# Patient Record
Sex: FEMALE | Race: BLACK OR AFRICAN AMERICAN | NOT HISPANIC OR LATINO | Employment: OTHER | URBAN - METROPOLITAN AREA
[De-identification: names, ages, dates, MRNs, and addresses within clinical notes are randomized per-mention and may not be internally consistent; named-entity substitution may affect disease eponyms.]

---

## 2017-01-09 ENCOUNTER — COMMUNICATION - HEALTHEAST (OUTPATIENT)
Dept: INTERNAL MEDICINE | Facility: CLINIC | Age: 82
End: 2017-01-09

## 2017-01-11 ENCOUNTER — HOSPITAL ENCOUNTER (OUTPATIENT)
Dept: CARDIOLOGY | Facility: CLINIC | Age: 82
Discharge: HOME | End: 2017-01-11
Attending: INTERNAL MEDICINE

## 2017-01-11 ENCOUNTER — OFFICE VISIT - HEALTHEAST (OUTPATIENT)
Dept: INTERNAL MEDICINE | Facility: CLINIC | Age: 82
End: 2017-01-11

## 2017-01-11 DIAGNOSIS — J98.01 ACUTE BRONCHOSPASM: ICD-10-CM

## 2017-01-11 DIAGNOSIS — I10 ESSENTIAL HYPERTENSION, BENIGN: ICD-10-CM

## 2017-01-11 DIAGNOSIS — J06.9 VIRAL URI: ICD-10-CM

## 2017-01-11 DIAGNOSIS — R05.9 COUGH: ICD-10-CM

## 2017-01-11 DIAGNOSIS — E11.8 TYPE 2 DIABETES MELLITUS WITH COMPLICATION, UNSPECIFIED LONG TERM INSULIN USE STATUS: ICD-10-CM

## 2017-01-11 DIAGNOSIS — J81.0 ACUTE PULMONARY EDEMA (H): ICD-10-CM

## 2017-01-11 DIAGNOSIS — R06.2 WHEEZING: ICD-10-CM

## 2017-01-11 DIAGNOSIS — E78.00 PURE HYPERCHOLESTEROLEMIA: ICD-10-CM

## 2017-01-11 DIAGNOSIS — E03.9 HYPOTHYROIDISM, UNSPECIFIED TYPE: ICD-10-CM

## 2017-01-11 LAB
AORTIC ROOT: 2.6 CM
AORTIC VALVE MEAN VELOCITY: 73.8 CM/S
AV DIMENSIONLESS INDEX VTI: 0.7
AV MEAN GRADIENT: 3 MMHG
AV PEAK GRADIENT: 5.1 MMHG
AV VALVE AREA: 1.9 CM2
AV VELOCITY RATIO: 0.7
BSA FOR ECHO PROCEDURE: 1.89 M2
CV BLOOD PRESSURE: NORMAL MMHG
CV ECHO HEIGHT: 63 IN
CV ECHO WEIGHT: 178 LBS
DOP CALC AO PEAK VEL: 113 CM/S
DOP CALC AO VTI: 28.6 CM
DOP CALC LVOT AREA: 2.83 CM2
DOP CALC LVOT DIAMETER: 1.9 CM
DOP CALC LVOT PEAK VEL: 78.2 CM/S
DOP CALC LVOT STROKE VOLUME: 55.3 CM3
DOP CALCLVOT PEAK VEL VTI: 19.5 CM
EJECTION FRACTION: 55 % (ref 55–75)
FRACTIONAL SHORTENING: 37.6 % (ref 28–44)
HBA1C MFR BLD: 6.5 % (ref 3.5–6)
INTERVENTRICULAR SEPTUM IN END DIASTOLE: 1.02 CM (ref 0.6–0.9)
IVS/PW RATIO: 1
LA AREA 1: 26.1 CM2
LA AREA 2: 21.2 CM2
LEFT ATRIUM LENGTH: 5.58 CM
LEFT ATRIUM SIZE: 4.2 CM
LEFT ATRIUM VOLUME INDEX: 44.6 ML/M2
LEFT ATRIUM VOLUME: 84.3 CM3
LEFT VENTRICLE CARDIAC INDEX: 1.9 L/MIN/M2
LEFT VENTRICLE CARDIAC OUTPUT: 3.5 L/MIN
LEFT VENTRICLE DIASTOLIC VOLUME INDEX: 21.2 CM3/M2 (ref 34–74)
LEFT VENTRICLE DIASTOLIC VOLUME: 40 CM3 (ref 46–106)
LEFT VENTRICLE HEART RATE: 64 BPM
LEFT VENTRICLE MASS INDEX: 99.5 G/M2
LEFT VENTRICLE SYSTOLIC VOLUME INDEX: 9.5 CM3/M2 (ref 11–31)
LEFT VENTRICLE SYSTOLIC VOLUME: 18 CM3 (ref 14–42)
LEFT VENTRICULAR INTERNAL DIMENSION IN DIASTOLE: 4.98 CM (ref 3.8–5.2)
LEFT VENTRICULAR INTERNAL DIMENSION IN SYSTOLE: 3.11 CM (ref 2.2–3.5)
LEFT VENTRICULAR MASS: 188.1 G
LEFT VENTRICULAR OUTFLOW TRACT MEAN GRADIENT: 1 MMHG
LEFT VENTRICULAR OUTFLOW TRACT MEAN VELOCITY: 52.9 CM/S
LEFT VENTRICULAR OUTFLOW TRACT PEAK GRADIENT: 2 MMHG
LEFT VENTRICULAR POSTERIOR WALL IN END DIASTOLE: 1.04 CM (ref 0.6–0.9)
LV STROKE VOLUME INDEX: 29.2 ML/M2
MITRAL REGURGITANT VELOCITY TIME INTEGRAL: 191 CM
MITRAL VALVE E/A RATIO: 2.2
MR FLOW: 55 CM3
MR MEAN GRADIENT: 60 MMHG
MR MEAN VELOCITY: 364 CM/S
MR PEAK GRADIENT: 85.4 MMHG
MR PISA EROA: 0.3 CM2
MR PISA RADIUS: 0.9 CM
MR PISA VN NYQUIST: 26 CM/S
MV AVERAGE E/E' RATIO: 12.9 CM/S
MV DECELERATION TIME: 158 MS
MV E'TISSUE VEL-LAT: 7.99 CM/S
MV E'TISSUE VEL-MED: 8.29 CM/S
MV LATERAL E/E' RATIO: 13.1
MV MEDIAL E/E' RATIO: 12.7
MV PEAK A VELOCITY: 47.9 CM/S
MV PEAK E VELOCITY: 105 CM/S
MV REGURGITANT VOLUME: 54.7 CC
NUC REST DIASTOLIC VOLUME INDEX: 2848 LBS
NUC REST SYSTOLIC VOLUME INDEX: 63 IN
PISA MR PEAK VEL: 462 CM/S
PR MAX PG: 11 MMHG
PR PEAK VELOCITY: 169 CM/S
TRICUSPID REGURGITATION PEAK PRESSURE GRADIENT: 39.9 MMHG
TRICUSPID VALVE PEAK REGURGITANT VELOCITY: 316 CM/S

## 2017-01-11 ASSESSMENT — MIFFLIN-ST. JEOR
SCORE: 1191.07
SCORE: 1186.53

## 2017-01-12 ENCOUNTER — COMMUNICATION - HEALTHEAST (OUTPATIENT)
Dept: INTERNAL MEDICINE | Facility: CLINIC | Age: 82
End: 2017-01-12

## 2017-01-13 ENCOUNTER — COMMUNICATION - HEALTHEAST (OUTPATIENT)
Dept: INTERNAL MEDICINE | Facility: CLINIC | Age: 82
End: 2017-01-13

## 2017-01-17 ENCOUNTER — COMMUNICATION - HEALTHEAST (OUTPATIENT)
Dept: INTERNAL MEDICINE | Facility: CLINIC | Age: 82
End: 2017-01-17

## 2017-01-26 ENCOUNTER — OFFICE VISIT - HEALTHEAST (OUTPATIENT)
Dept: INTERNAL MEDICINE | Facility: CLINIC | Age: 82
End: 2017-01-26

## 2017-01-26 ENCOUNTER — COMMUNICATION - HEALTHEAST (OUTPATIENT)
Dept: INTERNAL MEDICINE | Facility: CLINIC | Age: 82
End: 2017-01-26

## 2017-01-26 DIAGNOSIS — J81.1 CHRONIC PULMONARY EDEMA: ICD-10-CM

## 2017-01-26 DIAGNOSIS — R05.9 COUGH: ICD-10-CM

## 2017-01-26 DIAGNOSIS — E11.8 TYPE 2 DIABETES MELLITUS WITH COMPLICATION, UNSPECIFIED LONG TERM INSULIN USE STATUS: ICD-10-CM

## 2017-01-26 DIAGNOSIS — I10 ESSENTIAL HYPERTENSION, BENIGN: ICD-10-CM

## 2017-01-26 ASSESSMENT — MIFFLIN-ST. JEOR: SCORE: 1195.6

## 2017-02-01 ENCOUNTER — COMMUNICATION - HEALTHEAST (OUTPATIENT)
Dept: INTERNAL MEDICINE | Facility: CLINIC | Age: 82
End: 2017-02-01

## 2017-02-27 ENCOUNTER — OFFICE VISIT - HEALTHEAST (OUTPATIENT)
Dept: INTERNAL MEDICINE | Facility: CLINIC | Age: 82
End: 2017-02-27

## 2017-02-27 DIAGNOSIS — E11.8 TYPE 2 DIABETES MELLITUS WITH COMPLICATION, UNSPECIFIED LONG TERM INSULIN USE STATUS: ICD-10-CM

## 2017-02-27 DIAGNOSIS — M79.10 MYALGIA: ICD-10-CM

## 2017-02-27 DIAGNOSIS — E03.9 HYPOTHYROIDISM, UNSPECIFIED TYPE: ICD-10-CM

## 2017-02-27 DIAGNOSIS — I10 ESSENTIAL HYPERTENSION, BENIGN: ICD-10-CM

## 2017-02-27 ASSESSMENT — MIFFLIN-ST. JEOR: SCORE: 1213.75

## 2017-03-01 ENCOUNTER — COMMUNICATION - HEALTHEAST (OUTPATIENT)
Dept: INTERNAL MEDICINE | Facility: CLINIC | Age: 82
End: 2017-03-01

## 2017-06-28 ENCOUNTER — COMMUNICATION - HEALTHEAST (OUTPATIENT)
Dept: INTERNAL MEDICINE | Facility: CLINIC | Age: 82
End: 2017-06-28

## 2017-06-28 DIAGNOSIS — E03.9 HYPOTHYROID: ICD-10-CM

## 2017-07-01 ENCOUNTER — COMMUNICATION - HEALTHEAST (OUTPATIENT)
Dept: INTERNAL MEDICINE | Facility: CLINIC | Age: 82
End: 2017-07-01

## 2017-07-11 ENCOUNTER — OFFICE VISIT - HEALTHEAST (OUTPATIENT)
Dept: INTERNAL MEDICINE | Facility: CLINIC | Age: 82
End: 2017-07-11

## 2017-07-11 DIAGNOSIS — R40.0 DAYTIME SOMNOLENCE: ICD-10-CM

## 2017-07-11 DIAGNOSIS — E78.00 PURE HYPERCHOLESTEROLEMIA: ICD-10-CM

## 2017-07-11 DIAGNOSIS — E11.8 TYPE 2 DIABETES MELLITUS WITH COMPLICATION (H): ICD-10-CM

## 2017-07-11 DIAGNOSIS — I10 ESSENTIAL HYPERTENSION, BENIGN: ICD-10-CM

## 2017-07-11 DIAGNOSIS — E03.9 HYPOTHYROIDISM: ICD-10-CM

## 2017-07-11 LAB
CHOLEST SERPL-MCNC: 138 MG/DL
FASTING STATUS PATIENT QL REPORTED: YES
HBA1C MFR BLD: 7 % (ref 3.5–6)
HDLC SERPL-MCNC: 42 MG/DL
LDLC SERPL CALC-MCNC: 82 MG/DL
TRIGL SERPL-MCNC: 71 MG/DL

## 2017-07-11 ASSESSMENT — MIFFLIN-ST. JEOR: SCORE: 1204.68

## 2017-07-12 ENCOUNTER — COMMUNICATION - HEALTHEAST (OUTPATIENT)
Dept: INTERNAL MEDICINE | Facility: CLINIC | Age: 82
End: 2017-07-12

## 2017-07-27 ENCOUNTER — COMMUNICATION - HEALTHEAST (OUTPATIENT)
Dept: INTERNAL MEDICINE | Facility: CLINIC | Age: 82
End: 2017-07-27

## 2017-08-01 ENCOUNTER — OFFICE VISIT - HEALTHEAST (OUTPATIENT)
Dept: INTERNAL MEDICINE | Facility: CLINIC | Age: 82
End: 2017-08-01

## 2017-08-01 DIAGNOSIS — I10 ESSENTIAL HYPERTENSION, BENIGN: ICD-10-CM

## 2017-08-01 DIAGNOSIS — I48.92 ATRIAL FLUTTER (H): ICD-10-CM

## 2017-08-01 DIAGNOSIS — R60.9 EDEMA: ICD-10-CM

## 2017-08-01 DIAGNOSIS — I49.9 IRREGULAR HEART RHYTHM: ICD-10-CM

## 2017-08-01 DIAGNOSIS — R40.0 DAYTIME SOMNOLENCE: ICD-10-CM

## 2017-08-01 LAB
ATRIAL RATE - MUSE: 340 BPM
DIASTOLIC BLOOD PRESSURE - MUSE: NORMAL MMHG
INTERPRETATION ECG - MUSE: NORMAL
P AXIS - MUSE: NORMAL DEGREES
PR INTERVAL - MUSE: NORMAL MS
QRS DURATION - MUSE: 88 MS
QT - MUSE: 394 MS
QTC - MUSE: 492 MS
R AXIS - MUSE: -21 DEGREES
SYSTOLIC BLOOD PRESSURE - MUSE: NORMAL MMHG
T AXIS - MUSE: 191 DEGREES
VENTRICULAR RATE- MUSE: 94 BPM

## 2017-08-01 ASSESSMENT — MIFFLIN-ST. JEOR: SCORE: 1200.14

## 2017-08-03 ENCOUNTER — AMBULATORY - HEALTHEAST (OUTPATIENT)
Dept: PODIATRY | Facility: CLINIC | Age: 82
End: 2017-08-03

## 2017-08-03 ENCOUNTER — OFFICE VISIT - HEALTHEAST (OUTPATIENT)
Dept: CARDIOLOGY | Facility: CLINIC | Age: 82
End: 2017-08-03

## 2017-08-03 DIAGNOSIS — I50.32 CHRONIC DIASTOLIC CONGESTIVE HEART FAILURE (H): ICD-10-CM

## 2017-08-03 DIAGNOSIS — I10 ESSENTIAL HYPERTENSION: ICD-10-CM

## 2017-08-03 DIAGNOSIS — B35.1 NAIL FUNGUS: ICD-10-CM

## 2017-08-03 DIAGNOSIS — I48.3 TYPICAL ATRIAL FLUTTER (H): ICD-10-CM

## 2017-08-03 DIAGNOSIS — E11.8 TYPE 2 DIABETES MELLITUS WITH COMPLICATION, WITHOUT LONG-TERM CURRENT USE OF INSULIN (H): ICD-10-CM

## 2017-08-03 DIAGNOSIS — L60.2 ONYCHAUXIS: ICD-10-CM

## 2017-08-03 DIAGNOSIS — L84 TYLOMA: ICD-10-CM

## 2017-08-03 DIAGNOSIS — I11.0 LVH (LEFT VENTRICULAR HYPERTROPHY) DUE TO HYPERTENSIVE DISEASE, WITH HEART FAILURE (H): ICD-10-CM

## 2017-08-03 ASSESSMENT — MIFFLIN-ST. JEOR
SCORE: 1166.68
SCORE: 1200.14

## 2017-08-07 ENCOUNTER — COMMUNICATION - HEALTHEAST (OUTPATIENT)
Dept: INTERNAL MEDICINE | Facility: CLINIC | Age: 82
End: 2017-08-07

## 2017-09-08 ENCOUNTER — AMBULATORY - HEALTHEAST (OUTPATIENT)
Dept: NURSING | Facility: CLINIC | Age: 82
End: 2017-09-08

## 2017-09-08 ENCOUNTER — COMMUNICATION - HEALTHEAST (OUTPATIENT)
Dept: INTERNAL MEDICINE | Facility: CLINIC | Age: 82
End: 2017-09-08

## 2017-09-08 DIAGNOSIS — I48.3 TYPICAL ATRIAL FLUTTER (H): ICD-10-CM

## 2017-09-08 DIAGNOSIS — I48.92 ATRIAL FLUTTER (H): ICD-10-CM

## 2017-09-12 ENCOUNTER — COMMUNICATION - HEALTHEAST (OUTPATIENT)
Dept: NURSING | Facility: CLINIC | Age: 82
End: 2017-09-12

## 2017-09-12 DIAGNOSIS — I48.3 TYPICAL ATRIAL FLUTTER (H): ICD-10-CM

## 2017-09-13 ENCOUNTER — AMBULATORY - HEALTHEAST (OUTPATIENT)
Dept: LAB | Facility: CLINIC | Age: 82
End: 2017-09-13

## 2017-09-13 ENCOUNTER — COMMUNICATION - HEALTHEAST (OUTPATIENT)
Dept: NURSING | Facility: CLINIC | Age: 82
End: 2017-09-13

## 2017-09-13 DIAGNOSIS — I48.3 TYPICAL ATRIAL FLUTTER (H): ICD-10-CM

## 2017-09-18 ENCOUNTER — COMMUNICATION - HEALTHEAST (OUTPATIENT)
Dept: INTERNAL MEDICINE | Facility: CLINIC | Age: 82
End: 2017-09-18

## 2017-09-19 ENCOUNTER — AMBULATORY - HEALTHEAST (OUTPATIENT)
Dept: LAB | Facility: CLINIC | Age: 82
End: 2017-09-19

## 2017-09-19 ENCOUNTER — COMMUNICATION - HEALTHEAST (OUTPATIENT)
Dept: NURSING | Facility: CLINIC | Age: 82
End: 2017-09-19

## 2017-09-19 DIAGNOSIS — I48.3 TYPICAL ATRIAL FLUTTER (H): ICD-10-CM

## 2017-09-25 ENCOUNTER — COMMUNICATION - HEALTHEAST (OUTPATIENT)
Dept: NURSING | Facility: CLINIC | Age: 82
End: 2017-09-25

## 2017-09-25 ENCOUNTER — AMBULATORY - HEALTHEAST (OUTPATIENT)
Dept: LAB | Facility: CLINIC | Age: 82
End: 2017-09-25

## 2017-09-25 DIAGNOSIS — I48.3 TYPICAL ATRIAL FLUTTER (H): ICD-10-CM

## 2017-09-28 ENCOUNTER — COMMUNICATION - HEALTHEAST (OUTPATIENT)
Dept: NURSING | Facility: CLINIC | Age: 82
End: 2017-09-28

## 2017-09-28 ENCOUNTER — AMBULATORY - HEALTHEAST (OUTPATIENT)
Dept: LAB | Facility: CLINIC | Age: 82
End: 2017-09-28

## 2017-09-28 DIAGNOSIS — I48.3 TYPICAL ATRIAL FLUTTER (H): ICD-10-CM

## 2017-10-02 ENCOUNTER — AMBULATORY - HEALTHEAST (OUTPATIENT)
Dept: LAB | Facility: CLINIC | Age: 82
End: 2017-10-02

## 2017-10-02 ENCOUNTER — COMMUNICATION - HEALTHEAST (OUTPATIENT)
Dept: NURSING | Facility: CLINIC | Age: 82
End: 2017-10-02

## 2017-10-02 DIAGNOSIS — I48.3 TYPICAL ATRIAL FLUTTER (H): ICD-10-CM

## 2017-10-10 ENCOUNTER — COMMUNICATION - HEALTHEAST (OUTPATIENT)
Dept: SCHEDULING | Facility: CLINIC | Age: 82
End: 2017-10-10

## 2017-10-11 ENCOUNTER — COMMUNICATION - HEALTHEAST (OUTPATIENT)
Dept: NURSING | Facility: CLINIC | Age: 82
End: 2017-10-11

## 2017-10-16 ENCOUNTER — OFFICE VISIT - HEALTHEAST (OUTPATIENT)
Dept: INTERNAL MEDICINE | Facility: CLINIC | Age: 82
End: 2017-10-16

## 2017-10-16 DIAGNOSIS — I48.92 ATRIAL FLUTTER (H): ICD-10-CM

## 2017-10-16 DIAGNOSIS — R51.9 HEADACHE: ICD-10-CM

## 2017-10-16 DIAGNOSIS — M54.9 BACK PAIN: ICD-10-CM

## 2017-10-16 DIAGNOSIS — I10 ESSENTIAL HYPERTENSION, BENIGN: ICD-10-CM

## 2017-10-16 ASSESSMENT — MIFFLIN-ST. JEOR: SCORE: 1186.53

## 2017-10-19 ENCOUNTER — COMMUNICATION - HEALTHEAST (OUTPATIENT)
Dept: NURSING | Facility: CLINIC | Age: 82
End: 2017-10-19

## 2017-10-19 ENCOUNTER — AMBULATORY - HEALTHEAST (OUTPATIENT)
Dept: PODIATRY | Facility: CLINIC | Age: 82
End: 2017-10-19

## 2017-10-19 ENCOUNTER — AMBULATORY - HEALTHEAST (OUTPATIENT)
Dept: LAB | Facility: CLINIC | Age: 82
End: 2017-10-19

## 2017-10-19 DIAGNOSIS — B35.1 NAIL FUNGUS: ICD-10-CM

## 2017-10-19 DIAGNOSIS — E11.9 TYPE 2 DIABETES MELLITUS WITHOUT COMPLICATION, WITHOUT LONG-TERM CURRENT USE OF INSULIN (H): ICD-10-CM

## 2017-10-19 DIAGNOSIS — I48.92 ATRIAL FLUTTER (H): ICD-10-CM

## 2017-10-19 DIAGNOSIS — L60.2 ONYCHAUXIS: ICD-10-CM

## 2017-10-19 DIAGNOSIS — I48.3 TYPICAL ATRIAL FLUTTER (H): ICD-10-CM

## 2017-10-19 DIAGNOSIS — L84 TYLOMA: ICD-10-CM

## 2017-10-26 ENCOUNTER — OFFICE VISIT - HEALTHEAST (OUTPATIENT)
Dept: INTERNAL MEDICINE | Facility: CLINIC | Age: 82
End: 2017-10-26

## 2017-10-26 ENCOUNTER — COMMUNICATION - HEALTHEAST (OUTPATIENT)
Dept: INTERNAL MEDICINE | Facility: CLINIC | Age: 82
End: 2017-10-26

## 2017-10-26 DIAGNOSIS — E78.00 HYPERCHOLESTEREMIA: ICD-10-CM

## 2017-10-26 DIAGNOSIS — I10 ESSENTIAL HYPERTENSION, BENIGN: ICD-10-CM

## 2017-10-26 DIAGNOSIS — Z23 NEED FOR IMMUNIZATION AGAINST INFLUENZA: ICD-10-CM

## 2017-10-26 DIAGNOSIS — K30 UPSET STOMACH: ICD-10-CM

## 2017-10-26 ASSESSMENT — MIFFLIN-ST. JEOR: SCORE: 1172.92

## 2017-10-30 ENCOUNTER — COMMUNICATION - HEALTHEAST (OUTPATIENT)
Dept: INTERNAL MEDICINE | Facility: CLINIC | Age: 82
End: 2017-10-30

## 2017-10-30 DIAGNOSIS — E78.00 HYPERCHOLESTEREMIA: ICD-10-CM

## 2017-11-02 ENCOUNTER — COMMUNICATION - HEALTHEAST (OUTPATIENT)
Dept: NURSING | Facility: CLINIC | Age: 82
End: 2017-11-02

## 2017-11-16 ENCOUNTER — OFFICE VISIT - HEALTHEAST (OUTPATIENT)
Dept: INTERNAL MEDICINE | Facility: CLINIC | Age: 82
End: 2017-11-16

## 2017-11-16 DIAGNOSIS — I10 ESSENTIAL HYPERTENSION, BENIGN: ICD-10-CM

## 2017-11-16 ASSESSMENT — MIFFLIN-ST. JEOR: SCORE: 1172.92

## 2017-11-20 ENCOUNTER — COMMUNICATION - HEALTHEAST (OUTPATIENT)
Dept: NURSING | Facility: CLINIC | Age: 82
End: 2017-11-20

## 2017-11-20 ENCOUNTER — AMBULATORY - HEALTHEAST (OUTPATIENT)
Dept: LAB | Facility: CLINIC | Age: 82
End: 2017-11-20

## 2017-11-20 DIAGNOSIS — I48.92 ATRIAL FLUTTER (H): ICD-10-CM

## 2017-11-20 DIAGNOSIS — I48.3 TYPICAL ATRIAL FLUTTER (H): ICD-10-CM

## 2017-11-29 ENCOUNTER — COMMUNICATION - HEALTHEAST (OUTPATIENT)
Dept: INTERNAL MEDICINE | Facility: CLINIC | Age: 82
End: 2017-11-29

## 2017-11-29 DIAGNOSIS — I10 ESSENTIAL HYPERTENSION, BENIGN: ICD-10-CM

## 2017-12-04 ENCOUNTER — COMMUNICATION - HEALTHEAST (OUTPATIENT)
Dept: NURSING | Facility: CLINIC | Age: 82
End: 2017-12-04

## 2017-12-04 ENCOUNTER — AMBULATORY - HEALTHEAST (OUTPATIENT)
Dept: LAB | Facility: CLINIC | Age: 82
End: 2017-12-04

## 2017-12-04 DIAGNOSIS — I48.92 ATRIAL FLUTTER (H): ICD-10-CM

## 2017-12-04 DIAGNOSIS — I48.3 TYPICAL ATRIAL FLUTTER (H): ICD-10-CM

## 2017-12-18 ENCOUNTER — COMMUNICATION - HEALTHEAST (OUTPATIENT)
Dept: INTERNAL MEDICINE | Facility: CLINIC | Age: 82
End: 2017-12-18

## 2017-12-18 DIAGNOSIS — E03.9 HYPOTHYROID: ICD-10-CM

## 2018-01-04 ENCOUNTER — COMMUNICATION - HEALTHEAST (OUTPATIENT)
Dept: NURSING | Facility: CLINIC | Age: 83
End: 2018-01-04

## 2018-01-08 ENCOUNTER — COMMUNICATION - HEALTHEAST (OUTPATIENT)
Dept: ADMINISTRATIVE | Facility: CLINIC | Age: 83
End: 2018-01-08

## 2018-01-08 ENCOUNTER — COMMUNICATION - HEALTHEAST (OUTPATIENT)
Dept: NURSING | Facility: CLINIC | Age: 83
End: 2018-01-08

## 2018-01-08 ENCOUNTER — AMBULATORY - HEALTHEAST (OUTPATIENT)
Dept: LAB | Facility: CLINIC | Age: 83
End: 2018-01-08

## 2018-01-08 DIAGNOSIS — I48.92 ATRIAL FLUTTER (H): ICD-10-CM

## 2018-01-08 DIAGNOSIS — I48.3 TYPICAL ATRIAL FLUTTER (H): ICD-10-CM

## 2018-01-08 LAB — INR PPP: 3 (ref 0.9–1.1)

## 2018-01-28 ENCOUNTER — COMMUNICATION - HEALTHEAST (OUTPATIENT)
Dept: INTERNAL MEDICINE | Facility: CLINIC | Age: 83
End: 2018-01-28

## 2018-01-29 ENCOUNTER — COMMUNICATION - HEALTHEAST (OUTPATIENT)
Dept: INTERNAL MEDICINE | Facility: CLINIC | Age: 83
End: 2018-01-29

## 2018-01-29 DIAGNOSIS — E87.6 HYPOPOTASSEMIA: ICD-10-CM

## 2018-01-31 RX ORDER — FUROSEMIDE 20 MG
TABLET ORAL
Qty: 90 TABLET | Refills: 1 | Status: SHIPPED | OUTPATIENT
Start: 2018-01-31 | End: 2022-04-21

## 2018-01-31 RX ORDER — POTASSIUM CHLORIDE 750 MG/1
TABLET, FILM COATED, EXTENDED RELEASE ORAL
Qty: 90 TABLET | Refills: 0 | Status: SHIPPED | OUTPATIENT
Start: 2018-01-31 | End: 2022-04-21

## 2018-02-12 ENCOUNTER — COMMUNICATION - HEALTHEAST (OUTPATIENT)
Dept: NURSING | Facility: CLINIC | Age: 83
End: 2018-02-12

## 2018-02-13 ENCOUNTER — AMBULATORY - HEALTHEAST (OUTPATIENT)
Dept: LAB | Facility: CLINIC | Age: 83
End: 2018-02-13

## 2018-02-13 ENCOUNTER — COMMUNICATION - HEALTHEAST (OUTPATIENT)
Dept: NURSING | Facility: CLINIC | Age: 83
End: 2018-02-13

## 2018-02-13 DIAGNOSIS — I48.92 ATRIAL FLUTTER (H): ICD-10-CM

## 2018-02-13 DIAGNOSIS — I48.3 TYPICAL ATRIAL FLUTTER (H): ICD-10-CM

## 2018-02-13 LAB — INR PPP: 2.7 (ref 0.9–1.1)

## 2018-03-13 ENCOUNTER — COMMUNICATION - HEALTHEAST (OUTPATIENT)
Dept: NURSING | Facility: CLINIC | Age: 83
End: 2018-03-13

## 2018-03-13 ENCOUNTER — OFFICE VISIT - HEALTHEAST (OUTPATIENT)
Dept: INTERNAL MEDICINE | Facility: CLINIC | Age: 83
End: 2018-03-13

## 2018-03-13 DIAGNOSIS — I48.3 TYPICAL ATRIAL FLUTTER (H): ICD-10-CM

## 2018-03-13 DIAGNOSIS — R26.81 UNSTEADY GAIT: ICD-10-CM

## 2018-03-13 DIAGNOSIS — I10 ESSENTIAL HYPERTENSION, BENIGN: ICD-10-CM

## 2018-03-13 DIAGNOSIS — E78.00 PURE HYPERCHOLESTEROLEMIA: ICD-10-CM

## 2018-03-13 DIAGNOSIS — E03.9 HYPOTHYROIDISM, UNSPECIFIED TYPE: ICD-10-CM

## 2018-03-13 DIAGNOSIS — I48.92 ATRIAL FLUTTER (H): ICD-10-CM

## 2018-03-13 LAB
ALBUMIN SERPL-MCNC: 3.1 G/DL (ref 3.5–5)
ALP SERPL-CCNC: 77 U/L (ref 45–120)
ALT SERPL W P-5'-P-CCNC: 12 U/L (ref 0–45)
ANION GAP SERPL CALCULATED.3IONS-SCNC: 9 MMOL/L (ref 5–18)
AST SERPL W P-5'-P-CCNC: 19 U/L (ref 0–40)
BILIRUB SERPL-MCNC: 0.7 MG/DL (ref 0–1)
BUN SERPL-MCNC: 20 MG/DL (ref 8–28)
CALCIUM SERPL-MCNC: 9 MG/DL (ref 8.5–10.5)
CHLORIDE BLD-SCNC: 102 MMOL/L (ref 98–107)
CHOLEST SERPL-MCNC: 148 MG/DL
CO2 SERPL-SCNC: 28 MMOL/L (ref 22–31)
CREAT SERPL-MCNC: 1.08 MG/DL (ref 0.6–1.1)
FASTING STATUS PATIENT QL REPORTED: YES
GFR SERPL CREATININE-BSD FRML MDRD: 48 ML/MIN/1.73M2
GLUCOSE BLD-MCNC: 141 MG/DL (ref 70–125)
HDLC SERPL-MCNC: 51 MG/DL
INR PPP: 3.3 (ref 0.9–1.1)
LDLC SERPL CALC-MCNC: 82 MG/DL
POTASSIUM BLD-SCNC: 4.4 MMOL/L (ref 3.5–5)
PROT SERPL-MCNC: 7.9 G/DL (ref 6–8)
SODIUM SERPL-SCNC: 139 MMOL/L (ref 136–145)
TRIGL SERPL-MCNC: 73 MG/DL
TSH SERPL DL<=0.005 MIU/L-ACNC: 3.43 UIU/ML (ref 0.3–5)

## 2018-03-13 ASSESSMENT — MIFFLIN-ST. JEOR: SCORE: 1191.07

## 2018-03-14 ENCOUNTER — COMMUNICATION - HEALTHEAST (OUTPATIENT)
Dept: INTERNAL MEDICINE | Facility: CLINIC | Age: 83
End: 2018-03-14

## 2018-03-29 ENCOUNTER — COMMUNICATION - HEALTHEAST (OUTPATIENT)
Dept: ADMINISTRATIVE | Facility: CLINIC | Age: 83
End: 2018-03-29

## 2018-04-09 ENCOUNTER — COMMUNICATION - HEALTHEAST (OUTPATIENT)
Dept: ANTICOAGULATION | Facility: CLINIC | Age: 83
End: 2018-04-09

## 2018-04-09 ENCOUNTER — AMBULATORY - HEALTHEAST (OUTPATIENT)
Dept: LAB | Facility: CLINIC | Age: 83
End: 2018-04-09

## 2018-04-09 DIAGNOSIS — I48.3 TYPICAL ATRIAL FLUTTER (H): ICD-10-CM

## 2018-04-09 DIAGNOSIS — I48.92 ATRIAL FLUTTER (H): ICD-10-CM

## 2018-04-09 LAB — INR PPP: 2.9 (ref 0.9–1.1)

## 2018-04-13 ENCOUNTER — OFFICE VISIT - HEALTHEAST (OUTPATIENT)
Dept: INTERNAL MEDICINE | Facility: CLINIC | Age: 83
End: 2018-04-13

## 2018-04-13 ENCOUNTER — COMMUNICATION - HEALTHEAST (OUTPATIENT)
Dept: ANTICOAGULATION | Facility: CLINIC | Age: 83
End: 2018-04-13

## 2018-04-13 DIAGNOSIS — I48.3 TYPICAL ATRIAL FLUTTER (H): ICD-10-CM

## 2018-04-13 DIAGNOSIS — T30.0 BURN: ICD-10-CM

## 2018-04-13 DIAGNOSIS — I48.92 ATRIAL FLUTTER (H): ICD-10-CM

## 2018-04-13 DIAGNOSIS — I10 ESSENTIAL HYPERTENSION, BENIGN: ICD-10-CM

## 2018-04-13 DIAGNOSIS — L72.9 SCALP CYST: ICD-10-CM

## 2018-04-13 LAB — INR PPP: 3.6 (ref 0.9–1.1)

## 2018-04-13 ASSESSMENT — MIFFLIN-ST. JEOR: SCORE: 1191.07

## 2018-04-25 ENCOUNTER — COMMUNICATION - HEALTHEAST (OUTPATIENT)
Dept: ADMINISTRATIVE | Facility: CLINIC | Age: 83
End: 2018-04-25

## 2018-04-27 ENCOUNTER — COMMUNICATION - HEALTHEAST (OUTPATIENT)
Dept: ANTICOAGULATION | Facility: CLINIC | Age: 83
End: 2018-04-27

## 2018-04-27 ENCOUNTER — AMBULATORY - HEALTHEAST (OUTPATIENT)
Dept: LAB | Facility: CLINIC | Age: 83
End: 2018-04-27

## 2018-04-27 DIAGNOSIS — I48.3 TYPICAL ATRIAL FLUTTER (H): ICD-10-CM

## 2018-04-27 DIAGNOSIS — I48.92 ATRIAL FLUTTER (H): ICD-10-CM

## 2018-04-27 LAB — INR PPP: 2.6 (ref 0.9–1.1)

## 2018-05-18 ENCOUNTER — COMMUNICATION - HEALTHEAST (OUTPATIENT)
Dept: ANTICOAGULATION | Facility: CLINIC | Age: 83
End: 2018-05-18

## 2018-06-05 ENCOUNTER — COMMUNICATION - HEALTHEAST (OUTPATIENT)
Dept: ANTICOAGULATION | Facility: CLINIC | Age: 83
End: 2018-06-05

## 2018-06-05 ENCOUNTER — AMBULATORY - HEALTHEAST (OUTPATIENT)
Dept: LAB | Facility: CLINIC | Age: 83
End: 2018-06-05

## 2018-06-05 DIAGNOSIS — I48.92 ATRIAL FLUTTER (H): ICD-10-CM

## 2018-06-05 DIAGNOSIS — I48.3 TYPICAL ATRIAL FLUTTER (H): ICD-10-CM

## 2018-06-05 LAB — INR PPP: 3.6 (ref 0.9–1.1)

## 2018-06-06 ENCOUNTER — COMMUNICATION - HEALTHEAST (OUTPATIENT)
Dept: INTERNAL MEDICINE | Facility: CLINIC | Age: 83
End: 2018-06-06

## 2018-06-06 DIAGNOSIS — I10 ESSENTIAL HYPERTENSION, BENIGN: ICD-10-CM

## 2018-06-11 ENCOUNTER — OFFICE VISIT - HEALTHEAST (OUTPATIENT)
Dept: INTERNAL MEDICINE | Facility: CLINIC | Age: 83
End: 2018-06-11

## 2018-06-11 ENCOUNTER — COMMUNICATION - HEALTHEAST (OUTPATIENT)
Dept: ANTICOAGULATION | Facility: CLINIC | Age: 83
End: 2018-06-11

## 2018-06-11 DIAGNOSIS — I48.3 TYPICAL ATRIAL FLUTTER (H): ICD-10-CM

## 2018-06-11 DIAGNOSIS — I48.92 ATRIAL FLUTTER (H): ICD-10-CM

## 2018-06-11 DIAGNOSIS — E11.8 TYPE 2 DIABETES MELLITUS WITH COMPLICATION, UNSPECIFIED LONG TERM INSULIN USE STATUS: ICD-10-CM

## 2018-06-11 DIAGNOSIS — E03.9 HYPOTHYROIDISM, UNSPECIFIED TYPE: ICD-10-CM

## 2018-06-11 DIAGNOSIS — R53.83 FATIGUE: ICD-10-CM

## 2018-06-11 DIAGNOSIS — I10 ESSENTIAL HYPERTENSION, BENIGN: ICD-10-CM

## 2018-06-11 LAB
ALBUMIN SERPL-MCNC: 3.1 G/DL (ref 3.5–5)
ALP SERPL-CCNC: 94 U/L (ref 45–120)
ALT SERPL W P-5'-P-CCNC: 9 U/L (ref 0–45)
ANION GAP SERPL CALCULATED.3IONS-SCNC: 13 MMOL/L (ref 5–18)
AST SERPL W P-5'-P-CCNC: 14 U/L (ref 0–40)
BILIRUB SERPL-MCNC: 0.5 MG/DL (ref 0–1)
BUN SERPL-MCNC: 19 MG/DL (ref 8–28)
CALCIUM SERPL-MCNC: 9.1 MG/DL (ref 8.5–10.5)
CHLORIDE BLD-SCNC: 104 MMOL/L (ref 98–107)
CHOLEST SERPL-MCNC: 156 MG/DL
CO2 SERPL-SCNC: 24 MMOL/L (ref 22–31)
CREAT SERPL-MCNC: 1.1 MG/DL (ref 0.6–1.1)
FASTING STATUS PATIENT QL REPORTED: YES
GFR SERPL CREATININE-BSD FRML MDRD: 47 ML/MIN/1.73M2
GLUCOSE BLD-MCNC: 123 MG/DL (ref 70–125)
HBA1C MFR BLD: 7.3 % (ref 3.5–6)
HDLC SERPL-MCNC: 49 MG/DL
INR PPP: 3.5 (ref 0.9–1.1)
LDLC SERPL CALC-MCNC: 92 MG/DL
POTASSIUM BLD-SCNC: 3.6 MMOL/L (ref 3.5–5)
PROT SERPL-MCNC: 8.3 G/DL (ref 6–8)
SODIUM SERPL-SCNC: 141 MMOL/L (ref 136–145)
TRIGL SERPL-MCNC: 76 MG/DL
TSH SERPL DL<=0.005 MIU/L-ACNC: 3.79 UIU/ML (ref 0.3–5)

## 2018-06-11 ASSESSMENT — MIFFLIN-ST. JEOR: SCORE: 1179.72

## 2018-06-13 ENCOUNTER — COMMUNICATION - HEALTHEAST (OUTPATIENT)
Dept: INTERNAL MEDICINE | Facility: CLINIC | Age: 83
End: 2018-06-13

## 2018-06-25 ENCOUNTER — AMBULATORY - HEALTHEAST (OUTPATIENT)
Dept: LAB | Facility: CLINIC | Age: 83
End: 2018-06-25

## 2018-06-25 ENCOUNTER — COMMUNICATION - HEALTHEAST (OUTPATIENT)
Dept: ANTICOAGULATION | Facility: CLINIC | Age: 83
End: 2018-06-25

## 2018-06-25 DIAGNOSIS — I48.3 TYPICAL ATRIAL FLUTTER (H): ICD-10-CM

## 2018-06-25 LAB — INR PPP: 1.9 (ref 0.9–1.1)

## 2018-07-08 ENCOUNTER — COMMUNICATION - HEALTHEAST (OUTPATIENT)
Dept: INTERNAL MEDICINE | Facility: CLINIC | Age: 83
End: 2018-07-08

## 2018-07-08 DIAGNOSIS — E03.9 HYPOTHYROID: ICD-10-CM

## 2018-07-08 DIAGNOSIS — I10 ESSENTIAL HYPERTENSION, BENIGN: ICD-10-CM

## 2018-07-09 ENCOUNTER — AMBULATORY - HEALTHEAST (OUTPATIENT)
Dept: LAB | Facility: CLINIC | Age: 83
End: 2018-07-09

## 2018-07-09 ENCOUNTER — COMMUNICATION - HEALTHEAST (OUTPATIENT)
Dept: ANTICOAGULATION | Facility: CLINIC | Age: 83
End: 2018-07-09

## 2018-07-09 DIAGNOSIS — I48.92 ATRIAL FLUTTER (H): ICD-10-CM

## 2018-07-09 DIAGNOSIS — I48.3 TYPICAL ATRIAL FLUTTER (H): ICD-10-CM

## 2018-07-09 LAB — INR PPP: 3.3 (ref 0.9–1.1)

## 2018-07-16 ENCOUNTER — COMMUNICATION - HEALTHEAST (OUTPATIENT)
Dept: ANTICOAGULATION | Facility: CLINIC | Age: 83
End: 2018-07-16

## 2018-07-16 ENCOUNTER — AMBULATORY - HEALTHEAST (OUTPATIENT)
Dept: LAB | Facility: CLINIC | Age: 83
End: 2018-07-16

## 2018-07-16 DIAGNOSIS — I48.3 TYPICAL ATRIAL FLUTTER (H): ICD-10-CM

## 2018-07-16 DIAGNOSIS — I48.92 ATRIAL FLUTTER (H): ICD-10-CM

## 2018-07-16 LAB — INR PPP: 1.8 (ref 0.9–1.1)

## 2018-07-23 ENCOUNTER — COMMUNICATION - HEALTHEAST (OUTPATIENT)
Dept: INTERNAL MEDICINE | Facility: CLINIC | Age: 83
End: 2018-07-23

## 2018-07-30 ENCOUNTER — COMMUNICATION - HEALTHEAST (OUTPATIENT)
Dept: ANTICOAGULATION | Facility: CLINIC | Age: 83
End: 2018-07-30

## 2018-07-30 ENCOUNTER — AMBULATORY - HEALTHEAST (OUTPATIENT)
Dept: LAB | Facility: CLINIC | Age: 83
End: 2018-07-30

## 2018-07-30 DIAGNOSIS — I48.3 TYPICAL ATRIAL FLUTTER (H): ICD-10-CM

## 2018-07-30 LAB — INR PPP: 3 (ref 0.9–1.1)

## 2018-08-14 ENCOUNTER — COMMUNICATION - HEALTHEAST (OUTPATIENT)
Dept: ANTICOAGULATION | Facility: CLINIC | Age: 83
End: 2018-08-14

## 2018-08-14 DIAGNOSIS — I48.92 ATRIAL FLUTTER, UNSPECIFIED TYPE (H): ICD-10-CM

## 2018-08-20 ENCOUNTER — AMBULATORY - HEALTHEAST (OUTPATIENT)
Dept: LAB | Facility: CLINIC | Age: 83
End: 2018-08-20

## 2018-08-20 ENCOUNTER — COMMUNICATION - HEALTHEAST (OUTPATIENT)
Dept: ANTICOAGULATION | Facility: CLINIC | Age: 83
End: 2018-08-20

## 2018-08-20 DIAGNOSIS — I48.92 ATRIAL FLUTTER, UNSPECIFIED TYPE (H): ICD-10-CM

## 2018-08-20 LAB — INR PPP: 2.7 (ref 0.9–1.1)

## 2018-09-11 ENCOUNTER — OFFICE VISIT - HEALTHEAST (OUTPATIENT)
Dept: INTERNAL MEDICINE | Facility: CLINIC | Age: 83
End: 2018-09-11

## 2018-09-11 ENCOUNTER — COMMUNICATION - HEALTHEAST (OUTPATIENT)
Dept: ANTICOAGULATION | Facility: CLINIC | Age: 83
End: 2018-09-11

## 2018-09-11 DIAGNOSIS — E11.8 TYPE 2 DIABETES MELLITUS WITH COMPLICATION, UNSPECIFIED LONG TERM INSULIN USE STATUS: ICD-10-CM

## 2018-09-11 DIAGNOSIS — I48.92 ATRIAL FLUTTER, UNSPECIFIED TYPE (H): ICD-10-CM

## 2018-09-11 DIAGNOSIS — Z23 NEED FOR IMMUNIZATION AGAINST INFLUENZA: ICD-10-CM

## 2018-09-11 DIAGNOSIS — I10 ESSENTIAL HYPERTENSION, BENIGN: ICD-10-CM

## 2018-09-11 DIAGNOSIS — I48.92 ATRIAL FLUTTER (H): ICD-10-CM

## 2018-09-11 LAB
ALBUMIN SERPL-MCNC: 3.4 G/DL (ref 3.5–5)
ALP SERPL-CCNC: 69 U/L (ref 45–120)
ALT SERPL W P-5'-P-CCNC: 13 U/L (ref 0–45)
ANION GAP SERPL CALCULATED.3IONS-SCNC: 12 MMOL/L (ref 5–18)
AST SERPL W P-5'-P-CCNC: 15 U/L (ref 0–40)
BILIRUB SERPL-MCNC: 0.8 MG/DL (ref 0–1)
BUN SERPL-MCNC: 17 MG/DL (ref 8–28)
CALCIUM SERPL-MCNC: 9.3 MG/DL (ref 8.5–10.5)
CHLORIDE BLD-SCNC: 104 MMOL/L (ref 98–107)
CO2 SERPL-SCNC: 26 MMOL/L (ref 22–31)
CREAT SERPL-MCNC: 0.98 MG/DL (ref 0.6–1.1)
GFR SERPL CREATININE-BSD FRML MDRD: 53 ML/MIN/1.73M2
GLUCOSE BLD-MCNC: 120 MG/DL (ref 70–125)
HBA1C MFR BLD: 7.1 % (ref 3.5–6)
INR PPP: 3.9 (ref 0.9–1.1)
POTASSIUM BLD-SCNC: 4.1 MMOL/L (ref 3.5–5)
PROT SERPL-MCNC: 8.1 G/DL (ref 6–8)
SODIUM SERPL-SCNC: 142 MMOL/L (ref 136–145)

## 2018-09-11 ASSESSMENT — MIFFLIN-ST. JEOR: SCORE: 1170.65

## 2018-09-13 ENCOUNTER — COMMUNICATION - HEALTHEAST (OUTPATIENT)
Dept: INTERNAL MEDICINE | Facility: CLINIC | Age: 83
End: 2018-09-13

## 2018-09-25 ENCOUNTER — COMMUNICATION - HEALTHEAST (OUTPATIENT)
Dept: ANTICOAGULATION | Facility: CLINIC | Age: 83
End: 2018-09-25

## 2018-09-25 ENCOUNTER — AMBULATORY - HEALTHEAST (OUTPATIENT)
Dept: LAB | Facility: CLINIC | Age: 83
End: 2018-09-25

## 2018-09-25 DIAGNOSIS — I48.92 ATRIAL FLUTTER, UNSPECIFIED TYPE (H): ICD-10-CM

## 2018-09-25 LAB — INR PPP: 3.5 (ref 0.9–1.1)

## 2018-09-28 ENCOUNTER — COMMUNICATION - HEALTHEAST (OUTPATIENT)
Dept: SCHEDULING | Facility: CLINIC | Age: 83
End: 2018-09-28

## 2018-10-01 ENCOUNTER — COMMUNICATION - HEALTHEAST (OUTPATIENT)
Dept: ANTICOAGULATION | Facility: CLINIC | Age: 83
End: 2018-10-01

## 2018-10-09 ENCOUNTER — COMMUNICATION - HEALTHEAST (OUTPATIENT)
Dept: ANTICOAGULATION | Facility: CLINIC | Age: 83
End: 2018-10-09

## 2018-10-09 ENCOUNTER — OFFICE VISIT - HEALTHEAST (OUTPATIENT)
Dept: INTERNAL MEDICINE | Facility: CLINIC | Age: 83
End: 2018-10-09

## 2018-10-09 DIAGNOSIS — R26.81 UNSTEADY GAIT: ICD-10-CM

## 2018-10-09 DIAGNOSIS — I48.92 ATRIAL FLUTTER, UNSPECIFIED TYPE (H): ICD-10-CM

## 2018-10-09 DIAGNOSIS — R29.6 FALLS FREQUENTLY: ICD-10-CM

## 2018-10-09 DIAGNOSIS — S80.219A KNEE ABRASION: ICD-10-CM

## 2018-10-09 LAB
ALBUMIN UR-MCNC: ABNORMAL MG/DL
APPEARANCE UR: ABNORMAL
BACTERIA #/AREA URNS HPF: ABNORMAL HPF
BILIRUB UR QL STRIP: NEGATIVE
COLOR UR AUTO: YELLOW
GLUCOSE UR STRIP-MCNC: NEGATIVE MG/DL
HGB UR QL STRIP: ABNORMAL
INR PPP: 3.59 (ref 0.9–1.1)
KETONES UR STRIP-MCNC: NEGATIVE MG/DL
LEUKOCYTE ESTERASE UR QL STRIP: NEGATIVE
NITRATE UR QL: NEGATIVE
PH UR STRIP: 7 [PH] (ref 5–8)
RBC #/AREA URNS AUTO: ABNORMAL HPF
SP GR UR STRIP: 1.01 (ref 1–1.03)
SQUAMOUS #/AREA URNS AUTO: ABNORMAL LPF
UROBILINOGEN UR STRIP-ACNC: ABNORMAL
WBC #/AREA URNS AUTO: ABNORMAL HPF

## 2018-10-09 ASSESSMENT — MIFFLIN-ST. JEOR: SCORE: 1161.58

## 2018-10-11 ENCOUNTER — COMMUNICATION - HEALTHEAST (OUTPATIENT)
Dept: INTERNAL MEDICINE | Facility: CLINIC | Age: 83
End: 2018-10-11

## 2018-10-23 ENCOUNTER — COMMUNICATION - HEALTHEAST (OUTPATIENT)
Dept: ANTICOAGULATION | Facility: CLINIC | Age: 83
End: 2018-10-23

## 2018-10-23 ENCOUNTER — AMBULATORY - HEALTHEAST (OUTPATIENT)
Dept: LAB | Facility: CLINIC | Age: 83
End: 2018-10-23

## 2018-10-23 DIAGNOSIS — I48.92 ATRIAL FLUTTER, UNSPECIFIED TYPE (H): ICD-10-CM

## 2018-10-23 LAB — INR PPP: 2.3 (ref 0.9–1.1)

## 2018-11-06 ENCOUNTER — AMBULATORY - HEALTHEAST (OUTPATIENT)
Dept: LAB | Facility: CLINIC | Age: 83
End: 2018-11-06

## 2018-11-06 ENCOUNTER — COMMUNICATION - HEALTHEAST (OUTPATIENT)
Dept: ANTICOAGULATION | Facility: CLINIC | Age: 83
End: 2018-11-06

## 2018-11-06 DIAGNOSIS — I48.92 ATRIAL FLUTTER, UNSPECIFIED TYPE (H): ICD-10-CM

## 2018-11-06 LAB — INR PPP: 2.5 (ref 0.9–1.1)

## 2018-12-03 ENCOUNTER — COMMUNICATION - HEALTHEAST (OUTPATIENT)
Dept: INTERNAL MEDICINE | Facility: CLINIC | Age: 83
End: 2018-12-03

## 2018-12-03 DIAGNOSIS — I10 ESSENTIAL HYPERTENSION, BENIGN: ICD-10-CM

## 2018-12-23 ENCOUNTER — COMMUNICATION - HEALTHEAST (OUTPATIENT)
Dept: INTERNAL MEDICINE | Facility: CLINIC | Age: 83
End: 2018-12-23

## 2018-12-23 DIAGNOSIS — E78.00 HYPERCHOLESTEREMIA: ICD-10-CM

## 2019-01-11 ENCOUNTER — AMBULATORY - HEALTHEAST (OUTPATIENT)
Dept: LAB | Facility: CLINIC | Age: 84
End: 2019-01-11

## 2019-01-11 ENCOUNTER — COMMUNICATION - HEALTHEAST (OUTPATIENT)
Dept: ANTICOAGULATION | Facility: CLINIC | Age: 84
End: 2019-01-11

## 2019-01-11 DIAGNOSIS — I48.92 ATRIAL FLUTTER, UNSPECIFIED TYPE (H): ICD-10-CM

## 2019-01-11 LAB — INR PPP: 2.7 (ref 0.9–1.1)

## 2019-03-04 ENCOUNTER — COMMUNICATION - HEALTHEAST (OUTPATIENT)
Dept: ANTICOAGULATION | Facility: CLINIC | Age: 84
End: 2019-03-04

## 2019-04-01 ENCOUNTER — COMMUNICATION - HEALTHEAST (OUTPATIENT)
Dept: ANTICOAGULATION | Facility: CLINIC | Age: 84
End: 2019-04-01

## 2019-04-01 ENCOUNTER — OFFICE VISIT - HEALTHEAST (OUTPATIENT)
Dept: INTERNAL MEDICINE | Facility: CLINIC | Age: 84
End: 2019-04-01

## 2019-04-01 DIAGNOSIS — I48.92 ATRIAL FLUTTER, UNSPECIFIED TYPE (H): ICD-10-CM

## 2019-04-01 DIAGNOSIS — E03.9 HYPOTHYROIDISM, UNSPECIFIED TYPE: ICD-10-CM

## 2019-04-01 DIAGNOSIS — E11.8 TYPE 2 DIABETES MELLITUS WITH COMPLICATION, UNSPECIFIED WHETHER LONG TERM INSULIN USE: ICD-10-CM

## 2019-04-01 DIAGNOSIS — E78.00 PURE HYPERCHOLESTEROLEMIA: ICD-10-CM

## 2019-04-01 DIAGNOSIS — I10 ESSENTIAL HYPERTENSION, BENIGN: ICD-10-CM

## 2019-04-01 DIAGNOSIS — M54.10 RADICULAR PAIN: ICD-10-CM

## 2019-04-01 LAB
ALBUMIN SERPL-MCNC: 3.3 G/DL (ref 3.5–5)
ALP SERPL-CCNC: 73 U/L (ref 45–120)
ALT SERPL W P-5'-P-CCNC: 11 U/L (ref 0–45)
ANION GAP SERPL CALCULATED.3IONS-SCNC: 9 MMOL/L (ref 5–18)
AST SERPL W P-5'-P-CCNC: 14 U/L (ref 0–40)
BILIRUB SERPL-MCNC: 0.5 MG/DL (ref 0–1)
BUN SERPL-MCNC: 25 MG/DL (ref 8–28)
CALCIUM SERPL-MCNC: 9.3 MG/DL (ref 8.5–10.5)
CHLORIDE BLD-SCNC: 105 MMOL/L (ref 98–107)
CHOLEST SERPL-MCNC: 168 MG/DL
CO2 SERPL-SCNC: 27 MMOL/L (ref 22–31)
CREAT SERPL-MCNC: 1.49 MG/DL (ref 0.6–1.1)
FASTING STATUS PATIENT QL REPORTED: YES
GFR SERPL CREATININE-BSD FRML MDRD: 33 ML/MIN/1.73M2
GLUCOSE BLD-MCNC: 124 MG/DL (ref 70–125)
HBA1C MFR BLD: 6.5 % (ref 3.5–6)
HDLC SERPL-MCNC: 51 MG/DL
INR PPP: 2.1 (ref 0.9–1.1)
LDLC SERPL CALC-MCNC: 100 MG/DL
POTASSIUM BLD-SCNC: 3.9 MMOL/L (ref 3.5–5)
PROT SERPL-MCNC: 8 G/DL (ref 6–8)
SODIUM SERPL-SCNC: 141 MMOL/L (ref 136–145)
TRIGL SERPL-MCNC: 87 MG/DL
TSH SERPL DL<=0.005 MIU/L-ACNC: 2.27 UIU/ML (ref 0.3–5)

## 2019-04-01 ASSESSMENT — MIFFLIN-ST. JEOR: SCORE: 1102.61

## 2019-04-04 ENCOUNTER — COMMUNICATION - HEALTHEAST (OUTPATIENT)
Dept: INTERNAL MEDICINE | Facility: CLINIC | Age: 84
End: 2019-04-04

## 2019-04-04 DIAGNOSIS — N17.9 AKI (ACUTE KIDNEY INJURY) (H): ICD-10-CM

## 2019-04-18 ENCOUNTER — AMBULATORY - HEALTHEAST (OUTPATIENT)
Dept: LAB | Facility: CLINIC | Age: 84
End: 2019-04-18

## 2019-04-18 DIAGNOSIS — N17.9 AKI (ACUTE KIDNEY INJURY) (H): ICD-10-CM

## 2019-04-18 LAB
ANION GAP SERPL CALCULATED.3IONS-SCNC: 14 MMOL/L (ref 5–18)
BUN SERPL-MCNC: 16 MG/DL (ref 8–28)
CALCIUM SERPL-MCNC: 9.1 MG/DL (ref 8.5–10.5)
CHLORIDE BLD-SCNC: 101 MMOL/L (ref 98–107)
CO2 SERPL-SCNC: 25 MMOL/L (ref 22–31)
CREAT SERPL-MCNC: 1.05 MG/DL (ref 0.6–1.1)
GFR SERPL CREATININE-BSD FRML MDRD: 49 ML/MIN/1.73M2
GLUCOSE BLD-MCNC: 124 MG/DL (ref 70–125)
POTASSIUM BLD-SCNC: 3.4 MMOL/L (ref 3.5–5)
SODIUM SERPL-SCNC: 140 MMOL/L (ref 136–145)

## 2019-04-23 ENCOUNTER — COMMUNICATION - HEALTHEAST (OUTPATIENT)
Dept: INTERNAL MEDICINE | Facility: CLINIC | Age: 84
End: 2019-04-23

## 2019-04-29 ENCOUNTER — AMBULATORY - HEALTHEAST (OUTPATIENT)
Dept: LAB | Facility: CLINIC | Age: 84
End: 2019-04-29

## 2019-04-29 ENCOUNTER — COMMUNICATION - HEALTHEAST (OUTPATIENT)
Dept: ANTICOAGULATION | Facility: CLINIC | Age: 84
End: 2019-04-29

## 2019-04-29 DIAGNOSIS — I48.92 ATRIAL FLUTTER, UNSPECIFIED TYPE (H): ICD-10-CM

## 2019-04-29 LAB — INR PPP: 2.2 (ref 0.9–1.1)

## 2019-05-31 ENCOUNTER — COMMUNICATION - HEALTHEAST (OUTPATIENT)
Dept: INTERNAL MEDICINE | Facility: CLINIC | Age: 84
End: 2019-05-31

## 2019-05-31 DIAGNOSIS — E03.9 HYPOTHYROID: ICD-10-CM

## 2019-06-04 ENCOUNTER — COMMUNICATION - HEALTHEAST (OUTPATIENT)
Dept: INTERNAL MEDICINE | Facility: CLINIC | Age: 84
End: 2019-06-04

## 2019-06-04 DIAGNOSIS — M54.10 RADICULAR PAIN: ICD-10-CM

## 2019-06-10 ENCOUNTER — COMMUNICATION - HEALTHEAST (OUTPATIENT)
Dept: ANTICOAGULATION | Facility: CLINIC | Age: 84
End: 2019-06-10

## 2019-06-19 ENCOUNTER — AMBULATORY - HEALTHEAST (OUTPATIENT)
Dept: LAB | Facility: CLINIC | Age: 84
End: 2019-06-19

## 2019-06-19 ENCOUNTER — COMMUNICATION - HEALTHEAST (OUTPATIENT)
Dept: ANTICOAGULATION | Facility: CLINIC | Age: 84
End: 2019-06-19

## 2019-06-19 DIAGNOSIS — I48.92 ATRIAL FLUTTER, UNSPECIFIED TYPE (H): ICD-10-CM

## 2019-06-19 LAB — INR PPP: 2.3 (ref 0.9–1.1)

## 2019-07-02 ENCOUNTER — COMMUNICATION - HEALTHEAST (OUTPATIENT)
Dept: ANTICOAGULATION | Facility: CLINIC | Age: 84
End: 2019-07-02

## 2019-07-02 ENCOUNTER — OFFICE VISIT - HEALTHEAST (OUTPATIENT)
Dept: INTERNAL MEDICINE | Facility: CLINIC | Age: 84
End: 2019-07-02

## 2019-07-02 DIAGNOSIS — N18.30 CKD (CHRONIC KIDNEY DISEASE) STAGE 3, GFR 30-59 ML/MIN (H): ICD-10-CM

## 2019-07-02 DIAGNOSIS — I10 ESSENTIAL HYPERTENSION, BENIGN: ICD-10-CM

## 2019-07-02 DIAGNOSIS — I48.92 ATRIAL FLUTTER, UNSPECIFIED TYPE (H): ICD-10-CM

## 2019-07-02 DIAGNOSIS — E03.9 HYPOTHYROIDISM, UNSPECIFIED TYPE: ICD-10-CM

## 2019-07-02 DIAGNOSIS — E78.00 PURE HYPERCHOLESTEROLEMIA: ICD-10-CM

## 2019-07-02 DIAGNOSIS — E11.8 TYPE 2 DIABETES MELLITUS WITH COMPLICATION, UNSPECIFIED WHETHER LONG TERM INSULIN USE: ICD-10-CM

## 2019-07-02 LAB — INR PPP: 1.7 (ref 0.9–1.1)

## 2019-07-02 ASSESSMENT — MIFFLIN-ST. JEOR: SCORE: 1116.22

## 2019-07-15 ENCOUNTER — OFFICE VISIT - HEALTHEAST (OUTPATIENT)
Dept: CARDIOLOGY | Facility: CLINIC | Age: 84
End: 2019-07-15

## 2019-07-15 ENCOUNTER — AMBULATORY - HEALTHEAST (OUTPATIENT)
Dept: CARDIOLOGY | Facility: CLINIC | Age: 84
End: 2019-07-15

## 2019-07-15 ENCOUNTER — COMMUNICATION - HEALTHEAST (OUTPATIENT)
Dept: ANTICOAGULATION | Facility: CLINIC | Age: 84
End: 2019-07-15

## 2019-07-15 DIAGNOSIS — I48.92 ATRIAL FLUTTER, UNSPECIFIED TYPE (H): ICD-10-CM

## 2019-07-15 DIAGNOSIS — Z79.01 LONG TERM (CURRENT) USE OF ANTICOAGULANTS: ICD-10-CM

## 2019-07-15 DIAGNOSIS — N18.30 CKD (CHRONIC KIDNEY DISEASE) STAGE 3, GFR 30-59 ML/MIN (H): ICD-10-CM

## 2019-07-15 DIAGNOSIS — E78.00 PURE HYPERCHOLESTEROLEMIA: ICD-10-CM

## 2019-07-15 DIAGNOSIS — E11.8 TYPE 2 DIABETES MELLITUS WITH COMPLICATION, UNSPECIFIED WHETHER LONG TERM INSULIN USE: ICD-10-CM

## 2019-07-15 DIAGNOSIS — I10 ESSENTIAL HYPERTENSION, BENIGN: ICD-10-CM

## 2019-07-15 LAB — POC INR - HE - HISTORICAL: 2.1 (ref 0.9–1.1)

## 2019-07-15 ASSESSMENT — MIFFLIN-ST. JEOR: SCORE: 1125.29

## 2019-07-23 ENCOUNTER — COMMUNICATION - HEALTHEAST (OUTPATIENT)
Dept: INTERNAL MEDICINE | Facility: CLINIC | Age: 84
End: 2019-07-23

## 2019-07-23 DIAGNOSIS — E78.00 HYPERCHOLESTEREMIA: ICD-10-CM

## 2019-07-24 ENCOUNTER — COMMUNICATION - HEALTHEAST (OUTPATIENT)
Dept: ANTICOAGULATION | Facility: CLINIC | Age: 84
End: 2019-07-24

## 2019-07-24 DIAGNOSIS — I48.92 ATRIAL FLUTTER, UNSPECIFIED TYPE (H): ICD-10-CM

## 2019-08-04 ENCOUNTER — COMMUNICATION - HEALTHEAST (OUTPATIENT)
Dept: INTERNAL MEDICINE | Facility: CLINIC | Age: 84
End: 2019-08-04

## 2019-08-04 DIAGNOSIS — I10 ESSENTIAL HYPERTENSION, BENIGN: ICD-10-CM

## 2019-08-07 ENCOUNTER — AMBULATORY - HEALTHEAST (OUTPATIENT)
Dept: LAB | Facility: CLINIC | Age: 84
End: 2019-08-07

## 2019-08-07 ENCOUNTER — COMMUNICATION - HEALTHEAST (OUTPATIENT)
Dept: ANTICOAGULATION | Facility: CLINIC | Age: 84
End: 2019-08-07

## 2019-08-07 DIAGNOSIS — I48.92 ATRIAL FLUTTER, UNSPECIFIED TYPE (H): ICD-10-CM

## 2019-08-07 LAB — INR PPP: 1.9 (ref 0.9–1.1)

## 2019-08-26 ENCOUNTER — AMBULATORY - HEALTHEAST (OUTPATIENT)
Dept: LAB | Facility: CLINIC | Age: 84
End: 2019-08-26

## 2019-08-26 ENCOUNTER — COMMUNICATION - HEALTHEAST (OUTPATIENT)
Dept: ANTICOAGULATION | Facility: CLINIC | Age: 84
End: 2019-08-26

## 2019-08-26 DIAGNOSIS — I48.92 ATRIAL FLUTTER, UNSPECIFIED TYPE (H): ICD-10-CM

## 2019-08-26 LAB — INR PPP: 1.5 (ref 0.9–1.1)

## 2019-09-09 ENCOUNTER — COMMUNICATION - HEALTHEAST (OUTPATIENT)
Dept: ANTICOAGULATION | Facility: CLINIC | Age: 84
End: 2019-09-09

## 2019-09-09 ENCOUNTER — AMBULATORY - HEALTHEAST (OUTPATIENT)
Dept: LAB | Facility: CLINIC | Age: 84
End: 2019-09-09

## 2019-09-09 DIAGNOSIS — I48.92 ATRIAL FLUTTER, UNSPECIFIED TYPE (H): ICD-10-CM

## 2019-09-09 LAB — INR PPP: 2.2 (ref 0.9–1.1)

## 2019-09-12 ENCOUNTER — RECORDS - HEALTHEAST (OUTPATIENT)
Dept: ADMINISTRATIVE | Facility: OTHER | Age: 84
End: 2019-09-12

## 2019-09-23 ENCOUNTER — COMMUNICATION - HEALTHEAST (OUTPATIENT)
Dept: ANTICOAGULATION | Facility: CLINIC | Age: 84
End: 2019-09-23

## 2019-09-23 ENCOUNTER — AMBULATORY - HEALTHEAST (OUTPATIENT)
Dept: LAB | Facility: CLINIC | Age: 84
End: 2019-09-23

## 2019-09-23 DIAGNOSIS — I48.92 ATRIAL FLUTTER, UNSPECIFIED TYPE (H): ICD-10-CM

## 2019-09-23 LAB — INR PPP: 1.9 (ref 0.9–1.1)

## 2019-09-25 ENCOUNTER — COMMUNICATION - HEALTHEAST (OUTPATIENT)
Dept: INTERNAL MEDICINE | Facility: CLINIC | Age: 84
End: 2019-09-25

## 2019-09-25 DIAGNOSIS — I10 ESSENTIAL HYPERTENSION, BENIGN: ICD-10-CM

## 2019-09-26 ENCOUNTER — RECORDS - HEALTHEAST (OUTPATIENT)
Dept: ADMINISTRATIVE | Facility: OTHER | Age: 84
End: 2019-09-26

## 2019-09-30 ENCOUNTER — COMMUNICATION - HEALTHEAST (OUTPATIENT)
Dept: INTERNAL MEDICINE | Facility: CLINIC | Age: 84
End: 2019-09-30

## 2019-10-02 ENCOUNTER — COMMUNICATION - HEALTHEAST (OUTPATIENT)
Dept: ANTICOAGULATION | Facility: CLINIC | Age: 84
End: 2019-10-02

## 2019-10-02 ENCOUNTER — OFFICE VISIT - HEALTHEAST (OUTPATIENT)
Dept: INTERNAL MEDICINE | Facility: CLINIC | Age: 84
End: 2019-10-02

## 2019-10-02 DIAGNOSIS — R00.2 PALPITATIONS: ICD-10-CM

## 2019-10-02 DIAGNOSIS — Z01.818 PREOPERATIVE EXAMINATION: ICD-10-CM

## 2019-10-02 DIAGNOSIS — I48.92 ATRIAL FLUTTER, UNSPECIFIED TYPE (H): ICD-10-CM

## 2019-10-02 LAB
ATRIAL RATE - MUSE: 375 BPM
DIASTOLIC BLOOD PRESSURE - MUSE: NORMAL
INR PPP: 2.4 (ref 0.9–1.1)
INTERPRETATION ECG - MUSE: NORMAL
P AXIS - MUSE: NORMAL
PR INTERVAL - MUSE: NORMAL
QRS DURATION - MUSE: 92 MS
QT - MUSE: 290 MS
QTC - MUSE: 388 MS
R AXIS - MUSE: -21 DEGREES
SYSTOLIC BLOOD PRESSURE - MUSE: NORMAL
T AXIS - MUSE: 140 DEGREES
VENTRICULAR RATE- MUSE: 108 BPM

## 2019-10-03 ENCOUNTER — COMMUNICATION - HEALTHEAST (OUTPATIENT)
Dept: SCHEDULING | Facility: CLINIC | Age: 84
End: 2019-10-03

## 2019-10-23 ENCOUNTER — COMMUNICATION - HEALTHEAST (OUTPATIENT)
Dept: INTERNAL MEDICINE | Facility: CLINIC | Age: 84
End: 2019-10-23

## 2019-10-23 DIAGNOSIS — I48.92 ATRIAL FLUTTER (H): ICD-10-CM

## 2019-10-29 ENCOUNTER — COMMUNICATION - HEALTHEAST (OUTPATIENT)
Dept: ANTICOAGULATION | Facility: CLINIC | Age: 84
End: 2019-10-29

## 2019-10-29 ENCOUNTER — OFFICE VISIT - HEALTHEAST (OUTPATIENT)
Dept: INTERNAL MEDICINE | Facility: CLINIC | Age: 84
End: 2019-10-29

## 2019-10-29 DIAGNOSIS — H34.8192 CENTRAL RETINAL VEIN OCCLUSION, UNSPECIFIED COMPLICATION STATUS, UNSPECIFIED LATERALITY (H): ICD-10-CM

## 2019-10-29 DIAGNOSIS — E03.9 HYPOTHYROIDISM, UNSPECIFIED TYPE: ICD-10-CM

## 2019-10-29 DIAGNOSIS — I48.92 ATRIAL FLUTTER, UNSPECIFIED TYPE (H): ICD-10-CM

## 2019-10-29 DIAGNOSIS — N18.30 CKD (CHRONIC KIDNEY DISEASE) STAGE 3, GFR 30-59 ML/MIN (H): ICD-10-CM

## 2019-10-29 DIAGNOSIS — I10 ESSENTIAL HYPERTENSION, BENIGN: ICD-10-CM

## 2019-10-29 DIAGNOSIS — E78.00 PURE HYPERCHOLESTEROLEMIA: ICD-10-CM

## 2019-10-29 DIAGNOSIS — Z23 FLU VACCINE NEED: ICD-10-CM

## 2019-10-29 DIAGNOSIS — E11.8 TYPE 2 DIABETES MELLITUS WITH COMPLICATION (H): ICD-10-CM

## 2019-10-29 LAB
ALBUMIN SERPL-MCNC: 3.3 G/DL (ref 3.5–5)
ALP SERPL-CCNC: 86 U/L (ref 45–120)
ALT SERPL W P-5'-P-CCNC: <9 U/L (ref 0–45)
ANION GAP SERPL CALCULATED.3IONS-SCNC: 10 MMOL/L (ref 5–18)
AST SERPL W P-5'-P-CCNC: 11 U/L (ref 0–40)
BILIRUB SERPL-MCNC: 0.6 MG/DL (ref 0–1)
BUN SERPL-MCNC: 15 MG/DL (ref 8–28)
CALCIUM SERPL-MCNC: 8.9 MG/DL (ref 8.5–10.5)
CHLORIDE BLD-SCNC: 105 MMOL/L (ref 98–107)
CHOLEST SERPL-MCNC: 187 MG/DL
CO2 SERPL-SCNC: 27 MMOL/L (ref 22–31)
CREAT SERPL-MCNC: 0.92 MG/DL (ref 0.6–1.1)
ERYTHROCYTE [DISTWIDTH] IN BLOOD BY AUTOMATED COUNT: 14.3 % (ref 11–14.5)
FASTING STATUS PATIENT QL REPORTED: YES
GFR SERPL CREATININE-BSD FRML MDRD: 57 ML/MIN/1.73M2
GLUCOSE BLD-MCNC: 129 MG/DL (ref 70–125)
HBA1C MFR BLD: 6.7 % (ref 3.5–6)
HCT VFR BLD AUTO: 41.8 % (ref 35–47)
HDLC SERPL-MCNC: 56 MG/DL
HGB BLD-MCNC: 14.1 G/DL (ref 12–16)
INR PPP: 3.7 (ref 0.9–1.1)
LDLC SERPL CALC-MCNC: 109 MG/DL
MCH RBC QN AUTO: 31.9 PG (ref 27–34)
MCHC RBC AUTO-ENTMCNC: 33.7 G/DL (ref 32–36)
MCV RBC AUTO: 94 FL (ref 80–100)
PLATELET # BLD AUTO: 203 THOU/UL (ref 140–440)
PMV BLD AUTO: 7.9 FL (ref 7–10)
POTASSIUM BLD-SCNC: 4.5 MMOL/L (ref 3.5–5)
PROT SERPL-MCNC: 7.9 G/DL (ref 6–8)
RBC # BLD AUTO: 4.43 MILL/UL (ref 3.8–5.4)
SODIUM SERPL-SCNC: 142 MMOL/L (ref 136–145)
TRIGL SERPL-MCNC: 110 MG/DL
TSH SERPL DL<=0.005 MIU/L-ACNC: 3.13 UIU/ML (ref 0.3–5)
WBC: 5.9 THOU/UL (ref 4–11)

## 2019-10-29 ASSESSMENT — MIFFLIN-ST. JEOR: SCORE: 1116.22

## 2019-10-31 ENCOUNTER — COMMUNICATION - HEALTHEAST (OUTPATIENT)
Dept: INTERNAL MEDICINE | Facility: CLINIC | Age: 84
End: 2019-10-31

## 2019-11-13 ENCOUNTER — COMMUNICATION - HEALTHEAST (OUTPATIENT)
Dept: ANTICOAGULATION | Facility: CLINIC | Age: 84
End: 2019-11-13

## 2019-11-18 ENCOUNTER — COMMUNICATION - HEALTHEAST (OUTPATIENT)
Dept: ANTICOAGULATION | Facility: CLINIC | Age: 84
End: 2019-11-18

## 2019-11-18 ENCOUNTER — AMBULATORY - HEALTHEAST (OUTPATIENT)
Dept: LAB | Facility: CLINIC | Age: 84
End: 2019-11-18

## 2019-11-18 DIAGNOSIS — I48.92 ATRIAL FLUTTER, UNSPECIFIED TYPE (H): ICD-10-CM

## 2019-11-18 LAB — INR PPP: 1.9 (ref 0.9–1.1)

## 2019-11-20 ENCOUNTER — COMMUNICATION - HEALTHEAST (OUTPATIENT)
Dept: INTERNAL MEDICINE | Facility: CLINIC | Age: 84
End: 2019-11-20

## 2019-11-20 DIAGNOSIS — I10 ESSENTIAL HYPERTENSION, BENIGN: ICD-10-CM

## 2019-12-03 ENCOUNTER — COMMUNICATION - HEALTHEAST (OUTPATIENT)
Dept: ANTICOAGULATION | Facility: CLINIC | Age: 84
End: 2019-12-03

## 2020-01-28 ENCOUNTER — COMMUNICATION - HEALTHEAST (OUTPATIENT)
Dept: ANTICOAGULATION | Facility: CLINIC | Age: 85
End: 2020-01-28

## 2020-01-28 ENCOUNTER — AMBULATORY - HEALTHEAST (OUTPATIENT)
Dept: LAB | Facility: CLINIC | Age: 85
End: 2020-01-28

## 2020-01-28 DIAGNOSIS — I48.92 ATRIAL FLUTTER, UNSPECIFIED TYPE (H): ICD-10-CM

## 2020-01-28 LAB — INR PPP: 2.7 (ref 0.9–1.1)

## 2020-03-03 ENCOUNTER — COMMUNICATION - HEALTHEAST (OUTPATIENT)
Dept: ANTICOAGULATION | Facility: CLINIC | Age: 85
End: 2020-03-03

## 2020-03-09 ENCOUNTER — COMMUNICATION - HEALTHEAST (OUTPATIENT)
Dept: ANTICOAGULATION | Facility: CLINIC | Age: 85
End: 2020-03-09

## 2020-03-09 ENCOUNTER — AMBULATORY - HEALTHEAST (OUTPATIENT)
Dept: LAB | Facility: CLINIC | Age: 85
End: 2020-03-09

## 2020-03-09 DIAGNOSIS — I48.92 ATRIAL FLUTTER, UNSPECIFIED TYPE (H): ICD-10-CM

## 2020-03-09 LAB — INR PPP: 1.9 (ref 0.9–1.1)

## 2020-03-30 ENCOUNTER — COMMUNICATION - HEALTHEAST (OUTPATIENT)
Dept: INTERNAL MEDICINE | Facility: CLINIC | Age: 85
End: 2020-03-30

## 2020-04-01 ENCOUNTER — COMMUNICATION - HEALTHEAST (OUTPATIENT)
Dept: ANTICOAGULATION | Facility: CLINIC | Age: 85
End: 2020-04-01

## 2020-04-07 ENCOUNTER — AMBULATORY - HEALTHEAST (OUTPATIENT)
Dept: LAB | Facility: CLINIC | Age: 85
End: 2020-04-07

## 2020-04-07 ENCOUNTER — COMMUNICATION - HEALTHEAST (OUTPATIENT)
Dept: ANTICOAGULATION | Facility: CLINIC | Age: 85
End: 2020-04-07

## 2020-04-07 DIAGNOSIS — I48.92 ATRIAL FLUTTER, UNSPECIFIED TYPE (H): ICD-10-CM

## 2020-04-07 DIAGNOSIS — I48.92 ATRIAL FLUTTER (H): ICD-10-CM

## 2020-04-07 LAB — INR PPP: 3.5 (ref 0.9–1.1)

## 2020-04-21 ENCOUNTER — AMBULATORY - HEALTHEAST (OUTPATIENT)
Dept: LAB | Facility: CLINIC | Age: 85
End: 2020-04-21

## 2020-04-21 ENCOUNTER — COMMUNICATION - HEALTHEAST (OUTPATIENT)
Dept: ANTICOAGULATION | Facility: CLINIC | Age: 85
End: 2020-04-21

## 2020-04-21 DIAGNOSIS — I48.92 ATRIAL FLUTTER (H): ICD-10-CM

## 2020-04-21 DIAGNOSIS — I48.92 ATRIAL FLUTTER, UNSPECIFIED TYPE (H): ICD-10-CM

## 2020-04-21 LAB — INR PPP: 1.5 (ref 0.9–1.1)

## 2020-05-07 ENCOUNTER — OFFICE VISIT - HEALTHEAST (OUTPATIENT)
Dept: INTERNAL MEDICINE | Facility: CLINIC | Age: 85
End: 2020-05-07

## 2020-05-07 ENCOUNTER — COMMUNICATION - HEALTHEAST (OUTPATIENT)
Dept: ANTICOAGULATION | Facility: CLINIC | Age: 85
End: 2020-05-07

## 2020-05-07 DIAGNOSIS — I48.92 ATRIAL FLUTTER, UNSPECIFIED TYPE (H): ICD-10-CM
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
## 2020-05-07 DIAGNOSIS — N18.30 CKD (CHRONIC KIDNEY DISEASE) STAGE 3, GFR 30-59 ML/MIN (H): ICD-10-CM

## 2020-05-07 DIAGNOSIS — I10 ESSENTIAL HYPERTENSION, BENIGN: ICD-10-CM

## 2020-05-07 DIAGNOSIS — E11.8 TYPE 2 DIABETES MELLITUS WITH COMPLICATION (H): ICD-10-CM

## 2020-05-12 ENCOUNTER — AMBULATORY - HEALTHEAST (OUTPATIENT)
Dept: LAB | Facility: CLINIC | Age: 85
End: 2020-05-12

## 2020-05-12 ENCOUNTER — COMMUNICATION - HEALTHEAST (OUTPATIENT)
Dept: ANTICOAGULATION | Facility: CLINIC | Age: 85
End: 2020-05-12

## 2020-05-12 DIAGNOSIS — I48.92 ATRIAL FLUTTER, UNSPECIFIED TYPE (H): ICD-10-CM

## 2020-05-12 LAB — INR PPP: 1.7 (ref 0.9–1.1)

## 2020-05-26 ENCOUNTER — COMMUNICATION - HEALTHEAST (OUTPATIENT)
Dept: ANTICOAGULATION | Facility: CLINIC | Age: 85
End: 2020-05-26

## 2020-05-26 ENCOUNTER — AMBULATORY - HEALTHEAST (OUTPATIENT)
Dept: LAB | Facility: CLINIC | Age: 85
End: 2020-05-26

## 2020-05-26 DIAGNOSIS — I48.92 ATRIAL FLUTTER, UNSPECIFIED TYPE (H): ICD-10-CM

## 2020-05-26 LAB — INR PPP: 2.2 (ref 0.9–1.1)

## 2020-06-01 ENCOUNTER — COMMUNICATION - HEALTHEAST (OUTPATIENT)
Dept: INTERNAL MEDICINE | Facility: CLINIC | Age: 85
End: 2020-06-01

## 2020-06-17 ENCOUNTER — COMMUNICATION - HEALTHEAST (OUTPATIENT)
Dept: ANTICOAGULATION | Facility: CLINIC | Age: 85
End: 2020-06-17

## 2020-06-17 DIAGNOSIS — I48.92 ATRIAL FLUTTER, UNSPECIFIED TYPE (H): ICD-10-CM

## 2020-06-22 ENCOUNTER — RECORDS - HEALTHEAST (OUTPATIENT)
Dept: ADMINISTRATIVE | Facility: OTHER | Age: 85
End: 2020-06-22

## 2020-06-30 ENCOUNTER — COMMUNICATION - HEALTHEAST (OUTPATIENT)
Dept: INTERNAL MEDICINE | Facility: CLINIC | Age: 85
End: 2020-06-30

## 2020-06-30 DIAGNOSIS — I48.92 ATRIAL FLUTTER (H): ICD-10-CM

## 2020-06-30 DIAGNOSIS — Z79.01 LONG TERM (CURRENT) USE OF ANTICOAGULANTS: ICD-10-CM

## 2020-07-03 ENCOUNTER — COMMUNICATION - HEALTHEAST (OUTPATIENT)
Dept: ANTICOAGULATION | Facility: CLINIC | Age: 85
End: 2020-07-03

## 2020-07-03 DIAGNOSIS — I48.3 TYPICAL ATRIAL FLUTTER (H): ICD-10-CM

## 2020-07-03 DIAGNOSIS — Z79.01 LONG TERM (CURRENT) USE OF ANTICOAGULANTS: ICD-10-CM

## 2020-07-14 ENCOUNTER — COMMUNICATION - HEALTHEAST (OUTPATIENT)
Dept: ANTICOAGULATION | Facility: CLINIC | Age: 85
End: 2020-07-14

## 2020-07-26 ENCOUNTER — COMMUNICATION - HEALTHEAST (OUTPATIENT)
Dept: INTERNAL MEDICINE | Facility: CLINIC | Age: 85
End: 2020-07-26

## 2020-07-26 DIAGNOSIS — E78.00 HYPERCHOLESTEREMIA: ICD-10-CM

## 2020-08-01 ENCOUNTER — COMMUNICATION - HEALTHEAST (OUTPATIENT)
Dept: INTERNAL MEDICINE | Facility: CLINIC | Age: 85
End: 2020-08-01

## 2020-08-01 DIAGNOSIS — E03.9 HYPOTHYROID: ICD-10-CM

## 2020-08-10 ENCOUNTER — COMMUNICATION - HEALTHEAST (OUTPATIENT)
Dept: INTERNAL MEDICINE | Facility: CLINIC | Age: 85
End: 2020-08-10

## 2020-08-19 ENCOUNTER — OFFICE VISIT - HEALTHEAST (OUTPATIENT)
Dept: INTERNAL MEDICINE | Facility: CLINIC | Age: 85
End: 2020-08-19

## 2020-08-19 ENCOUNTER — COMMUNICATION - HEALTHEAST (OUTPATIENT)
Dept: ANTICOAGULATION | Facility: CLINIC | Age: 85
End: 2020-08-19

## 2020-08-19 ENCOUNTER — COMMUNICATION - HEALTHEAST (OUTPATIENT)
Dept: INTERNAL MEDICINE | Facility: CLINIC | Age: 85
End: 2020-08-19

## 2020-08-19 DIAGNOSIS — I48.92 ATRIAL FLUTTER, UNSPECIFIED TYPE (H): ICD-10-CM

## 2020-08-19 DIAGNOSIS — E03.9 HYPOTHYROIDISM, UNSPECIFIED TYPE: ICD-10-CM

## 2020-08-19 DIAGNOSIS — I10 ESSENTIAL HYPERTENSION, BENIGN: ICD-10-CM

## 2020-08-19 DIAGNOSIS — Z79.01 LONG TERM (CURRENT) USE OF ANTICOAGULANTS: ICD-10-CM

## 2020-08-19 DIAGNOSIS — E11.8 TYPE 2 DIABETES MELLITUS WITH COMPLICATION (H): ICD-10-CM

## 2020-08-19 DIAGNOSIS — I48.3 TYPICAL ATRIAL FLUTTER (H): ICD-10-CM

## 2020-08-19 DIAGNOSIS — N18.30 CKD (CHRONIC KIDNEY DISEASE) STAGE 3, GFR 30-59 ML/MIN (H): ICD-10-CM

## 2020-08-19 DIAGNOSIS — E78.00 PURE HYPERCHOLESTEROLEMIA: ICD-10-CM

## 2020-08-19 LAB
ALBUMIN SERPL-MCNC: 3.1 G/DL (ref 3.5–5)
ALP SERPL-CCNC: 84 U/L (ref 45–120)
ALT SERPL W P-5'-P-CCNC: 10 U/L (ref 0–45)
ANION GAP SERPL CALCULATED.3IONS-SCNC: 12 MMOL/L (ref 5–18)
AST SERPL W P-5'-P-CCNC: 13 U/L (ref 0–40)
BILIRUB SERPL-MCNC: 0.5 MG/DL (ref 0–1)
BUN SERPL-MCNC: 17 MG/DL (ref 8–28)
CALCIUM SERPL-MCNC: 8.7 MG/DL (ref 8.5–10.5)
CHLORIDE BLD-SCNC: 104 MMOL/L (ref 98–107)
CHOLEST SERPL-MCNC: 157 MG/DL
CO2 SERPL-SCNC: 26 MMOL/L (ref 22–31)
CREAT SERPL-MCNC: 1.06 MG/DL (ref 0.6–1.1)
FASTING STATUS PATIENT QL REPORTED: YES
GFR SERPL CREATININE-BSD FRML MDRD: 49 ML/MIN/1.73M2
GLUCOSE BLD-MCNC: 118 MG/DL (ref 70–125)
HBA1C MFR BLD: 7 %
HDLC SERPL-MCNC: 49 MG/DL
INR PPP: 2.7 (ref 0.9–1.1)
LDLC SERPL CALC-MCNC: 91 MG/DL
POTASSIUM BLD-SCNC: 3.6 MMOL/L (ref 3.5–5)
PROT SERPL-MCNC: 7.4 G/DL (ref 6–8)
SODIUM SERPL-SCNC: 142 MMOL/L (ref 136–145)
TRIGL SERPL-MCNC: 87 MG/DL
TSH SERPL DL<=0.005 MIU/L-ACNC: 2.98 UIU/ML (ref 0.3–5)

## 2020-08-19 ASSESSMENT — MIFFLIN-ST. JEOR: SCORE: 1148.52

## 2020-08-20 ENCOUNTER — COMMUNICATION - HEALTHEAST (OUTPATIENT)
Dept: INTERNAL MEDICINE | Facility: CLINIC | Age: 85
End: 2020-08-20

## 2020-08-25 ENCOUNTER — COMMUNICATION - HEALTHEAST (OUTPATIENT)
Dept: INTERNAL MEDICINE | Facility: CLINIC | Age: 85
End: 2020-08-25

## 2020-09-23 ENCOUNTER — COMMUNICATION - HEALTHEAST (OUTPATIENT)
Dept: ANTICOAGULATION | Facility: CLINIC | Age: 85
End: 2020-09-23

## 2020-09-28 ENCOUNTER — COMMUNICATION - HEALTHEAST (OUTPATIENT)
Dept: ANTICOAGULATION | Facility: CLINIC | Age: 85
End: 2020-09-28

## 2020-09-28 ENCOUNTER — AMBULATORY - HEALTHEAST (OUTPATIENT)
Dept: LAB | Facility: CLINIC | Age: 85
End: 2020-09-28

## 2020-09-28 DIAGNOSIS — I48.92 ATRIAL FLUTTER, UNSPECIFIED TYPE (H): ICD-10-CM

## 2020-09-28 DIAGNOSIS — I48.3 TYPICAL ATRIAL FLUTTER (H): ICD-10-CM

## 2020-09-28 DIAGNOSIS — Z79.01 LONG TERM (CURRENT) USE OF ANTICOAGULANTS: ICD-10-CM

## 2020-09-28 LAB — INR PPP: 2.6 (ref 0.9–1.1)

## 2020-10-03 ENCOUNTER — COMMUNICATION - HEALTHEAST (OUTPATIENT)
Dept: INTERNAL MEDICINE | Facility: CLINIC | Age: 85
End: 2020-10-03

## 2020-10-03 DIAGNOSIS — I10 ESSENTIAL HYPERTENSION, BENIGN: ICD-10-CM

## 2020-10-29 ENCOUNTER — COMMUNICATION - HEALTHEAST (OUTPATIENT)
Dept: INTERNAL MEDICINE | Facility: CLINIC | Age: 85
End: 2020-10-29

## 2020-10-29 DIAGNOSIS — I10 ESSENTIAL HYPERTENSION, BENIGN: ICD-10-CM

## 2020-11-01 ENCOUNTER — COMMUNICATION - HEALTHEAST (OUTPATIENT)
Dept: INTERNAL MEDICINE | Facility: CLINIC | Age: 85
End: 2020-11-01

## 2020-11-01 DIAGNOSIS — I10 ESSENTIAL HYPERTENSION, BENIGN: ICD-10-CM

## 2020-11-02 ENCOUNTER — COMMUNICATION - HEALTHEAST (OUTPATIENT)
Dept: ANTICOAGULATION | Facility: CLINIC | Age: 85
End: 2020-11-02

## 2020-11-04 ENCOUNTER — COMMUNICATION - HEALTHEAST (OUTPATIENT)
Dept: INTERNAL MEDICINE | Facility: CLINIC | Age: 85
End: 2020-11-04

## 2020-11-09 ENCOUNTER — COMMUNICATION - HEALTHEAST (OUTPATIENT)
Dept: ANTICOAGULATION | Facility: CLINIC | Age: 85
End: 2020-11-09

## 2020-11-09 ENCOUNTER — AMBULATORY - HEALTHEAST (OUTPATIENT)
Dept: LAB | Facility: CLINIC | Age: 85
End: 2020-11-09

## 2020-11-09 ENCOUNTER — COMMUNICATION - HEALTHEAST (OUTPATIENT)
Dept: INTERNAL MEDICINE | Facility: CLINIC | Age: 85
End: 2020-11-09

## 2020-11-09 DIAGNOSIS — I48.92 ATRIAL FLUTTER, UNSPECIFIED TYPE (H): ICD-10-CM

## 2020-11-09 DIAGNOSIS — Z79.01 LONG TERM (CURRENT) USE OF ANTICOAGULANTS: ICD-10-CM

## 2020-11-09 DIAGNOSIS — I48.3 TYPICAL ATRIAL FLUTTER (H): ICD-10-CM

## 2020-11-09 LAB — INR PPP: 2.8 (ref 0.9–1.1)

## 2020-11-13 ENCOUNTER — COMMUNICATION - HEALTHEAST (OUTPATIENT)
Dept: INTERNAL MEDICINE | Facility: CLINIC | Age: 85
End: 2020-11-13

## 2020-12-09 ENCOUNTER — COMMUNICATION - HEALTHEAST (OUTPATIENT)
Dept: INTERNAL MEDICINE | Facility: CLINIC | Age: 85
End: 2020-12-09

## 2020-12-09 DIAGNOSIS — I10 ESSENTIAL HYPERTENSION, BENIGN: ICD-10-CM

## 2020-12-10 ENCOUNTER — COMMUNICATION - HEALTHEAST (OUTPATIENT)
Dept: INTERNAL MEDICINE | Facility: CLINIC | Age: 85
End: 2020-12-10

## 2020-12-10 DIAGNOSIS — I10 ESSENTIAL HYPERTENSION, BENIGN: ICD-10-CM

## 2020-12-10 DIAGNOSIS — E78.00 HYPERCHOLESTEREMIA: ICD-10-CM

## 2020-12-10 DIAGNOSIS — I48.92 ATRIAL FLUTTER (H): ICD-10-CM

## 2020-12-10 DIAGNOSIS — Z79.01 LONG TERM (CURRENT) USE OF ANTICOAGULANTS: ICD-10-CM

## 2021-01-06 ENCOUNTER — COMMUNICATION - HEALTHEAST (OUTPATIENT)
Dept: ANTICOAGULATION | Facility: CLINIC | Age: 86
End: 2021-01-06

## 2021-01-12 ENCOUNTER — COMMUNICATION - HEALTHEAST (OUTPATIENT)
Dept: ANTICOAGULATION | Facility: CLINIC | Age: 86
End: 2021-01-12

## 2021-01-12 ENCOUNTER — AMBULATORY - HEALTHEAST (OUTPATIENT)
Dept: LAB | Facility: CLINIC | Age: 86
End: 2021-01-12

## 2021-01-12 DIAGNOSIS — I48.92 ATRIAL FLUTTER, UNSPECIFIED TYPE (H): ICD-10-CM

## 2021-01-12 DIAGNOSIS — I48.3 TYPICAL ATRIAL FLUTTER (H): ICD-10-CM

## 2021-01-12 DIAGNOSIS — Z79.01 LONG TERM (CURRENT) USE OF ANTICOAGULANTS: ICD-10-CM

## 2021-01-12 LAB — INR PPP: 2.6 (ref 0.9–1.1)

## 2021-02-11 ENCOUNTER — COMMUNICATION - HEALTHEAST (OUTPATIENT)
Dept: INTERNAL MEDICINE | Facility: CLINIC | Age: 86
End: 2021-02-11

## 2021-02-17 ENCOUNTER — AMBULATORY - HEALTHEAST (OUTPATIENT)
Dept: ANTICOAGULATION | Facility: CLINIC | Age: 86
End: 2021-02-17

## 2021-02-25 ENCOUNTER — OFFICE VISIT - HEALTHEAST (OUTPATIENT)
Dept: INTERNAL MEDICINE | Facility: CLINIC | Age: 86
End: 2021-02-25

## 2021-02-25 ENCOUNTER — COMMUNICATION - HEALTHEAST (OUTPATIENT)
Dept: ANTICOAGULATION | Facility: CLINIC | Age: 86
End: 2021-02-25

## 2021-02-25 DIAGNOSIS — N18.31 STAGE 3A CHRONIC KIDNEY DISEASE (H): ICD-10-CM

## 2021-02-25 DIAGNOSIS — E78.00 PURE HYPERCHOLESTEROLEMIA: ICD-10-CM

## 2021-02-25 DIAGNOSIS — I10 ESSENTIAL HYPERTENSION, BENIGN: ICD-10-CM

## 2021-02-25 DIAGNOSIS — Z00.00 ENCOUNTER FOR MEDICARE ANNUAL WELLNESS EXAM: ICD-10-CM

## 2021-02-25 DIAGNOSIS — Z79.01 LONG TERM (CURRENT) USE OF ANTICOAGULANTS: ICD-10-CM

## 2021-02-25 DIAGNOSIS — E55.9 VITAMIN D DEFICIENCY: ICD-10-CM

## 2021-02-25 DIAGNOSIS — I48.92 ATRIAL FLUTTER, UNSPECIFIED TYPE (H): ICD-10-CM

## 2021-02-25 DIAGNOSIS — I48.3 TYPICAL ATRIAL FLUTTER (H): ICD-10-CM

## 2021-02-25 DIAGNOSIS — E03.9 HYPOTHYROIDISM, UNSPECIFIED TYPE: ICD-10-CM

## 2021-02-25 DIAGNOSIS — E11.8 TYPE 2 DIABETES MELLITUS WITH COMPLICATION (H): ICD-10-CM

## 2021-02-25 LAB
ALBUMIN SERPL-MCNC: 3.6 G/DL (ref 3.5–5)
ALP SERPL-CCNC: 86 U/L (ref 45–120)
ALT SERPL W P-5'-P-CCNC: <9 U/L (ref 0–45)
ANION GAP SERPL CALCULATED.3IONS-SCNC: 13 MMOL/L (ref 5–18)
AST SERPL W P-5'-P-CCNC: 12 U/L (ref 0–40)
BILIRUB SERPL-MCNC: 0.7 MG/DL (ref 0–1)
BUN SERPL-MCNC: 19 MG/DL (ref 8–28)
CALCIUM SERPL-MCNC: 8.7 MG/DL (ref 8.5–10.5)
CHLORIDE BLD-SCNC: 103 MMOL/L (ref 98–107)
CHOLEST SERPL-MCNC: 154 MG/DL
CO2 SERPL-SCNC: 25 MMOL/L (ref 22–31)
CREAT SERPL-MCNC: 1.13 MG/DL (ref 0.6–1.1)
ERYTHROCYTE [DISTWIDTH] IN BLOOD BY AUTOMATED COUNT: 14.5 % (ref 11–14.5)
FASTING STATUS PATIENT QL REPORTED: ABNORMAL
GFR SERPL CREATININE-BSD FRML MDRD: 45 ML/MIN/1.73M2
GLUCOSE BLD-MCNC: 128 MG/DL (ref 70–125)
HBA1C MFR BLD: 6.9 %
HCT VFR BLD AUTO: 43.6 % (ref 35–47)
HDLC SERPL-MCNC: 48 MG/DL
HGB BLD-MCNC: 14.2 G/DL (ref 12–16)
INR PPP: 3.3 (ref 0.9–1.1)
LDLC SERPL CALC-MCNC: 86 MG/DL
MCH RBC QN AUTO: 31.4 PG (ref 27–34)
MCHC RBC AUTO-ENTMCNC: 32.6 G/DL (ref 32–36)
MCV RBC AUTO: 97 FL (ref 80–100)
PLATELET # BLD AUTO: 198 THOU/UL (ref 140–440)
PMV BLD AUTO: 10.1 FL (ref 7–10)
POTASSIUM BLD-SCNC: 4.6 MMOL/L (ref 3.5–5)
PROT SERPL-MCNC: 8 G/DL (ref 6–8)
RBC # BLD AUTO: 4.52 MILL/UL (ref 3.8–5.4)
SODIUM SERPL-SCNC: 141 MMOL/L (ref 136–145)
TRIGL SERPL-MCNC: 98 MG/DL
TSH SERPL DL<=0.005 MIU/L-ACNC: 3.99 UIU/ML (ref 0.3–5)
WBC: 5.8 THOU/UL (ref 4–11)

## 2021-02-26 ENCOUNTER — COMMUNICATION - HEALTHEAST (OUTPATIENT)
Dept: INTERNAL MEDICINE | Facility: CLINIC | Age: 86
End: 2021-02-26

## 2021-02-26 ENCOUNTER — AMBULATORY - HEALTHEAST (OUTPATIENT)
Dept: NURSING | Facility: CLINIC | Age: 86
End: 2021-02-26

## 2021-02-26 LAB — 25(OH)D3 SERPL-MCNC: 33.9 NG/ML (ref 30–80)

## 2021-03-02 ENCOUNTER — COMMUNICATION - HEALTHEAST (OUTPATIENT)
Dept: INTERNAL MEDICINE | Facility: CLINIC | Age: 86
End: 2021-03-02

## 2021-03-03 ENCOUNTER — COMMUNICATION - HEALTHEAST (OUTPATIENT)
Dept: INTERNAL MEDICINE | Facility: CLINIC | Age: 86
End: 2021-03-03

## 2021-03-03 RX ORDER — TRAMADOL HYDROCHLORIDE 50 MG/1
50 TABLET ORAL EVERY 6 HOURS PRN
Qty: 20 TABLET | Refills: 0 | Status: SHIPPED | OUTPATIENT
Start: 2021-03-03 | End: 2021-11-22

## 2021-03-04 ENCOUNTER — COMMUNICATION - HEALTHEAST (OUTPATIENT)
Dept: INTERNAL MEDICINE | Facility: CLINIC | Age: 86
End: 2021-03-04

## 2021-03-04 DIAGNOSIS — I48.92 ATRIAL FLUTTER (H): ICD-10-CM

## 2021-03-04 DIAGNOSIS — Z79.01 LONG TERM (CURRENT) USE OF ANTICOAGULANTS: ICD-10-CM

## 2021-03-04 RX ORDER — WARFARIN SODIUM 5 MG/1
TABLET ORAL
Qty: 80 TABLET | Refills: 2 | Status: SHIPPED | OUTPATIENT
Start: 2021-03-04 | End: 2022-03-23

## 2021-03-12 ENCOUNTER — COMMUNICATION - HEALTHEAST (OUTPATIENT)
Dept: ANTICOAGULATION | Facility: CLINIC | Age: 86
End: 2021-03-12

## 2021-03-19 ENCOUNTER — AMBULATORY - HEALTHEAST (OUTPATIENT)
Dept: NURSING | Facility: CLINIC | Age: 86
End: 2021-03-19

## 2021-03-22 ENCOUNTER — AMBULATORY - HEALTHEAST (OUTPATIENT)
Dept: LAB | Facility: CLINIC | Age: 86
End: 2021-03-22

## 2021-03-22 ENCOUNTER — COMMUNICATION - HEALTHEAST (OUTPATIENT)
Dept: ANTICOAGULATION | Facility: CLINIC | Age: 86
End: 2021-03-22

## 2021-03-22 DIAGNOSIS — Z79.01 LONG TERM (CURRENT) USE OF ANTICOAGULANTS: ICD-10-CM

## 2021-03-22 DIAGNOSIS — I48.92 ATRIAL FLUTTER, UNSPECIFIED TYPE (H): ICD-10-CM

## 2021-03-22 DIAGNOSIS — I48.3 TYPICAL ATRIAL FLUTTER (H): ICD-10-CM

## 2021-03-22 LAB — INR PPP: 3.7 (ref 0.9–1.1)

## 2021-03-30 ENCOUNTER — COMMUNICATION - HEALTHEAST (OUTPATIENT)
Dept: INTERNAL MEDICINE | Facility: CLINIC | Age: 86
End: 2021-03-30

## 2021-03-31 ENCOUNTER — RECORDS - HEALTHEAST (OUTPATIENT)
Dept: ADMINISTRATIVE | Facility: OTHER | Age: 86
End: 2021-03-31

## 2021-03-31 ENCOUNTER — OFFICE VISIT - HEALTHEAST (OUTPATIENT)
Dept: INTERNAL MEDICINE | Facility: CLINIC | Age: 86
End: 2021-03-31
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
## 2021-03-31 DIAGNOSIS — M25.561 ACUTE PAIN OF RIGHT KNEE: ICD-10-CM

## 2021-03-31 DIAGNOSIS — Z79.899 HIGH RISK MEDICATION USE: ICD-10-CM

## 2021-03-31 DIAGNOSIS — M25.461 EFFUSION OF RIGHT KNEE: ICD-10-CM

## 2021-03-31 DIAGNOSIS — Z91.81 AT RISK FOR FALLS: ICD-10-CM

## 2021-03-31 RX ORDER — OXYCODONE HYDROCHLORIDE 5 MG/1
1 TABLET ORAL EVERY 6 HOURS PRN
Status: SHIPPED | COMMUNITY
Start: 2021-03-29 | End: 2021-11-22

## 2021-04-08 ENCOUNTER — RECORDS - HEALTHEAST (OUTPATIENT)
Dept: ADMINISTRATIVE | Facility: OTHER | Age: 86
End: 2021-04-08

## 2021-04-10 ENCOUNTER — COMMUNICATION - HEALTHEAST (OUTPATIENT)
Dept: INTERNAL MEDICINE | Facility: CLINIC | Age: 86
End: 2021-04-10

## 2021-04-10 DIAGNOSIS — I10 ESSENTIAL HYPERTENSION, BENIGN: ICD-10-CM

## 2021-04-12 ENCOUNTER — COMMUNICATION - HEALTHEAST (OUTPATIENT)
Dept: SCHEDULING | Facility: CLINIC | Age: 86
End: 2021-04-12

## 2021-04-12 DIAGNOSIS — I10 ESSENTIAL HYPERTENSION, BENIGN: ICD-10-CM

## 2021-04-13 ENCOUNTER — COMMUNICATION - HEALTHEAST (OUTPATIENT)
Dept: ANTICOAGULATION | Facility: CLINIC | Age: 86
End: 2021-04-13

## 2021-04-14 ENCOUNTER — COMMUNICATION - HEALTHEAST (OUTPATIENT)
Dept: INTERNAL MEDICINE | Facility: CLINIC | Age: 86
End: 2021-04-14

## 2021-04-14 DIAGNOSIS — E03.9 HYPOTHYROID: ICD-10-CM

## 2021-04-14 RX ORDER — METOPROLOL SUCCINATE 100 MG/1
100 TABLET, EXTENDED RELEASE ORAL DAILY
Qty: 90 TABLET | Refills: 1 | Status: SHIPPED | OUTPATIENT
Start: 2021-04-14 | End: 2021-10-26

## 2021-04-19 ENCOUNTER — COMMUNICATION - HEALTHEAST (OUTPATIENT)
Dept: ANTICOAGULATION | Facility: CLINIC | Age: 86
End: 2021-04-19

## 2021-04-19 ENCOUNTER — AMBULATORY - HEALTHEAST (OUTPATIENT)
Dept: LAB | Facility: CLINIC | Age: 86
End: 2021-04-19

## 2021-04-19 DIAGNOSIS — Z79.01 LONG TERM (CURRENT) USE OF ANTICOAGULANTS: ICD-10-CM

## 2021-04-19 DIAGNOSIS — I48.3 TYPICAL ATRIAL FLUTTER (H): ICD-10-CM

## 2021-04-19 DIAGNOSIS — I48.92 ATRIAL FLUTTER, UNSPECIFIED TYPE (H): ICD-10-CM

## 2021-04-19 LAB — INR PPP: 2.3 (ref 0.9–1.1)

## 2021-05-10 ENCOUNTER — COMMUNICATION - HEALTHEAST (OUTPATIENT)
Dept: INTERNAL MEDICINE | Facility: CLINIC | Age: 86
End: 2021-05-10

## 2021-05-10 DIAGNOSIS — E03.9 HYPOTHYROID: ICD-10-CM

## 2021-05-10 DIAGNOSIS — E78.00 HYPERCHOLESTEREMIA: ICD-10-CM

## 2021-05-11 ENCOUNTER — COMMUNICATION - HEALTHEAST (OUTPATIENT)
Dept: ANTICOAGULATION | Facility: CLINIC | Age: 86
End: 2021-05-11

## 2021-05-11 RX ORDER — LEVOTHYROXINE SODIUM 50 UG/1
TABLET ORAL
Qty: 90 TABLET | Refills: 2 | Status: SHIPPED | OUTPATIENT
Start: 2021-05-11 | End: 2022-05-11

## 2021-05-11 RX ORDER — SIMVASTATIN 20 MG
TABLET ORAL
Qty: 45 TABLET | Refills: 1 | Status: SHIPPED | OUTPATIENT
Start: 2021-05-11 | End: 2021-11-18

## 2021-05-18 ENCOUNTER — AMBULATORY - HEALTHEAST (OUTPATIENT)
Dept: LAB | Facility: CLINIC | Age: 86
End: 2021-05-18

## 2021-05-18 ENCOUNTER — COMMUNICATION - HEALTHEAST (OUTPATIENT)
Dept: ANTICOAGULATION | Facility: CLINIC | Age: 86
End: 2021-05-18

## 2021-05-18 DIAGNOSIS — I48.92 ATRIAL FLUTTER, UNSPECIFIED TYPE (H): ICD-10-CM

## 2021-05-18 DIAGNOSIS — I48.3 TYPICAL ATRIAL FLUTTER (H): ICD-10-CM

## 2021-05-18 DIAGNOSIS — Z79.01 LONG TERM (CURRENT) USE OF ANTICOAGULANTS: ICD-10-CM

## 2021-05-18 LAB — INR PPP: 2.5 (ref 0.9–1.1)

## 2021-05-19 ENCOUNTER — COMMUNICATION - HEALTHEAST (OUTPATIENT)
Dept: INTERNAL MEDICINE | Facility: CLINIC | Age: 86
End: 2021-05-19

## 2021-05-19 DIAGNOSIS — I10 ESSENTIAL HYPERTENSION, BENIGN: ICD-10-CM

## 2021-05-20 RX ORDER — AMLODIPINE BESYLATE 2.5 MG/1
TABLET ORAL
Qty: 90 TABLET | Refills: 0 | Status: SHIPPED | OUTPATIENT
Start: 2021-05-20 | End: 2021-07-30

## 2021-05-20 RX ORDER — LISINOPRIL AND HYDROCHLOROTHIAZIDE 12.5; 2 MG/1; MG/1
TABLET ORAL
Qty: 180 TABLET | Refills: 2 | Status: SHIPPED | OUTPATIENT
Start: 2021-05-20 | End: 2022-01-01

## 2021-05-24 ENCOUNTER — RECORDS - HEALTHEAST (OUTPATIENT)
Dept: ADMINISTRATIVE | Facility: CLINIC | Age: 86
End: 2021-05-24

## 2021-05-27 ENCOUNTER — RECORDS - HEALTHEAST (OUTPATIENT)
Dept: ADMINISTRATIVE | Facility: CLINIC | Age: 86
End: 2021-05-27

## 2021-05-27 NOTE — TELEPHONE ENCOUNTER
Unable to leave a message a message for the patient- caller prompted to enter an access code.    Will try again later.    Keira Boyle RN

## 2021-05-27 NOTE — TELEPHONE ENCOUNTER
----- Message from Roc Mares MD sent at 4/3/2019  6:30 PM CDT -----  Please call pt and send letter:    Cholesterol not as good as last visit but not bad.  Kidney function has declined considerably.  I do not know if that is due to something you are taking or dehydration.  Please make sure you are staying well-hydrated and do not take any over-the-counter pain medications such as Advil or Aleve.  I will want to recheck your kidney function in a week or 2.  Please make a lab appointment for that.  Sugar control looks pretty good.  Thyroid test is normal.    Laura, please place order for BMP.  Diagnosis is AK I.  Have her come in in a week or 2.  Thank you

## 2021-05-27 NOTE — TELEPHONE ENCOUNTER
ANTICOAGULATION MANAGEMENT PROGRAM--Non-Compliance Chart Review    Miesha Quarles is overdue for INR follow up.      INR Results:   Lab Results   Component Value Date    INR 2.70 (H) 01/11/2019    INR 2.50 (H) 11/06/2018    INR 2.30 (H) 10/23/2018         Chart reviewed, noted that patient does have an OV scheduled on 4/1. Until INR results become available okay to continue non-compliance protocol       Belinda Baum RN

## 2021-05-27 NOTE — TELEPHONE ENCOUNTER
Pt notified lab results/recommendations and verbalized understanding. Future lab orders enter in Epic to be co-sign.     Lab appt scheduled for 4/18/19 at 11:00 am for recheck.

## 2021-05-27 NOTE — PROGRESS NOTES
Office Visit - Follow Up   Miesha Quarles   89 y.o. female    Date of Visit: 4/1/2019    Chief Complaint   Patient presents with     Diabetes     4 mo f/u - fasting      Medication Refill     requesting for rx for tramadol for right leg sciatica pain for prn use only         Assessment and Plan   1. Benign Essential Hypertension  Good control.  Continue same regimen.    2. Atrial flutter, unspecified type (H)  Due for INR today.  No bleeding.  - INR    3. Hypercholesterolemia  Labs today for monitoring.  - Lipid Cascade  - Comprehensive Metabolic Panel    4. Hypothyroidism, unspecified type  Seems euthyroid but with the weight loss will check TSH.  - Thyroid Cascade    5. Type 2 diabetes mellitus with complication, unspecified whether long term insulin use (H)  She reports good sugar control.  A1c today.  - Comprehensive Metabolic Panel  - Glycosylated Hemoglobin A1c    6. Radicular pain  She was given a few tramadol to have on hand in the future as needed.  She is tolerates this without difficulty.  - traMADol (ULTRAM) 50 mg tablet; Take 1 tablet (50 mg total) by mouth every 6 (six) hours as needed for pain.  Dispense: 30 tablet; Refill: 0        Return in about 3 months (around 7/1/2019) for Recheck.     History of Present Illness   This 89 y.o. old patient comes in today for follow-up.  I have not seen her in many months.  She did not want to come out of winter.  She has diabetes and other issues.  She is 89 years old now.  She had a stomach bug in February she tells me and lost some weight but is eating well now.  She is also been trying to lose the weight because she wanted to fit into address that she was getting too big for.  She is feeling well.  Checking her sugars and these have been good she tells me.  She recently had a flareup of her lumbar radiculopathy but that is better now.  She would like to have some tramadol on hand if has recurrences.    Review of Systems: A comprehensive review of systems  "was negative except as noted.     Medications, Allergies and Problem List   Reviewed, reconciled and updated     Physical Exam   General Appearance:   Delightful woman who appears younger than her age.  Good spirits today.    /62 (Patient Site: Right Arm, Patient Position: Sitting, Cuff Size: Adult Regular)   Pulse 76   Ht 5' 2\" (1.575 m)   Wt 163 lb (73.9 kg)   SpO2 97%   BMI 29.81 kg/m           Additional Information   Current Outpatient Medications   Medication Sig Dispense Refill     acetaminophen (TYLENOL) 650 MG CR tablet Take 650 mg by mouth as needed.        amLODIPine (NORVASC) 2.5 MG tablet Take 1 tablet (2.5 mg total) by mouth daily. 30 tablet 11     ascorbic acid (VITAMIN C) 100 MG tablet Take 100 mg by mouth daily.       calcium carbonate-vitamin D3 (CALCIUM 600 WITH VITAMIN D3) 600 mg(1,500mg) -200 unit per tablet Take 1 tablet by mouth daily.       cholecalciferol, vitamin D3, 1,000 unit capsule Take 3,000 Units by mouth daily.        cranberry 400 mg cap Take 1 capsule by mouth daily.       cyanocobalamin (VITAMIN B-12) 1000 MCG tablet Take 1,000 mcg by mouth daily.       furosemide (LASIX) 20 MG tablet TAKE 1 TABLET BY MOUTH AS NEEDED. 90 tablet 1     KLOR-CON 10 10 mEq CR tablet TAKE 1 TABLET BY MOUTH DAILY. TAKE ONLY IF TAKING FUROSEMIDE 90 tablet 0     levothyroxine (SYNTHROID, LEVOTHROID) 50 MCG tablet TAKE ONE TABLET BY MOUTH ONCE DAILY 90 tablet 3     lisinopril-hydrochlorothiazide (PRINZIDE,ZESTORETIC) 20-12.5 mg per tablet TAKE TWO TABS BY MOUTH DAILY 180 tablet 3     loratadine (CLARITIN) 10 mg tablet Take 10 mg by mouth daily.       metoprolol succinate (TOPROL-XL) 100 MG 24 hr tablet Take 1 tablet (100 mg total) by mouth daily. 90 tablet 2     pyridoxine (VITAMIN B-6) 100 MG tablet Take 100 mg by mouth daily.       simvastatin (ZOCOR) 20 MG tablet TAKE 1/2 TABLET BY MOUTH DAILY IN THE EVENING. 90 tablet 0     vitamin E 400 unit Tab Take by mouth.       warfarin (COUMADIN) 5 MG " tablet Take 1 tablet (5 mg total) by mouth See Admin Instructions. Adjust dose based on INR results as directed. 90 tablet 2     traMADol (ULTRAM) 50 mg tablet Take 1 tablet (50 mg total) by mouth every 6 (six) hours as needed for pain. 30 tablet 0     No current facility-administered medications for this visit.      Allergies   Allergen Reactions     Amoxicillin Hives     Cephalexin Other (See Comments)     Reaction with intervenous administration.     Penicillins Hives     Latex Rash     Other Environmental Allergy Other (See Comments)     Pollen causes seasonal allergies.     Social History     Tobacco Use     Smoking status: Former Smoker     Types: Cigarettes     Last attempt to quit:      Years since quittin.2     Smokeless tobacco: Never Used   Substance Use Topics     Alcohol use: Yes     Drug use: Not on file       Review and/or order of clinical lab tests:  Review and/or order of radiology tests:  Review and/or order of medicine tests:  Discussion of test results with performing physician:  Decision to obtain old records and/or obtain history from someone other than the patient:  Review and summarization of old records and/or obtaining history from someone other than the patient and.or discussion of case with another health care provider:  Independent visualization of image, tracing or specimen itself:    Time: not applicable     Roc Mares MD

## 2021-05-28 NOTE — TELEPHONE ENCOUNTER
ANTICOAGULATION  MANAGEMENT    Assessment     Today's INR result of 2.20 is Therapeutic (goal INR of 2.0-3.0)        Warfarin taken as previously instructed    No new diet changes affecting INR    No new medication/supplements affecting INR    Continues to tolerate warfarin with no reported s/s of bleeding or thromboembolism     Previous INR was Therapeutic at 2.10 on 4/1/19.  (last 5 consecutive INR's therapeutic.)    Plan:     Spoke with Miesha regarding INR result and instructed:     Warfarin Dosing Instructions:   (has 5mg tabs)   - Continue current warfarin dose 2.5 mg daily.    Instructed patient to follow up no later than:  4-6 wks.    Education provided: importance of consistent vitamin K intake, target INR goal and significance of current INR result and importance of notifying clinic for changes in medications    Miesha verbalizes understanding and agrees to warfarin dosing plan.    Instructed to call the [facility] for any changes, questions or concerns. (#[phone])   ?   Reina Cruz RN    Subjective/Objective:      Miesha Quarles, a 89 y.o. female is on warfarin.     Miesha reports:     Home warfarin dose: as updated on anticoagulation calendar per template     Missed doses: No     Medication changes:  No     S/S of bleeding or thromboembolism:  No     New Injury or illness:  No     Changes in diet or alcohol consumption:  No     Upcoming surgery, procedure or cardioversion:  No    Anticoagulation Episode Summary     Current INR goal:   2.0-3.0   TTR:   68.2 % (1.6 y)   Next INR check:   6/3/2019   INR from last check:   2.20 (4/29/2019)   Weekly max warfarin dose:      Target end date:      INR check location:      Preferred lab:      Send INR reminders to:   ANTICOAGULATION POOL C (DTN,VAD,CGR,GAV)    Indications    Atrial flutter (H) [I48.92]           Comments:            Anticoagulation Care Providers     Provider Role Specialty Phone number    Roc Mares MD Referring Internal  Medicine [phone]

## 2021-05-29 NOTE — TELEPHONE ENCOUNTER
Prescription Monitoring Program activity reviewed with no discrepancies noted.      Last fill per : 4/1/19  Quantity/days supply: 30 tablets for 7 days    Controlled Substance Agreement on file: No  Date: NA    Last office visit with provider:  4/1/2019 Roc Mares MD    Please advise.

## 2021-05-29 NOTE — TELEPHONE ENCOUNTER
Controlled Substance Refill Request  Medication:   Requested Prescriptions     Pending Prescriptions Disp Refills     traMADol (ULTRAM) 50 mg tablet [Pharmacy Med Name: TRAMADOL HCL 50 MG TABLET] 30 tablet 0     Sig: TAKE 1 TABLET BY MOUTH EVERY 6 HOURS AS NEEDED FOR PAIN     Date Last Fill: 4/1/19  Pharmacy: [facility]   Submit electronically to pharmacy    Controlled Substance Agreement on File:   Encounter-Level CSA Scan Date:    There are no encounter-level csa scan date.       Last office visit with primary: 4/1/2019

## 2021-05-29 NOTE — TELEPHONE ENCOUNTER
Refill Approved    Rx renewed per Medication Renewal Policy. Medication was last renewed on 7/9/18.    Claribel Heard, [facility] Triage/Med Refill 6/1/2019     Requested Prescriptions   Pending Prescriptions Disp Refills     levothyroxine (SYNTHROID, LEVOTHROID) 50 MCG tablet [Pharmacy Med Name: LEVOTHYROXINE 50 MCG TABLET] 90 tablet 2     Sig: TAKE ONE TABLET BY MOUTH ONCE DAILY       Thyroid Hormones Protocol Passed - 5/31/2019  1:32 PM        Passed - Provider visit in past 12 months or next 3 months     Last office visit with prescriber/PCP: 4/1/2019 Roc Mares MD OR same dept: 4/1/2019 Roc Mares MD OR same specialty: 4/1/2019 Roc Mares MD  Last physical: Visit date not found Last MTM visit: Visit date not found   Next visit within 3 mo: Visit date not found  Next physical within 3 mo: Visit date not found  Prescriber OR PCP: Roc Mares MD  Last diagnosis associated with med order: 1. Hypothyroid  - levothyroxine (SYNTHROID, LEVOTHROID) 50 MCG tablet [Pharmacy Med Name: LEVOTHYROXINE 50 MCG TABLET]; TAKE ONE TABLET BY MOUTH ONCE DAILY  Dispense: 90 tablet; Refill: 2    If protocol passes may refill for 12 months if within 3 months of last provider visit (or a total of 15 months).             Passed - TSH on file in past 12 months for patient age 12 & older     TSH   Date Value Ref Range Status   04/01/2019 2.27 0.30 - 5.00 uIU/mL Final

## 2021-05-29 NOTE — TELEPHONE ENCOUNTER
ANTICOAGULATION  MANAGEMENT    Assessment     Today's INR result of 2.30 is Therapeutic (goal INR of 2.0-3.0)        Warfarin taken as previously instructed    No new diet changes affecting INR    Potential interaction between Tramadol and warfarin which may affect subsequent INRs    Continues to tolerate warfarin with no reported s/s of bleeding or thromboembolism     Previous INR was Therapeutic at 2.20 on 4/29/19.   (last 6 consecutive INR's therapeutic.)    Plan:     Spoke with Miesha regarding INR result and instructed:     Warfarin Dosing Instructions:  (has 5mg tabs)   - Continue current warfarin dose 2.5 mg daily.    Instructed patient to follow up no later than:  4-6 wks.   - will check INR on 7/15/19 during 2 year f/u @ [facility].    Education provided: importance of consistent vitamin K intake, target INR goal and significance of current INR result, potential interaction between warfarin and Tramadol and importance of notifying clinic for changes in medications    Miesha verbalizes understanding and agrees to warfarin dosing plan.    Instructed to call the [facility] for any changes, questions or concerns. (#[phone])   ?   Reina Cruz RN    Subjective/Objective:      Mieshavaishali Quarles, a 89 y.o. female is on warfarin.     Miesha reports:     Home warfarin dose: as updated on anticoagulation calendar per template     Missed doses: No     Medication changes:  Yes:  Reported since 6/4/19 has been taking pain medication Tramadol 50mg one tab q6hrs PRN for arthritic pains.  Reported helping alleviate pain.     S/S of bleeding or thromboembolism:  No     New Injury or illness:  No.  Reports doing well.     Changes in diet or alcohol consumption:  No     Upcoming surgery, procedure or cardioversion:  No    Anticoagulation Episode Summary     Current INR goal:   2.0-3.0   TTR:   70.7 % (1.8 y)   Next INR check:   8/14/2019   INR from last check:   2.30 (6/19/2019)   Weekly max warfarin  dose:      Target end date:      INR check location:      Preferred lab:      Send INR reminders to:   [facility]    Indications    Atrial flutter (H) [I48.92]           Comments:            Anticoagulation Care Providers     Provider Role Specialty Phone number    Roc Mares MD Referring Internal Medicine [phone]

## 2021-05-29 NOTE — TELEPHONE ENCOUNTER
ANTICOAGULATION  MANAGEMENT PROGRAM    Miesha Quarles is overdue for INR check.  Reminder call made.    Spoke with Miesha and scheduled INR appointment on 6/12/19 @ [facility]. .    Reina Cruz RN

## 2021-05-30 VITALS — HEIGHT: 63 IN | BODY MASS INDEX: 31.71 KG/M2 | WEIGHT: 179 LBS

## 2021-05-30 VITALS — HEIGHT: 63 IN | BODY MASS INDEX: 32.6 KG/M2 | WEIGHT: 184 LBS

## 2021-05-30 VITALS — WEIGHT: 178 LBS | HEIGHT: 63 IN | BODY MASS INDEX: 31.54 KG/M2

## 2021-05-30 VITALS — HEIGHT: 63 IN | WEIGHT: 180 LBS | BODY MASS INDEX: 31.89 KG/M2

## 2021-05-30 NOTE — TELEPHONE ENCOUNTER
Anticoagulation Management    Unable to reach Miesha today.    Today's INR result of 1.70 is Subtherapeutic (goal INR of 2.0-3.0)    (Last 6 INR's therapeutic).    Follow up required to provide dosing instructions     No instructions provided. Unable to leave voicemail due to NO VM    - if patient is reached tonight, have her take one time booster with 5mg warfarin dose.  (she is due to take 2.5mg warfarin dose tonight.)         ACN to follow up    Reina Cruz RN

## 2021-05-30 NOTE — TELEPHONE ENCOUNTER
Refill Approved    Rx renewed per Medication Renewal Policy. Medication was last renewed on 12/23/18.    Emelia Ma, [facility] Triage/Med Refill 7/23/2019     Requested Prescriptions   Pending Prescriptions Disp Refills     simvastatin (ZOCOR) 20 MG tablet [Pharmacy Med Name: SIMVASTATIN 20 MG TABLET] 90 tablet 0     Sig: TAKE 1/2 TABLET BY MOUTH DAILY IN THE EVENING.       Statins Refill Protocol (Hmg CoA Reductase Inhibitors) Passed - 7/23/2019  1:22 AM        Passed - PCP or prescribing provider visit in past 12 months      Last office visit with prescriber/PCP: 7/2/2019 Roc Mares MD OR same dept: 7/2/2019 Roc Mares MD OR same specialty: 7/2/2019 Roc Mares MD  Last physical: Visit date not found Last MTM visit: Visit date not found   Next visit within 3 mo: Visit date not found  Next physical within 3 mo: Visit date not found  Prescriber OR PCP: Roc Mares MD  Last diagnosis associated with med order: 1. Hypercholesteremia  - simvastatin (ZOCOR) 20 MG tablet [Pharmacy Med Name: SIMVASTATIN 20 MG TABLET]; TAKE 1/2 TABLET BY MOUTH DAILY IN THE EVENING.  Dispense: 90 tablet; Refill: 0    If protocol passes may refill for 12 months if within 3 months of last provider visit (or a total of 15 months).

## 2021-05-30 NOTE — TELEPHONE ENCOUNTER
ANTICOAGULATION  MANAGEMENT    Assessment     Today's INR result of 1.70 is Subtherapeutic (goal INR of 2.0-3.0)        Warfarin taken as previously instructed    No new diet changes affecting INR    No new medication/supplements affecting INR    Continues to tolerate warfarin with no reported s/s of bleeding or thromboembolism     Previous INR was Therapeutic at 2.30 on 6/19/19.   (last 6 INR's therapeutic)    Plan:     Spoke with Miesha regarding INR result and instructed:     Warfarin Dosing Instructions:    - today, advised one time booster with 5mg warfarin dose,   - then continue current warfarin dose 2.5 mg daily.    Instructed patient to follow up no later than: 1-2 wks.    Education provided: importance of consistent vitamin K intake, target INR goal and significance of current INR result and importance of notifying clinic for changes in medications    Miesha verbalizes understanding and agrees to warfarin dosing plan.    Instructed to call the [facility] for any changes, questions or concerns. (#[phone])   ?   Reina Cruz RN    Subjective/Objective:      Miesha Quarles, a 89 y.o. female is on warfarin.     Miesha reports:     Home warfarin dose: as updated on anticoagulation calendar per template     Missed doses: No     Medication changes:  No     S/S of bleeding or thromboembolism:  No     New Injury or illness:  No     Changes in diet or alcohol consumption:  No     Upcoming surgery, procedure or cardioversion:  No    Anticoagulation Episode Summary     Current INR goal:   2.0-3.0   TTR:   70.3 % (1.8 y)   Next INR check:   7/16/2019   INR from last check:   1.70! (7/2/2019)   Weekly max warfarin dose:      Target end date:      INR check location:      Preferred lab:      Send INR reminders to:   [facility]    Indications    Atrial flutter (H) [I48.92]           Comments:            Anticoagulation Care Providers     Provider Role Specialty Phone number    Vishnu,  Roc VIEIRA MD [facility] Internal Medicine [phone]

## 2021-05-30 NOTE — TELEPHONE ENCOUNTER
Anticoagulation Annual Referral Renewal Review    Miesha OSORIO Quarles's chart reviewed for annual renewal of referral to anticoagulation monitoring.        Criteria for anticoagulation nurse and/or pharmacist renewal met   Warfarin indication: Atrial Flutter Yes, per indication   Current with INR monitoring/compliant Yes Yes   Date of last office visit 07/15/2019 Yes, had office visit within last year   Time in Therapeutic Range (TTR) 100.00 % Yes, TTR > 60%       Miesha Quarles met all criteria for anticoagulation management program initiated renewal.  New INR standing orders and anticoagulation referral renewal placed.      Reina Cruz RN  1:08 PM

## 2021-05-30 NOTE — PROGRESS NOTES
Office Visit - Follow Up   Miesha Quarles   89 y.o. female    Date of Visit: 7/2/2019    Chief Complaint   Patient presents with     Diabetes     3 mo f/u - fasting today      INR Check        Assessment and Plan   1. Type 2 diabetes mellitus with complication, unspecified whether long term insulin use (H)  Unknown control.  Labs today for monitoring.    2. CKD (chronic kidney disease) stage 3, GFR 30-59 ml/min (H)  Renal function will be rechecked today.    3. Atrial flutter, unspecified type (H)    - INR    4. Hypercholesterolemia  She did not fast.  Lipids will be done next visit.  I will see her in the fall.  Assuming all is well I will see her in the fall again and then next spring.    5. Benign Essential Hypertension  Good control.  Continue same regimen.    6. Hypothyroidism, unspecified type  She seems euthyroid.  Recent TSH done a few months ago looked good.        Return in about 4 months (around 11/2/2019) for Recheck.     History of Present Illness   This 89 y.o. old patient with multiple medical problems comes in today for follow-up of her multiple issues.  He has diabetes which is been well controlled.  States sugars have been good.  Remains active in her [employer]ism and with her family.  Going to her niece's for Fourth of July.  She is been doing a once a month exercise class at a [employer].  Its put on by a nonprofit group.  For seniors.  They teach exercise of her to do at home and she has been doing them.  Enjoys that.  They gave her a free lunch to she tells me.  Otherwise offers no new somatic concerns for me.  Due for an INR today.  She had some hypokalemia and is been trying to eat increase her potassium in her diet.  She needs followed up of that today.  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    Review of Systems: A comprehensive review of systems was negative except as noted.     Medications, Allergies and Problem List   Reviewed, reconciled and updated  Post Discharge Medication  "Reconciliation Status:      Physical Exam   General Appearance:   Delightful woman appears younger than her age.  Heart is regular with occasional ectopy.    /68 (Patient Site: Right Arm, Patient Position: Sitting, Cuff Size: Adult Regular)   Pulse 78   Ht 5' 2\" (1.575 m)   Wt 166 lb (75.3 kg)   SpO2 98%   BMI 30.36 kg/m           Additional Information   Current Outpatient Medications   Medication Sig Dispense Refill     acetaminophen (TYLENOL) 650 MG CR tablet Take 650 mg by mouth as needed.        amLODIPine (NORVASC) 2.5 MG tablet Take 1 tablet (2.5 mg total) by mouth daily. 30 tablet 11     ascorbic acid (VITAMIN C) 100 MG tablet Take 100 mg by mouth daily.       calcium carbonate-vitamin D3 (CALCIUM 600 WITH VITAMIN D3) 600 mg(1,500mg) -200 unit per tablet Take 1 tablet by mouth daily.       cholecalciferol, vitamin D3, 1,000 unit capsule Take 3,000 Units by mouth daily.        cranberry 400 mg cap Take 1 capsule by mouth daily.       cyanocobalamin (VITAMIN B-12) 1000 MCG tablet Take 1,000 mcg by mouth daily.       furosemide (LASIX) 20 MG tablet TAKE 1 TABLET BY MOUTH AS NEEDED. 90 tablet 1     KLOR-CON 10 10 mEq CR tablet TAKE 1 TABLET BY MOUTH DAILY. TAKE ONLY IF TAKING FUROSEMIDE 90 tablet 0     levothyroxine (SYNTHROID, LEVOTHROID) 50 MCG tablet Take 1 tablet (50 mcg total) by mouth daily. 90 tablet 3     lisinopril-hydrochlorothiazide (PRINZIDE,ZESTORETIC) 20-12.5 mg per tablet TAKE TWO TABS BY MOUTH DAILY 180 tablet 3     loratadine (CLARITIN) 10 mg tablet Take 10 mg by mouth daily.       metoprolol succinate (TOPROL-XL) 100 MG 24 hr tablet Take 1 tablet (100 mg total) by mouth daily. 90 tablet 2     pyridoxine (VITAMIN B-6) 100 MG tablet Take 100 mg by mouth daily.       simvastatin (ZOCOR) 20 MG tablet TAKE 1/2 TABLET BY MOUTH DAILY IN THE EVENING. 90 tablet 0     traMADol (ULTRAM) 50 mg tablet TAKE 1 TABLET BY MOUTH EVERY 6 HOURS AS NEEDED FOR PAIN 30 tablet 0     vitamin E 400 unit Tab " Take by mouth.       warfarin (COUMADIN) 5 MG tablet Take 1 tablet (5 mg total) by mouth See Admin Instructions. Adjust dose based on INR results as directed. 90 tablet 2     No current facility-administered medications for this visit.      Allergies   Allergen Reactions     Amoxicillin Hives     Cephalexin Other (See Comments)     Reaction with intervenous administration.     Penicillins Hives     Latex Rash     Other Environmental Allergy Other (See Comments)     Pollen causes seasonal allergies.     Social History     Tobacco Use     Smoking status: Former Smoker     Types: Cigarettes     Last attempt to quit:      Years since quittin.5     Smokeless tobacco: Never Used   Substance Use Topics     Alcohol use: Yes     Drug use: Not on file       Review and/or order of clinical lab tests:  Review and/or order of radiology tests:  Review and/or order of medicine tests:  Discussion of test results with performing physician:  Decision to obtain old records and/or obtain history from someone other than the patient:  Review and summarization of old records and/or obtaining history from someone other than the patient and.or discussion of case with another health care provider:  Independent visualization of image, tracing or specimen itself:    Time: total time spent with the patient was 25 minutes of which >50% was spent in counseling and coordination of care     Roc Mares MD

## 2021-05-30 NOTE — TELEPHONE ENCOUNTER
ANTICOAGULATION  MANAGEMENT    Assessment     Today's INR result of 2.10 is Therapeutic (goal INR of 2.0-3.0)        Warfarin taken as previously instructed    No new diet changes affecting INR    No new medication/supplements affecting INR    Continues to tolerate warfarin with no reported s/s of bleeding or thromboembolism     Previous INR was Subtherapeutic at 1.70 on 7/2/19.    Plan:     Spoke with Miesha regarding INR result and instructed:     Warfarin Dosing Instructions:  (has 5mg tabs)    - Continue current warfarin dose 2.5 mg daily.    Instructed patient to follow up no later than:  2-3 wks.   - scheduled on 8/7/19 @ [facility].    Education provided: importance of consistent vitamin K intake, target INR goal and significance of current INR result and importance of notifying clinic for changes in medications    Miesha verbalizes understanding and agrees to warfarin dosing plan.    Instructed to call the [facility] for any changes, questions or concerns. (#[phone])   ?   Reina Cruz RN    Subjective/Objective:      Miesha Quarles, a 89 y.o. female is on warfarin.     Miesha reports:     Home warfarin dose: verbally confirmed home dose with Miesha and updated on anticoagulation calendar     Missed doses: No     Medication changes:  No     S/S of bleeding or thromboembolism:  No     New Injury or illness:  No. Had appt with Cardiology - no new changes with medications.     Changes in diet or alcohol consumption:  No     Upcoming surgery, procedure or cardioversion:  No    Anticoagulation Episode Summary     Current INR goal:   2.0-3.0   TTR:   69.4 % (1.8 y)   Next INR check:   8/5/2019   INR from last check:   2.10 (7/15/2019)   Weekly max warfarin dose:      Target end date:      INR check location:      Preferred lab:      Send INR reminders to:   [facility]    Indications    Atrial flutter (H) [I48.92]           Comments:            Anticoagulation Care Providers     Provider  Role Specialty Phone number    Roc Mares MD Referring Internal Medicine [phone]

## 2021-05-31 VITALS — WEIGHT: 178 LBS | BODY MASS INDEX: 32.76 KG/M2 | HEIGHT: 62 IN

## 2021-05-31 VITALS — HEIGHT: 63 IN | BODY MASS INDEX: 32.07 KG/M2 | WEIGHT: 181 LBS

## 2021-05-31 VITALS — HEIGHT: 63 IN | WEIGHT: 175 LBS | BODY MASS INDEX: 31.01 KG/M2

## 2021-05-31 VITALS — WEIGHT: 175 LBS | HEIGHT: 63 IN | BODY MASS INDEX: 31.01 KG/M2

## 2021-05-31 VITALS — HEIGHT: 63 IN | WEIGHT: 182 LBS | BODY MASS INDEX: 32.25 KG/M2

## 2021-05-31 VITALS — BODY MASS INDEX: 32.07 KG/M2 | WEIGHT: 181 LBS | HEIGHT: 63 IN

## 2021-05-31 VITALS — WEIGHT: 178 LBS | BODY MASS INDEX: 31.54 KG/M2 | HEIGHT: 63 IN

## 2021-05-31 NOTE — TELEPHONE ENCOUNTER
ANTICOAGULATION  MANAGEMENT    Assessment     Today's INR result of 1.50 is Subtherapeutic (goal INR of 2.0-3.0)        Warfarin taken as previously instructed    Decreased greens/vitamin K intake may be affecting INR    No new medication/supplements affecting INR    Continues to tolerate warfarin with no reported s/s of bleeding or thromboembolism     Previous INR was Subtherapeutic at 1.90 on 8/7/19.   (last 3 INR's sub-therapeutic).    Plan:     Spoke with Miesha regarding INR result and instructed:     Warfarin Dosing Instructions:  (has 5mg tabs)   - Change warfarin dose to 5 mg daily on Monday; and 2.5 mg daily rest of week.   - (14.3 % change)    Instructed patient to follow up no later than:  1-2 wks.   - scheduled on 9/9/19 @ [facility].    Education provided: importance of consistent vitamin K intake and target INR goal and significance of current INR result    Miesha verbalizes understanding and agrees to warfarin dosing plan.    Instructed to call the [facility] for any changes, questions or concerns. (#[phone])   ?   Reina Cruz RN    Subjective/Objective:      Miesha Quarles, a 89 y.o. female is on warfarin.     Miesha reports:     Home warfarin dose: as updated on anticoagulation calendar per template     Missed doses: No     Medication changes:  No     S/S of bleeding or thromboembolism:  No     New Injury or illness:  No     Changes in diet or alcohol consumption:  Yes.  Reported eating less than usual salads / vegetables.     Upcoming surgery, procedure or cardioversion:  No    Anticoagulation Episode Summary     Current INR goal:   2.0-3.0   TTR:   66.9 % (1.9 y)   Next INR check:   9/9/2019   INR from last check:   1.50! (8/26/2019)   Weekly max warfarin dose:      Target end date:      INR check location:      Preferred lab:      Send INR reminders to:   [facility]    Indications    Atrial flutter (H) [I48.92]           Comments:            Anticoagulation Care  Providers     Provider Role Specialty Phone number    Roc Mares MD Referring Internal Medicine [phone]

## 2021-05-31 NOTE — TELEPHONE ENCOUNTER
ANTICOAGULATION  MANAGEMENT    Assessment     Today's INR result of 1.90 is Subtherapeutic (goal INR of 2.0-3.0)        Warfarin taken as previously instructed    Increased greens/vitamin K intake may be affecting INR    No new medication/supplements affecting INR    Continues to tolerate warfarin with no reported s/s of bleeding or thromboembolism     Previous INR was Subtherapeutic at 1.70 on 7/15/19.   (last 2 INR's sub-therapeutic).    Plan:     Spoke with Miesha regarding INR result and instructed:    1.  She reported, she will go back to her usual salad intake.    Warfarin Dosing Instructions:  (has 5mg tabs)   - Continue current warfarin dose 2.5 mg daily.    Instructed patient to follow up no later than:  2 wks.   - scheduled on 8/26/19 @ [facility].    Education provided: importance of consistent vitamin K intake, impact of vitamin K foods on INR and target INR goal and significance of current INR result    Miesha verbalizes understanding and agrees to warfarin dosing plan.    Instructed to call the [facility] for any changes, questions or concerns. (#[phone])   ?   Reina Cruz RN    Subjective/Objective:      Miesha Quarles, a 89 y.o. female is on warfarin.     Miesha reports:     Home warfarin dose: as updated on anticoagulation calendar per template     Missed doses: No     Medication changes:  No     S/S of bleeding or thromboembolism:  No     New Injury or illness:  No     Changes in diet or alcohol consumption:  Yes:  Reported has been eating more salads than usual.     Upcoming surgery, procedure or cardioversion:  No    Anticoagulation Episode Summary     Current INR goal:   2.0-3.0   TTR:   68.8 % (1.9 y)   Next INR check:   8/21/2019   INR from last check:   1.90! (8/7/2019)   Weekly max warfarin dose:      Target end date:      INR check location:      Preferred lab:      Send INR reminders to:   [facility]    Indications    Atrial flutter (H) [I48.92]            Comments:            Anticoagulation Care Providers     Provider Role Specialty Phone number    Roc Mares MD Referring Internal Medicine [phone]

## 2021-05-31 NOTE — TELEPHONE ENCOUNTER
Refill Approved    Rx renewed per Medication Renewal Policy. Medication was last renewed on 7/9/2018 with 3 refills.  Last office visit: 7/2/2019 with PCP Dr LIBAN Fregoso, [facility] Triage/Med Refill 8/4/2019     Requested Prescriptions   Pending Prescriptions Disp Refills     lisinopril-hydrochlorothiazide (PRINZIDE,ZESTORETIC) 20-12.5 mg per tablet [Pharmacy Med Name: LISINOPRIL-HCTZ 20-12.5 MG TAB] 180 tablet 3     Sig: TAKE TWO TABS BY MOUTH DAILY       Diuretics/Combination Diuretics Refill Protocol  Passed - 8/4/2019  8:35 AM        Passed - Visit with PCP or prescribing provider visit in past 12 months     Last office visit with prescriber/PCP: 7/2/2019 Roc Mares MD OR same dept: 7/2/2019 Roc Mares MD OR same specialty: 7/2/2019 Roc Mares MD  Last physical: Visit date not found Last MTM visit: Visit date not found   Next visit within 3 mo: Visit date not found  Next physical within 3 mo: Visit date not found  Prescriber OR PCP: Roc Mares MD  Last diagnosis associated with med order: 1. Benign Essential Hypertension  - lisinopril-hydrochlorothiazide (PRINZIDE,ZESTORETIC) 20-12.5 mg per tablet [Pharmacy Med Name: LISINOPRIL-HCTZ 20-12.5 MG TAB]; TAKE TWO TABS BY MOUTH DAILY  Dispense: 180 tablet; Refill: 3    If protocol passes may refill for 12 months if within 3 months of last provider visit (or a total of 15 months).             Passed - Serum Potassium in past 12 months      Lab Results   Component Value Date    Potassium 3.4 (L) 04/18/2019             Passed - Serum Sodium in past 12 months      Lab Results   Component Value Date    Sodium 140 04/18/2019             Passed - Blood pressure on file in past 12 months     BP Readings from Last 1 Encounters:   07/15/19 142/84             Passed - Serum Creatinine in past 12 months      Creatinine   Date Value Ref Range Status   04/18/2019 1.05 0.60 - 1.10 mg/dL Final

## 2021-06-01 VITALS — WEIGHT: 179 LBS | HEIGHT: 63 IN | BODY MASS INDEX: 31.71 KG/M2

## 2021-06-01 VITALS — BODY MASS INDEX: 31.71 KG/M2 | WEIGHT: 179 LBS | HEIGHT: 63 IN

## 2021-06-01 VITALS — WEIGHT: 180 LBS | HEIGHT: 62 IN | BODY MASS INDEX: 33.13 KG/M2

## 2021-06-01 NOTE — TELEPHONE ENCOUNTER
Refill Approved    Rx renewed per Medication Renewal Policy. Medication was last renewed on 9/11/18.    Emelia Ma, [facility] Triage/Med Refill 9/27/2019     Requested Prescriptions   Pending Prescriptions Disp Refills     metoprolol succinate (TOPROL-XL) 100 MG 24 hr tablet [Pharmacy Med Name: METOPROLOL SUCC  MG TAB] 90 tablet 2     Sig: TAKE 1 TABLET BY MOUTH EVERY DAY       Beta-Blockers Refill Protocol Passed - 9/25/2019  7:23 PM        Passed - PCP or prescribing provider visit in past 12 months or next 3 months     Last office visit with prescriber/PCP: 7/2/2019 Roc Mares MD OR same dept: 7/2/2019 Roc Mares MD OR same specialty: 7/2/2019 Roc Mares MD  Last physical: Visit date not found Last MTM visit: Visit date not found   Next visit within 3 mo: Visit date not found  Next physical within 3 mo: Visit date not found  Prescriber OR PCP: Roc Mares MD  Last diagnosis associated with med order: 1. Benign Essential Hypertension  - metoprolol succinate (TOPROL-XL) 100 MG 24 hr tablet [Pharmacy Med Name: METOPROLOL SUCC  MG TAB]; TAKE 1 TABLET BY MOUTH EVERY DAY  Dispense: 90 tablet; Refill: 2    If protocol passes may refill for 12 months if within 3 months of last provider visit (or a total of 15 months).             Passed - Blood pressure filed in past 12 months     BP Readings from Last 1 Encounters:   07/15/19 142/84

## 2021-06-01 NOTE — TELEPHONE ENCOUNTER
New Appointment Needed  What is the reason for the visit:    Pre-Op Appt Request  When is the surgery? :  10/4/19  Where is the surgery?:   unknown  Who is the surgeon? :  Dr. Coleman  What type of surgery is being done?: right eye surgery  Provider Preference: PCP only  How soon do you need to be seen?: anytime before her surgery  Waitlist offered?: No  Okay to leave a detailed message:  Yes    Patient had to cancel her appointment for today 9/30/19 because her car would not start.

## 2021-06-01 NOTE — TELEPHONE ENCOUNTER
ANTICOAGULATION  MANAGEMENT    Assessment     Today's INR result of 2.20 is Therapeutic (goal INR of 2.0-3.0)        Warfarin taken as previously instructed    No new diet changes affecting INR    No new medication/supplements affecting INR    Continues to tolerate warfarin with no reported s/s of bleeding or thromboembolism     Previous INR was Subtherapeutic at 1.50 on 8/26/19.    Plan:     Spoke with Miesha regarding INR result and instructed:     Warfarin Dosing Instructions:   (has 5mg tabs)   - Continue current warfarin dose 5 mg daily on Monday; and 2.5 mg daily rest of week.    Instructed patient to follow up no later than:  2 wks.   - scheduled on 9/23/19 @ [facility].    Education provided: importance of consistent vitamin K intake and target INR goal and significance of current INR result    Miesha verbalizes understanding and agrees to warfarin dosing plan.    Instructed to call the [facility] for any changes, questions or concerns. (#[phone])   ?   Reina Cruz RN    Subjective/Objective:      Miesha Quarles, a 89 y.o. female is on warfarin.     Miesha reports:     Home warfarin dose: as updated on anticoagulation calendar per template     Missed doses: No     Medication changes:  No     S/S of bleeding or thromboembolism:  No     New Injury or illness:  No     Changes in diet or alcohol consumption:  No     Upcoming surgery, procedure or cardioversion:  No    Anticoagulation Episode Summary     Current INR goal:   2.0-3.0   TTR:   57.3 %   Next INR check:   9/23/2019   INR from last check:   2.20 (9/9/2019)   Weekly max warfarin dose:      Target end date:      INR check location:      Preferred lab:      Send INR reminders to:   [facility]    Indications    Atrial flutter (H) [I48.92]           Comments:            Anticoagulation Care Providers     Provider Role Specialty Phone number    Roc Mares MD Referring Internal Medicine [phone]

## 2021-06-01 NOTE — TELEPHONE ENCOUNTER
Who is calling:  Patient  Reason for Call:  Patient was returning Dipti's phone call about her INR results.   Date of last appointment with primary care: n/a  Okay to leave a detailed message: Yes

## 2021-06-01 NOTE — TELEPHONE ENCOUNTER
ANTICOAGULATION  MANAGEMENT    Assessment     Today's INR result of 1.90 is Subtherapeutic (goal INR of 2.0-3.0)        Warfarin taken as previously instructed    No new diet changes affecting INR    No interaction expected between Avastin injection and warfarin    Continues to tolerate warfarin with no reported s/s of bleeding or thromboembolism     Previous INR was Therapeutic at 2.20 on 9/9/19.    Plan:     Spoke with Miesha regarding INR result and instructed:     Warfarin Dosing Instructions:  (has 5mg tabs)   - Change warfarin dose to 5 mg daily on Mon/Thurs; and 2.5 mg daily rest of week   - (12.5 % change)    Instructed patient to follow up no later than:  1-2 wks.   - scheduled on 10/3/19 @ [facility].    Education provided: importance of consistent vitamin K intake, target INR goal and significance of current INR result, no interaction anticipated between warfarin and Avastin and importance of notifying clinic for changes in medications    Miesha verbalizes understanding and agrees to warfarin dosing plan.    Instructed to call the [facility] for any changes, questions or concerns. (#[phone])   ?   Reina Cruz RN    Subjective/Objective:      Miesha Quarles, a 89 y.o. female is on warfarin.     Miesha reports:     Home warfarin dose: as updated on anticoagulation calendar per template     Missed doses: No     Medication changes:  Yes:  On 9/12/19 Avastin injection LEFT eye for FLOATERS (reported she could not see with left eye.     S/S of bleeding or thromboembolism:  No     New Injury or illness:  No     Changes in diet or alcohol consumption:  No     Upcoming surgery, procedure or cardioversion:  No    Anticoagulation Episode Summary     Current INR goal:   2.0-3.0   TTR:   61.6 %   Next INR check:   10/7/2019   INR from last check:   1.90! (9/23/2019)   Weekly max warfarin dose:      Target end date:      INR check location:      Preferred lab:      Send INR reminders to:   [facility]  [place]    Indications    Atrial flutter (H) [I48.92]           Comments:            Anticoagulation Care Providers     Provider Role Specialty Phone number    Roc Mares MD Referring Internal Medicine [phone]

## 2021-06-01 NOTE — TELEPHONE ENCOUNTER
Spoke with the patient and helped her to schedule a pre-op appointment to see Dr. Ware on Wednesday, since Dr. Mares had no available appointments.  Patient verbalized understanding and had no further questions at this time.  Janice MASCORRO CMA/CMT....................2:43 PM

## 2021-06-01 NOTE — TELEPHONE ENCOUNTER
ANTICOAGULATION  MANAGEMENT    Assessment     Today's INR result of 2.40 is Therapeutic (goal INR of 2.0-3.0)        Warfarin taken as previously instructed    No new diet changes affecting INR    No new medication/supplements affecting INR    Continues to tolerate warfarin with no reported s/s of bleeding or thromboembolism     Previous INR was Subtherapeutic at 1.90 on 9/32/19.    Scheduled for surgery on RIGHT eye vitrectomy on 10/4/19 @ [facility] with Dr. Coleman.  (will continue with warfarin therapy, as long as INR is less than 2.5)    Plan:     Spoke with Miesha regarding INR result and instructed:      Warfarin Dosing Instructions:   (has 5mg tabs)   - Continue current warfarin dose 5 mg daily on Mon/Thurs; and 2.5 mg daily rest of week.    Instructed patient to follow up no later than:  2 wks.    Education provided: importance of consistent vitamin K intake, target INR goal and significance of current INR result, importance of notifying clinic for changes in medications and importance of notifying clinic of upcoming surgeries and procedures 2 weeks in advance    Miesha verbalizes understanding and agrees to warfarin dosing plan.    Instructed to call the [facility] for any changes, questions or concerns. (#[phone])   ?   Reina Cruz RN    Subjective/Objective:      Miesha DEVON McintyreQuarles, a 89 y.o. female is on warfarin.     Miesha reports:     Home warfarin dose: as updated on anticoagulation calendar per template     Missed doses: No     Medication changes:  No     S/S of bleeding or thromboembolism:  No     New Injury or illness:  No   - on warfarin therapy for atrial flutter.     Changes in diet or alcohol consumption:  No    Upcoming surgery, procedure or cardioversion:  Yes: Scheduled for surgery of RIGHT eye vitrectomy on 10/4/19 @ [facility] with Dr. Coleman.   - will continue with warfarin therapy, as long as INR is less than 2.5)      Anticoagulation Episode Summary     Current INR goal:    2.0-3.0   TTR:   64.9 %   Next INR check:   10/16/2019   INR from last check:   2.40 (10/2/2019)   Weekly max warfarin dose:      Target end date:      INR check location:      Preferred lab:      Send INR reminders to:   [facility]    Indications    Atrial flutter (H) [I48.92]           Comments:            Anticoagulation Care Providers     Provider Role Specialty Phone number    Roc Mares MD Referring Internal Medicine [phone]

## 2021-06-01 NOTE — PROGRESS NOTES
Preoperative Exam    Scheduled Procedure: RT eye Vitrectomy  Surgery Date:  10/4/2019  Surgery Location: [facility] - Fax: [phone]    Surgeon:  Dr. Drake Coleman MD    Assessment/Plan:     1. Atrial flutter, unspecified type (H)  Symptomatic in the form of periodic palpitations and headaches.  Counseled patient to please fill her metoprolol prescription today and to take half dose 50 mg tonight and then resume the 100 mg Toprol tomorrow.  We will also take on day of her surgery.  Anticoagulation clinic will follow up with her regarding maintaining INR below 2.5 prior to her surgery.  Counseled patient to call the clinic if any of the symptoms recurs and to call us if heart rate remains greater than 80 by tomorrow morning  - INR  - Ambulatory referral for EKG  - Electrocardiogram Perform and Read    2. Palpitations  Likely in the setting of not taking her metoprolol for the last 3 to 4 days.  Patient will take 50 mg tonight and then resume taking regular dose tomorrow 10/3/2019  - Ambulatory referral for EKG  - Electrocardiogram Perform and Read    3. Preoperative examination  Surgical Procedure Risk: Low (reported cardiac risk generally < 1%)  Have you had prior anesthesia?: Yes  Have you or any family members had a previous anesthesia reaction:  No  Do you or any family members have a history of a clotting or bleeding disorder?: No  Cardiac Risk Assessment: no increased risk for major cardiac complications    APPROVAL GIVEN to proceed with proposed procedure, without further diagnostic evaluation    Please Note:  Counseled to not take furosemide on the day of surgery and will follow up with anticoagulation clinic whilst on the Coumadin    Functional Status: Independent  Patient plans to recover Staying with her niece Mariluz     Subjective:      Miesha Quarles is a 89 y.o. female who presents for a preoperative consultation.  She will be having a right vitrectomy on 10/4/2019 due to a vitreal  hemorrhage.  To note patient is on Coumadin.  She reports doing well overall with no particular complaints except for headache and some discomfort in her left arm 2 days ago that lasted all day and improved with placement of an ice pack.  She denies any diaphoresis, angina or jaw claudication.  To note patient did not feel her metoprolol prescription and endorses the last dose she took was on 9/30/2019.  She denies any falls and ambulates with a single pronged cane.  Endorses osteoarthritic discomfort in her hands and her feet.  Lives alone but will be staying with her niece Mariluz after her surgery which will be on an outpatient basis.  Review of systems is negative for fever, sweats, chills, shortness of breath, cough, hematuria, dysuria, constipation.  She does endorse periodic loose stools but denies watery diarrhea.  Only takes Lasix when she has significant swelling in her feet and denies any plan to take it the day of her surgery.  She denies any bleeding otherwise.    All other systems reviewed and are negative, other than those listed in the HPI.    Pertinent History  Do you have difficulty breathing or chest pain after walking up a flight of stairs: No  History of obstructive sleep apnea: No  Steroid use in the last 6 months: No  Frequent Aspirin/NSAID use: No  Prior Blood Transfusion: No  Prior Blood Transfusion Reaction: No  If for some reason prior to, during or after the procedure, if it is medically indicated, would you be willing to have a blood transfusion?:  There is no transfusion refusal.    Current Outpatient Medications   Medication Sig Dispense Refill     acetaminophen (TYLENOL) 650 MG CR tablet Take 650 mg by mouth as needed.        amLODIPine (NORVASC) 2.5 MG tablet Take 1 tablet (2.5 mg total) by mouth daily. 30 tablet 11     ascorbic acid (VITAMIN C) 100 MG tablet Take 100 mg by mouth daily.       calcium carbonate-vitamin D3 (CALCIUM 600 WITH VITAMIN D3) 600 mg(1,500mg) -200 unit per  tablet Take 1 tablet by mouth daily.       cholecalciferol, vitamin D3, 1,000 unit capsule Take 3,000 Units by mouth daily.        cranberry 400 mg cap Take 1 capsule by mouth daily.       cyanocobalamin (VITAMIN B-12) 1000 MCG tablet Take 1,000 mcg by mouth daily.       furosemide (LASIX) 20 MG tablet TAKE 1 TABLET BY MOUTH AS NEEDED. 90 tablet 1     KLOR-CON 10 10 mEq CR tablet TAKE 1 TABLET BY MOUTH DAILY. TAKE ONLY IF TAKING FUROSEMIDE 90 tablet 0     levothyroxine (SYNTHROID, LEVOTHROID) 50 MCG tablet Take 1 tablet (50 mcg total) by mouth daily. 90 tablet 3     lisinopril-hydrochlorothiazide (PRINZIDE,ZESTORETIC) 20-12.5 mg per tablet Take 2 tablets by mouth daily. 180 tablet 3     loratadine (CLARITIN) 10 mg tablet Take 10 mg by mouth daily.       metoprolol succinate (TOPROL-XL) 100 MG 24 hr tablet TAKE 1 TABLET BY MOUTH EVERY DAY 90 tablet 3     pyridoxine (VITAMIN B-6) 100 MG tablet Take 100 mg by mouth daily.       simvastatin (ZOCOR) 20 MG tablet TAKE 1/2 TABLET BY MOUTH DAILY IN THE EVENING. 45 tablet 3     traMADol (ULTRAM) 50 mg tablet TAKE 1 TABLET BY MOUTH EVERY 6 HOURS AS NEEDED FOR PAIN 30 tablet 0     vitamin E 400 unit Tab Take by mouth.       warfarin (COUMADIN) 5 MG tablet Take 1 tablet (5 mg total) by mouth See Admin Instructions. Adjust dose based on INR results as directed. 90 tablet 2     No current facility-administered medications for this visit.         Allergies   Allergen Reactions     Amoxicillin Hives     Cephalexin Other (See Comments)     Reaction with intervenous administration.     Penicillins Hives     Latex Rash     Other Environmental Allergy Other (See Comments)     Pollen causes seasonal allergies.       Patient Active Problem List   Diagnosis     Osteopenia     Plantar Fasciitis     Muscle Aches, Generalized (Myalgias)     Vitamin D Deficiency     Osteoarthritis Of The Knee     Hypothyroidism     Type 2 diabetes mellitus with complication (H)     Hypercholesterolemia      Benign Essential Hypertension     Edema     Hyperproteinemia     Atrial flutter (H)     CKD (chronic kidney disease) stage 3, GFR 30-59 ml/min (H)       Past Medical History:   Diagnosis Date     Benign Essential Hypertension     Created by Conversion      Hypercholesterolemia     Created by Conversion      Hypothyroidism     Created by Conversion      Osteoarthritis Of The Knee     Created by Conversion      Osteopenia     Created by Conversion      Type 2 Diabetes Mellitus     Created by Conversion      Vitamin D Deficiency     Created by Conversion        Past Surgical History:   Procedure Laterality Date     TOTAL ABDOMINAL HYSTERECTOMY W/ BILATERAL SALPINGOOPHORECTOMY         Social History     Socioeconomic History     Marital status:      Spouse name: Not on file     Number of children: Not on file     Years of education: Not on file     Highest education level: Not on file   Occupational History     Not on file   Social Needs     Financial resource strain: Not on file     Food insecurity:     Worry: Not on file     Inability: Not on file     Transportation needs:     Medical: Not on file     Non-medical: Not on file   Tobacco Use     Smoking status: Former Smoker     Types: Cigarettes     Last attempt to quit:      Years since quittin.7     Smokeless tobacco: Never Used   Substance and Sexual Activity     Alcohol use: Yes     Drug use: Not on file     Sexual activity: Not on file   Lifestyle     Physical activity:     Days per week: Not on file     Minutes per session: Not on file     Stress: Not on file   Relationships     Social connections:     Talks on phone: Not on file     Gets together: Not on file     Attends Christianity service: Not on file     Active member of club or organization: Not on file     Attends meetings of clubs or organizations: Not on file     Relationship status: Not on file     Intimate partner violence:     Fear of current or ex partner: Not on file     Emotionally  abused: Not on file     Physically abused: Not on file     Forced sexual activity: Not on file   Other Topics Concern     Not on file   Social History Narrative     Not on file       Patient Care Team:  Roc Mares MD as PCP - General  Roc Mares MD as Assigned PCP          Objective:     Vitals:    10/02/19 1255   Weight: 164 lb (74.4 kg)         Physical Exam:  General: No acute distress, pleasant, alert and oriented x4, follows commands  EYES: Eyelids, conjunctiva, and sclera were normal. Pupils were normal. Cornea, iris, and lens were normal bilaterally.  HEAD, EARS, NOSE, MOUTH, AND THROAT: Head and face were normal. Hearing was normal to voice and the ears were normal to external exam.  Tympanic membranes intact with no visible cerumen bilaterally. Nose appearance was normal and there was no discharge. Oropharynx was normal.  NECK: Neck appearance was normal. There were no neck masses and the thyroid was not enlarged.  RESPIRATORY: Breathing pattern was normal and the chest moved symmetrically.  Percussion/auscultatory percussion was normal.  Lung sounds were normal and there were no abnormal sounds.  CARDIOVASCULAR: Tachycardic, irregularly irregular.  S1 and S2 were normal and there were no extra sounds or murmurs. Peripheral pulses in arms and legs were normal.  JVP was normal. No peripheral edema.  GASTROINTESTINAL: Normoactive bowel sounds were present.  No organ enlargement or tenderness.  No tenderness, mass, or enlarged organs.   MUSCULOSKELETAL: Skeletal configuration was normal and muscle mass was normal for age. Joint appearance was overall normal.  SKIN/HAIR/NAILS: Skin color was normal.  There were no skin lesions.  Hair and nails were normal.  NEUROLOGIC: Alert and oriented to person, place, time, and circumstance. Speech was normal. Cranial nerves were normal. Motor strength was normal for age.   DIABETES: Sensation is intact in feet to monofilament bilaterally.  No ulceration, ulcers  or wounds noted in between toes on the soles of her feet bilaterally.  PSYCHIATRIC:  Mood and affect were normal and the patient had normal recent and remote memory. The patient's judgment and insight were normal.  There are no Patient Instructions on file for this visit.    EKG: A. fib with RVR, heart rate 108, no significant ST or T wave changes/abnormalities    Labs:  Recent Results (from the past 240 hour(s))   INR    Collection Time: 09/23/19  1:02 PM   Result Value Ref Range    INR 1.90 (H) 0.90 - 1.10   INR    Collection Time: 10/02/19 12:54 PM   Result Value Ref Range    INR 2.40 (H) 0.90 - 1.10   Electrocardiogram Perform and Read    Collection Time: 10/02/19  1:55 PM   Result Value Ref Range    SYSTOLIC BLOOD PRESSURE      DIASTOLIC BLOOD PRESSURE      VENTRICULAR RATE 108 BPM    ATRIAL RATE 375 BPM    P-R INTERVAL      QRS DURATION 92 ms    Q-T INTERVAL 290 ms    QTC CALCULATION (BEZET) 388 ms    P Axis      R AXIS -21 degrees    T AXIS 140 degrees    MUSE DIAGNOSIS       Atrial fibrillation with rapid ventricular response  Abnormal ECG  When compared with ECG of 10-OCT-2017 20:00,  has not changed  Confirmed by JAYA WORLEY MD LOC: ([id number]) on 10/2/2019 3:56:26 PM         Immunization History   Administered Date(s) Administered     Influenza high dose,seasonal,PF, 65+ yrs 09/16/2014, 09/17/2015, 09/26/2016, 10/26/2017, 09/11/2018     Influenza, Seasonal, Inj PF IIV3 09/21/2012     Influenza, inj, historic,unspecified 12/28/2009, 11/17/2010, 10/03/2011     Influenza, seasonal,quad inj 6-35 mos 10/29/2013     Pneumo Conj 13-V (2010&after) 12/30/2014     Pneumo Polysac 23-V 12/26/2007     Td,adult,historic,unspecified 04/28/2008     Tdap 06/18/2014     Electronically signed by Ruben Ware MD 10/02/19 12:55 PM

## 2021-06-01 NOTE — PATIENT INSTRUCTIONS - HE
We will be checking in EKG today because of your complaint of arm pain and fast beating heart rate  Please do not take the furosemide (lasix) the morning of your surgery  Please fill the prescription for the metoprolol and resume taking it today.   Take half a tablet (0.5) or 50 mg this evening and then resume taking a whole tablet tomorrow.  Please check your heart rate tomorrow Thursday 10/3/19 and call the clinic if it remains over 80 and/or if you are having palpitations or arm pain.    Goal INR before surgery is meant to be less than 2.5, so the anticoagulation clinic will give you instructions

## 2021-06-02 VITALS — HEIGHT: 62 IN | BODY MASS INDEX: 32.39 KG/M2 | WEIGHT: 176 LBS

## 2021-06-02 VITALS — BODY MASS INDEX: 30 KG/M2 | WEIGHT: 163 LBS | HEIGHT: 62 IN

## 2021-06-02 VITALS — BODY MASS INDEX: 32.76 KG/M2 | WEIGHT: 178 LBS | HEIGHT: 62 IN

## 2021-06-02 NOTE — TELEPHONE ENCOUNTER
Pt is calling in to report back to Dr. Ware who called her to ask her to check her BP and pulse, and call the clinic back. Pt checked at 550 pm.   /70, HR 73.       Yoni Elizabeth, RN [facility] Triage/Medication Refill

## 2021-06-02 NOTE — TELEPHONE ENCOUNTER
RN cannot approve Refill Request    RN can NOT refill this medication Protocol failed and NO refill given. Last office visit: 7/2/2019 Roc Mares MD Last Physical: Visit date not found Last MTM visit: Visit date not found Last visit same specialty: 7/2/2019 Roc Mares MD.  Next visit within 3 mo: Visit date not found  Next physical within 3 mo: Visit date not found      Charito Hauser, [facility] Triage/Med Refill 10/23/2019    Requested Prescriptions   Pending Prescriptions Disp Refills     warfarin (COUMADIN/JANTOVEN) 5 MG tablet [Pharmacy Med Name: WARFARIN SODIUM 5 MG TABLET] 90 tablet 2     Sig: TAKE 1 TAB DAILY AS DIRECTED BASED ON INR RESULTS.       Warfarin Refill Protocol  Failed - 10/23/2019  4:50 PM        Failed -  Route to appropriate pool/provider     Last Anticoagulation Summary:   Anticoagulation Episode Summary     Current INR goal:   2.0-3.0   TTR:   68.2 %   Next INR check:   10/16/2019   INR from last check:   2.40 (10/2/2019)   Weekly max warfarin dose:      Target end date:      INR check location:      Preferred lab:      Send INR reminders to:   [facility]    Indications    Atrial flutter (H) [I48.92]           Comments:            Anticoagulation Care Providers     Provider Role Specialty Phone number    Roc Mares MD Referring Internal Medicine [phone]                Passed - Provider visit in last year     Last office visit with prescriber/PCP: 7/2/2019 Roc Mares MD OR same dept: 7/2/2019 Roc Mares MD OR same specialty: 7/2/2019 Roc Mares MD  Last physical: Visit date not found Last MTM visit: Visit date not found    Next appt within 3 mo: Visit date not found Next physical within 3 mo: Visit date not found  Prescriber OR PCP: Roc Mares MD  Last diagnosis associated with med order: 1. Atrial flutter (H)  - warfarin (COUMADIN/JANTOVEN) 5 MG tablet [Pharmacy Med Name: WARFARIN SODIUM 5 MG TABLET]; TAKE 1 TAB DAILY AS DIRECTED  BASED ON INR RESULTS.  Dispense: 90 tablet; Refill: 2    If protocol passes may refill for 6 months if within 3 months of last provider visit (or a total of 9 months).

## 2021-06-02 NOTE — TELEPHONE ENCOUNTER
ANTICOAGULATION  MANAGEMENT    Assessment     Today's INR result of 3.70 is Supratherapeutic (goal INR of 2.0-3.0)        Warfarin taken as previously instructed    No new diet changes affecting INR    Potential interaction between Moxifloxacin / Prednisolone eye drops and warfarin which may affect subsequent INRs    Continues to tolerate warfarin with no reported s/s of bleeding or thromboembolism     Previous INR was Therapeutic at 2.40 on 10/2/19.    Plan:     Spoke with Miesha regarding INR result and instructed:     Warfarin Dosing Instructions:  (has 5mg tabs)   -  today, advised to HOLD warfarin dose,   - then change warfarin dose to 5 mg daily on Mondays; and 2.5 mg daily rest of week.   -  (11.1 % change)    Instructed patient to follow up no later than:  2 wks.   - scheduled on 11/12/19 @ [facility].    Education provided: importance of consistent vitamin K intake, target INR goal and significance of current INR result, potential interaction between warfarin and was instilling Moxifloxacin / Prednisolone QID and importance of notifying clinic for changes in medications    Miesha, verbalizes understanding and agrees to warfarin dosing plan.    Instructed to call the [facility] for any changes, questions or concerns. (#[phone])   ?   Reina Cruz RN    Subjective/Objective:      Miesha Quarles, a 89 y.o. female is on warfarin.     Miesha reports:     Home warfarin dose: verbally confirmed home dose with Miesha and updated on anticoagulation calendar     Missed doses: No     Medication changes:  Yes: Now only on Timolol and Atropine eye drops.   - was on on Moxifloxacin, Prednisolone, and Atropine eye drops for RIGHT vitrectomy on 10/4/19 and finished last week.    S/S of bleeding or thromboembolism:  No.  Reported had bleeding behind right eye.  No she can see much better after eye surgery.     New Injury or illness:  No.   Vitreous Hge.     Changes in diet or alcohol consumption:   No     Upcoming surgery, procedure or cardioversion:  No    Anticoagulation Episode Summary     Current INR goal:   2.0-3.0   TTR:   64.6 %   Next INR check:   11/12/2019   INR from last check:   3.70! (10/29/2019)   Weekly max warfarin dose:      Target end date:      INR check location:      Preferred lab:      Send INR reminders to:   [facility]    Indications    Atrial flutter (H) [I48.92]           Comments:            Anticoagulation Care Providers     Provider Role Specialty Phone number    Roc Mares MD Referring Internal Medicine [phone]

## 2021-06-02 NOTE — PROGRESS NOTES
Office Visit - Follow Up   Miesha Quarles   89 y.o. female    Date of Visit: 10/29/2019    Chief Complaint   Patient presents with     Diabetes     4 mo f/u - fasting today         Assessment and Plan   1. Type 2 diabetes mellitus with complication (H)  Unknown control.  Labs today for monitoring.  - Glycosylated Hemoglobin A1c    2. Atrial flutter, unspecified type (H)  Rate is well controlled.  Continue regimen per cardiology including anticoagulation.  - INR    3. Benign Essential Hypertension  Excellent control.  Continue same.    4. CKD (chronic kidney disease) stage 3, GFR 30-59 ml/min (H)  BMP for monitoring.  - Comprehensive Metabolic Panel  - HM2(CBC w/o Differential)    5. Hypothyroidism, unspecified type  Seems euthyroid.  Check TSH given the palpitations.  - Thyroid Cascade    6. Hypercholesterolemia    - Lipid Cascade    7. Central retinal vein occlusion, unspecified complication status, unspecified laterality  Doing well after vitrectomy.  Plan per vitreoretinal surgeon.    8. Flu vaccine need    - Influenza High Dose,Seasonal,PF 65+ Yrs        Return in about 6 months (around 2020) for Recheck.     History of Present Illness   This 89 y.o. old Miesha comes today for follow-up of her months of multiple medical problems.  Since I saw her last she underwent a vitrectomy.  She had a central retinal vein occlusion with bleed.  Initially the tried some nonsurgical treatments it sounds like but then ended to go to vitrectomy.  She is seeing better now she tells me.  She lost her baby brother recently.  Still waiting for the .  This has her down a little bit.  He was 86.  She is the last remaining sibling now.  Sugars have been good.  She denies somatic concerns for me.  She does have a few palpitations here recently but attributes that to the loss of her brother.  No chest pains.  She saw Dr. Tilley from cardiology this summer.  They plan a one-year follow-up.  He said she is had no  "bleeding on her anticoagulation.    Review of Systems: A comprehensive review of systems was negative except as noted.     Medications, Allergies and Problem List   Reviewed, reconciled and updated  Post Discharge Medication Reconciliation Status:      Physical Exam   General Appearance:   Delightful elderly woman who appears younger than her age.  Vital signs are noted.  Weight is stable.  Good spirits despite her recent loss.    /78 (Patient Site: Right Arm, Patient Position: Sitting, Cuff Size: Adult Regular)   Pulse 76   Ht 5' 2\" (1.575 m)   Wt 166 lb (75.3 kg)   SpO2 95%   BMI 30.36 kg/m           Additional Information   Current Outpatient Medications   Medication Sig Dispense Refill     acetaminophen (TYLENOL) 650 MG CR tablet Take 650 mg by mouth as needed.        amLODIPine (NORVASC) 2.5 MG tablet Take 1 tablet (2.5 mg total) by mouth daily. 30 tablet 11     ascorbic acid (VITAMIN C) 100 MG tablet Take 100 mg by mouth daily.       atropine 1 % ophthalmic solution INSTILL 1 DROP INTO THE RIGHT EYE 2 TIMES DAILY. FOLLOW PHYSICIANS INSTRUCTIONS.  0     calcium carbonate-vitamin D3 (CALCIUM 600 WITH VITAMIN D3) 600 mg(1,500mg) -200 unit per tablet Take 1 tablet by mouth daily.       cholecalciferol, vitamin D3, 1,000 unit capsule Take 3,000 Units by mouth daily.        cranberry 400 mg cap Take 1 capsule by mouth daily.       cyanocobalamin (VITAMIN B-12) 1000 MCG tablet Take 1,000 mcg by mouth daily.       dorzolamide-timolol (COSOPT) 22.3-6.8 mg/mL ophthalmic solution INSTILL 1 DROP INTO RIGHT EYE TWICE A DAY  6     furosemide (LASIX) 20 MG tablet TAKE 1 TABLET BY MOUTH AS NEEDED. 90 tablet 1     KLOR-CON 10 10 mEq CR tablet TAKE 1 TABLET BY MOUTH DAILY. TAKE ONLY IF TAKING FUROSEMIDE 90 tablet 0     levothyroxine (SYNTHROID, LEVOTHROID) 50 MCG tablet Take 1 tablet (50 mcg total) by mouth daily. 90 tablet 3     lisinopril-hydrochlorothiazide (PRINZIDE,ZESTORETIC) 20-12.5 mg per tablet Take 2 " tablets by mouth daily. 180 tablet 3     loratadine (CLARITIN) 10 mg tablet Take 10 mg by mouth daily.       metoprolol succinate (TOPROL-XL) 100 MG 24 hr tablet TAKE 1 TABLET BY MOUTH EVERY DAY 90 tablet 3     pyridoxine (VITAMIN B-6) 100 MG tablet Take 100 mg by mouth daily.       simvastatin (ZOCOR) 20 MG tablet TAKE 1/2 TABLET BY MOUTH DAILY IN THE EVENING. 45 tablet 3     traMADol (ULTRAM) 50 mg tablet TAKE 1 TABLET BY MOUTH EVERY 6 HOURS AS NEEDED FOR PAIN 30 tablet 0     vitamin E 400 unit Tab Take by mouth.       warfarin (COUMADIN/JANTOVEN) 5 MG tablet Take 1/2-1 tablet by mouth daily as directed 90 tablet 1     No current facility-administered medications for this visit.      Allergies   Allergen Reactions     Amoxicillin Hives     Cephalexin Other (See Comments)     Reaction with intervenous administration.     Penicillins Hives     Latex Rash     Other Environmental Allergy Other (See Comments)     Pollen causes seasonal allergies.     Social History     Tobacco Use     Smoking status: Former Smoker     Types: Cigarettes     Last attempt to quit:      Years since quittin.8     Smokeless tobacco: Never Used   Substance Use Topics     Alcohol use: Yes     Drug use: Not on file       Review and/or order of clinical lab tests:  Review and/or order of radiology tests:  Review and/or order of medicine tests:  Discussion of test results with performing physician:  Decision to obtain old records and/or obtain history from someone other than the patient:  Review and summarization of old records and/or obtaining history from someone other than the patient and.or discussion of case with another health care provider:  Independent visualization of image, tracing or specimen itself:    Time: not applicable     Roc Mares MD

## 2021-06-03 VITALS
HEART RATE: 76 BPM | BODY MASS INDEX: 30.55 KG/M2 | HEIGHT: 62 IN | DIASTOLIC BLOOD PRESSURE: 78 MMHG | OXYGEN SATURATION: 95 % | SYSTOLIC BLOOD PRESSURE: 126 MMHG | WEIGHT: 166 LBS

## 2021-06-03 VITALS
WEIGHT: 164 LBS | OXYGEN SATURATION: 98 % | DIASTOLIC BLOOD PRESSURE: 74 MMHG | SYSTOLIC BLOOD PRESSURE: 138 MMHG | BODY MASS INDEX: 30 KG/M2 | HEART RATE: 76 BPM

## 2021-06-03 VITALS — BODY MASS INDEX: 30.55 KG/M2 | WEIGHT: 166 LBS | HEIGHT: 62 IN

## 2021-06-03 VITALS — WEIGHT: 168 LBS | BODY MASS INDEX: 30.91 KG/M2 | HEIGHT: 62 IN

## 2021-06-03 NOTE — TELEPHONE ENCOUNTER
Refill Approved    Rx renewed per Medication Renewal Policy. Medication was last renewed on 12/4/18.    Carolyn Eller, [facility] Triage/Med Refill 11/21/2019     Requested Prescriptions   Pending Prescriptions Disp Refills     amLODIPine (NORVASC) 2.5 MG tablet [Pharmacy Med Name: AMLODIPINE BESYLATE 2.5 MG TAB] 90 tablet 3     Sig: TAKE 1 TABLET (2.5 MG TOTAL) BY MOUTH DAILY.       Calcium-Channel Blockers Protocol Passed - 11/20/2019  1:31 PM        Passed - PCP or prescribing provider visit in past 12 months or next 3 months     Last office visit with prescriber/PCP: 10/29/2019 Roc Mares MD OR same dept: 10/29/2019 Roc Mares MD OR same specialty: 10/29/2019 Roc Mares MD  Last physical: Visit date not found Last MTM visit: Visit date not found   Next visit within 3 mo: Visit date not found  Next physical within 3 mo: Visit date not found  Prescriber OR PCP: Roc Mares MD  Last diagnosis associated with med order: 1. Benign Essential Hypertension  - amLODIPine (NORVASC) 2.5 MG tablet [Pharmacy Med Name: AMLODIPINE BESYLATE 2.5 MG TAB]; Take 1 tablet (2.5 mg total) by mouth daily.  Dispense: 90 tablet; Refill: 3    If protocol passes may refill for 12 months if within 3 months of last provider visit (or a total of 15 months).             Passed - Blood pressure filed in past 12 months     BP Readings from Last 1 Encounters:   10/29/19 126/78

## 2021-06-03 NOTE — TELEPHONE ENCOUNTER
ANTICOAGULATION  MANAGEMENT    Assessment     Today's INR result of 1.90 is Subtherapeutic (goal INR of 2.0-3.0)        Missed dose(s) may be affecting INR    No new diet changes affecting INR    No new medication/supplements affecting INR    Continues to tolerate warfarin with no reported s/s of bleeding or thromboembolism     Previous INR was Supratherapeutic at 3.70 on 10/2919.    Plan:     Spoke with Miesha regarding INR result and instructed:    1.  Advised to ensure, she takes a warfarin dose everyday.  If any questions on warfarin doses, to call [facility].    Warfarin Dosing Instructions:   (has 5mg tabs)   - Continue current warfarin dose 5 mg daily on Mondays; and 2.5 mg daily rest of week.    Instructed patient to follow up no later than:  1-2 wks.   - she will call to schedule next INR.    Education provided: target INR goal and significance of current INR result and importance of taking warfarin as instructed    Miesha verbalizes understanding and agrees to warfarin dosing plan.    Instructed to call the [facility] for any changes, questions or concerns. (#[phone])   ?   Reina Cruz RN    Subjective/Objective:      Miesha M Quarles, a 89 y.o. female is on warfarin.     Miesha reports:     Home warfarin dose: template incorrect; verbally confirmed home dose with Miesha and updated on anticoagulation calendar     Missed doses: Yes:  Reported skipping warfarin doses every Tuesday.  She misunderstood her warfarin dose and did not call [facility] to verify.     Medication changes:  Yes:  completed instilling eye drops Prednisolone and Moxifloxacin.  Now just instilling Atropine and Timolol.    S/S of bleeding or thromboembolism:  No     New Injury or illness:  No     Changes in diet or alcohol consumption:  No     Upcoming surgery, procedure or cardioversion:  No    Anticoagulation Episode Summary     Current INR goal:   2.0-3.0   TTR:   62.5 %   Next INR check:   12/2/2019   INR from last check:   1.90!  (11/18/2019)   Weekly max warfarin dose:      Target end date:      INR check location:      Preferred lab:      Send INR reminders to:   [facility]    Indications    Atrial flutter (H) [I48.92]           Comments:            Anticoagulation Care Providers     Provider Role Specialty Phone number    Roc Mares MD Referring Internal Medicine [phone]

## 2021-06-03 NOTE — TELEPHONE ENCOUNTER
ANTICOAGULATION  MANAGEMENT PROGRAM    Miesha Quarles is overdue for INR check.     Spoke with Miesha and scheduled INR appointment on 11/19/19 @ [facility].      Reina Cruz RN

## 2021-06-03 NOTE — TELEPHONE ENCOUNTER
ANTICOAGULATION  MANAGEMENT PROGRAM    Miesha Quarles is overdue for INR check.     Was unable to reach patient and was unable to leave a voicemail. If patient calls, please schedule INR appointment as soon as possible.       Reina Cruz RN

## 2021-06-04 VITALS
SYSTOLIC BLOOD PRESSURE: 132 MMHG | DIASTOLIC BLOOD PRESSURE: 78 MMHG | HEART RATE: 74 BPM | WEIGHT: 173.12 LBS | BODY MASS INDEX: 31.86 KG/M2 | HEIGHT: 62 IN | OXYGEN SATURATION: 98 %

## 2021-06-04 NOTE — TELEPHONE ENCOUNTER
Who is calling:  Patient  Reason for Call:  Patient had INR scheduled for today 12/19, however, she has flat tire. Patient is rescheduled for 12/20/19.  Date of last appointment with primary care: na  Okay to leave a detailed message: Yes

## 2021-06-04 NOTE — TELEPHONE ENCOUNTER
ANTICOAGULATION  MANAGEMENT PROGRAM    Miesha Quarles is overdue for INR check.     Spoke with Miesha and scheduled INR appointment on 12/19/19 @ [facility]..      Reina Cruz RN

## 2021-06-05 VITALS
HEIGHT: 62 IN | BODY MASS INDEX: 31.66 KG/M2 | SYSTOLIC BLOOD PRESSURE: 124 MMHG | TEMPERATURE: 98.5 F | HEART RATE: 68 BPM | DIASTOLIC BLOOD PRESSURE: 78 MMHG | OXYGEN SATURATION: 95 %

## 2021-06-05 VITALS
DIASTOLIC BLOOD PRESSURE: 78 MMHG | SYSTOLIC BLOOD PRESSURE: 124 MMHG | HEIGHT: 62 IN | HEART RATE: 72 BPM | BODY MASS INDEX: 31.66 KG/M2 | OXYGEN SATURATION: 96 %

## 2021-06-05 NOTE — TELEPHONE ENCOUNTER
ANTICOAGULATION  MANAGEMENT PROGRAM    Miesha Quarles is overdue for INR check.     Spoke with Miesha and scheduled INR appointment on 1/24/2020 @ [facility].    - has had 4 - no shows INR appts.      Reina Cruz RN

## 2021-06-06 NOTE — TELEPHONE ENCOUNTER
ANTICOAGULATION  MANAGEMENT PROGRAM    Miesha Quarles is overdue for INR check.     Spoke with Miesha and scheduled INR appointment on 3/9/20 @ [facility].      Reina Cruz RN

## 2021-06-07 NOTE — TELEPHONE ENCOUNTER
Who is calling:  patient  Reason for Call:  Patient is scheduled for an INR today at 1:15pm, she would like to speak to a nurse to discuss whether or not she should keep this appointment.  Date of last appointment with primary care: 10/29/19  Okay to leave a detailed message: Yes

## 2021-06-07 NOTE — TELEPHONE ENCOUNTER
ANTICOAGULATION  MANAGEMENT    Assessment     Today's INR result of 1.50 is Subtherapeutic (goal INR of 2.0-3.0)        Warfarin taken as previously instructed    No new diet changes affecting INR    No new medication/supplements affecting INR    Continues to tolerate warfarin with no reported s/s of bleeding or thromboembolism     Previous INR was Supratherapeutic at 3.50 on 4/7/20.    Plan:     Spoke with Miesha regarding INR result and instructed:     Warfarin Dosing Instructions:    - tonight, advised to take one time booster with 5mg warfarin dose,    - then continue current warfarin dose 5 mg daily on Monday; and 2.5 mg daily rest of week.    Instructed patient to follow up no later than:  1-2 wks.    Education provided: importance of consistent vitamin K intake, target INR goal and significance of current INR result and importance of notifying clinic for changes in medications    Miesha verbalizes understanding and agrees to warfarin dosing plan.    Instructed to call the [facility] for any changes, questions or concerns. (#[phone])   ?   Reina Cruz RN    Subjective/Objective:      Miesha Quarles, a 89 y.o. female is on warfarin.     Miesha reports:     Home warfarin dose: as updated on anticoagulation calendar per template     Missed doses: No     Medication changes:  No     S/S of bleeding or thromboembolism:  No     New Injury or illness:  No     Changes in diet or alcohol consumption:  No     Upcoming surgery, procedure or cardioversion:  No    Anticoagulation Episode Summary     Current INR goal:   2.0-3.0   TTR:   67.6 % (1 y)   Next INR check:   5/5/2020   INR from last check:   1.50! (4/21/2020)   Weekly max warfarin dose:      Target end date:      INR check location:      Preferred lab:      Send INR reminders to:   [facility]    Indications    Atrial flutter (H) [I48.92]           Comments:            Anticoagulation Care Providers     Provider Role Specialty  Phone number    Roc Mares MD Referring Internal Medicine [phone]

## 2021-06-07 NOTE — TELEPHONE ENCOUNTER
[facility] called and spoke with patient and recommended to keep INR appointment today. She stated that she will come in as scheduled.    Keira Boyle RN

## 2021-06-07 NOTE — TELEPHONE ENCOUNTER
ANTICOAGULATION  MANAGEMENT PROGRAM    Miesha Quarles is overdue for INR check.     Spoke with Miesha and scheduled INR appointment on 4/7.      Sowmya Reyna RN

## 2021-06-08 NOTE — TELEPHONE ENCOUNTER
ANTICOAGULATION  MANAGEMENT    Assessment     Today's INR result of 2.20 is Therapeutic (goal INR of 2.0-3.0)        Warfarin taken as previously instructed    No new diet changes affecting INR    No new medication/supplements affecting INR    Continues to tolerate warfarin with no reported s/s of bleeding or thromboembolism     Previous INR was Subtherapeutic at 1.70 on 5/12/20.    Plan:     Spoke with Miesha regarding INR result and instructed:     Warfarin Dosing Instructions:  (has 5mg tabs)   - Continue current warfarin dose 5 mg daily on Mon/Fri; and 2.5 mg daily rest of week.    Instructed patient to follow up no later than:  2 wks.   - INR scheduled on 6/9/20 @ [facility].    Education provided: target INR goal and significance of current INR result and importance of taking warfarin as instructed    Miesha verbalizes understanding and agrees to warfarin dosing plan.    Instructed to call the [facility] for any changes, questions or concerns. (#[phone])   ?   Reina Cruz RN    Subjective/Objective:      Miesha Quarles, a 89 y.o. female is on warfarin.     Miesha reports:     Home warfarin dose: template incorrect; verbally confirmed home dose with Miesha and updated on anticoagulation calendar - Enzo, she verbalized back correct warfarin dose.     Missed doses: No     Medication changes:  No     S/S of bleeding or thromboembolism:  No     New Injury or illness:  No     Changes in diet or alcohol consumption:  No     Upcoming surgery, procedure or cardioversion:  No    Anticoagulation Episode Summary     Current INR goal:   2.0-3.0   TTR:   56.1 % (11.1 mo)   Next INR check:   6/9/2020   INR from last check:   2.20 (5/26/2020)   Weekly max warfarin dose:      Target end date:      INR check location:      Preferred lab:      Send INR reminders to:   [facility]    Indications    Atrial flutter (H) [I48.92]           Comments:            Anticoagulation Care Providers     Provider  Role Specialty Phone number    Roc Mares MD Referring Internal Medicine [phone]

## 2021-06-08 NOTE — PROGRESS NOTES
"  Office Visit - Follow Up   Miesha Quarles   88 y.o. female    Date of Visit: 1/26/2017    Chief Complaint   Patient presents with     Cough     1 week follow up        Assessment and Plan   1. Cough  I think a lot of her residual cough is due to postnasal drip and nasal drainage.  She states she is better if she takes her \"allergy pill\".  I offered fluticasone nasal spray but she declines.  I'll see her back in another few weeks.    2. Chronic pulmonary edema  There was no evidence of systolic or diastolic dysfunction on echo.  I will cut back the water pill to just 3 days a week and see how she does recall if she has worsening shortness of breath.    3. Benign Essential Hypertension  Blood pressure is good at this time.  She does question whether the lisinopril could be causing her cough but I don't believe so at this time.  She's been on this for years.    4. Type 2 diabetes mellitus with complication, unspecified long term insulin use status  Good control continue current regimen.        No Follow-up on file.     History of Present Illness   This 88 y.o. old patient comes in for follow-up of her multiple medical problems.  She was in with a cough.  She had been in the emergency room and they told her that she had pulmonary edema.  The put her on Lasix.  Her BNP was high at that time as well.  She came into for follow-up and at that time she had bronchitis and wheezing.  I had her on Lasix.  With a combination of treatment for bronchitis as well as the furosemide she is feeling better.  He still has a cough but it sounds like this is probably due to postnasal drip.  Her breathing is quite good.  She dislikes the Lasix as it causes her to have urinary incontinence.  She did have her echo showed normal ejection fraction.  No significant valve abnormalities no other worrisome findings.     Review of Systems: A comprehensive review of systems was negative except as noted.     Medications, Allergies and Problem " "List   Reviewed and updated     Physical Exam   General Appearance:   Pleasant woman.  She looks well today.  Lungs are clear.  Heart is regular.    Visit Vitals     /50 (Patient Site: Right Arm, Patient Position: Sitting, Cuff Size: Adult Large)     Pulse 81     Ht 5' 3\" (1.6 m)     Wt 180 lb (81.6 kg)     SpO2 97%     BMI 31.89 kg/m2            Additional Information   Current Outpatient Prescriptions   Medication Sig Dispense Refill     acetaminophen (TYLENOL) 650 MG CR tablet Take 650 mg by mouth daily.        amLODIPine (NORVASC) 5 MG tablet TAKE ONE TABLET BY MOUTH ONE TIME DAILY 90 tablet 1     ascorbic acid (VITAMIN C) 100 MG tablet Take 100 mg by mouth daily.       aspirin 81 mg chewable tablet Chew 81 mg daily.       atenolol (TENORMIN) 100 MG tablet TAKE ONE TABLET BY MOUTH ONE TIME DAILY 90 tablet 2     calcium carbonate-vitamin D3 (CALCIUM 600 WITH VITAMIN D3) 600 mg(1,500mg) -200 unit per tablet Take 1 tablet by mouth daily.       cholecalciferol, vitamin D3, 1,000 unit capsule Take 3,000 Units by mouth daily.        cranberry 400 mg cap Take 1 capsule by mouth daily.       cyanocobalamin (VITAMIN B-12) 1000 MCG tablet Take 1,000 mcg by mouth daily.       furosemide (LASIX) 20 MG tablet Take 1 tablet (20 mg total) by mouth as needed. 90 tablet 1     levothyroxine (SYNTHROID, LEVOTHROID) 50 MCG tablet TAKE ONE TABLET BY MOUTH ONE TIME DAILY 90 tablet 1     lisinopril-hydrochlorothiazide (PRINZIDE,ZESTORETIC) 20-12.5 mg per tablet TAKE TWO TABLETS BY MOUTH DAILY (Patient taking differently: TAKE ONE TABLETS BY MOUTH DAILY) 180 tablet 1     loratadine (CLARITIN) 10 mg tablet Take 10 mg by mouth daily.       potassium chloride (KLOR-CON) 10 MEQ CR tablet Take 1 tablet (10 mEq total) by mouth daily. TAKE ONLY IF TAKING FUROSEMIDE 90 tablet 1     pyridoxine (VITAMIN B-6) 100 MG tablet Take 100 mg by mouth daily.       simvastatin (ZOCOR) 20 MG tablet TAKE ONE TABLET BY MOUTH ONE TIME DAILY IN THE " EVENING 90 tablet 2     vitamin E 400 unit Tab Take by mouth.       No current facility-administered medications for this visit.      Allergies   Allergen Reactions     Amoxicillin Hives     Cephalexin Other (See Comments)     Reaction with intervenous administration.     Penicillins Hives     Latex Rash     Other Environmental Allergy Other (See Comments)     Pollen causes seasonal allergies.     Social History   Substance Use Topics     Smoking status: Former Smoker     Types: Cigarettes     Quit date: 1991     Smokeless tobacco: None     Alcohol use None       Review and/or order of clinical lab tests:  Review and/or order of radiology tests:  Review and/or order of medicine tests:  Discussion of test results with performing physician:  Decision to obtain old records and/or obtain history from someone other than the patient:  Review and summarization of old records and/or obtaining history from someone other than the patient and.or discussion of case with another health care provider:  Independent visualization of image, tracing or specimen itself:    Time: total time spent with the patient was 25 minutes of which >50% was spent in counseling and coordination of care     Roc Mares MD

## 2021-06-08 NOTE — TELEPHONE ENCOUNTER
Controlled Substance Refill Request  Medication Name:   Requested Prescriptions     Pending Prescriptions Disp Refills     traMADoL (ULTRAM) 50 mg tablet 20 tablet 0     Sig: Take 1 tablet (50 mg total) by mouth every 6 (six) hours as needed for pain.     Date Last Fill: 5/7/20  Is patient out of medication?: No, 1 days left. The patient states she has been taking the tramadol more often , every six hours for right shoulder and arm pain because her sink was plugged and had to use the plunger.   Patient notified refills processed within 3 business days:  Yes  Requested Pharmacy: [facility]  Submit electronically to pharmacy  Controlled Substance Agreement on file:   Encounter-Level CSA Scan Date:    There are no encounter-level csa scan date.        Last office visit:  5/7/20

## 2021-06-08 NOTE — TELEPHONE ENCOUNTER
ANTICOAGULATION  MANAGEMENT PROGRAM    Miesha Quarles is overdue for INR check.     Spoke with Miesha and scheduled INR appointment on 5/12/20 @ [facility].      Reina Cruz RN

## 2021-06-08 NOTE — TELEPHONE ENCOUNTER
ANTICOAGULATION  MANAGEMENT    Assessment     Today's INR result of 1.70 is Subtherapeutic (goal INR of 2.0-3.0)        Warfarin taken as previously instructed    - reported, has not missed any doses.    No new diet changes affecting INR    No new medication/supplements affecting INR    Continues to tolerate warfarin with no reported s/s of bleeding or thromboembolism     Previous INR was Subtherapeutic at 1.50 on 4/21/20.    Plan:     Spoke with Miesha regarding INR result and instructed:     Warfarin Dosing Instructions:  (has 5mg tabs)   - tonight, advised one time booster with 5mg warfarin dose,   - then change warfarin dose to 5 mg daily on Mon/Fri; and 2.5 mg daily rest of week.   - (12.5 % change)    Instructed patient to follow up no later than:  1-2 wks.   - INR scheduled on 5/26/20 @ [facility].    Education provided: importance of consistent vitamin K intake, target INR goal and significance of current INR result and importance of notifying clinic for changes in medications    Miesha verbalizes understanding and agrees to warfarin dosing plan.    Instructed to call the [facility] for any changes, questions or concerns. (#[phone])   ?   Reina Crzu RN    Subjective/Objective:      Miesha Quarles, a 89 y.o. female is on warfarin.     Miesha reports:     Home warfarin dose: as updated on anticoagulation calendar per template     Missed doses: No     Medication changes:  No.     S/S of bleeding or thromboembolism:  No     New Injury or illness:  No     Changes in diet or alcohol consumption:  No     Upcoming surgery, procedure or cardioversion:  No    Anticoagulation Episode Summary     Current INR goal:   2.0-3.0   TTR:   61.8 % (1 y)   Next INR check:   5/26/2020   INR from last check:   1.70! (5/12/2020)   Weekly max warfarin dose:      Target end date:      INR check location:      Preferred lab:      Send INR reminders to:   [facility]    Indications    Atrial flutter (H)  [I48.92]           Comments:            Anticoagulation Care Providers     Provider Role Specialty Phone number    Roc Mares MD Referring Internal Medicine [phone]

## 2021-06-08 NOTE — PROGRESS NOTES
Office Visit - Follow Up   Miesha Quarles   88 y.o. female    Date of Visit: 1/11/2017    Chief Complaint   Patient presents with     Diabetes     3 mo. f/u - fasting     Hospital Visit Follow Up     ER f/u 1/9/17 - still coughing and wheezing        Assessment and Plan   1. Cough  I think she does have a viral URI as well as bronchospasm and potentially superimposed congestive heart failure but this seems less likely.  We'll treat with a prednisone 20 mg daily as well as a Z-Anuj as well as Lasix and potassium.  I offered a albuterol inhaler but she declines.  I'll see her back next week.    2. Wheezing  As above.    3. Acute pulmonary edema  We'll do echocardiogram to evaluate her LV function.  Begin Lasix and potassium.  - Echo Complete; Future  - BNP(B-type Natriuretic Peptide)    4. Type 2 diabetes mellitus with complication, unspecified long term insulin use status  Labs if monitoring I did discuss with her that her sugars will go up on the prednisone.  - Glycosylated Hemoglobin A1c  - Basic Metabolic Panel    5. Benign Essential Hypertension  Blood pressure is good back on the amlodipine.    6. Hypercholesterolemia  Recent cholesterol looked good.    7. Hypothyroidism, unspecified type  Continue same regimen.    8. Viral URI  As above    9. Acute bronchospasm   As above  - BNP(B-type Natriuretic Peptide)        Return in about 1 week (around 1/18/2017) for Recheck.     History of Present Illness   This 88 y.o. old patient comes in today for follow-up of her cough and upper restaurant infection symptoms.  She has cough productive of yellow sputum as well as rhinorrhea and nasal congestion.  She's wheezing.  She's went first to urgent care with a center to the ER.  There she was told she might have heart failure.  She denies any PND or orthopnea.  It feels like previous colds that she's had.  Never had a history of heart failure.  No chest pains or shortness of breath.  She is back on her amlodipine.  She's  "not been taking her furosemide.     Review of Systems: A comprehensive review of systems was negative except as noted.     Medications, Allergies and Problem List   Reviewed and updated     Physical Exam   General Appearance:   Pleasant woman.  She sounds nasally congested.  She has rhinorrhea coming out of her nose.  She has an exotropia wheezes bilaterally.  No crackles.  Heart is regular.    Visit Vitals     /70 (Patient Site: Right Arm, Patient Position: Sitting, Cuff Size: Adult Large)     Pulse 62     Temp 98.4  F (36.9  C)     Ht 5' 3\" (1.6 m)     Wt 179 lb (81.2 kg)     SpO2 94%     BMI 31.71 kg/m2            Additional Information   Current Outpatient Prescriptions   Medication Sig Dispense Refill     acetaminophen (TYLENOL) 650 MG CR tablet Take 650 mg by mouth daily.        amLODIPine (NORVASC) 5 MG tablet TAKE ONE TABLET BY MOUTH ONE TIME DAILY 5 tablet 0     ascorbic acid (VITAMIN C) 100 MG tablet Take 100 mg by mouth daily.       aspirin 81 mg chewable tablet Chew 81 mg daily.       atenolol (TENORMIN) 100 MG tablet TAKE ONE TABLET BY MOUTH ONE TIME DAILY 90 tablet 2     calcium carbonate-vitamin D3 (CALCIUM 600 WITH VITAMIN D3) 600 mg(1,500mg) -200 unit per tablet Take 1 tablet by mouth daily.       cholecalciferol, vitamin D3, 1,000 unit capsule Take 3,000 Units by mouth daily.        cranberry 400 mg cap Take 1 capsule by mouth daily.       cyanocobalamin (VITAMIN B-12) 1000 MCG tablet Take 1,000 mcg by mouth daily.       levothyroxine (SYNTHROID, LEVOTHROID) 50 MCG tablet TAKE ONE TABLET BY MOUTH ONE TIME DAILY 90 tablet 1     lisinopril-hydrochlorothiazide (PRINZIDE,ZESTORETIC) 20-12.5 mg per tablet TAKE TWO TABLETS BY MOUTH DAILY (Patient taking differently: TAKE ONE TABLETS BY MOUTH DAILY) 180 tablet 1     loratadine (CLARITIN) 10 mg tablet Take 10 mg by mouth daily.       pyridoxine (VITAMIN B-6) 100 MG tablet Take 100 mg by mouth daily.       simvastatin (ZOCOR) 20 MG tablet TAKE ONE " TABLET BY MOUTH ONE TIME DAILY IN THE EVENING 90 tablet 2     vitamin E 400 unit Tab Take by mouth.       azithromycin (ZITHROMAX Z-CESAR) 250 MG tablet Take 2 tablets (500 mg) on  Day 1,  followed by 1 tablet (250 mg) once daily on Days 2 through 5. 6 tablet 0     furosemide (LASIX) 20 MG tablet Take 1 tablet (20 mg total) by mouth as needed. 90 tablet 1     potassium chloride (KLOR-CON) 10 MEQ CR tablet Take 1 tablet (10 mEq total) by mouth daily. TAKE ONLY IF TAKING FUROSEMIDE 90 tablet 1     predniSONE (DELTASONE) 20 MG tablet Take 1 tablet (20 mg total) by mouth daily for 5 days. 5 tablet 0     No current facility-administered medications for this visit.      Allergies   Allergen Reactions     Amoxicillin      Cephalexin      Penicillins      Latex Rash     Social History   Substance Use Topics     Smoking status: Former Smoker     Types: Cigarettes     Quit date: 1991     Smokeless tobacco: None     Alcohol use None       Review and/or order of clinical lab tests:  Review and/or order of radiology tests:  Review and/or order of medicine tests:  Discussion of test results with performing physician:  Decision to obtain old records and/or obtain history from someone other than the patient:  Review and summarization of old records and/or obtaining history from someone other than the patient and.or discussion of case with another health care provider:  Independent visualization of image, tracing or specimen itself:    Time: total time spent with the patient was 25 minutes of which >50% was spent in counseling and coordination of care     Roc Mares MD

## 2021-06-08 NOTE — TELEPHONE ENCOUNTER
ANTICOAGULATION  MANAGEMENT PROGRAM    Miesha Quarles is overdue for INR check.     Was unable to reach patient and was unable to leave a voicemail. If patient calls, please schedule INR appointment as soon as possible.    - cancelled on 6/9/20 and no showed on rescheduled appt on 6/12.    Reina Cruz RN

## 2021-06-08 NOTE — PROGRESS NOTES
"Miesha Quarles is a 89 y.o. female who is being evaluated via a billable telephone visit.      The patient has been notified of following:     \"This telephone visit will be conducted via a call between you and your physician/provider. We have found that certain health care needs can be provided without the need for a physical exam.  This service lets us provide the care you need with a short phone conversation.  If a prescription is necessary we can send it directly to your pharmacy.  If lab work is needed we can place an order for that and you can then stop by our lab to have the test done at a later time.    Telephone visits are billed at different rates depending on your insurance coverage. During this emergency period, for some insurers they may be billed the same as an in-person visit.  Please reach out to your insurance provider with any questions.    If during the course of the call the physician/provider feels a telephone visit is not appropriate, you will not be charged for this service.\"    Patient has given verbal consent to a Telephone visit? Yes    What phone number would you like to be contacted at? [phone]    Patient would like to receive their AVS by AVS Preference: Mail a copy.    Additional provider notes: Miesha joined me for a phone visit today for follow-up.  She reports she has been doing well.  She is trying to socially isolate although she is getting out to the store into the pharmacy.  She has not seen her boyfriend since January.  She took a fall out of her chair recently.  No injuries.  She fell asleep and just slipped out of it she tells me.  Sugars have been excellent.  No chest pains or shortness of breath.  Her DJD of her knee continues to be a problem.  She is using primarily Tylenol but would like to have a few tramadol on hand.    Assessment/Plan:  1. Type 2 diabetes mellitus with complication (H)  Controlled per patient report.  Continue same regimen.    2. CKD (chronic " kidney disease) stage 3, GFR 30-59 ml/min (H)  Labs will be done next visit in 3 months.    3. Atrial flutter, unspecified type (H)  Continue warfarin.  She is due for an INR.  We will try to get that set up for today or tomorrow I hope.    4. Benign Essential Hypertension  Controlled as far as we are aware.  Recheck in 3 months.    5. Fall: We discussed fall prevention methods today.  She is using her cane and walker.    6. Osteoarthritis Of The Knee  Continue Tylenol and rare tramadol.  I did send a few to her pharmacy.    - traMADoL (ULTRAM) 50 mg tablet; Take 1 tablet (50 mg total) by mouth every 6 (six) hours as needed for pain.  Dispense: 20 tablet; Refill: 0      Phone call duration:  11 minutes

## 2021-06-08 NOTE — TELEPHONE ENCOUNTER
Spoke with the patient and relayed message below from Dr. Mares.  She verbalized understanding and had no further questions at this time.      Refill has been set up for Dr. Mares to review per message below.  Janice MASCORRO, DELVIS/CMT....................2:12 PM

## 2021-06-09 NOTE — PROGRESS NOTES
Office Visit - Follow Up   Miesha Quarles   88 y.o. female    Date of Visit: 2/27/2017    Chief Complaint   Patient presents with     Cough     1 mo f/u - cough & breathing is better     Medication     started taking 1/2 tablet (10 mg) of simvastatin x 1 week due to muscle aches        Assessment and Plan   1. Myalgia  She reports improved symptoms after cutting back her statin.  I will check labs as below.  Continue same regimen for now.  - Basic Metabolic Panel  - CK Total    2. Type 2 diabetes mellitus with complication, unspecified long term insulin use status  Good control.    3. Hypothyroidism, unspecified type  Continue same regimen.    4. Benign Essential Hypertension  Blood pressure is excellent.  Continue same regimen.  If she has recurrent episodes of palpitations that would last more than a few minutes she is to be seen and I discussed that with her.  - Basic Metabolic Panel        Return in about 3 months (around 5/27/2017) for Recheck.     History of Present Illness   This 88 y.o. old patient multiple medical problems comes in for follow-up.  She is having cough and shortness of breath.  There is concern about possible so heart failure but echo looked good.  I did put her on a low-dose of diuretic for some possible pulmonary edema.  She had improvement of her shortness of breath but had persistent cough when I saw her last but I treated that to likely upper respiratory infection she had at that same time.  She comes in today and reports she is doing well.  No further cough or shortness of breath.  She is taking the diuretic 3 days a week.  She does get some myalgias or muscle cramps at times and she cut back her statin therapy and that has resolved these issues.  She denies other somatic concerns for me.  She'll be going to [place] to her family's reunion.  That happens this summer.  She does note that she occasionally will feel some palpitations but that he last just a few minutes.    Review of  "Systems: A comprehensive review of systems was negative except as noted.     Medications, Allergies and Problem List   Reviewed and updated     Physical Exam   General Appearance:   Delightful woman who looks well and in no distress.  O2 sats 99% on room air.  Heart is regular today.    Visit Vitals     /62 (Patient Site: Right Arm, Patient Position: Sitting, Cuff Size: Adult Regular)     Pulse 66     Ht 5' 3\" (1.6 m)     Wt 184 lb (83.5 kg)     SpO2 99%     BMI 32.59 kg/m2            Additional Information   Current Outpatient Prescriptions   Medication Sig Dispense Refill     acetaminophen (TYLENOL) 650 MG CR tablet Take 650 mg by mouth daily.        amLODIPine (NORVASC) 5 MG tablet TAKE ONE TABLET BY MOUTH ONE TIME DAILY 90 tablet 1     ascorbic acid (VITAMIN C) 100 MG tablet Take 100 mg by mouth daily.       aspirin 81 mg chewable tablet Chew 81 mg daily.       atenolol (TENORMIN) 100 MG tablet TAKE ONE TABLET BY MOUTH ONE TIME DAILY 90 tablet 2     calcium carbonate-vitamin D3 (CALCIUM 600 WITH VITAMIN D3) 600 mg(1,500mg) -200 unit per tablet Take 1 tablet by mouth daily.       cholecalciferol, vitamin D3, 1,000 unit capsule Take 3,000 Units by mouth daily.        cranberry 400 mg cap Take 1 capsule by mouth daily.       cyanocobalamin (VITAMIN B-12) 1000 MCG tablet Take 1,000 mcg by mouth daily.       furosemide (LASIX) 20 MG tablet Take 1 tablet (20 mg total) by mouth as needed. (Patient taking differently: Take 20 mg by mouth as needed (Taken on Mon/Wed/Fri). ) 90 tablet 1     levothyroxine (SYNTHROID, LEVOTHROID) 50 MCG tablet TAKE ONE TABLET BY MOUTH ONE TIME DAILY 90 tablet 1     lisinopril-hydrochlorothiazide (PRINZIDE,ZESTORETIC) 20-12.5 mg per tablet TAKE TWO TABLETS BY MOUTH DAILY (Patient taking differently: TAKE ONE TABLETS BY MOUTH DAILY) 180 tablet 1     loratadine (CLARITIN) 10 mg tablet Take 10 mg by mouth daily.       potassium chloride (KLOR-CON) 10 MEQ CR tablet Take 1 tablet (10 mEq " total) by mouth daily. TAKE ONLY IF TAKING FUROSEMIDE 90 tablet 1     pyridoxine (VITAMIN B-6) 100 MG tablet Take 100 mg by mouth daily.       simvastatin (ZOCOR) 20 MG tablet TAKE ONE TABLET BY MOUTH ONE TIME DAILY IN THE EVENING (Patient taking differently: TAKE ONE TABLET-HALF BY MOUTH ONE TIME DAILY IN THE EVENING) 90 tablet 2     vitamin E 400 unit Tab Take by mouth.       No current facility-administered medications for this visit.      Allergies   Allergen Reactions     Amoxicillin Hives     Cephalexin Other (See Comments)     Reaction with intervenous administration.     Penicillins Hives     Latex Rash     Other Environmental Allergy Other (See Comments)     Pollen causes seasonal allergies.     Social History   Substance Use Topics     Smoking status: Former Smoker     Types: Cigarettes     Quit date: 1991     Smokeless tobacco: None     Alcohol use None       Review and/or order of clinical lab tests:  Review and/or order of radiology tests:  Review and/or order of medicine tests:  Discussion of test results with performing physician:  Decision to obtain old records and/or obtain history from someone other than the patient:  Review and summarization of old records and/or obtaining history from someone other than the patient and.or discussion of case with another health care provider:  Independent visualization of image, tracing or specimen itself:    Time: total time spent with the patient was 25 minutes of which >50% was spent in counseling and coordination of care     Roc Mares MD

## 2021-06-09 NOTE — TELEPHONE ENCOUNTER
"Anticoagulation Annual Referral Renewal Review    Miesha Quarles's chart reviewed for annual renewal of referral to anticoagulation monitoring.        Criteria for anticoagulation nurse and/or pharmacist renewal met   Warfarin indication: Atrial Flutter Yes, per indication   Current with INR monitoring/compliant Yes Yes   Date of last office visit 10/29/2019 Yes, had office visit within last year   Time in Therapeutic Range (TTR) 56.1 % No, TTR < 60 %       Miesha Quarles did NOT meet all criteria for anticoagulation management program initiated renewal and requires provider review. Using dot phrase, \".acmrenewalprovider\", please advise if Rebekahs anticoagulation management referral should be renewed or if patient should be seen in office to review anticoagulation therapy      Reina Cruz RN  1:45 PM      "

## 2021-06-09 NOTE — TELEPHONE ENCOUNTER
ANTICOAGULATION  MANAGEMENT PROGRAM    Miesha Quarles is overdue for INR check.     Spoke with Miesha and scheduled INR appointment on 7/21/20 @ [facility].   - NO SHOWS for appts on 6/9 and 6/12 - (she reported forgeting her appts).      Reina Cruz RN

## 2021-06-09 NOTE — TELEPHONE ENCOUNTER
RN cannot approve Refill Request    RN can NOT refill this medication Protocol failed and NO refill given. Last office visit: 10/29/2019 Roc Mares MD Last Physical: Visit date not found Last MTM visit: Visit date not found Last visit same specialty: 10/29/2019 Roc Mares MD.  Next visit within 3 mo: Visit date not found  Next physical within 3 mo: Visit date not found      Charito Hauser, [facility] Triage/Med Refill 6/30/2020    Requested Prescriptions   Pending Prescriptions Disp Refills     warfarin ANTICOAGULANT (COUMADIN/JANTOVEN) 5 MG tablet [Pharmacy Med Name: WARFARIN SODIUM 5 MG TABLET] 90 tablet 1     Sig: TAKE 1/2-1 TABLET BY MOUTH DAILY AS DIRECTED       Warfarin Refill Protocol  Failed - 6/30/2020 12:15 AM        Failed -  Route to appropriate pool/provider     Last Anticoagulation Summary:   Anticoagulation Episode Summary     Current INR goal:   2.0-3.0   TTR:   56.7 % (11 mo)   Next INR check:   6/9/2020   INR from last check:   2.20 (5/26/2020)   Weekly max warfarin dose:      Target end date:      INR check location:      Preferred lab:      Send INR reminders to:   [facility]    Indications    Atrial flutter (H) [I48.92]           Comments:            Anticoagulation Care Providers     Provider Role Specialty Phone number    Roc Mares MD Referring Internal Medicine [phone]                Passed - Provider visit in last year     Last office visit with prescriber/PCP: 10/29/2019 Roc Mares MD OR same dept: 10/29/2019 Roc Mares MD OR same specialty: 10/29/2019 Roc Mares MD  Last physical: Visit date not found Last MTM visit: Visit date not found    Next appt within 3 mo: Visit date not found Next physical within 3 mo: Visit date not found  Prescriber OR PCP: Roc Mares MD  Last diagnosis associated with med order: 1. Atrial flutter (H)  - warfarin ANTICOAGULANT (COUMADIN/JANTOVEN) 5 MG tablet [Pharmacy Med Name: WARFARIN SODIUM 5 MG  TABLET]; Take 1/2-1 tablet by mouth daily as directed  Dispense: 90 tablet; Refill: 1    If protocol passes may refill for 6 months if within 3 months of last provider visit (or a total of 9 months).

## 2021-06-10 NOTE — TELEPHONE ENCOUNTER
ANTICOAGULATION  MANAGEMENT    Assessment     Today's INR result of 2.70 is Therapeutic (goal INR of 2.0-3.0)        Warfarin taken as previously instructed    No new diet changes affecting INR    No new medication/supplements affecting INR    Continues to tolerate warfarin with no reported s/s of bleeding or thromboembolism     Previous INR was Therapeutic at 2.20 on 5/26/20.    Plan:     Spoke on phone with  regarding INR result and instructed:     Warfarin Dosing Instructions:  (has 5mg tabs)   - Continue current warfarin dose 5 mg daily on Mon/Fri; and 2.5 mg daily rest of week.    Instructed patient to follow up no later than:  4 wks.    Education provided: importance of consistent vitamin K intake, target INR goal and significance of current INR result and importance of notifying clinic for changes in medications  Miesha verbalizes understanding and agrees to warfarin dosing plan.    Instructed to call the [facility] for any changes, questions or concerns. (#[phone])   ?   Reina Cruz RN    Subjective/Objective:      Miesha Quarles, a 89 y.o. female is on warfarin.     Miesha reports:     Home warfarin dose: as updated on anticoagulation calendar per template     Missed doses: No     Medication changes:  No     S/S of bleeding or thromboembolism:  No     New Injury or illness:  No     Changes in diet or alcohol consumption:  No     Upcoming surgery, procedure or cardioversion:  No    Anticoagulation Episode Summary     Current INR goal:   2.0-3.0   TTR:   70.5 % (1 y)   Next INR check:   9/16/2020   INR from last check:   2.70 (8/19/2020)   Weekly max warfarin dose:      Target end date:      INR check location:      Preferred lab:      Send INR reminders to:   [facility]    Indications    Atrial flutter (H) [I48.92]           Comments:            Anticoagulation Care Providers     Provider Role Specialty Phone number    Roc Mares MD Referring Internal Medicine  [phone]

## 2021-06-10 NOTE — PROGRESS NOTES
Office Visit - Follow Up   Miesha Quarles   89 y.o. female    Date of Visit: 8/19/2020    Chief Complaint   Patient presents with     Follow-up     DM, medication review. fasting. states BS's have been good        Assessment and Plan   1. Type 2 diabetes mellitus with complication (H)  Unknown control.  Labs today for monitoring.  - Glycosylated Hemoglobin A1c  - Lipid Cascade  - Comprehensive Metabolic Panel    2. Hypothyroidism, unspecified type  Seems euthyroid.  Check TSH given weight gain.  - Thyroid Cascade    3. Hypercholesterolemia  Lipids today for monitoring.  - Lipid Cascade    4. CKD (chronic kidney disease) stage 3, GFR 30-59 ml/min (H)  Labs today for monitoring.  - Comprehensive Metabolic Panel    5. Benign Essential Hypertension  Pretty good control.  Continue same.    6. Typical atrial flutter (H)  Overdue for INR.  I have urged her to make sure she is getting her INR monthly.  - INR    7. Long term (current) use of anticoagulants  As above.  - INR    I have urged her to get her flu shot this fall as soon as available.    Return in about 6 months (around 2/19/2021) for Recheck, AWV.     History of Present Illness   This 89 y.o. old choline comes in today for follow-up of her multi-medical problems.  Have not seen her since last year.  She reports things been going fairly well.  Her boyfriend has been having some issues with unintentional weight loss he needs down to 100 pounds.  She is concerned about that.  He is nearly 89 years old I believe 89 or 89.  She is trying to steer clear of COVID.  She does go out to the store to get her groceries and medications but otherwise is limiting who she sees.  Has not been going to [employer] except they are doing it in the parking lot now and she is staying in her car.  No falls.  She uses a rare tramadol for pain of her arthritis.  She refuses to undergo any sort of knee arthroplasty for her severe knee DJD.  Memory has been stable.  She is a family history  "of longevity.  She is gained weight but she notes that she has been eating very well.    Review of Systems: A comprehensive review of systems was negative except as noted.     Medications, Allergies and Problem List   Reviewed, reconciled and updated  Post Discharge Medication Reconciliation Status:      Physical Exam   General Appearance:   Delightful woman who looks well and is in good spirits.  Appears much younger than her age.    /78 (Patient Site: Right Arm, Patient Position: Sitting, Cuff Size: Adult Regular)   Pulse 74   Ht 5' 2\" (1.575 m)   Wt 173 lb 1.9 oz (78.5 kg)   SpO2 98%   BMI 31.66 kg/m           Additional Information   Current Outpatient Medications   Medication Sig Dispense Refill     acetaminophen (TYLENOL) 650 MG CR tablet Take 650 mg by mouth as needed.        amLODIPine (NORVASC) 2.5 MG tablet TAKE 1 TABLET (2.5 MG TOTAL) BY MOUTH DAILY. 90 tablet 3     ascorbic acid (VITAMIN C) 100 MG tablet Take 100 mg by mouth daily.       calcium carbonate-vitamin D3 (CALCIUM 600 WITH VITAMIN D3) 600 mg(1,500mg) -200 unit per tablet Take 1 tablet by mouth daily.       cholecalciferol, vitamin D3, 1,000 unit capsule Take 3,000 Units by mouth daily.        cyanocobalamin (VITAMIN B-12) 1000 MCG tablet Take 1,000 mcg by mouth daily.       furosemide (LASIX) 20 MG tablet TAKE 1 TABLET BY MOUTH AS NEEDED. 90 tablet 1     KLOR-CON 10 10 mEq CR tablet TAKE 1 TABLET BY MOUTH DAILY. TAKE ONLY IF TAKING FUROSEMIDE 90 tablet 0     levothyroxine (SYNTHROID, LEVOTHROID) 50 MCG tablet Take 1 tablet (50 mcg total) by mouth daily. 90 tablet 2     lisinopril-hydrochlorothiazide (PRINZIDE,ZESTORETIC) 20-12.5 mg per tablet Take 2 tablets by mouth daily. 180 tablet 3     loratadine (CLARITIN) 10 mg tablet Take 10 mg by mouth as needed.        metoprolol succinate (TOPROL-XL) 100 MG 24 hr tablet TAKE 1 TABLET BY MOUTH EVERY DAY 90 tablet 3     pyridoxine (VITAMIN B-6) 100 MG tablet Take 100 mg by mouth daily.       " simvastatin (ZOCOR) 20 MG tablet TAKE 1/2 TABLET BY MOUTH DAILY IN THE EVENING. 45 tablet 3     traMADoL (ULTRAM) 50 mg tablet Take 1 tablet (50 mg total) by mouth every 6 (six) hours as needed for pain. 20 tablet 0     vitamin E 400 unit Tab Take by mouth.       warfarin ANTICOAGULANT (COUMADIN/JANTOVEN) 5 MG tablet Takes 1/2 tablet (2.5mg) daily and Takes 1 tablet (5mg) on  /  by mouth as directed.  Adjust dose based on INR results. 80 tablet 1     No current facility-administered medications for this visit.      Allergies   Allergen Reactions     Amoxicillin Hives     Cephalexin Other (See Comments)     Reaction with intervenous administration.     Penicillins Hives     Latex Rash     Other Environmental Allergy Other (See Comments)     Pollen causes seasonal allergies.     Social History     Tobacco Use     Smoking status: Former Smoker     Types: Cigarettes     Last attempt to quit:      Years since quittin.6     Smokeless tobacco: Never Used   Substance Use Topics     Alcohol use: Yes     Drug use: Not on file       Review and/or order of clinical lab tests:  Review and/or order of radiology tests:  Review and/or order of medicine tests:  Discussion of test results with performing physician:  Decision to obtain old records and/or obtain history from someone other than the patient:  Review and summarization of old records and/or obtaining history from someone other than the patient and.or discussion of case with another health care provider:  Independent visualization of image, tracing or specimen itself:    Time: not applicable     Roc Mares MD

## 2021-06-10 NOTE — TELEPHONE ENCOUNTER
Prescription Monitoring Program activity reviewed with no discrepancies noted.      Last fill per : 6/1/20  Quantity/days supply: 20 tablets for 5 days    Controlled Substance Agreement on file: Yes  Date: 7/2/19 - will need new CSA at next follow up appt    Last office visit with provider:  10/29/2019 Roc Mares MD    Please advise.

## 2021-06-10 NOTE — TELEPHONE ENCOUNTER
Refill Approved    Rx renewed per Medication Renewal Policy. Medication was last renewed on 7/23/19.    Emelia Ma, [facility] Triage/Med Refill 7/28/2020     Requested Prescriptions   Pending Prescriptions Disp Refills     simvastatin (ZOCOR) 20 MG tablet [Pharmacy Med Name: SIMVASTATIN 20 MG TABLET] 45 tablet 3     Sig: TAKE 1/2 TABLET BY MOUTH DAILY IN THE EVENING.       Statins Refill Protocol (Hmg CoA Reductase Inhibitors) Passed - 7/26/2020  4:27 PM        Passed - PCP or prescribing provider visit in past 12 months      Last office visit with prescriber/PCP: 10/29/2019 Roc Mares MD OR same dept: 10/29/2019 Roc Mares MD OR same specialty: 10/29/2019 Roc Mares MD  Last physical: Visit date not found Last MTM visit: Visit date not found   Next visit within 3 mo: Visit date not found  Next physical within 3 mo: Visit date not found  Prescriber OR PCP: Roc Mares MD  Last diagnosis associated with med order: 1. Hypercholesteremia  - simvastatin (ZOCOR) 20 MG tablet [Pharmacy Med Name: SIMVASTATIN 20 MG TABLET]; TAKE 1/2 TABLET BY MOUTH DAILY IN THE EVENING.  Dispense: 45 tablet; Refill: 3    If protocol passes may refill for 12 months if within 3 months of last provider visit (or a total of 15 months).

## 2021-06-10 NOTE — TELEPHONE ENCOUNTER
Dr Mares     Ok to set up refill for Tramadol?    Barbie Cameron, CMA    
Medication Question or Clarification  Who is calling: patient  What medication are you calling about (include dose and sig)?:     traMADoL (ULTRAM) 50 mg tablet  20 tablet  0  8/10/2020      Sig - Route: Take 1 tablet (50 mg total) by mouth every 6 (six) hours as needed for pain. - Oral       Who prescribed the medication?: Roc Mares MD  What is your question/concern?: Patient requesting this medication to be re-sent to [facility].  Requested Pharmacy: [facility]  Okay to leave a detailed message?: Yes        
Ok to send to correct pharmacy.    
Prescription set up and sent to Dr Mares to sign. Barbie Cameron, CMA    
Spoke with patient regarding Tramadol. The prescription on the 10th was sent to the [facility] and patient never did pick it up. I looked on the  and the last fill date was on 6/1/20.    Okay to set up?    Barbie Cameron, CMA    
We just sent to this to the pharmacy on the 10th.  Did she go through all of it?  
no

## 2021-06-10 NOTE — TELEPHONE ENCOUNTER
Refill Approved    Rx renewed per Medication Renewal Policy. Medication was last renewed on 6/1/19, last OV 5/7/20.    Shira Hurley, [facility] Triage/Med Refill 8/2/2020     Requested Prescriptions   Pending Prescriptions Disp Refills     levothyroxine (SYNTHROID, LEVOTHROID) 50 MCG tablet [Pharmacy Med Name: LEVOTHYROXINE 50 MCG TABLET] 90 tablet 3     Sig: TAKE 1 TABLET BY MOUTH EVERY DAY       Thyroid Hormones Protocol Passed - 8/1/2020  9:15 AM        Passed - Provider visit in past 12 months or next 3 months     Last office visit with prescriber/PCP: 10/29/2019 Roc Mares MD OR same dept: 10/29/2019 Roc Mares MD OR same specialty: 10/29/2019 Roc Mares MD  Last physical: Visit date not found Last MTM visit: Visit date not found   Next visit within 3 mo: Visit date not found  Next physical within 3 mo: Visit date not found  Prescriber OR PCP: Roc Mares MD  Last diagnosis associated with med order: 1. Hypothyroid  - levothyroxine (SYNTHROID, LEVOTHROID) 50 MCG tablet [Pharmacy Med Name: LEVOTHYROXINE 50 MCG TABLET]; TAKE 1 TABLET BY MOUTH EVERY DAY  Dispense: 90 tablet; Refill: 3    If protocol passes may refill for 12 months if within 3 months of last provider visit (or a total of 15 months).             Passed - TSH on file in past 12 months for patient age 12 & older     TSH   Date Value Ref Range Status   10/29/2019 3.13 0.30 - 5.00 uIU/mL Final

## 2021-06-11 ENCOUNTER — COMMUNICATION - HEALTHEAST (OUTPATIENT)
Dept: ANTICOAGULATION | Facility: CLINIC | Age: 86
End: 2021-06-11

## 2021-06-11 DIAGNOSIS — H34.8192 CENTRAL RETINAL VEIN OCCLUSION, UNSPECIFIED COMPLICATION STATUS, UNSPECIFIED LATERALITY (H): ICD-10-CM

## 2021-06-11 DIAGNOSIS — I48.92 ATRIAL FLUTTER (H): ICD-10-CM

## 2021-06-11 DIAGNOSIS — Z79.01 LONG TERM (CURRENT) USE OF ANTICOAGULANTS: ICD-10-CM

## 2021-06-11 NOTE — TELEPHONE ENCOUNTER
ANTICOAGULATION  MANAGEMENT PROGRAM    Miesha Quarles is overdue for INR check.     Spoke with Miesha and scheduled INR appointment on 9/28 @ [facility].      Reina Cruz RN

## 2021-06-11 NOTE — TELEPHONE ENCOUNTER
ANTICOAGULATION  MANAGEMENT    Assessment     Today's INR result of 2.60 is Therapeutic (goal INR of 2.0-3.0)        Warfarin taken as previously instructed    No new diet changes affecting INR    No new medication/supplements affecting INR    Continues to tolerate warfarin with no reported s/s of bleeding or thromboembolism     Previous INR was Therapeutic at 2.70 on 8/19/20.    Miesha reported doing well with no new concerns or questions.    Plan:     Spoke on phone with Miesha regarding INR result and instructed:     Warfarin Dosing Instructions:   (has 5mg tabs)   - Continue current warfarin dose 5 mg daily on Mon/Fri; and 2.5 mg daily rest of week.    Instructed patient to follow up no later than:  4 wks.    Education provided: importance of consistent vitamin K intake and target INR goal and significance of current INR result    Miesha verbalizes understanding and agrees to warfarin dosing plan.    Instructed to call the [facility] for any changes, questions or concerns. (#[phone])   ?   Reina Cruz RN    Subjective/Objective:      Miesha Quarles, a 89 y.o. female is on warfarin.     Miesha reports:     Home warfarin dose: as updated on anticoagulation calendar per template     Missed doses: No     Medication changes:  No     S/S of bleeding or thromboembolism:  No     New Injury or illness:  No     Changes in diet or alcohol consumption:  No     Upcoming surgery, procedure or cardioversion:  No    Anticoagulation Episode Summary     Current INR goal:   2.0-3.0   TTR:   76.8 % (1 y)   Next INR check:   10/26/2020   INR from last check:   2.60 (9/28/2020)   Weekly max warfarin dose:      Target end date:      INR check location:      Preferred lab:      Send INR reminders to:   [facility] [place]    Indications    Atrial flutter (H) [I48.92]           Comments:            Anticoagulation Care Providers     Provider Role Specialty Phone number    Roc Mares MD Referring Internal  Medicine [phone]

## 2021-06-11 NOTE — PROGRESS NOTES
Office Visit - Follow Up   Miesha Quarles   88 y.o. female    Date of Visit: 7/11/2017    Chief Complaint   Patient presents with     Diabetes     f/u - fasting     Edema     Lt lower leg x 1 week        Assessment and Plan   1. Type 2 diabetes mellitus with complication  Unknown control.  Labs today for monitoring.  - Lipid Cascade  - Comprehensive Metabolic Panel  - Glycosylated Hemoglobin A1c    2. Osteoarthritis Of The Knee  Knees have been good.    3. Hypothyroidism  She seems euthyroid.  Check TSH today  - Thyroid Stimulating Hormone (TSH)    4. Hypercholesterolemia  Lipid profile today for monitoring.    5. Benign Essential Hypertension  Perhaps too much blood pressure medication at this time.  Discontinue the amlodipine at this time to see if that helps with the edema.  Also see if that helps with her sleepiness during the day.  I will see her back in 3 weeks.    6. Daytime somnolence  I have urged her to sleep more at night and get more rest to prevent the daytime somnolence.  We will see if cutting back on her blood pressure helps as well.        Return in about 3 weeks (around 8/1/2017) for Recheck.     History of Present Illness   This 88 y.o. old delightful active 88-year-old woman comes in today for follow-up.  She reports she has been doing fairly well.  She gets intermittent swelling of her legs left greater than right.  She states of the left leg is more swollen recently.  She also notes she is very tired during the day.  She stays up very late until 2 or 3 in the morning and then only sleeps about 6 hours at night.  She can fall asleep in the middle of the day easily.  Otherwise she reports things are going well.  She did not go to her family reunion this summer.  She is going to her boyfriend's family reunion next month in [facility].  She has not been checking her sugars.  She denies other somatic concerns for me.    Review of Systems: A comprehensive review of systems was negative  "except as noted.     Medications, Allergies and Problem List   Reviewed and updated     Physical Exam   General Appearance:   Delightful woman.  Appears younger than her age.    /60 (Patient Site: Right Arm, Patient Position: Sitting, Cuff Size: Adult Regular)  Pulse (!) 58  Ht 5' 3\" (1.6 m)  Wt 182 lb (82.6 kg)  SpO2 98%  BMI 32.24 kg/m2    1+ edema left lower extremity none on the right.       Additional Information   Current Outpatient Prescriptions   Medication Sig Dispense Refill     acetaminophen (TYLENOL) 650 MG CR tablet Take 650 mg by mouth daily.        ascorbic acid (VITAMIN C) 100 MG tablet Take 100 mg by mouth daily.       aspirin 81 mg chewable tablet Chew 81 mg daily.       atenolol (TENORMIN) 100 MG tablet TAKE ONE TABLET BY MOUTH ONE TIME DAILY 90 tablet 2     calcium carbonate-vitamin D3 (CALCIUM 600 WITH VITAMIN D3) 600 mg(1,500mg) -200 unit per tablet Take 1 tablet by mouth daily.       cholecalciferol, vitamin D3, 1,000 unit capsule Take 3,000 Units by mouth daily.        cranberry 400 mg cap Take 1 capsule by mouth daily.       cyanocobalamin (VITAMIN B-12) 1000 MCG tablet Take 1,000 mcg by mouth daily.       furosemide (LASIX) 20 MG tablet Take 1 tablet (20 mg total) by mouth as needed. (Patient taking differently: Take 20 mg by mouth as needed (Taken on Mon/Wed/Fri). ) 90 tablet 1     levothyroxine (SYNTHROID, LEVOTHROID) 50 MCG tablet TAKE ONE TABLET BY MOUTH ONE TIME DAILY 90 tablet 1     lisinopril-hydrochlorothiazide (PRINZIDE,ZESTORETIC) 20-12.5 mg per tablet TAKE TWO TABLETS BY MOUTH DAILY (Patient taking differently: TAKE ONE TABLETS BY MOUTH DAILY) 180 tablet 1     loratadine (CLARITIN) 10 mg tablet Take 10 mg by mouth daily.       potassium chloride (KLOR-CON) 10 MEQ CR tablet Take 1 tablet (10 mEq total) by mouth daily. TAKE ONLY IF TAKING FUROSEMIDE 90 tablet 1     pyridoxine (VITAMIN B-6) 100 MG tablet Take 100 mg by mouth daily.       simvastatin (ZOCOR) 20 MG tablet " TAKE ONE TABLET BY MOUTH ONE TIME DAILY IN THE EVENING (Patient taking differently: TAKE ONE TABLET-HALF BY MOUTH ONE TIME DAILY IN THE EVENING) 90 tablet 2     vitamin E 400 unit Tab Take by mouth.       No current facility-administered medications for this visit.      Allergies   Allergen Reactions     Amoxicillin Hives     Cephalexin Other (See Comments)     Reaction with intervenous administration.     Penicillins Hives     Latex Rash     Other Environmental Allergy Other (See Comments)     Pollen causes seasonal allergies.     Social History   Substance Use Topics     Smoking status: Former Smoker     Types: Cigarettes     Quit date: 1991     Smokeless tobacco: None     Alcohol use None       Review and/or order of clinical lab tests:  Review and/or order of radiology tests:  Review and/or order of medicine tests:  Discussion of test results with performing physician:  Decision to obtain old records and/or obtain history from someone other than the patient:  Review and summarization of old records and/or obtaining history from someone other than the patient and.or discussion of case with another health care provider:  Independent visualization of image, tracing or specimen itself:    Time: not applicable     Roc Mares MD

## 2021-06-12 NOTE — TELEPHONE ENCOUNTER
Refill Approved    Rx renewed per Medication Renewal Policy. Medication was last renewed on 11/21/19.    Emelia Ma, [facility] Triage/Med Refill 11/2/2020     Requested Prescriptions   Pending Prescriptions Disp Refills     amLODIPine (NORVASC) 2.5 MG tablet [Pharmacy Med Name: AMLODIPINE BESYLATE 2.5 MG TAB] 90 tablet 3     Sig: TAKE 1 TABLET (2.5 MG TOTAL) BY MOUTH DAILY.       Calcium-Channel Blockers Protocol Passed - 10/29/2020 12:47 AM        Passed - PCP or prescribing provider visit in past 12 months or next 3 months     Last office visit with prescriber/PCP: 8/19/2020 Roc Mares MD OR same dept: 8/19/2020 Roc Mares MD OR same specialty: 8/19/2020 Roc Mares MD  Last physical: Visit date not found Last MTM visit: Visit date not found   Next visit within 3 mo: Visit date not found  Next physical within 3 mo: Visit date not found  Prescriber OR PCP: Roc Mares MD  Last diagnosis associated with med order: 1. Benign Essential Hypertension  - amLODIPine (NORVASC) 2.5 MG tablet [Pharmacy Med Name: AMLODIPINE BESYLATE 2.5 MG TAB]; TAKE 1 TABLET (2.5 MG TOTAL) BY MOUTH DAILY.  Dispense: 90 tablet; Refill: 3    If protocol passes may refill for 12 months if within 3 months of last provider visit (or a total of 15 months).             Passed - Blood pressure filed in past 12 months     BP Readings from Last 1 Encounters:   08/19/20 132/78

## 2021-06-12 NOTE — PROGRESS NOTES
Subjective findings: Ms. Quarles returned to the clinic today for continued diabetic foot care  complaining of long thick painful nails on both feet and painful calluses on  the bottom of both feet.      OBJECTIVE FINDINGS: Long thick discolored dystrophic nails 1 through 5 both  feet. All nails are 2 to 3 times normal thickness with subungual debris  present. There is thick hyperkeratotic nucleated lesions, plantar aspect  both feet. DP and PT pulses are absent bilaterally. Capillary refill 3  seconds bilaterally. Hair growth absent bilaterally. Negative clonus.    Negative Babinski bilaterally. Range of motion within normal limits  bilaterally. Muscle power plus 4/5 bilaterally within all compartments.      ASSESSMENT:  1. Onychomycosis.  2. Onychauxis.  3. Tylomas.  4. Diabetes mellitus.      PLAN: Debrided nails 1 through 5 both feet today and trimmed calluses both  feet today. I have recommended that Mrs. Quarles continue to return to  clinic every 2 months for continued diabetic foot care

## 2021-06-12 NOTE — TELEPHONE ENCOUNTER
Refill Approved    Rx renewed per Medication Renewal Policy. Medication was last renewed on 09/27/2019.  Last office visit was 08/19/2020 with PCP.    Dayanna Frances, [facility] Triage/Med Refill 10/6/2020     Requested Prescriptions   Pending Prescriptions Disp Refills     metoprolol succinate (TOPROL-XL) 100 MG 24 hr tablet [Pharmacy Med Name: METOPROLOL SUCC  MG TAB] 90 tablet 3     Sig: TAKE 1 TABLET BY MOUTH EVERY DAY       Beta-Blockers Refill Protocol Passed - 10/3/2020  9:18 AM        Passed - PCP or prescribing provider visit in past 12 months or next 3 months     Last office visit with prescriber/PCP: 8/19/2020 Roc Mares MD OR same dept: 8/19/2020 Roc Mares MD OR same specialty: 8/19/2020 Roc Mares MD  Last physical: Visit date not found Last MTM visit: Visit date not found   Next visit within 3 mo: Visit date not found  Next physical within 3 mo: Visit date not found  Prescriber OR PCP: Roc Mares MD  Last diagnosis associated with med order: 1. Benign Essential Hypertension  - metoprolol succinate (TOPROL-XL) 100 MG 24 hr tablet [Pharmacy Med Name: METOPROLOL SUCC  MG TAB]; TAKE 1 TABLET BY MOUTH EVERY DAY  Dispense: 90 tablet; Refill: 3    If protocol passes may refill for 12 months if within 3 months of last provider visit (or a total of 15 months).             Passed - Blood pressure filed in past 12 months     BP Readings from Last 1 Encounters:   08/19/20 132/78

## 2021-06-12 NOTE — TELEPHONE ENCOUNTER
Prescription Monitoring Program activity reviewed with no discrepancies noted.      Last fill per : 08/25/20  Quantity/days supply: #20 for a 5 day supply    Controlled Substance Agreement on file: Yes  Date: 08/21/20    Last office visit with provider:  8/19/2020 Roc Mares MD    Please advise.

## 2021-06-12 NOTE — TELEPHONE ENCOUNTER
The required info is not on this refill request.  Please complete as previously instructed by management.  Thanks.

## 2021-06-12 NOTE — TELEPHONE ENCOUNTER
ANTICOAGULATION  MANAGEMENT PROGRAM    Miesha Quarles is overdue for INR check.     Spoke with Miesha and scheduled INR appointment on 11/10/20 @ [facility]..      Reina Cruz RN

## 2021-06-12 NOTE — PROGRESS NOTES
Office Visit - Follow Up   Miesha Quarles   88 y.o. female    Date of Visit: 8/1/2017    Chief Complaint   Patient presents with     Hypertension     med & bp recheck - not fasitng     Edema     recheck swelling Lt leg swelling; slightly better        Assessment and Plan   1. Irregular heart rhythm  EKG was done and reveals a flutter.  Rate is in the 90s.  See below.  - Electrocardiogram Perform - Clinic    2. Atrial flutter  I counseled patient at length on what a flutter and A. fib R.  We discussed stroke protection and rate control.  Rate is fairly well-controlled it seems.  Will discontinue her aspirin.  Begin Eliquis.  If that is not covered consider other novel anticoagulant versus Coumadin.  We discussed all of these today and I counseled her on these at length.  She would like to avoid Coumadin because of dietary restrictions if she could.  We will have her see rapid access.  Recent TSH looked okay.  Echo was done just a few months ago and looked okay.  She did have some elevated pulmonary pressure likely due to sleep apnea I would assume.  See below.  - Ambulatory referral to [facility]  - Ambulatory referral to Sleep Medicine    3. Benign Essential Hypertension  Blood pressures a little on the high side at this time.  We will continue to monitor shows be seen cardiology in the near future.  I am not going to adjust anything today.    4. Edema  None today on exam.    5. Daytime somnolence  I suspect she has sleep apnea.  This is likely because of her elevated pulmonary artery pressures as well as her daytime somnolence.  She is resistant to going to do sleep study and using CPAP but she is willing now at this time to do it.  Will make that referral today.  - Ambulatory referral to Sleep Medicine        Return in about 3 weeks (around 8/22/2017) for Recheck.     History of Present Illness   This 88 y.o. old patient comes in today for blood pressure follow-up.  We discontinued her amlodipine  "last visit because of mild swelling in the ankles as well as daytime somnolence.  I thought may be letting her blood pressure go up a little bit would help.  She still feels sleepy during the day but notes that she has for many years if in fact her whole life.  She does snore.  We also changed her atenolol to metoprolol so she is here for follow-up of that.  That was just a couple days ago.  That was based upon manufacture no longer making atenolol and the shortage that is present.  She does not feel any better or worse.  No palpitations.  No lightheadedness or dizziness.  Denies other somatic concerns for me.  Planning to leave for [place] lab next week.    Review of Systems: A comprehensive review of systems was negative except as noted.     Medications, Allergies and Problem List   Reviewed and updated     Physical Exam   General Appearance:   Delightful woman.  Heart today is irregularly irregular however.  No edema today.    /80 (Patient Site: Right Arm, Patient Position: Sitting, Cuff Size: Adult Regular)  Pulse 72  Ht 5' 3\" (1.6 m)  Wt 181 lb (82.1 kg)  SpO2 98%  BMI 32.06 kg/m2         Additional Information   Current Outpatient Prescriptions   Medication Sig Dispense Refill     acetaminophen (TYLENOL) 650 MG CR tablet Take 650 mg by mouth daily.        ascorbic acid (VITAMIN C) 100 MG tablet Take 100 mg by mouth daily.       calcium carbonate-vitamin D3 (CALCIUM 600 WITH VITAMIN D3) 600 mg(1,500mg) -200 unit per tablet Take 1 tablet by mouth daily.       cholecalciferol, vitamin D3, 1,000 unit capsule Take 3,000 Units by mouth daily.        cranberry 400 mg cap Take 1 capsule by mouth daily.       cyanocobalamin (VITAMIN B-12) 1000 MCG tablet Take 1,000 mcg by mouth daily.       levothyroxine (SYNTHROID, LEVOTHROID) 50 MCG tablet TAKE ONE TABLET BY MOUTH ONE TIME DAILY 90 tablet 1     lisinopril-hydrochlorothiazide (PRINZIDE,ZESTORETIC) 20-12.5 mg per tablet TAKE TWO TABLETS BY MOUTH DAILY " (Patient taking differently: No sig reported) 180 tablet 1     loratadine (CLARITIN) 10 mg tablet Take 10 mg by mouth daily.       metoprolol succinate (TOPROL-XL) 100 MG 24 hr tablet Take 100 mg by mouth daily.       pyridoxine (VITAMIN B-6) 100 MG tablet Take 100 mg by mouth daily.       simvastatin (ZOCOR) 20 MG tablet TAKE ONE TABLET BY MOUTH ONE TIME DAILY IN THE EVENING (Patient taking differently: TAKE ONE TABLET-HALF BY MOUTH ONE TIME DAILY IN THE EVENING) 90 tablet 2     vitamin E 400 unit Tab Take by mouth.       apixaban (ELIQUIS) 5 mg Tab tablet Take 1 tablet (5 mg total) by mouth 2 (two) times a day. 60 tablet 1     furosemide (LASIX) 20 MG tablet Take 1 tablet (20 mg total) by mouth as needed. (Patient taking differently: Take 20 mg by mouth as needed (Taken on Mon/Wed/Fri). ) 90 tablet 1     potassium chloride (KLOR-CON) 10 MEQ CR tablet Take 1 tablet (10 mEq total) by mouth daily. TAKE ONLY IF TAKING FUROSEMIDE 90 tablet 1     No current facility-administered medications for this visit.      Allergies   Allergen Reactions     Amoxicillin Hives     Cephalexin Other (See Comments)     Reaction with intervenous administration.     Penicillins Hives     Latex Rash     Other Environmental Allergy Other (See Comments)     Pollen causes seasonal allergies.     Social History   Substance Use Topics     Smoking status: Former Smoker     Types: Cigarettes     Quit date: 1991     Smokeless tobacco: None     Alcohol use None       Review and/or order of clinical lab tests:  Review and/or order of radiology tests:  Review and/or order of medicine tests:  Discussion of test results with performing physician:  Decision to obtain old records and/or obtain history from someone other than the patient:  Review and summarization of old records and/or obtaining history from someone other than the patient and.or discussion of case with another health care provider:  Independent visualization of image, tracing or specimen  itself:    Time: total time spent with the patient was 40 minutes of which >50% was spent in counseling and coordination of care     Roc Mares MD

## 2021-06-12 NOTE — TELEPHONE ENCOUNTER
Controlled Substance Refill Request  Medication Name:   Requested Prescriptions     Pending Prescriptions Disp Refills     traMADoL (ULTRAM) 50 mg tablet [Pharmacy Med Name: TRAMADOL HCL 50 MG TABLET] 20 tablet 0     Sig: TAKE 1 TABLET BY MOUTH EVERY 6 HOURS AS NEEDED FOR PAIN     Date Last Fill: 8/25/20  Requested Pharmacy: [facility]  Submit electronically to pharmacy  Controlled Substance Agreement on file:   Encounter-Level CSA Scan Date:    There are no encounter-level csa scan date.        Last office visit:  8/19/20

## 2021-06-12 NOTE — PROGRESS NOTES
1.  Referral to ACM program.    2.  DX:  Atrial Flutter, typical    3.  INR target goal 2.0 - 3.0    4.  Discontinued on Eliquis 5mg BID   - she was give a 30 day samples from [facility].   - has NO INSURANCE coverage for meds.   - it was too costly to fill.  Over $300.00/month.   - requesting a cost effective anticoagulant.    5.  Reviewed past LABS from 7/11/2017.   - ALBUMIN 3.2 (L)   - CREATININE 1.08   - GFR (-American) 58 (L)    6.  Started on warfarin 5mg QD on Fri. 9/8/17 by Cindy Eller, PharmD    7.  Called Miesha Quarles - NO VM  (not home yet).

## 2021-06-12 NOTE — TELEPHONE ENCOUNTER
Refill Approved    Rx renewed per Medication Renewal Policy. Medication was last renewed on 8/4/19.    Emelia Ma, [facility] Triage/Med Refill 11/4/2020     Requested Prescriptions   Pending Prescriptions Disp Refills     lisinopriL-hydrochlorothiazide (PRINZIDE,ZESTORETIC) 20-12.5 mg per tablet [Pharmacy Med Name: LISINOPRIL-HCTZ 20-12.5 MG TAB] 180 tablet 3     Sig: TAKE 2 TABLETS BY MOUTH EVERY DAY       Diuretics/Combination Diuretics Refill Protocol  Passed - 11/1/2020  9:20 AM        Passed - Visit with PCP or prescribing provider visit in past 12 months     Last office visit with prescriber/PCP: 8/19/2020 Roc Mares MD OR same dept: 8/19/2020 Roc Mares MD OR same specialty: 8/19/2020 Roc Mares MD  Last physical: Visit date not found Last MTM visit: Visit date not found   Next visit within 3 mo: Visit date not found  Next physical within 3 mo: Visit date not found  Prescriber OR PCP: Roc Mares MD  Last diagnosis associated with med order: 1. Benign Essential Hypertension  - lisinopriL-hydrochlorothiazide (PRINZIDE,ZESTORETIC) 20-12.5 mg per tablet [Pharmacy Med Name: LISINOPRIL-HCTZ 20-12.5 MG TAB]; TAKE 2 TABLETS BY MOUTH EVERY DAY  Dispense: 180 tablet; Refill: 3    If protocol passes may refill for 12 months if within 3 months of last provider visit (or a total of 15 months).             Passed - Serum Potassium in past 12 months      Lab Results   Component Value Date    Potassium 3.6 08/19/2020             Passed - Serum Sodium in past 12 months      Lab Results   Component Value Date    Sodium 142 08/19/2020             Passed - Blood pressure on file in past 12 months     BP Readings from Last 1 Encounters:   08/19/20 132/78             Passed - Serum Creatinine in past 12 months      Creatinine   Date Value Ref Range Status   08/19/2020 1.06 0.60 - 1.10 mg/dL Final

## 2021-06-12 NOTE — TELEPHONE ENCOUNTER
Prescription set up and sent to PCP with  information. Please refuse the prescription set up in this encounter. Thank you  Dunia Foley, CMA

## 2021-06-12 NOTE — TELEPHONE ENCOUNTER
ANTICOAGULATION  MANAGEMENT    Assessment     Today's INR result of 2.8 is Therapeutic (goal INR of 2.0-3.0)        Warfarin taken as previously instructed    No new diet changes affecting INR    No new medication/supplements affecting INR    Continues to tolerate warfarin with no reported s/s of bleeding or thromboembolism     Previous INR was Therapeutic    Plan:     Spoke on phone with Miesha regarding INR result and instructed:     Warfarin Dosing Instructions:  Continue current warfarin dose    5 mg every Mon, Fri; 2.5 mg all other days        (0 % change)    Instructed patient to follow up no later than: 4-6 - appointment made    Education provided: importance of therapeutic range    Miesha verbalizes understanding and agrees to warfarin dosing plan.    Instructed to call the [facility] for any changes, questions or concerns. (#[phone])   ?   Keira Boyle RN    Subjective/Objective:      Miesha Quarles, a 89 y.o. female is on warfarin.     Miesha reports:     Home warfarin dose: as updated on anticoagulation calendar per template     Missed doses: No     Medication changes:  No     S/S of bleeding or thromboembolism:  No     New Injury or illness:  No     Changes in diet or alcohol consumption:  No     Upcoming surgery, procedure or cardioversion:  No    Anticoagulation Episode Summary     Current INR goal:  2.0-3.0   TTR:  82.9 % (1 y)   Next INR check:  12/21/2020   INR from last check:  2.80 (11/9/2020)   Weekly max warfarin dose:     Target end date:     INR check location:     Preferred lab:     Send INR reminders to:  [facility]    Indications    Atrial flutter (H) [I48.92]           Comments:           Anticoagulation Care Providers     Provider Role Specialty Phone number    Roc Mares MD Referring Internal Medicine [phone]

## 2021-06-12 NOTE — PROGRESS NOTES
1. Trying to reach Miesha Quarles, to NO AVAIL.   - CALLED 9/7, 9/8, 9/11.   - PHONE ONLY RINGS AND NO VM ([phone]).    2.  Will niece - Estefani Villar  ([phone])   - Mariluz will call her Aunt Miesha and have her call Dipti,  ([phone])   -  WAITING FOR CALL BACK from Miesha.

## 2021-06-13 NOTE — PROGRESS NOTES
"  Office Visit - Follow Up   Miesha Quarles   88 y.o. female    Date of Visit: 10/16/2017    Chief Complaint   Patient presents with     Follow-up     ER f/u - Rt shoulder pain, back pain f/u - pain is better        Assessment and Plan   1. Benign Essential Hypertension  Uncontrolled.  Increase Toprol to Toprol  daily.  1-1/2 tablet.    Description given.  Written instructions given.    2. Back pain  Resolved.    3. Headache  She believes this is due to her allergies.  I concur.  I do not think this is due to Coumadin.    4. Atrial flutter  Rate fair control.  The increase the metoprolol should help with rate control as well.        Return in about 1 week (around 10/23/2017) for Recheck.     History of Present Illness   This 88 y.o. old patient comes in today for ER follow-up.  She seen in the emergency room last week with some severe back pain.  He was in upper shoulder.  Extensive evaluation was done.  They did find gallstones but liver enzymes are normal.  Denies somatic concerns otherwise.  She reports she has been feeling well otherwise.  Pain is much better now.  She is a flutter and is now on Coumadin.  She does get some headaches and wonders if it could be due to that.    Review of Systems: A comprehensive review of systems was negative except as noted.     Medications, Allergies and Problem List   Reviewed and updated     Physical Exam   General Appearance:   Pleasant woman.  No rashes on her back.  She does not appear to be in any distress.  Heart rate is in the 70-90 range.  Blood pressure is high 160s over 90s per    /90  Pulse 72  Ht 5' 3\" (1.6 m)  Wt 178 lb (80.7 kg)  SpO2 98%  BMI 31.53 kg/m2         Additional Information   Current Outpatient Prescriptions   Medication Sig Dispense Refill     acetaminophen (TYLENOL) 650 MG CR tablet Take 650 mg by mouth daily.        ascorbic acid (VITAMIN C) 100 MG tablet Take 100 mg by mouth daily.       calcium carbonate-vitamin D3 (CALCIUM 600 " WITH VITAMIN D3) 600 mg(1,500mg) -200 unit per tablet Take 1 tablet by mouth daily.       cholecalciferol, vitamin D3, 1,000 unit capsule Take 3,000 Units by mouth daily.        cranberry 400 mg cap Take 1 capsule by mouth daily.       cyanocobalamin (VITAMIN B-12) 1000 MCG tablet Take 1,000 mcg by mouth daily.       furosemide (LASIX) 20 MG tablet Take 1 tablet (20 mg total) by mouth as needed. (Patient taking differently: Take 20 mg by mouth as needed (Taken on Mon/Wed/Fri). ) 90 tablet 1     levothyroxine (SYNTHROID, LEVOTHROID) 50 MCG tablet TAKE ONE TABLET BY MOUTH ONE TIME DAILY 90 tablet 1     lisinopril-hydrochlorothiazide (PRINZIDE,ZESTORETIC) 20-12.5 mg per tablet TAKE TWO TABLETS BY MOUTH DAILY (Patient taking differently: No sig reported) 180 tablet 1     loratadine (CLARITIN) 10 mg tablet Take 10 mg by mouth daily.       metoprolol succinate (TOPROL-XL) 100 MG 24 hr tablet Take 1.5 tablets (150 mg total) by mouth daily. 30 tablet 2     potassium chloride (KLOR-CON) 10 MEQ CR tablet Take 1 tablet (10 mEq total) by mouth daily. TAKE ONLY IF TAKING FUROSEMIDE 90 tablet 1     pyridoxine (VITAMIN B-6) 100 MG tablet Take 100 mg by mouth daily.       simvastatin (ZOCOR) 20 MG tablet TAKE ONE TABLET BY MOUTH ONE TIME DAILY IN THE EVENING (Patient taking differently: TAKE ONE TABLET-HALF BY MOUTH ONE TIME DAILY IN THE EVENING) 90 tablet 2     vitamin E 400 unit Tab Take by mouth.       warfarin (COUMADIN) 5 MG tablet Take 1 tablet (5 mg total) by mouth See Admin Instructions. Adjust dose based on INR results as directed. 30 tablet 11     No current facility-administered medications for this visit.      Allergies   Allergen Reactions     Amoxicillin Hives     Cephalexin Other (See Comments)     Reaction with intervenous administration.     Penicillins Hives     Latex Rash     Other Environmental Allergy Other (See Comments)     Pollen causes seasonal allergies.     Social History   Substance Use Topics      Smoking status: Former Smoker     Types: Cigarettes     Quit date: 1991     Smokeless tobacco: Never Used     Alcohol use Yes       Review and/or order of clinical lab tests:  Review and/or order of radiology tests:  Review and/or order of medicine tests:  Discussion of test results with performing physician:  Decision to obtain old records and/or obtain history from someone other than the patient:  Review and summarization of old records and/or obtaining history from someone other than the patient and.or discussion of case with another health care provider:  Independent visualization of image, tracing or specimen itself:    Time: total time spent with the patient was 25 minutes of which >50% was spent in counseling and coordination of care     Roc Mares MD

## 2021-06-13 NOTE — TELEPHONE ENCOUNTER
Prescription Monitoring Program activity reviewed with no discrepancies noted.      Last fill per : 08/25/20  Quantity/days supply: #20 for a 5 day supply    Controlled Substance Agreement on file: Yes  Date: 08/19/20    Last office visit with provider:  8/19/2020 Roc Mares MD    Please advise. Prescription set up in the encounter. Thank you.    Dunia Foley, CMA

## 2021-06-13 NOTE — TELEPHONE ENCOUNTER
Medication Question or Clarification  Who is calling: Patient   What medication are you calling about (include dose and sig)?: traMADoL (ULTRAM) 50 mg tablet. Sig - Route: Take 1 tablet (50 mg total) by mouth every 6 (six) hours as needed for pain.  Who prescribed the medication?: Roc Mares MD   What is your question/concern?: This medication was teed up incorrectly and sent to the wrong pharmacy. Patient is asking for this to be resent as soon as possible. Writer has removed the incorrect pharmacy from patient's list.   Requested Pharmacy: [facility] #[id number]  Okay to leave a detailed message?: No

## 2021-06-13 NOTE — PROGRESS NOTES
"  Office Visit - Follow Up   Miesha Quarles   88 y.o. female    Date of Visit: 10/26/2017    Chief Complaint   Patient presents with     Hypertension     bp & med recheck - reduced toprol back to 100 mg daily x 5 days now due to side effect (burning sensation in stomact region)        Assessment and Plan   1. Benign Essential Hypertension  Uncontrolled.  Add back the amlodipine but at 2.5 mg daily.  I will see her back in about 2-3 weeks and we will see how she is doing.    2. Hypercholesteremia  Renewed her simvastatin.  - simvastatin (ZOCOR) 20 MG tablet; TAKE ONE TABLET-HALF BY MOUTH ONE TIME DAILY IN THE EVENING  Dispense: 90 tablet; Refill: 2    3. Upset stomach  Resolved for now.  Let me know if recurs however.        Return in about 3 weeks (around 11/16/2017) for Recheck.     History of Present Illness   This 88 y.o. old patient comes in today for follow-up of her hypertension.  She had an increase of her metoprolol last visit to 150 mg.  She states that that made her stomach upset.  She noted burning in her stomach.  She actually called our office but never heard back.  She cut back the metoprolol back to the 100 mg in her symptoms went away.  She is feeling fine at this time.  No abdominal concerns.  Blood pressures been running high however.  She is to take amlodipine but we discontinued this due to some daytime somnolence and swelling in her ankles.  She did tolerate that however for years.  She remains on her other medications.    Review of Systems: A comprehensive review of systems was negative except as noted.     Medications, Allergies and Problem List   Reviewed and updated     Physical Exam   General Appearance:   Pleasant woman in no distress.  No ankle edema.  Heart is regular.  Blood pressures noted.    BP (!) 158/100 (Patient Site: Right Arm, Patient Position: Sitting, Cuff Size: Adult Regular)  Pulse 82  Ht 5' 3\" (1.6 m)  Wt 175 lb (79.4 kg)  SpO2 99%  BMI 31 kg/m2         Additional " Information   Current Outpatient Prescriptions   Medication Sig Dispense Refill     acetaminophen (TYLENOL) 650 MG CR tablet Take 650 mg by mouth daily.        ascorbic acid (VITAMIN C) 100 MG tablet Take 100 mg by mouth daily.       calcium carbonate-vitamin D3 (CALCIUM 600 WITH VITAMIN D3) 600 mg(1,500mg) -200 unit per tablet Take 1 tablet by mouth daily.       cholecalciferol, vitamin D3, 1,000 unit capsule Take 3,000 Units by mouth daily.        cranberry 400 mg cap Take 1 capsule by mouth daily.       cyanocobalamin (VITAMIN B-12) 1000 MCG tablet Take 1,000 mcg by mouth daily.       furosemide (LASIX) 20 MG tablet Take 1 tablet (20 mg total) by mouth as needed. (Patient taking differently: Take 20 mg by mouth as needed (Taken on Mon/Wed/Fri). ) 90 tablet 1     levothyroxine (SYNTHROID, LEVOTHROID) 50 MCG tablet TAKE ONE TABLET BY MOUTH ONE TIME DAILY 90 tablet 1     lisinopril-hydrochlorothiazide (PRINZIDE,ZESTORETIC) 20-12.5 mg per tablet TAKE TWO TABLETS BY MOUTH DAILY (Patient taking differently: No sig reported) 180 tablet 1     loratadine (CLARITIN) 10 mg tablet Take 10 mg by mouth daily.       metoprolol succinate (TOPROL-XL) 100 MG 24 hr tablet Take 1.5 tablets (150 mg total) by mouth daily. 30 tablet 2     potassium chloride (KLOR-CON) 10 MEQ CR tablet Take 1 tablet (10 mEq total) by mouth daily. TAKE ONLY IF TAKING FUROSEMIDE 90 tablet 1     pyridoxine (VITAMIN B-6) 100 MG tablet Take 100 mg by mouth daily.       vitamin E 400 unit Tab Take by mouth.       warfarin (COUMADIN) 5 MG tablet Take 1 tablet (5 mg total) by mouth See Admin Instructions. Adjust dose based on INR results as directed. 30 tablet 11     simvastatin (ZOCOR) 20 MG tablet TAKE ONE TABLET-HALF BY MOUTH ONE TIME DAILY IN THE EVENING 90 tablet 2     No current facility-administered medications for this visit.      Allergies   Allergen Reactions     Amoxicillin Hives     Cephalexin Other (See Comments)     Reaction with intervenous  administration.     Penicillins Hives     Latex Rash     Other Environmental Allergy Other (See Comments)     Pollen causes seasonal allergies.     Social History   Substance Use Topics     Smoking status: Former Smoker     Types: Cigarettes     Quit date: 1991     Smokeless tobacco: Never Used     Alcohol use Yes       Review and/or order of clinical lab tests:  Review and/or order of radiology tests:  Review and/or order of medicine tests:  Discussion of test results with performing physician:  Decision to obtain old records and/or obtain history from someone other than the patient:  Review and summarization of old records and/or obtaining history from someone other than the patient and.or discussion of case with another health care provider:  Independent visualization of image, tracing or specimen itself:    Time: total time spent with the patient was 15 minutes of which >50% was spent in counseling and coordination of care     Roc Mares MD

## 2021-06-13 NOTE — TELEPHONE ENCOUNTER
Spoke with the patient and relayed message below from Dr. Gibson.  She verbalized understanding and had no further questions at this time.  Janice MASCORRO, DELVIS/CMT....................12:53 PM

## 2021-06-14 NOTE — TELEPHONE ENCOUNTER
ANTICOAGULATION  MANAGEMENT PROGRAM    Miesha Quarles is overdue for INR check.     Spoke with Miesha and scheduled INR appointment on 1/12 @ [facility].      Reina Cruz RN

## 2021-06-14 NOTE — TELEPHONE ENCOUNTER
ANTICOAGULATION  MANAGEMENT PROGRAM    Miesha Quarles is overdue for INR check.     Was unable to reach patient and was unable to leave a voicemail. If patient calls, please schedule INR appointment as soon as possible.       Reina Cruz RN     Discussed dosing with ACC RN.  Has been increasing dose as goes further out from surgery, which is expected with new valve placement and healing.      Since INR has continued to drop with dose increase, and amiodarone stopped 06/26/2020, recommend planning dose increase 7.5mg 2x weekly,  ~14%, which is outside of protocol, and rechecking INR before second weekly dose of 7.5mg due to assess.      Sandra Hodge, PharmD BCACP  Anticoagulation Clinical Pharmacist

## 2021-06-14 NOTE — TELEPHONE ENCOUNTER
ANTICOAGULATION  MANAGEMENT    Assessment     Today's INR result of 2.60 is Therapeutic (goal INR of 2.0-3.0)        Warfarin taken as previously instructed    No new diet changes affecting INR    No new medication/supplements affecting INR    Continues to tolerate warfarin with no reported s/s of bleeding or thromboembolism     Previous INR was Therapeutic at 2.80 on 11/9/20.    Plan:     Spoke on phone with Miesha regarding INR result and instructed:     Warfarin Dosing Instructions:    - Continue current warfarin dose 5 mg daily on Mon/Fri; and 2.5 mg daily rest of week.    Instructed patient to follow up no later than:  6 wks.   - INR scheduled on 2/25/21 during OV with Dr. Mares.    Education provided: importance of consistent vitamin K intake and target INR goal and significance of current INR result    Miesha verbalizes understanding and agrees to warfarin dosing plan.    Instructed to call the [facility] for any changes, questions or concerns. (#[phone])   ?   Reina Cruz RN    Subjective/Objective:      Miesha Quarles, a 89 y.o. female is on warfarin.     Miesha reports:     Home warfarin dose: verbally confirmed home dose with Miesha and updated on anticoagulation calendar     Missed doses: No     Medication changes:  No     S/S of bleeding or thromboembolism:  No     New Injury or illness:  No     Changes in diet or alcohol consumption:  No     Upcoming surgery, procedure or cardioversion:  No    Anticoagulation Episode Summary     Current INR goal:  2.0-3.0   TTR:  85.7 % (1 y)   Next INR check:  2/23/2021   INR from last check:  2.60 (1/12/2021)   Weekly max warfarin dose:     Target end date:     INR check location:     Preferred lab:     Send INR reminders to:  [facility]    Indications    Atrial flutter (H) [I48.92]           Comments:           Anticoagulation Care Providers     Provider Role Specialty Phone number    Roc Mares MD Referring Internal Medicine  [phone]

## 2021-06-14 NOTE — PROGRESS NOTES
"  Office Visit - Follow Up   Miesha Quarles   88 y.o. female    Date of Visit: 11/16/2017    Chief Complaint   Patient presents with     Hypertension     med and bp recheck - not fasting         Assessment and Plan   1. Benign Essential Hypertension  Better control with current regimen.  Will continue same.  I will see her back for her regular follow-up couple months        Return in about 2 months (around 1/16/2018) for Recheck.     History of Present Illness   This 88 y.o. old Miesha comes today for follow-up of her hypertension.  I added back low-dose amlodipine at last visit.  She had had swelling with higher dose amlodipine such as added back 2.5 mg and she is tolerating without difficulty.  She feels well.  Offers no somatic concerns for me.  Spending Thanksgiving with her niece.    Review of Systems: A comprehensive review of systems was negative except as noted.     Medications, Allergies and Problem List   Reviewed and updated     Physical Exam   General Appearance:   Pleasant woman.  Blood pressure is 140/74.    /78  Pulse 88  Ht 5' 3\" (1.6 m)  Wt 175 lb (79.4 kg)  SpO2 98%  BMI 31 kg/m2         Additional Information   Current Outpatient Prescriptions   Medication Sig Dispense Refill     acetaminophen (TYLENOL) 650 MG CR tablet Take 650 mg by mouth daily.        ascorbic acid (VITAMIN C) 100 MG tablet Take 100 mg by mouth daily.       calcium carbonate-vitamin D3 (CALCIUM 600 WITH VITAMIN D3) 600 mg(1,500mg) -200 unit per tablet Take 1 tablet by mouth daily.       cholecalciferol, vitamin D3, 1,000 unit capsule Take 3,000 Units by mouth daily.        cranberry 400 mg cap Take 1 capsule by mouth daily.       cyanocobalamin (VITAMIN B-12) 1000 MCG tablet Take 1,000 mcg by mouth daily.       furosemide (LASIX) 20 MG tablet Take 1 tablet (20 mg total) by mouth as needed. (Patient taking differently: Take 20 mg by mouth as needed (Taken on Mon/Wed/Fri). ) 90 tablet 1     levothyroxine " (SYNTHROID, LEVOTHROID) 50 MCG tablet TAKE ONE TABLET BY MOUTH ONE TIME DAILY 90 tablet 1     lisinopril-hydrochlorothiazide (PRINZIDE,ZESTORETIC) 20-12.5 mg per tablet TAKE TWO TABLETS BY MOUTH DAILY (Patient taking differently: No sig reported) 180 tablet 1     loratadine (CLARITIN) 10 mg tablet Take 10 mg by mouth daily.       metoprolol succinate (TOPROL-XL) 100 MG 24 hr tablet Take 1.5 tablets (150 mg total) by mouth daily. (Patient taking differently: Take 100 mg by mouth daily. ) 30 tablet 2     potassium chloride (KLOR-CON) 10 MEQ CR tablet Take 1 tablet (10 mEq total) by mouth daily. TAKE ONLY IF TAKING FUROSEMIDE 90 tablet 1     pyridoxine (VITAMIN B-6) 100 MG tablet Take 100 mg by mouth daily.       simvastatin (ZOCOR) 20 MG tablet Take one-half tablet daily in the evening. 90 tablet 2     vitamin E 400 unit Tab Take by mouth.       warfarin (COUMADIN) 5 MG tablet Take 1 tablet (5 mg total) by mouth See Admin Instructions. Adjust dose based on INR results as directed. 30 tablet 11     amLODIPine (NORVASC) 2.5 MG tablet Take 1 tablet (2.5 mg total) by mouth daily. 30 tablet 11     No current facility-administered medications for this visit.      Allergies   Allergen Reactions     Amoxicillin Hives     Cephalexin Other (See Comments)     Reaction with intervenous administration.     Penicillins Hives     Latex Rash     Other Environmental Allergy Other (See Comments)     Pollen causes seasonal allergies.     Social History   Substance Use Topics     Smoking status: Former Smoker     Types: Cigarettes     Quit date: 1991     Smokeless tobacco: Never Used     Alcohol use Yes       Review and/or order of clinical lab tests:  Review and/or order of radiology tests:  Review and/or order of medicine tests:  Discussion of test results with performing physician:  Decision to obtain old records and/or obtain history from someone other than the patient:  Review and summarization of old records and/or obtaining  history from someone other than the patient and.or discussion of case with another health care provider:  Independent visualization of image, tracing or specimen itself:    Time: total time spent with the patient was 15 minutes of which >50% was spent in counseling and coordination of care     Roc Mares MD

## 2021-06-15 NOTE — PROGRESS NOTES
Assessment and Plan:   1. Encounter for Medicare annual wellness exam  She will be getting her Covid vaccine tomorrow.  Pneumococcal vaccine is due but we will wait until she is done with her Covid series for that.  Stay as active as she can.  I will see her back in 3 months.  She does not need a colonoscopy at this point.  She is relieved to hear.    2. Type 2 diabetes mellitus with complication (H)  Labs for monitoring.  Referred to Dr. Festus Mcdonald for routine foot care and to take care of her nails and corns and calluses.  - Comprehensive Metabolic Panel  - Lipid Cascade FASTING  - Glycosylated Hemoglobin A1c  - Urinalysis-UC if Indicated    3. Vitamin D deficiency    - Vitamin D, Total (25-Hydroxy)    4. Hypothyroidism, unspecified type  Seems euthyroid.  Check TSH.  - Thyroid Cascade    5. Hypercholesterolemia  Lipids today for monitoring.    6. Stage 3a chronic kidney disease  Labs for monitoring.  - HM2(CBC w/o Differential)    7. Atrial flutter, unspecified type (H)  Due for an INR.  - HM2(CBC w/o Differential)    8. Benign Essential Hypertension  Good control.    9. Typical atrial flutter (H)    - INR    10. Long term (current) use of anticoagulants    - INR    11.. DJD knees: Referred back to [facility] for steroid injection.    Patient has been advised of split billing requirements and indicates understanding: Yes      The patient's current medical problems were reviewed.      The following health maintenance schedule was reviewed with the patient and provided in printed form in the after visit summary:   Health Maintenance   Topic Date Due     COVID-19 Vaccine (1 of 2) 12/26/1944     ZOSTER VACCINES (2 of 2) 05/26/2015     DIABETIC FOOT EXAM  10/29/2020     DIABETIC EYE EXAM  06/22/2021     BMP  08/19/2021     LIPID  08/19/2021     A1C  08/25/2021     MEDICARE ANNUAL WELLNESS VISIT  02/25/2022     FALL RISK ASSESSMENT  02/25/2022     ADVANCE CARE PLANNING  02/27/2022     TD 18+ HE  06/18/2024      Pneumococcal Vaccine: Pediatrics (0 to 5 Years) and At-Risk Patients (6 to 64 Years)  Completed     Pneumococcal Vaccine: 65+ Years  Completed     INFLUENZA VACCINE RULE BASED  Completed     MICROALBUMIN  Discontinued        Subjective:   Chief Complaint: Miesha Quarles is an 89 y.o. female here for an Annual Wellness visit.   HPI: Miesha comes in today for follow-up.  She is here for annual wellness as well.  She has diabetes.  Sugars have been well controlled.  She has been doing okay.  She had called for preop before colonoscopy however she is 89 and this would be a screening colonoscopy and I do not recommend she proceed with that.  In fact her boyfriend who is in his late 90s recently had a colonoscopy in the punctured his colon and now he has a colostomy as well as is now on dialysis and had a stroke it sounds like.  Difficult situation.  She seems to be tolerating and dealing with this okay.  She is getting her Covid vaccine tomorrow.  She needs her routine foot care.  Wonders where she can go for that.    Her knees are bothering her.  She has end-stage DJD.  She gets intermittent steroid injections.    Review of Systems:    Please see above.  The rest of the review of systems are negative for all systems.    Patient Care Team:  Roc Mares MD as PCP - General  Roc Mares MD as Assigned PCP     Patient Active Problem List   Diagnosis     Osteopenia     Plantar Fasciitis     Muscle Aches, Generalized (Myalgias)     Vitamin D Deficiency     Osteoarthritis Of The Knee     Hypothyroidism     Type 2 diabetes mellitus with complication (H)     Hypercholesterolemia     Benign Essential Hypertension     Edema     Hyperproteinemia     Atrial flutter (H)     CKD (chronic kidney disease) stage 3, GFR 30-59 ml/min     Central retinal vein occlusion     Past Medical History:   Diagnosis Date     Benign Essential Hypertension     Created by Conversion      Hypercholesterolemia     Created by Conversion       Hypothyroidism     Created by Conversion      Osteoarthritis Of The Knee     Created by Conversion      Osteopenia     Created by Conversion      Type 2 Diabetes Mellitus     Created by Conversion      Vitamin D Deficiency     Created by Conversion       Past Surgical History:   Procedure Laterality Date     TOTAL ABDOMINAL HYSTERECTOMY W/ BILATERAL SALPINGOOPHORECTOMY        Family History   Problem Relation Age of Onset     Coronary artery disease Brother       Social History     Socioeconomic History     Marital status:      Spouse name: Not on file     Number of children: Not on file     Years of education: Not on file     Highest education level: Not on file   Occupational History     Not on file   Social Needs     Financial resource strain: Not on file     Food insecurity     Worry: Not on file     Inability: Not on file     Transportation needs     Medical: Not on file     Non-medical: Not on file   Tobacco Use     Smoking status: Former Smoker     Types: Cigarettes     Quit date:      Years since quittin.1     Smokeless tobacco: Never Used   Substance and Sexual Activity     Alcohol use: Yes     Drug use: Not on file     Sexual activity: Not on file   Lifestyle     Physical activity     Days per week: Not on file     Minutes per session: Not on file     Stress: Not on file   Relationships     Social connections     Talks on phone: Not on file     Gets together: Not on file     Attends Jew service: Not on file     Active member of club or organization: Not on file     Attends meetings of clubs or organizations: Not on file     Relationship status: Not on file     Intimate partner violence     Fear of current or ex partner: Not on file     Emotionally abused: Not on file     Physically abused: Not on file     Forced sexual activity: Not on file   Other Topics Concern     Not on file   Social History Narrative     Not on file      Current Outpatient Medications   Medication Sig Dispense  Refill     acetaminophen (TYLENOL) 650 MG CR tablet Take 650 mg by mouth as needed.        amLODIPine (NORVASC) 2.5 MG tablet Take 1 tablet (2.5 mg total) by mouth daily. 90 tablet 3     ascorbic acid (VITAMIN C) 100 MG tablet Take 100 mg by mouth daily.       calcium carbonate-vitamin D3 (CALCIUM 600 WITH VITAMIN D3) 600 mg(1,500mg) -200 unit per tablet Take 1 tablet by mouth daily.       cholecalciferol, vitamin D3, 1,000 unit capsule Take 3,000 Units by mouth daily.        cyanocobalamin (VITAMIN B-12) 1000 MCG tablet Take 1,000 mcg by mouth daily.       furosemide (LASIX) 20 MG tablet TAKE 1 TABLET BY MOUTH AS NEEDED. 90 tablet 1     KLOR-CON 10 10 mEq CR tablet TAKE 1 TABLET BY MOUTH DAILY. TAKE ONLY IF TAKING FUROSEMIDE 90 tablet 0     levothyroxine (SYNTHROID, LEVOTHROID) 50 MCG tablet Take 1 tablet (50 mcg total) by mouth daily. 90 tablet 2     lisinopriL-hydrochlorothiazide (PRINZIDE,ZESTORETIC) 20-12.5 mg per tablet TAKE 2 TABLETS BY MOUTH EVERY  tablet 2     loratadine (CLARITIN) 10 mg tablet Take 10 mg by mouth as needed.        metoprolol succinate (TOPROL-XL) 100 MG 24 hr tablet Take 1 tablet (100 mg total) by mouth daily. 90 tablet 3     pyridoxine (VITAMIN B-6) 100 MG tablet Take 100 mg by mouth daily.       simvastatin (ZOCOR) 20 MG tablet TAKE 1/2 TABLET BY MOUTH DAILY IN THE EVENING. 45 tablet 3     traMADoL (ULTRAM) 50 mg tablet Take 1 tablet (50 mg total) by mouth every 6 (six) hours as needed for pain. 20 tablet 0     vitamin E 400 unit Tab Take by mouth.       warfarin ANTICOAGULANT (COUMADIN/JANTOVEN) 5 MG tablet Takes 1/2 tablet (2.5mg) daily and Takes 1 tablet (5mg) on Mondays / Fridays by mouth as directed.  Adjust dose based on INR results. 80 tablet 1     No current facility-administered medications for this visit.       Objective:   Vital Signs:   Visit Vitals  /78 (Patient Site: Left Arm, Patient Position: Sitting, Cuff Size: Adult Regular)   Pulse 68   Temp 98.5  F (36.9  " C)   Ht 5' 2\" (1.575 m)   SpO2 95%   BMI 31.66 kg/m           VisionScreening:  No exam data present     PHYSICAL EXAM  Delightful woman.  No distress.  She is wearing her mask.  Lungs are clear.  Heart is regular.  Abdomen is obese soft nontender.  Extremities reveal multiple corns and long thick nails on her feet bilaterally.  She has 1+ peripheral pulses and normal monofilament testing.    Assessment Results 2/25/2021   Activities of Daily Living No help needed   Instrumental Activities of Daily Living No help needed   Get Up and Go Score Less than 12 seconds   Mini Cog Total Score 5   Some recent data might be hidden     A Mini-Cog score of 0-2 suggests the possibility of dementia, score of 3-5 suggests no dementia    Identified Health Risks:     She is at risk for lack of exercise and has been provided with information to increase physical activity for the benefit of her well-being.  Information on urinary incontinence and treatment options given to patient.  Patient's advanced directive was discussed and I am comfortable with the patient's wishes.        "

## 2021-06-15 NOTE — TELEPHONE ENCOUNTER
Patient had a colonoscopy in 11/19/10 with [facility].  Original ordered by Dr. Mares.  This is a routine 10 year follow up.    Do you recommend patient have procedure?

## 2021-06-15 NOTE — TELEPHONE ENCOUNTER
Reason for Call:  Medication or medication refill:    Do you use a [facility]?  Name of the pharmacy and phone number for the current request:   [facility] - PHONE:  [phone]    Name of the medication requested:   TRAMADOL    Other request: N/A    Can we leave a detailed message on this number? No    Phone number patient can be reached at: Home number on file [phone] (home)    Best Time: ANYTIME    Call taken on 3/3/2021 at 11:03 AM by Mar Rebollar

## 2021-06-15 NOTE — TELEPHONE ENCOUNTER
ANTICOAGULATION  MANAGEMENT PROGRAM    Miesha Quarles is overdue for INR check.     Spoke with Miesha and scheduled INR appointment on 3/16.      Reina Cruz RN

## 2021-06-15 NOTE — TELEPHONE ENCOUNTER
Attempted to reach patient but there was no answer and no voice mail.   At her age, Dr. Mares recommends she not have a colonoscopy.      Spoke to Miesha's niece, Casie and advised her of Dr. Mares's message.  She will relay the message to Miesha.    SYLVESTER was left a message that patient is not to have colonoscopy.  At her age, she does not need this.

## 2021-06-15 NOTE — TELEPHONE ENCOUNTER
Reason for Call: Request for an order or referral:    Order or referral being requested: verbal orders to hold warfarin 4 days prior to colonoscopy    Date needed: as soon as possible    Has the patient been seen by the PCP for this problem? YES    Additional comments: Colonoscopy March 10th    Phone number Patient can be reached at:  Other phone number:  [phone]    Best Time:  anytime    Can we leave a detailed message on this number?  Yes    Call taken on 2/11/2021 at 1:10 PM by Ronnie Jarquin

## 2021-06-16 PROBLEM — I48.92 ATRIAL FLUTTER (H): Status: ACTIVE | Noted: 2017-09-08

## 2021-06-16 PROBLEM — H34.8192 CENTRAL RETINAL VEIN OCCLUSION (H): Status: ACTIVE | Noted: 2019-10-29

## 2021-06-16 NOTE — TELEPHONE ENCOUNTER
Telephone Encounter by Reina Cruz RN at 3/9/2020  1:43 PM     Author: Reina Cruz RN Service: -- Author Type: Registered Nurse    Filed: 3/9/2020  3:41 PM Encounter Date: 3/9/2020 Status: Attested    : Reina Cruz RN (Registered Nurse) Cosigner: Roc Mares MD at 3/9/2020  4:12 PM    Attestation signed by Roc Mares MD at 3/9/2020  4:12 PM    agree                ANTICOAGULATION  MANAGEMENT    Assessment     Today's INR result of 1.90 is Subtherapeutic (goal INR of 2.0-3.0)        Warfarin taken as previously instructed    Increased greens/vitamin K intake may be affecting INR    No new medication/supplements affecting INR    Continues to tolerate warfarin with no reported s/s of bleeding or thromboembolism     Previous INR was Therapeutic at 2.70 on 1/28/20.    Plan:     Spoke with Miesha regarding INR result and instructed:    1.  Advised to stay consistent with her increased intake of salads / vegetables.  (she has both each meals)    Warfarin Dosing Instructions:   (has 5mg tabs)   - Change warfarin dose to 5 mg daily on Mon/Thurs; and 2.5 mg daily rest of week.   - (12.5 % change)    Instructed patient to follow up no later than:  1-2 wks.    Education provided: importance of consistent vitamin K intake, impact of vitamin K foods on INR, target INR goal and significance of current INR result and importance of notifying clinic for changes in medications    Miesha verbalizes understanding and agrees to warfarin dosing plan.    Instructed to call the [facility] for any changes, questions or concerns. (#[phone])   ?   Reina Cruz RN    Subjective/Objective:      Miesha Quarles, a 89 y.o. female is on warfarin.     Miesha reports:     Home warfarin dose: verbally confirmed home dose with Miesha and updated on anticoagulation calendar     Missed doses: No     Medication changes:  No     S/S of bleeding or thromboembolism:  No     New Injury or illness:   No     Changes in diet or alcohol consumption:  Yes: reported an increase intake with her salads and vegetables with her daily meals.     Upcoming surgery, procedure or cardioversion:  No    Anticoagulation Episode Summary     Current INR goal:   2.0-3.0   TTR:   72.5 % (1 y)   Next INR check:   3/23/2020   INR from last check:   1.90! (3/9/2020)   Weekly max warfarin dose:      Target end date:      INR check location:      Preferred lab:      Send INR reminders to:   [facility]    Indications    Atrial flutter (H) [I48.92]           Comments:            Anticoagulation Care Providers     Provider Role Specialty Phone number    Roc Mares MD Referring Internal Medicine [phone]

## 2021-06-16 NOTE — TELEPHONE ENCOUNTER
ANTICOAGULATION  MANAGEMENT PROGRAM    Miesha Quarles is overdue for INR check.     Spoke with Miesha and scheduled INR appointment on 415 @ [facility]..    - taking Osteo Bi-Flex can interact with warfarin.   - depending on weather condition, she could cancel.  (had a fall on 3/31).      Reina Cruz RN

## 2021-06-16 NOTE — TELEPHONE ENCOUNTER
ANTICOAGULATION  MANAGEMENT    Assessment     Today's INR result of 2.30 is Therapeutic (goal INR of 2.0-3.0)        Warfarin taken differently than instructed, but no impact to total weekly dose    No new diet changes affecting INR    No new medication/supplements affecting INR     not taking Tramadol anymore r/t from a fall   - continues with Osteo Bi flex.    Continues to tolerate warfarin with no reported s/s of bleeding or thromboembolism     Previous INR was Supratherapeutic at 3.70 on 3/22/21.    Plan:     Spoke on phone with Miesha regarding INR result and instructed:      Warfarin Dosing Instructions:    - Continue current warfarin dose 5 mg daily on Fridays; and 2.5 mg daily rest of week.    Instructed patient to follow up no later than:  2 wks.    Education provided: importance of consistent vitamin K intake and target INR goal and significance of current INR result    Miesha verbalizes understanding and agrees to warfarin dosing plan.    Instructed to call the [facility] for any changes, questions or concerns. (#[phone])   ?   Reina Cruz RN    Subjective/Objective:      Miesha Quarles, a 89 y.o. female is on warfarin. Miesha Whiteside reports:     Home warfarin dose: template incorrect; verbally confirmed home dose with Miesha and updated on anticoagulation calendar     Missed doses: No     Medication changes:  No     S/S of bleeding or thromboembolism:  No     New Injury or illness:  No     Changes in diet or alcohol consumption:  No     Upcoming surgery, procedure or cardioversion:  No    Anticoagulation Episode Summary     Current INR goal:  2.0-3.0   TTR:  75.4 % (1 y)   Next INR check:  5/3/2021   INR from last check:  2.30 (4/19/2021)   Weekly max warfarin dose:     Target end date:     INR check location:     Preferred lab:     Send INR reminders to:  [facility]    Indications    Atrial flutter (H) [I48.92]           Comments:           Anticoagulation Care Providers      Provider Role Specialty Phone number    Roc Mares MD Referring Internal Medicine [phone]

## 2021-06-16 NOTE — TELEPHONE ENCOUNTER
Refill Approved    Rx renewed per Medication Renewal Policy. Medication was last renewed on 8/2/20.    Luis Ragsdale, [facility] Triage/Med Refill 4/16/2021     Requested Prescriptions   Pending Prescriptions Disp Refills     levothyroxine (SYNTHROID, LEVOTHROID) 50 MCG tablet [Pharmacy Med Name: LEVOTHYROXINE SODIUM 50 MCG Tablet] 90 tablet 2     Sig: TAKE 1 TABLET EVERY DAY       Thyroid Hormones Protocol Passed - 4/14/2021  1:45 PM        Passed - Provider visit in past 12 months or next 3 months     Last office visit with prescriber/PCP: 3/31/2021 Roc Mares MD OR same dept: 3/31/2021 Roc Mares MD OR same specialty: 3/31/2021 Roc Mares MD  Last physical: 2/25/2021 Last MTM visit: Visit date not found   Next visit within 3 mo: Visit date not found  Next physical within 3 mo: Visit date not found  Prescriber OR PCP: Roc Mares MD  Last diagnosis associated with med order: 1. Hypothyroid  - levothyroxine (SYNTHROID, LEVOTHROID) 50 MCG tablet [Pharmacy Med Name: LEVOTHYROXINE SODIUM 50 MCG Tablet]; TAKE 1 TABLET EVERY DAY  Dispense: 90 tablet; Refill: 2    If protocol passes may refill for 12 months if within 3 months of last provider visit (or a total of 15 months).             Passed - TSH on file in past 12 months for patient age 12 & older     TSH   Date Value Ref Range Status   02/25/2021 3.99 0.30 - 5.00 uIU/mL Final

## 2021-06-16 NOTE — TELEPHONE ENCOUNTER
Refill Approved    Rx renewed per Medication Renewal Policy. Medication was last renewed on 12/09/2020.  Changing pharmacy.     aDyanna Frances, [facility] Triage/Med Refill 4/14/2021     Requested Prescriptions   Pending Prescriptions Disp Refills     metoprolol succinate (TOPROL-XL) 100 MG 24 hr tablet 90 tablet 3     Sig: Take 1 tablet (100 mg total) by mouth daily.       Beta-Blockers Refill Protocol Passed - 4/12/2021 12:59 PM        Passed - PCP or prescribing provider visit in past 12 months or next 3 months     Last office visit with prescriber/PCP: 3/31/2021 Roc Mares MD OR same dept: Visit date not found OR same specialty: Visit date not found  Last physical: 2/25/2021 Last MTM visit: Visit date not found   Next visit within 3 mo: Visit date not found  Next physical within 3 mo: Visit date not found  Prescriber OR PCP: Roc Mares MD  Last diagnosis associated with med order: 1. Benign Essential Hypertension  - metoprolol succinate (TOPROL-XL) 100 MG 24 hr tablet; Take 1 tablet (100 mg total) by mouth daily.  Dispense: 90 tablet; Refill: 3    If protocol passes may refill for 12 months if within 3 months of last provider visit (or a total of 15 months).             Passed - Blood pressure filed in past 12 months     BP Readings from Last 1 Encounters:   03/31/21 124/78

## 2021-06-16 NOTE — PROGRESS NOTES
Office Visit - Follow Up   Miesha Quarles   89 y.o. female    Date of Visit: 3/31/2021    Chief Complaint   Patient presents with     Fall     fell on 3/27/21 (was seen at [facility] 3/29/21)        Assessment and Plan   1. Fall, subsequent encounter  I urged patient to continue to stay at her niece's until she is off pain medications.    2. At risk for falls  As above.    3. Effusion of right knee  Her pain is made worse by her large knee effusion.  Cannot rule out hemarthrosis given her anticoagulation.  I have recommended urgent orthopedic evaluation and I have urged her to go to [facility] directly from here.    4. Acute pain of right knee  As above.    5. High risk medication use  I discussed with Miesha that she cannot go home while she is still on the oxycodone.        No follow-ups on file.     History of Present Illness   This 89 y.o. old Miesha comes in today accompanied by her niece.  Miesha was visiting [employer] grocery store in [place] on Saturday.  It was raining and wet.  She tripped over some sort of obstacle and fell onto her right knee.  Since that pain she has had ongoing pain and swelling.  She is getting around with a walker.  She went to the emergency room on Monday because pain was so severe.  They ruled out fracture.  They gave her some oxycodone.  They told her to see orthopedics which is scheduled for next week.  She is currently staying with her niece.  She gets slightly confused and off balance with the oxycodone.  Cornel does not think she should be going home alone as a result.  She was reaching for and remote to the other day and took another fall but did not injure her knee.  Did not hit her head.  No injury without fall.  She denies other new concerns.    Review of Systems: A comprehensive review of systems was negative except as noted.     Medications, Allergies and Problem List   Reviewed, reconciled and updated  Post Discharge Medication Reconciliation Status:   "    Physical Exam   General Appearance:   Pleasant woman.  She has a large knee effusion on the right.  No erythema or warmth.  She also has a 2 cm superficial abrasion below the knee.    /78 (Patient Site: Left Arm, Patient Position: Sitting, Cuff Size: Adult Regular)   Pulse 72   Ht 5' 2\" (1.575 m)   SpO2 96%   BMI 31.66 kg/m           Additional Information   Current Outpatient Medications   Medication Sig Dispense Refill     acetaminophen (TYLENOL) 650 MG CR tablet Take 650 mg by mouth as needed.        amLODIPine (NORVASC) 2.5 MG tablet Take 1 tablet (2.5 mg total) by mouth daily. 90 tablet 3     ascorbic acid (VITAMIN C) 100 MG tablet Take 100 mg by mouth daily.       calcium carbonate-vitamin D3 (CALCIUM 600 WITH VITAMIN D3) 600 mg(1,500mg) -200 unit per tablet Take 1 tablet by mouth daily.       cholecalciferol, vitamin D3, 1,000 unit capsule Take 3,000 Units by mouth daily.        cyanocobalamin (VITAMIN B-12) 1000 MCG tablet Take 1,000 mcg by mouth daily.       furosemide (LASIX) 20 MG tablet TAKE 1 TABLET BY MOUTH AS NEEDED. 90 tablet 1     KLOR-CON 10 10 mEq CR tablet TAKE 1 TABLET BY MOUTH DAILY. TAKE ONLY IF TAKING FUROSEMIDE 90 tablet 0     levothyroxine (SYNTHROID, LEVOTHROID) 50 MCG tablet Take 1 tablet (50 mcg total) by mouth daily. 90 tablet 2     lisinopriL-hydrochlorothiazide (PRINZIDE,ZESTORETIC) 20-12.5 mg per tablet TAKE 2 TABLETS BY MOUTH EVERY  tablet 2     loratadine (CLARITIN) 10 mg tablet Take 10 mg by mouth as needed.        metoprolol succinate (TOPROL-XL) 100 MG 24 hr tablet Take 1 tablet (100 mg total) by mouth daily. 90 tablet 3     pyridoxine (VITAMIN B-6) 100 MG tablet Take 100 mg by mouth daily.       simvastatin (ZOCOR) 20 MG tablet TAKE 1/2 TABLET BY MOUTH DAILY IN THE EVENING. 45 tablet 3     traMADoL (ULTRAM) 50 mg tablet Take 1 tablet (50 mg total) by mouth every 6 (six) hours as needed for pain. 20 tablet 0     vitamin E 400 unit Tab Take by mouth.       " warfarin ANTICOAGULANT (COUMADIN/JANTOVEN) 5 MG tablet Takes 1/2 tablet (2.5mg) daily and Takes 1 tablet (5mg) on  /  by mouth as directed.  Adjust dose based on INR results. 80 tablet 2     oxyCODONE (ROXICODONE) 5 MG immediate release tablet Take 1 tablet by mouth every 6 (six) hours as needed.       No current facility-administered medications for this visit.      Allergies   Allergen Reactions     Amoxicillin Hives     Cephalexin Other (See Comments)     Reaction with intervenous administration.     Penicillins Hives     Latex Rash     Other Environmental Allergy Other (See Comments)     Pollen causes seasonal allergies.     Social History     Tobacco Use     Smoking status: Former Smoker     Types: Cigarettes     Quit date:      Years since quittin.2     Smokeless tobacco: Never Used   Substance Use Topics     Alcohol use: Yes     Drug use: Not on file       Review and/or order of clinical lab tests:  Review and/or order of radiology tests:  Review and/or order of medicine tests:  Discussion of test results with performing physician:  Decision to obtain old records and/or obtain history from someone other than the patient:  Review and summarization of old records and/or obtaining history from someone other than the patient and.or discussion of case with another health care provider:  Independent visualization of image, tracing or specimen itself:    Time: not applicable     Roc Mares MD

## 2021-06-16 NOTE — TELEPHONE ENCOUNTER
Refill Request  Did you contact pharmacy: Yes  Medication name:   Metoprolol  Who prescribed the medication: PCP  Requested Pharmacy: [facility]  Is patient out of medication: Yes  Patient notified refills processed in 3 business days:  yes  Okay to leave a detailed message: yes         Per patient she called pharm and they told her to call clinic for a refill.  She is completely out, will MD ok refill?  States normally uses mail order but she is out and cant wait.  Please let pt know status

## 2021-06-16 NOTE — PROGRESS NOTES
Office Visit - Follow Up   Miesha Quarles   89 y.o. female    Date of Visit: 3/13/2018    Chief Complaint   Patient presents with     Diabetes     3 mo f/u - fasting today      Gait Problem     increased problem w/ balance - no recent falls        Assessment and Plan   1. Benign Essential Hypertension  Sub-adequate control today however she has not been taking her medications prescribed.  Resume the 2.5 mg of the amlodipine.  She does have quite a bit of dizziness when she first stands up in the morning especially so I want to avoid overcorrection and falls.  - Comprehensive Metabolic Panel    2. Hypercholesterolemia  Labs today for monitoring.  - Lipid Cascade    3. Hypothyroidism, unspecified type  She seems euthyroid.  Continue same regimen.  - Thyroid Stimulating Hormone (TSH)    4. Unsteady gait  Refer to physical therapy for gait analysis and strengthening.  She does pretty good for her age however.  - Ambulatory referral to PT/OT    5. Typical atrial flutter  INR today for monitoring.  Rate controlled  - INR        Return in about 3 months (around 6/13/2018) for Recheck.     History of Present Illness   This 89 y.o. old patient comes in today for follow-up.  She reports her gait is not what it used to be.  She has to turn around slowly because she is afraid that she could fall.  No falls.  She uses a cane.  Some friends do tricia chi or therapy and she wonders if she should try that.  She has not been taking her blood pressure Krason medications as prescribed.  She has been taking half of a 2.5 amlodipine thinking that she had the 5 mg tablets.  She has been feeling well otherwise.  Her granddaughter and 3 of her grand sons are living with her at the time.  She is hoping that is going to and soon.  She has been eating well feeling well and denies other somatic concerns for me except for the gait issue.    Review of Systems: A comprehensive review of systems was negative except as noted.     Medications,  "Allergies and Problem List   Reviewed and updated     Physical Exam   General Appearance:   Delightful woman.  She actually walks fairly steadily with a cane.  Even heel toe walk is pretty steady for a nearly 89-year-old.    /88  Pulse 84  Ht 5' 3\" (1.6 m)  Wt 179 lb (81.2 kg)  SpO2 97%  BMI 31.71 kg/m2         Additional Information   Current Outpatient Prescriptions   Medication Sig Dispense Refill     acetaminophen (TYLENOL) 650 MG CR tablet Take 650 mg by mouth as needed.        amLODIPine (NORVASC) 2.5 MG tablet Take 1 tablet (2.5 mg total) by mouth daily. (Patient taking differently: Take 1.25 mg by mouth daily. ) 30 tablet 11     ascorbic acid (VITAMIN C) 100 MG tablet Take 100 mg by mouth daily.       calcium carbonate-vitamin D3 (CALCIUM 600 WITH VITAMIN D3) 600 mg(1,500mg) -200 unit per tablet Take 1 tablet by mouth daily.       cholecalciferol, vitamin D3, 1,000 unit capsule Take 3,000 Units by mouth daily.        cranberry 400 mg cap Take 1 capsule by mouth daily.       cyanocobalamin (VITAMIN B-12) 1000 MCG tablet Take 1,000 mcg by mouth daily.       furosemide (LASIX) 20 MG tablet TAKE 1 TABLET BY MOUTH AS NEEDED. 90 tablet 1     KLOR-CON 10 10 mEq CR tablet TAKE 1 TABLET BY MOUTH DAILY. TAKE ONLY IF TAKING FUROSEMIDE 90 tablet 0     levothyroxine (SYNTHROID, LEVOTHROID) 50 MCG tablet TAKE ONE TABLET BY MOUTH ONCE DAILY 90 tablet 1     lisinopril-hydrochlorothiazide (PRINZIDE,ZESTORETIC) 20-12.5 mg per tablet TAKE TWO TABLETS BY MOUTH DAILY 180 tablet 1     loratadine (CLARITIN) 10 mg tablet Take 10 mg by mouth daily.       metoprolol succinate (TOPROL-XL) 100 MG 24 hr tablet TAKE 1 TABLET (100 MG TOTAL) BY MOUTH DAILY. 90 tablet 1     pyridoxine (VITAMIN B-6) 100 MG tablet Take 100 mg by mouth daily.       simvastatin (ZOCOR) 20 MG tablet Take one-half tablet daily in the evening. 90 tablet 2     vitamin E 400 unit Tab Take by mouth.       warfarin (COUMADIN) 5 MG tablet Take 1 tablet (5 mg " total) by mouth See Admin Instructions. Adjust dose based on INR results as directed. 30 tablet 11     No current facility-administered medications for this visit.      Allergies   Allergen Reactions     Amoxicillin Hives     Cephalexin Other (See Comments)     Reaction with intervenous administration.     Penicillins Hives     Latex Rash     Other Environmental Allergy Other (See Comments)     Pollen causes seasonal allergies.     Social History   Substance Use Topics     Smoking status: Former Smoker     Types: Cigarettes     Quit date: 1991     Smokeless tobacco: Never Used     Alcohol use Yes       Review and/or order of clinical lab tests:  Review and/or order of radiology tests:  Review and/or order of medicine tests:  Discussion of test results with performing physician:  Decision to obtain old records and/or obtain history from someone other than the patient:  Review and summarization of old records and/or obtaining history from someone other than the patient and.or discussion of case with another health care provider:  Independent visualization of image, tracing or specimen itself:    Time: total time spent with the patient was 25 minutes of which >50% was spent in counseling and coordination of care     Roc Mares MD

## 2021-06-17 NOTE — TELEPHONE ENCOUNTER
Telephone Encounter by Cheyenne Yun LPN at 3/3/2021 11:37 AM     Author: Cheyenne Yun LPN Service: -- Author Type: Licensed Nurse    Filed: 3/3/2021 11:44 AM Encounter Date: 3/3/2021 Status: Signed    : Cheyenne Yun LPN (Licensed Nurse)       Medication: Tramadol  Last Date Filled 11/116/20   pulled: YES         Only PCP Prescribing? : YES  Taken as prescribed from physician notes? YES OV 8/1920    CSA in last year: YES  Random Utox in last year: NO  (if any of the above answer NO - schedule with PCP)     Opioids + benzodiazepines? NO  (if the above answer YES - schedule with PCP every 6 months)     Is patient on the Executive Review Team? no      All responses suggest: Refilling prescription

## 2021-06-17 NOTE — PATIENT INSTRUCTIONS - HE
"Patient Instructions by Saul Tilley DO at 7/15/2019  1:10 PM     Author: Saul Tilley DO Service: -- Author Type: Physician    Filed: 7/15/2019  1:38 PM Encounter Date: 7/15/2019 Status: Addendum    : Saul Tilley DO (Physician)    Related Notes: Original Note by Saul Tilley DO (Physician) filed at 7/15/2019  1:37 PM         It was a pleasure to meet with you today in clinic.  Please do not hesitate to call the [facility] clinic with any questions or concerns at [phone].    Additional Provider Instructions:   Below is a list of any additional instructions from your provider (this section may have been left blank intentionally):   1. Continue current medications. Use lasix 20 mg pill if swelling noted.    2. Follow-up as needed. Call anytime.     Patient Survey:   You may be asked to participate in a survey regarding your experience during today's office visit.  The information from the survey is reviewed by both the provider and the clinic management in an effort to provider the best care for all patients.  Please consider taking the time to complete the survey.      To give this group a score, Medicare looks at the percentage of patient who gave their clinicians a rating of 9 or 10 on a scale of 0 (lowest) to 10 (highest) and/or marked \"always\" as a response.      Publically reported group results of these surveys can be founds on https://www.medicare.gov/physiciancompare/       Sincerely,              "

## 2021-06-17 NOTE — TELEPHONE ENCOUNTER
ANTICOAGULATION  MANAGEMENT PROGRAM    Miesha Quarles is overdue for INR check.     Spoke with Miesha and scheduled INR appointment on 5/18 @ MID..      Reina Cruz RN

## 2021-06-17 NOTE — TELEPHONE ENCOUNTER
RN cannot approve Refill Request    RN can NOT refill this medication early refill request with pharmacy change.  Outside protocol window. Last office visit: 3/31/2021 Roc Mares MD Last Physical: 2/25/2021 Last MTM visit: Visit date not found Last visit same specialty: 3/31/2021 Roc Mares MD.  Next visit within 3 mo: Visit date not found  Next physical within 3 mo: Visit date not found      Luis Ragsdale, [facility] Triage/Med Refill 5/20/2021    Requested Prescriptions   Pending Prescriptions Disp Refills     lisinopriL-hydrochlorothiazide (PRINZIDE,ZESTORETIC) 20-12.5 mg per tablet [Pharmacy Med Name: LISINOPRIL/HYDROCHLOROTHIAZIDE 20-12.5 MG Tablet] 180 tablet 2     Sig: TAKE 2 TABLETS EVERY DAY       Diuretics/Combination Diuretics Refill Protocol  Passed - 5/19/2021  8:39 PM        Passed - Visit with PCP or prescribing provider visit in past 12 months     Last office visit with prescriber/PCP: 3/31/2021 Roc Mares MD OR same dept: 3/31/2021 Roc Mares MD OR same specialty: 3/31/2021 Roc Mares MD  Last physical: 2/25/2021 Last MTM visit: Visit date not found   Next visit within 3 mo: Visit date not found  Next physical within 3 mo: Visit date not found  Prescriber OR PCP: Roc Mares MD  Last diagnosis associated with med order: 1. Benign Essential Hypertension  - lisinopriL-hydrochlorothiazide (PRINZIDE,ZESTORETIC) 20-12.5 mg per tablet [Pharmacy Med Name: LISINOPRIL/HYDROCHLOROTHIAZIDE 20-12.5 MG Tablet]; TAKE 2 TABLETS EVERY DAY  Dispense: 180 tablet; Refill: 2  - amLODIPine (NORVASC) 2.5 MG tablet; TAKE 1 TABLET EVERY DAY  Dispense: 90 tablet; Refill: 0    If protocol passes may refill for 12 months if within 3 months of last provider visit (or a total of 15 months).             Passed - Serum Potassium in past 12 months      Lab Results   Component Value Date    Potassium 4.6 02/25/2021             Passed - Serum Sodium in past 12 months      Lab Results   Component Value  Date    Sodium 141 02/25/2021             Passed - Blood pressure on file in past 12 months     BP Readings from Last 1 Encounters:   03/31/21 124/78             Passed - Serum Creatinine in past 12 months      Creatinine   Date Value Ref Range Status   02/25/2021 1.13 (H) 0.60 - 1.10 mg/dL Final              Signed Prescriptions Disp Refills    amLODIPine (NORVASC) 2.5 MG tablet 90 tablet 0     Sig: TAKE 1 TABLET EVERY DAY       Calcium-Channel Blockers Protocol Passed - 5/19/2021  8:39 PM        Passed - PCP or prescribing provider visit in past 12 months or next 3 months     Last office visit with prescriber/PCP: 3/31/2021 Roc Mares MD OR same dept: 3/31/2021 Roc Mares MD OR same specialty: 3/31/2021 Roc Mares MD  Last physical: 2/25/2021 Last MTM visit: Visit date not found   Next visit within 3 mo: Visit date not found  Next physical within 3 mo: Visit date not found  Prescriber OR PCP: Roc Mares MD  Last diagnosis associated with med order: 1. Benign Essential Hypertension  - lisinopriL-hydrochlorothiazide (PRINZIDE,ZESTORETIC) 20-12.5 mg per tablet [Pharmacy Med Name: LISINOPRIL/HYDROCHLOROTHIAZIDE 20-12.5 MG Tablet]; TAKE 2 TABLETS EVERY DAY  Dispense: 180 tablet; Refill: 2  - amLODIPine (NORVASC) 2.5 MG tablet; TAKE 1 TABLET EVERY DAY  Dispense: 90 tablet; Refill: 0    If protocol passes may refill for 12 months if within 3 months of last provider visit (or a total of 15 months).             Passed - Blood pressure filed in past 12 months     BP Readings from Last 1 Encounters:   03/31/21 124/78

## 2021-06-17 NOTE — PROGRESS NOTES
"  Office Visit - Follow Up   Miesha Quarles   89 y.o. female    Date of Visit: 4/13/2018    Chief Complaint   Patient presents with     Follow-up     4/3/18 UC (back burn) f/u - doing better; wants this recheck      INR Check        Assessment and Plan   1. Burn  I suggested wound care with Silvadene cream which I sent prescription to pharmacy.  She will keep it covered.  Follow until healed.  Looks like it is healing fairly well at this time.    2. Benign Essential Hypertension  Good control.  Continue same I see her back's fairly soon.    3. Scalp cyst  She will put warm compresses on the cyst.  It may eventually drain.        Return for Next scheduled follow up.     History of Present Illness   This 89 y.o. old patient comes in today for a wound check.  She has a burn over her back that she sustained last week due to falling asleep with a heating pad on.  She was having some back and shoulder pain which is resolved.  She also notes a cyst on her scalp that she wants me to look at.  Otherwise she is doing fine.    Review of Systems: A comprehensive review of systems was negative except as noted.     Medications, Allergies and Problem List   Reviewed and updated     Physical Exam   General Appearance:   Pleasant woman.  She has a second-degree burn of her back on the right mid back.  It is healing fairly well it appears.  She has a 1 cm subcutaneous cyst almost looks like a dermoid cyst in her posterior scalp.    /82 (Patient Site: Right Arm, Patient Position: Sitting, Cuff Size: Adult Regular)  Pulse 77  Ht 5' 3\" (1.6 m)  Wt 179 lb (81.2 kg)  SpO2 98%  BMI 31.71 kg/m2         Additional Information   Current Outpatient Prescriptions   Medication Sig Dispense Refill     acetaminophen (TYLENOL) 650 MG CR tablet Take 650 mg by mouth as needed.        amLODIPine (NORVASC) 2.5 MG tablet Take 1 tablet (2.5 mg total) by mouth daily. 30 tablet 11     ascorbic acid (VITAMIN C) 100 MG tablet Take 100 mg by " mouth daily.       calcium carbonate-vitamin D3 (CALCIUM 600 WITH VITAMIN D3) 600 mg(1,500mg) -200 unit per tablet Take 1 tablet by mouth daily.       cholecalciferol, vitamin D3, 1,000 unit capsule Take 3,000 Units by mouth daily.        cranberry 400 mg cap Take 1 capsule by mouth daily.       cyanocobalamin (VITAMIN B-12) 1000 MCG tablet Take 1,000 mcg by mouth daily.       furosemide (LASIX) 20 MG tablet TAKE 1 TABLET BY MOUTH AS NEEDED. 90 tablet 1     KLOR-CON 10 10 mEq CR tablet TAKE 1 TABLET BY MOUTH DAILY. TAKE ONLY IF TAKING FUROSEMIDE 90 tablet 0     levothyroxine (SYNTHROID, LEVOTHROID) 50 MCG tablet TAKE ONE TABLET BY MOUTH ONCE DAILY 90 tablet 1     lisinopril-hydrochlorothiazide (PRINZIDE,ZESTORETIC) 20-12.5 mg per tablet TAKE TWO TABLETS BY MOUTH DAILY 180 tablet 1     loratadine (CLARITIN) 10 mg tablet Take 10 mg by mouth daily.       metoprolol succinate (TOPROL-XL) 100 MG 24 hr tablet TAKE 1 TABLET (100 MG TOTAL) BY MOUTH DAILY. 90 tablet 1     pyridoxine (VITAMIN B-6) 100 MG tablet Take 100 mg by mouth daily.       simvastatin (ZOCOR) 20 MG tablet Take one-half tablet daily in the evening. 90 tablet 2     vitamin E 400 unit Tab Take by mouth.       warfarin (COUMADIN) 5 MG tablet Take 1 tablet (5 mg total) by mouth See Admin Instructions. Adjust dose based on INR results as directed. 30 tablet 11     silver sulfADIAZINE (SILVADENE, SSD) 1 % cream Apply to affected area daily 50 g 1     No current facility-administered medications for this visit.      Allergies   Allergen Reactions     Amoxicillin Hives     Cephalexin Other (See Comments)     Reaction with intervenous administration.     Penicillins Hives     Latex Rash     Other Environmental Allergy Other (See Comments)     Pollen causes seasonal allergies.     Social History   Substance Use Topics     Smoking status: Former Smoker     Types: Cigarettes     Quit date: 1991     Smokeless tobacco: Never Used     Alcohol use Yes       Review and/or  order of clinical lab tests:  Review and/or order of radiology tests:  Review and/or order of medicine tests:  Discussion of test results with performing physician:  Decision to obtain old records and/or obtain history from someone other than the patient:  Review and summarization of old records and/or obtaining history from someone other than the patient and.or discussion of case with another health care provider:  Independent visualization of image, tracing or specimen itself:    Time: total time spent with the patient was 15 minutes of which >50% was spent in counseling and coordination of care     Roc Mares MD

## 2021-06-17 NOTE — TELEPHONE ENCOUNTER
Telephone Encounter by Reina Cruz RN at 2/25/2021 12:28 PM     Author: Reina Cruz RN Service: -- Author Type: Registered Nurse    Filed: 2/25/2021  3:39 PM Encounter Date: 2/25/2021 Status: Signed    : Reina Cruz RN (Registered Nurse)       ANTICOAGULATION  MANAGEMENT    Assessment     Today's INR result of 3.30 is Supratherapeutic (goal INR of 2.0-3.0)        Warfarin taken as previously instructed    Decreased greens/vitamin K intake may be affecting INR    - reported has not been eating much Vitamin-K intake, but will resume eating her salads.    No new medication/supplements affecting INR    - scheduled on 2/26/21 for 1st Covid-19 vaccination.    Continues to tolerate warfarin with no reported s/s of bleeding or thromboembolism     Previous INR was Therapeutic at 2.60 on 1/12/21.   (last 5 INRs therapeutic).    Plan:     Spoke on phone with Miehsa regarding INR result and instructed:      Warfarin Dosing Instructions:   (5mg tabs)   - tomorrow, advised one time lower dose with 2.5mg warfarin, then continue current warfarin dose 5 mg daily on Mon/Fri; and 2.5 mg daily rest of weel/    Instructed patient to follow up no later than: 1-2 wks.    Education provided: importance of consistent vitamin K intake, impact of vitamin K foods on INR and target INR goal and significance of current INR result    Miesha verbalizes understanding and agrees to warfarin dosing plan.    Instructed to call the [facility] for any changes, questions or concerns. (#[phone])   ?   Reina Cruz RN    Subjective/Objective:      Miesha Quarles, a 89 y.o. female is on warfarin. Miesha Whiteside reports:     Home warfarin dose: as updated on anticoagulation calendar per template     Missed doses: No     Medication changes:  No     S/S of bleeding or thromboembolism:  No     New Injury or illness:  No     Changes in diet or alcohol consumption:  Yes:  Reported, has not been eating any  salads.     Upcoming surgery, procedure or cardioversion:  No    Anticoagulation Episode Summary     Current INR goal:  2.0-3.0   TTR:  80.5 % (1 y)   Next INR check:  3/11/2021   INR from last check:  3.30 (2/25/2021)   Weekly max warfarin dose:     Target end date:     INR check location:     Preferred lab:     Send INR reminders to:  [facility]    Indications    Atrial flutter (H) [I48.92]           Comments:           Anticoagulation Care Providers     Provider Role Specialty Phone number    Roc Mares MD Referring Internal Medicine [phone]

## 2021-06-17 NOTE — TELEPHONE ENCOUNTER
Telephone Encounter by Sowmya Reyna RN at 4/7/2020  2:40 PM     Author: Sowmya Reyna RN Service: -- Author Type: Registered Nurse    Filed: 4/7/2020  2:52 PM Encounter Date: 4/7/2020 Status: Attested    : Sowmya Reyna RN (Registered Nurse) Cosigner: Roc Mares MD at 4/7/2020  3:04 PM    Attestation signed by Roc Mares MD at 4/7/2020  3:04 PM    agree                ANTICOAGULATION  MANAGEMENT    Assessment     Today's INR result of 3.5 is Supratherapeutic (goal INR of 2.0-3.0)        Warfarin taken as previously instructed    Decreased greens/vitamin K intake may be affecting INR    No new medication/supplements affecting INR    Continues to tolerate warfarin with no reported s/s of bleeding or thromboembolism     Previous INR was subtherapeutic     Plan:     Spoke with Miesha regarding INR result and instructed:     Warfarin Dosing Instructions:  Change warfarin dose to 5 mg daily on Mon; and 2.5 mg daily rest of week  (11 % change)    Instructed patient to follow up no later than: two weeks    Education provided: importance of consistent vitamin K intake, impact of vitamin K foods on INR and importance of therapeutic range  Miesha states she does not want to go back to eating more greens  Miesha verbalizes understanding and agrees to warfarin dosing plan.    Instructed to call the [facility] for any changes, questions or concerns. (#[phone])   ?   Sowmya Reyna RN    Subjective/Objective:      Miesha Quarles, a 89 y.o. female is on warfarin.     Miesha reports:     Home warfarin dose: as updated on anticoagulation calendar per template     Missed doses: No     Medication changes:  No     S/S of bleeding or thromboembolism:  No     New Injury or illness:  No     Changes in diet or alcohol consumption:  Yes decreased greens     Upcoming surgery, procedure or cardioversion:  No    Anticoagulation Episode Summary     Current INR goal:   2.0-3.0   TTR:    69.5 % (1 y)   Next INR check:   4/21/2020   INR from last check:   3.50! (4/7/2020)   Weekly max warfarin dose:      Target end date:      INR check location:      Preferred lab:      Send INR reminders to:   [facility] [place]    Indications    Atrial flutter (H) [I48.92]           Comments:            Anticoagulation Care Providers     Provider Role Specialty Phone number    Roc Mares MD Referring Internal Medicine [phone]

## 2021-06-17 NOTE — TELEPHONE ENCOUNTER
Telephone Encounter by Reina Cruz RN at 3/22/2021  1:22 PM     Author: Reina Cruz RN Service: -- Author Type: Registered Nurse    Filed: 3/22/2021  3:37 PM Encounter Date: 3/22/2021 Status: Signed    : Reina Cruz RN (Registered Nurse)       ANTICOAGULATION  MANAGEMENT    Assessment     Today's INR result of 3.70 is Supratherapeutic (goal INR of 2.0-3.0)        Warfarin taken as previously instructed    No new diet changes affecting INR    Potential interaction between Tramadol and warfarin which may affect subsequent INRs    - has been taking Tramadol 50mg for her knee pains.  (usually takes 25mg Tramadol)    - is awaiting to get knee injections with Ortho.  She was advised to wait 15 days after covid vaccine.   - on 3/19/21 completed Pfizer Covid-19 vaccinations.    Continues to tolerate warfarin with no reported s/s of bleeding or thromboembolism     Previous INR was Supratherapeutic at 3.30 on 2/25/21.    Plan:     Spoke on phone with Miesha  regarding INR result and instructed:      Warfarin Dosing Instructions:   (evenings. 5mg tabs)   - today, advised to HOLD warfarin dose,   - then change warfarin dose to 5 mg daily on Mondays; and 2.5 mg daily rest of week.   - (11.1 % change)    Instructed patient to follow up no later than:  1-2 wks.    Education provided: importance of consistent vitamin K intake and target INR goal and significance of current INR result    Miesha verbalizes understanding and agrees to warfarin dosing plan.    Instructed to call the [facility] for any changes, questions or concerns. (#[phone])   ?   Reina Cruz RN    Subjective/Objective:      Miesha DEVON McintyreQuarles, a 89 y.o. female is on warfarin. Miesha Whiteside reports:     Home warfarin dose: as updated on anticoagulation calendar per template     Missed doses: No     Medication changes:  Yes:  Had been taking higherTramadol dose for bilateral knee pains.     S/S of bleeding or  thromboembolism:  No     New Injury or illness:  No     Changes in diet or alcohol consumption:  No     Upcoming surgery, procedure or cardioversion:  No    Anticoagulation Episode Summary     Current INR goal:  2.0-3.0   TTR:  75.6 % (1 y)   Next INR check:  4/5/2021   INR from last check:  3.70 (3/22/2021)   Weekly max warfarin dose:     Target end date:     INR check location:     Preferred lab:     Send INR reminders to:  [facility]    Indications    Atrial flutter (H) [I48.92]           Comments:           Anticoagulation Care Providers     Provider Role Specialty Phone number    Roc Mares MD Referring Internal Medicine [phone]

## 2021-06-17 NOTE — TELEPHONE ENCOUNTER
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  She states it seems to help alleviate the pains when walking    Continues to tolerate warfarin with no reported s/s of bleeding or thromboembolism     Previous INR was Therapeutic at 2.40 on 4/19/21.    Plan:     Spoke on phone with Miesha regarding INR result and instructed:      Warfarin Dosing Instructions:  (5mg tabs)   - Continue current warfarin dose 5 mg daily on Fridays; and 2.5 mg daily rest of week.    Instructed patient to follow up no later than:  4 wks.   - INR scheduled on 6/3/21 during OV with Dr. Mares.    Education provided: importance of consistent vitamin K intake and target INR goal and significance of current INR result    Miesha verbalizes understanding and agrees to warfarin dosing plan.    Instructed to call the [facility] for any changes, questions or concerns. (#[phone])   ?   Reina Cruz RN    Subjective/Objective:      Miesha Quarles, a 89 y.o. female is on warfarin. Miesha Whiteside reports:     Home warfarin dose: as updated on anticoagulation calendar per template     Missed doses: No     Medication changes:  No     S/S of bleeding or thromboembolism:  No     New Injury or illness:  No     Changes in diet or alcohol consumption:  No     Upcoming surgery, procedure or cardioversion:  No    Anticoagulation Episode Summary     Current INR goal:  2.0-3.0   TTR:  83.4 % (1 y)   Next INR check:  6/15/2021   INR from last check:  2.50 (5/18/2021)   Weekly max warfarin dose:     Target end date:     INR check location:     Preferred lab:     Send INR reminders to:  [facility]    Indications    Atrial flutter (H) [I48.92]           Comments:           Anticoagulation  Care Providers     Provider Role Specialty Phone number    Roc Mares MD Referring Internal Medicine [phone]

## 2021-06-18 NOTE — LETTER
Letter by Reina Cruz RN at      Author: Reina Cruz RN Service: -- Author Type: --    Filed:  Encounter Date: 3/4/2019 Status: (Other)       Miesha Quarles  [address] [place] [address]      March 12, 2019      Dear MsArnav Quarles,  You are currently under the care of [facility] for your warfarin (Coumadin ) therapy. Our records show that your last INR was on 1/11/2019 and you were due to come back on 2/22/2019. We have attempted to reach you by phone to schedule an INR appointment, but have not heard back from you.    The correct dose of warfarin for you may change from time to time based on your INR result. We would like to ensure that your warfarin dose is correct and that you are not having any side effects. It is not safe for you to be on Warfarin if your INR is not checked regularly. If your INR is too high, serious bleeding can occur. If your INR is too low, blood clots can form and lead to heart attack, stroke or a blood clot in the lung.      Getting your INR checked as instructed is required in order for the [facility] to continue managing and refilling your warfarin. We realize that it might be difficult for you to have frequent blood testing, but it is for your own safety.    Please contact us at [phone] as soon as possible to schedule an INR appointment. Our clinic staff is available Monday through Friday 8:00 am to 5:00 pm.  If you have been told to stop taking warfarin or your warfarin is being managed by another healthcare provider, please call and inform our staff.       Sincerely,     [facility]

## 2021-06-18 NOTE — PATIENT INSTRUCTIONS - HE
Patient Instructions by Roc Mares MD at 2/25/2021 11:00 AM     Author: Roc Mares MD Service: -- Author Type: Physician    Filed: 2/25/2021 12:14 PM Encounter Date: 2/25/2021 Status: Signed    : Roc Mares MD (Physician)         Patient Education     Exercise for a Healthier Heart  You may wonder how you can improve the health of your heart. If youre thinking about exercise, youre on the right track. You dont need to become an athlete, but you do need a certain amount of brisk exercise to help strengthen your heart. If you have been diagnosed with a heart condition, your doctor may recommend exercise to help stabilize your condition. To help make exercise a habit, choose safe, fun activities.       Be sure to check with your health care provider before starting an exercise program.    Why exercise?  Exercising regularly offers many healthy rewards. It can help you do all of the following:    Improve your blood cholesterol levels to help prevent further heart trouble    Lower your blood pressure to help prevent a stroke or heart attack    Control diabetes, or reduce your risk of getting this disease    Improve your heart and lung function    Reach and maintain a healthy weight    Make your muscles stronger and more limber so you can stay active    Prevent falls and fractures by slowing the loss of bone mass (osteoporosis)    Manage stress better  Exercise tips  Ease into your routine. Set small goals. Then build on them.  Exercise on most days. Aim for a total of 150 or more minutes of moderate to  vigorous intensity activity each week. Consider 40 minutes, 3 to 4 times a week. For best results, activity should last for 40 minutes on average. It is OK to work up to the 40 minute period over time. Examples of moderate-intensity activity is walking one mile in 15 minutes or 30 to 45 minutes of yard work.  Step up your daily activity level. Along with your exercise program, try being more active  throughout the day. Walk instead of drive. Do more household tasks or yard work.  Choose one or more activities you enjoy. Walking is one of the easiest things you can do. You can also try swimming, riding a bike, or taking an exercise class.  Stop exercising and call your doctor if you:    Have chest pain or feel dizzy or lightheaded    Feel burning, tightness, pressure, or heaviness in your chest, neck, shoulders, back, or arms    Have unusual shortness of breath    Have increased joint or muscle pain    Have palpitations or an irregular heartbeat      0508-6583 [employer]. [address] [place] [address]. All rights reserved. This information is not intended as a substitute for professional medical care. Always follow your healthcare professional's instructions.         Patient Education   Urinary Incontinence, Female (Adult)  Urinary incontinence means loss of control of the bladder. This problem affects many women, especially as they get older. If you have incontinence, you may be embarrassed to ask for help. But know that this problem can be treated.  Types of Incontinence  There are different types of incontinence. Two of the main types are described here. You can have more than one type.    Stress incontinence. With this type, urine leaks when pressure (stress) is put on the bladder. This may happen when you cough, sneeze, or laugh. Stress incontinence most often occurs because the pelvic floor muscles that support the bladder and urethra are weak. This can happen after pregnancy and vaginal childbirth or a hysterectomy. It can also be due to excess body weight or hormone changes.    Urge incontinence (also called overactive bladder). With this type, a sudden urge to urinate is felt often. This may happen even though there may not be much urine in the bladder. The need to urinate often during the night is common. Urge incontinence most often occurs because of bladder spasms. This may  be due to bladder irritation or infection. Damage to bladder nerves or pelvic muscles, constipation, and certain medicines can also lead to urge incontinence.  Treatment of urinary incontinence depends on the cause. Further evaluation is needed to find the type you have. This will likely include an exam and certain tests. Based on the results, you and your healthcare provider can then plan treatment. Until a diagnosis is made, the home care tips below can help relieve symptoms.  Home care    Do pelvic floor muscle exercises, if they are prescribed. The pelvic floor muscles help support the bladder and urethra. Many women find that their symptoms improve when doing special exercises that strengthen these muscles. To do the exercises contract the muscles you would use to stop your stream of urine, but do this when youre not urinating. Hold for 10 seconds, then relax. Repeat 10 to 20 times in a row, at least 3 times a day. Your provider may give you other instructions for how to do the exercises and how often.    Keep a bladder diary. This helps track how often and how much you urinate over a set period of time. Bring this diary with you to your next visit with the provider. The information can help your provider learn more about your bladder problem.    Lose weight, if advised to by your provider. Excess weight puts pressure on the bladder. Your provider can help you create a weight-loss plan thats right for you. This may include exercising more and making certain diet changes.    Don't consume foods and drinks that may irritate the bladder. These can include alcohol and caffeinated drinks.    Quit smoking. Smoking and other tobacco use can lead to chronic cough that strains the pelvic floor muscles. Smoking may also damage the bladder and urethra. Talk with your provider about treatments or methods you can use to quit smoking.    If drinking large amounts of fluid causes you to have symptoms, you may be advised to  limit your fluid intake. You may also be advised to drink most of your fluids during the day and to limit fluids at night.    If youre worried about urine leakage or accidents, you may wear absorbent pads to catch urine. Change the pads often. This helps reduce discomfort. It may also reduce the risk of skin or bladder infections.  Follow-up care  Follow up with your healthcare provider, or as directed. It may take some to find the right treatment for your problem. Your treatment plan may include special therapies or medicines. Certain procedures or surgery may also be options. Be sure to discuss any questions you have with your provider.  When to seek medical advice  Call the healthcare provider right away if any of these occur:    Fever of 100.4 F (38 C) or higher, or as directed by your provider    Bladder pain or fullness    Abdominal swelling    Nausea or vomiting    Back pain    Weakness, dizziness or fainting  Date Last Reviewed: 10/1/2017    [id number] [employer]. [address] [place] [address]. All rights reserved. This information is not intended as a substitute for professional medical care. Always follow your healthcare professional's instructions.       Advance Directive  Patients advance directive was discussed and I am comfortable with the patients wishes.  Patient Education   Personalized Prevention Plan  You are due for the preventive services outlined below.  Your care team is available to assist you in scheduling these services.  If you have already completed any of these items, please share that information with your care team to update in your medical record.  Health Maintenance   Topic Date Due   ? COVID-19 Vaccine (1 of 2) 12/26/1944   ? ZOSTER VACCINES (2 of 2) 05/26/2015   ? DIABETIC FOOT EXAM  10/29/2020   ? DIABETIC EYE EXAM  06/22/2021   ? BMP  08/19/2021   ? LIPID  08/19/2021   ? A1C  08/25/2021   ? MEDICARE ANNUAL WELLNESS VISIT  02/25/2022   ? FALL RISK  ASSESSMENT  02/25/2022   ? ADVANCE CARE PLANNING  02/27/2022   ? TD 18+ HE  06/18/2024   ? Pneumococcal Vaccine: Pediatrics (0 to 5 Years) and At-Risk Patients (6 to 64 Years)  Completed   ? Pneumococcal Vaccine: 65+ Years  Completed   ? INFLUENZA VACCINE RULE BASED  Completed   ? MICROALBUMIN  Discontinued

## 2021-06-18 NOTE — PROGRESS NOTES
Office Visit - Follow Up   Miesha Quarles   89 y.o. female    Date of Visit: 6/11/2018    Chief Complaint   Patient presents with     Follow-up     Irvinn on back is all healed     Diabetes     Diabetic check up - fasting for lab work //  Needs INR also        Assessment and Plan   1. Benign Essential Hypertension  Pretty good control.  I do not want to cut back her medications.  I think if she would sleep more should do better.  See below peer    2. Hypothyroidism, unspecified type  Check TSH given her fatigue.  - Thyroid Stimulating Hormone (TSH)    3. Type 2 diabetes mellitus with complication, unspecified long term insulin use status  Labs today for monitoring.  Unclear control  - Glycosylated Hemoglobin A1c  - Comprehensive Metabolic Panel  - Lipid Cascade    4. Fatigue  I discussed with patient today that I suspect a lot of her sleepiness could be a combination of not sleeping enough at night as well as some untreated sleep apnea.  We discussed potentially doing a sleep study but she does not wish to pursue that.  I have urged sleeping more at night and napping during the day as needed.        Return in about 3 months (around 9/11/2018) for Recheck.     History of Present Illness   This 89 y.o. old patient comes in for follow-up of her blood pressure and other medical problems.  She reports her sugars have been okay.  She has diabetes as well.  She is 89 years old.  Lives independently although her granddaughter is living with her as well as 2 of her great-grandchildren.  Great-grandchildren off for school from school this summer and patient told her granddaughter that she is not going to be a .  She notes that she is tired during the day.  She stays up very late.  She believes it has been since we added back the amlodipine at 2.5 mg.  She has had these symptoms in the past however likely due to not sleeping as much.  She also probably has some sleep apnea.    Review of Systems: A comprehensive  "review of systems was negative except as noted.     Medications, Allergies and Problem List   Reviewed and updated     Physical Exam   General Appearance:   Pleasant woman in no distress.  Vitals are noted.  Recheck blood pressure was 128/80.    /84 (Patient Site: Right Arm, Patient Position: Sitting, Cuff Size: Adult Regular)  Pulse 90  Ht 5' 2\" (1.575 m)  Wt 180 lb (81.6 kg)  SpO2 98%  BMI 32.92 kg/m2         Additional Information   Current Outpatient Prescriptions   Medication Sig Dispense Refill     acetaminophen (TYLENOL) 650 MG CR tablet Take 650 mg by mouth as needed.        amLODIPine (NORVASC) 2.5 MG tablet Take 1 tablet (2.5 mg total) by mouth daily. 30 tablet 11     ascorbic acid (VITAMIN C) 100 MG tablet Take 100 mg by mouth daily.       calcium carbonate-vitamin D3 (CALCIUM 600 WITH VITAMIN D3) 600 mg(1,500mg) -200 unit per tablet Take 1 tablet by mouth daily.       cholecalciferol, vitamin D3, 1,000 unit capsule Take 3,000 Units by mouth daily.        cranberry 400 mg cap Take 1 capsule by mouth daily.       cyanocobalamin (VITAMIN B-12) 1000 MCG tablet Take 1,000 mcg by mouth daily.       furosemide (LASIX) 20 MG tablet TAKE 1 TABLET BY MOUTH AS NEEDED. 90 tablet 1     KLOR-CON 10 10 mEq CR tablet TAKE 1 TABLET BY MOUTH DAILY. TAKE ONLY IF TAKING FUROSEMIDE 90 tablet 0     levothyroxine (SYNTHROID, LEVOTHROID) 50 MCG tablet TAKE ONE TABLET BY MOUTH ONCE DAILY 90 tablet 1     lisinopril-hydrochlorothiazide (PRINZIDE,ZESTORETIC) 20-12.5 mg per tablet TAKE TWO TABLETS BY MOUTH DAILY 180 tablet 1     loratadine (CLARITIN) 10 mg tablet Take 10 mg by mouth daily.       metoprolol succinate (TOPROL-XL) 100 MG 24 hr tablet TAKE 1 TABLET BY MOUTH DAILY 90 tablet 2     pyridoxine (VITAMIN B-6) 100 MG tablet Take 100 mg by mouth daily.       simvastatin (ZOCOR) 20 MG tablet Take one-half tablet daily in the evening. 90 tablet 2     vitamin E 400 unit Tab Take by mouth.       warfarin (COUMADIN) 5 MG " tablet Take 1 tablet (5 mg total) by mouth See Admin Instructions. Adjust dose based on INR results as directed. 30 tablet 11     No current facility-administered medications for this visit.      Allergies   Allergen Reactions     Amoxicillin Hives     Cephalexin Other (See Comments)     Reaction with intervenous administration.     Penicillins Hives     Latex Rash     Other Environmental Allergy Other (See Comments)     Pollen causes seasonal allergies.     Social History   Substance Use Topics     Smoking status: Former Smoker     Types: Cigarettes     Quit date: 1991     Smokeless tobacco: Never Used     Alcohol use Yes       Review and/or order of clinical lab tests:  Review and/or order of radiology tests:  Review and/or order of medicine tests:  Discussion of test results with performing physician:  Decision to obtain old records and/or obtain history from someone other than the patient:  Review and summarization of old records and/or obtaining history from someone other than the patient and.or discussion of case with another health care provider:  Independent visualization of image, tracing or specimen itself:    Time: total time spent with the patient was 25 minutes of which >50% was spent in counseling and coordination of care     Roc Mares MD

## 2021-06-19 NOTE — LETTER
Letter by Roc Mares MD at      Author: Roc Mares MD Service: -- Author Type: --    Filed:  Encounter Date: 10/31/2019 Status: Signed         Miesha Quarles  [address] [place] [address]             October 31, 2019         Dear Ms. Quarles,    Below are the results from your recent visit:    Resulted Orders   HM2(CBC w/o Differential)   Result Value Ref Range    WBC 5.9 4.0 - 11.0 thou/uL    RBC 4.43 3.80 - 5.40 mill/uL    Hemoglobin 14.1 12.0 - 16.0 g/dL    Hematocrit 41.8 35.0 - 47.0 %    MCV 94 80 - 100 fL    MCH 31.9 27.0 - 34.0 pg    MCHC 33.7 32.0 - 36.0 g/dL    RDW 14.3 11.0 - 14.5 %    Platelets 203 140 - 440 thou/uL    MPV 7.9 7.0 - 10.0 fL   Glycosylated Hemoglobin A1c   Result Value Ref Range    Hemoglobin A1c 6.7 (H) 3.5 - 6.0 %       Sugar control looks good and blood counts are normal.     Please call with questions or contact us using [employer].    Sincerely,        Electronically signed by Roc Mares MD

## 2021-06-19 NOTE — LETTER
Letter by Roc Mares MD at      Author: Roc Mares MD Service: -- Author Type: --    Filed:  Encounter Date: 4/23/2019 Status: (Other)         Miesha Quarles  [address] [place] [address]             April 23, 2019         Dear Ms. Quarles,    Below are the results from your recent visit:    Resulted Orders   Basic Metabolic Panel   Result Value Ref Range    Sodium 140 136 - 145 mmol/L    Potassium 3.4 (L) 3.5 - 5.0 mmol/L    Chloride 101 98 - 107 mmol/L    CO2 25 22 - 31 mmol/L    Anion Gap, Calculation 14 5 - 18 mmol/L    Glucose 124 70 - 125 mg/dL    Calcium 9.1 8.5 - 10.5 mg/dL    BUN 16 8 - 28 mg/dL    Creatinine 1.05 0.60 - 1.10 mg/dL    GFR MDRD Af Amer 60 (L) >60 mL/min/1.73m2    GFR MDRD Non Af Amer 49 (L) >60 mL/min/1.73m2    Narrative    Fasting Glucose reference range is 70-99 mg/dL per  American Diabetes Association (ADA) guidelines.       Kidney function is back to baseline but now your potassium is a little low.  Please try to increase the potassium in your diet with potassium rich foods.  We will send a list of these.  We will recheck again at your next visit.     Please call with questions or contact us using [employer].    Sincerely,        Electronically signed by Roc Mares MD

## 2021-06-19 NOTE — LETTER
Letter by Roc Mares MD at      Author: Roc Mares MD Service: -- Author Type: --    Filed:  Encounter Date: 4/4/2019 Status: (Other)         Miesha Quarles  [address]  [place] [address]             April 4, 2019         Dear Ms. Quarles,    Below are the results from your recent visit:    Resulted Orders   Thyroid Cascade   Result Value Ref Range    TSH 2.27 0.30 - 5.00 uIU/mL   Lipid Cascade   Result Value Ref Range    Cholesterol 168 <=199 mg/dL    Triglycerides 87 <=149 mg/dL    HDL Cholesterol 51 >=50 mg/dL    LDL Calculated 100 <=129 mg/dL    Patient Fasting > 8hrs? Yes    Comprehensive Metabolic Panel   Result Value Ref Range    Sodium 141 136 - 145 mmol/L    Potassium 3.9 3.5 - 5.0 mmol/L    Chloride 105 98 - 107 mmol/L    CO2 27 22 - 31 mmol/L    Anion Gap, Calculation 9 5 - 18 mmol/L    Glucose 124 70 - 125 mg/dL    BUN 25 8 - 28 mg/dL    Creatinine 1.49 (H) 0.60 - 1.10 mg/dL    GFR MDRD Af Amer 40 (L) >60 mL/min/1.73m2    GFR MDRD Non Af Amer 33 (L) >60 mL/min/1.73m2    Bilirubin, Total 0.5 0.0 - 1.0 mg/dL    Calcium 9.3 8.5 - 10.5 mg/dL    Protein, Total 8.0 6.0 - 8.0 g/dL    Albumin 3.3 (L) 3.5 - 5.0 g/dL    Alkaline Phosphatase 73 45 - 120 U/L    AST 14 0 - 40 U/L    ALT 11 0 - 45 U/L    Narrative    Fasting Glucose reference range is 70-99 mg/dL per  American Diabetes Association (ADA) guidelines.   Glycosylated Hemoglobin A1c   Result Value Ref Range    Hemoglobin A1c 6.5 (H) 3.5 - 6.0 %       Cholesterol not as good as last visit but not bad.  Kidney function has declined considerably.  I do not know if that is due to something you are taking or dehydration.  Please make sure you are staying well-hydrated and do not take any over-the-counter pain medications such as Advil or Aleve.  I will want to recheck your kidney function in a week or 2.  Please make a lab appointment for that.  Sugar control looks pretty good.  Thyroid test is normal.     Please call with questions or  contact us using [employer].    Sincerely,        Electronically signed by Roc Mares MD

## 2021-06-19 NOTE — LETTER
Letter by Roc Mares MD at      Author: Roc Mares MD Service: -- Author Type: --    Filed:  Encounter Date: 7/2/2019 Status: (Other)         [facility] INTERNAL MEDICINE  07/02/19    Patient: Miesha Quarles  YOB: 1928  Medical Record Number: [id number]                                                                  Opioid / Opioid Plus Controlled Substance Agreement    I understand that my care provider has prescribed an opioid (narcotic) controlled substance to help manage my condition(s). I am taking this medicine to help me function or work. I know this is strong medicine, and that it can cause serious side effects. Opioid medicine can be sedating, addicting and may cause a dependency on the drug. They can affect my ability to drive or think, and cause depression. They need to be taken exactly as prescribed. Combining opioids with certain medicines or chemicals (such as cocaine, sedatives and tranquilizers, sleeping pills, meth) can be dangerous or even fatal. Also, if I stop opioids suddenly, I may have severe withdrawal symptoms. Last, I understand that opioids do not work for all types of pain nor for all patients. If not helpful, I may be asked to stop them.        The risks, benefits, and side effects of these medicine(s) were explained to me. I agree that:    1. I will take part in other treatments as advised by my care team. This may be psychiatry or counseling, physical therapy, behavioral therapy, group treatment or a referral to a pain clinic. I will reduce or stop my medicine when my care team tells me to do so.  2. I will take my medicines as prescribed. I will not change the dose or schedule unless my care team tells me to. There will be no refills if I run out early.  I may be contactedwithout warning and asked to complete a urine drug test or pill count at any time.   3. I will keep all my appointments, and understand this is part of the monitoring of  opioids. My care team may require an office visit for EVERY opioid/controlled substance refill. If I miss appointments or dont follow instructions, my care team may stop my medicine.  4. I will not ask other providers to prescribe controlled substances, and I will not accept controlled substances from other people. If I need another prescribed controlled substance for a new reason, I will tell my care team within 1 business day.  5. I will use one pharmacy to fill all of my controlled substance prescriptions, and it is up to me to make sure that I do not run out of my medicines on weekends or holidays. If my care team is willing to refill my opioid prescription without a visit, I must request refills only during office hours, refills may take up to 3 days to process, and it may take up to 5 to 7 days for my medicine to be mailed and ready at my pharmacy. Prescriptions will not be mailed anywhere except my pharmacy.        [id number]  Rev 12/18         Registration to scan to EHR                             Page 1 of 2               Controlled Substance Agreement Opioid        [facility] INTERNAL MEDICINE  07/02/19  Patient: Miesha Quarles  YOB: 1928  Medical Record Number: [id number]                                                                  6. I am responsible for my prescriptions. If the medicine/prescription is lost or stolen, it will not be replaced. I also agree not to share controlled substance medicines with anyone.  7. I agree to not use ANY illegal or recreational drugs. This includes marijuana, cocaine, bath salts or other drugs. I agree not to use alcohol unless my care team says I may.          I agree to give urine samples whenever asked. If I dont give a urine sample, the care team may stop my medicine.    8. If I enroll in the [place] Medical Marijuana program, I will tell my care team. I will also sign an agreement to share my medical records with my care team.   9. I  will bring in my list of medicines (or my medicine bottles) each time I come to the clinic.   10. I will tell my care team right away if I become pregnant or have a new medical problem treated outside of my regular clinic.  11. I understand that this medicine can affect my thinking and judgment. It may be unsafe for me to drive, use machinery and do dangerous tasks. I will not do any of these things until I know how the medicine affects me. If my dose changes, I will wait to see how it affects me. I will contact my care team if I have concerns about medicine side effects.    I understand that if I do not follow any of the conditions above, my prescriptions or treatment may be stopped.      I agree that my provider, clinic care team, and pharmacy may work with any city, state or federal law enforcement agency that investigates the misuse, sale, or other diversion of my controlled medicine. I will allow my provider to discuss my care with or share a copy of this agreement with any other treating provider, pharmacy or emergency room where I receive care. I agree to give up (waive) any right of privacy or confidentiality with respect to these consents.     I have read this agreement and have asked questions about anything I did not understand.      ________________________________________________________________________  Patient signature - Date/Time -  Miesha Quarles                                      ________________________________________________________________________  Witness signature                                                            ________________________________________________________________________  Provider signature - Roc Mares MD      [id number]  Rev 12/18         Registration to scan to EHR                         Page 2 of 2                   Controlled Substance Agreement Opioid           Page 1 of 2  Opioid Pain Medicines (also known as Narcotics)  What You Need to Know    What are  opioids?   Opioids are pain medicines that must be prescribed by a doctor.  They are also known as narcotics.    Examples are:     morphine (MS Contin, Minnie)    oxycodone (Oxycontin)    oxycodone and acetaminophen (Percocet)    hydrocodone and acetaminophen (Vicodin, Norco)     fentanyl patch (Duragesic)     hydromorphone (Dilaudid)     methadone     What do opioids do well?   Opioids are best for short-term pain after a surgery or injury. They also work well for cancer pain. Unlike other pain medicines, they do not cause liver or kidney failure or ulcers. They may help some people with long-lasting (chronic) pain.     What do opioids NOT do well?   Opioids never get rid of pain entirely, and they do not work well for most patients with chronic pain. Opioids do not reduce swelling, one of the causes of pain. They also dont work well for nerve pain.                           For informational purposes only.  Not to replace the advice of your care provider.  Copyright [facility]. All right reserved. [employer] 791488-[id number] 02/18.      Page 2 of 2    Risks and side effects   Talk to your doctor before you start or decide to keep taking one of these medicines. Side effects include:    Lowering your breathing rate enough to cause death    Overdose, including death, especially if taking higher than prescribed doses    Long-term opioid use    Worse depression symptoms; less pleasure in things you usually enjoy    Feeling tired or sluggish    Slower thoughts or cloudy thinking    Being more sensitive to pain over time; pain is harder to control    Trouble sleeping or restless sleep    Changes in hormone levels (for example, less testosterone)    Changes in sex drive or ability to have sex    Constipation    Unsafe driving    Itching and sweating    Feeling dizzy    Nausea, vomiting and dry mouth    What else should I know about opioids?  When someone takes opioids for too long or too often, they become  dependent. This means that if you stop or reduce the medicine too quickly, you will have withdrawal symptoms.    Dependence is not the same as addiction. Addiction is when people keep using a substance that harms their body, their mind or their relations with others. If you have a history of drug or alcohol abuse, taking opioids can cause a relapse.    Over time, opioids dont work as well. Most people will need higher and higher doses. The higher the dose, the more serious the side effects. We dont know the long-term effects of opioids.      Prescribed opioids aren't the best way to manage chronic pain    Other ways to manage pain include:      Ibuprofen or acetaminophen.  You should always try this first.      Treat health problems that may be causing pain.      acupuncture or massage, deep breathing, meditation, visual imagery, aromatherapy.      Use heat or ice at the pain site      Physical therapy and exercise      Stop smoking      See a counselor or therapist                                                  People who have used opioids for a long time may have a lower quality of life, worse depression, higher levels of pain and more visits to doctors.    Never share your opioids with others. Be sure to store opioids in a secure place, locked if possible.Young children can easily swallow them and overdose.     You can overdose on opioids.  Signs of overdose include decrease or loss of consciousness, slowed breathing, trouble waking and blue lips.  If someone is worried about overdose, they should call 911.    If you are at risk for overdose, you may get naloxone (Narcan, a medicine that reverses the effects of opioids.  If you overdose, a friend or family member can give you Narcan while waiting for the ambulance.  They need to know the signs of overdose and how to give Narcan.    While you're taking opioids:    Don't use alcohol or street drugs. Taking them together can cause death.    Don't take any of these  medicines unless your doctor says its okay.  Taking these with opioids can cause death.    Benzodiazepines (such as lorazepam         or diazepam)    Muscle relaxers (such as cyclobenzaprine)    sleeping pills    other opioids    Safe disposal of opioids  Find your area drug take-back program, your pharmacy mail-back program, buy a special disposal bag (such as Deterra) from your pharmacy or flush them down the toilet.  Use the guidelines at:  www.fda.gov/drugs/resourcesforyou

## 2021-06-19 NOTE — LETTER
Letter by Roc Mares MD at      Author: Roc Mares MD Service: -- Author Type: --    Filed:  Encounter Date: 10/31/2019 Status: Signed         Miesha Quarles  [address]  [place] [address]             October 31, 2019         Dear Ms. Quarles,    Below are the results from your recent visit:    Resulted Orders   Lipid Cascade   Result Value Ref Range    Cholesterol 187 <=199 mg/dL    Triglycerides 110 <=149 mg/dL    HDL Cholesterol 56 >=50 mg/dL    LDL Calculated 109 <=129 mg/dL    Patient Fasting > 8hrs? Yes    Comprehensive Metabolic Panel   Result Value Ref Range    Sodium 142 136 - 145 mmol/L    Potassium 4.5 3.5 - 5.0 mmol/L    Chloride 105 98 - 107 mmol/L    CO2 27 22 - 31 mmol/L    Anion Gap, Calculation 10 5 - 18 mmol/L    Glucose 129 (H) 70 - 125 mg/dL    BUN 15 8 - 28 mg/dL    Creatinine 0.92 0.60 - 1.10 mg/dL    GFR MDRD Af Amer >60 >60 mL/min/1.73m2    GFR MDRD Non Af Amer 57 (L) >60 mL/min/1.73m2    Bilirubin, Total 0.6 0.0 - 1.0 mg/dL    Calcium 8.9 8.5 - 10.5 mg/dL    Protein, Total 7.9 6.0 - 8.0 g/dL    Albumin 3.3 (L) 3.5 - 5.0 g/dL    Alkaline Phosphatase 86 45 - 120 U/L    AST 11 0 - 40 U/L    ALT <9 0 - 45 U/L    Narrative    Fasting Glucose reference range is 70-99 mg/dL per  American Diabetes Association (ADA) guidelines.   HM2(CBC w/o Differential)   Result Value Ref Range    WBC 5.9 4.0 - 11.0 thou/uL    RBC 4.43 3.80 - 5.40 mill/uL    Hemoglobin 14.1 12.0 - 16.0 g/dL    Hematocrit 41.8 35.0 - 47.0 %    MCV 94 80 - 100 fL    MCH 31.9 27.0 - 34.0 pg    MCHC 33.7 32.0 - 36.0 g/dL    RDW 14.3 11.0 - 14.5 %    Platelets 203 140 - 440 thou/uL    MPV 7.9 7.0 - 10.0 fL   Thyroid Cascade   Result Value Ref Range    TSH 3.13 0.30 - 5.00 uIU/mL   Glycosylated Hemoglobin A1c   Result Value Ref Range    Hemoglobin A1c 6.7 (H) 3.5 - 6.0 %       Cholesterol has come up a little bit, however everything else here looks good and stable.     Please call with questions or contact us using  [employer].    Sincerely,        Electronically signed by Roc Mares MD

## 2021-06-20 NOTE — PROGRESS NOTES
"  Office Visit - Follow Up   Miesha Quarles   89 y.o. female    Date of Visit: 9/11/2018    Chief Complaint   Patient presents with     Diabetes     3 mo f/u - not fasting      INR Check        Assessment and Plan   1. Type 2 diabetes mellitus with complication, unspecified long term insulin use status (H)  Presumably good sugar control.  Continue same regimen.  - Comprehensive Metabolic Panel  - Glycosylated Hemoglobin A1c    2. Atrial flutter (H)  INR today.  - warfarin (COUMADIN) 5 MG tablet; Take 1 tablet (5 mg total) by mouth See Admin Instructions. Adjust dose based on INR results as directed.  Dispense: 90 tablet; Refill: 2    3. Benign Essential Hypertension  Good control.  Continue same regimen.  I will see her back in another 3 months.  - metoprolol succinate (TOPROL-XL) 100 MG 24 hr tablet; Take 1 tablet (100 mg total) by mouth daily.  Dispense: 90 tablet; Refill: 2    Flu shot today.        Return in about 3 months (around 12/11/2018) for Recheck.     History of Present Illness   This 89 y.o. old patient comes in today for follow-up of her diabetes and the medical problems.  Sugars have been good.  She is maintaining her garden.  She is active.  She will be 89 this year.  Her mother I believe lived to well over 100.  Knees are her biggest concern with knee arthritis but she does not wish to do anything about it.  She will occasionally get injections.  She is trying to lose the weight she tells me.  She had a nice time at [employer] festival on [address] recently.    Review of Systems: A comprehensive review of systems was negative except as noted.     Medications, Allergies and Problem List   Reviewed, reconciled and updated     Physical Exam   General Appearance:   Delightful woman who appears younger than age.  Looks well.  Blood pressure is excellent.    /82 (Patient Site: Right Arm, Patient Position: Sitting, Cuff Size: Adult Regular)  Pulse 60  Ht 5' 2\" (1.575 m)  Wt 178 lb (80.7 kg)  SpO2 98%  " BMI 32.56 kg/m2         Additional Information   Current Outpatient Prescriptions   Medication Sig Dispense Refill     acetaminophen (TYLENOL) 650 MG CR tablet Take 650 mg by mouth as needed.        amLODIPine (NORVASC) 2.5 MG tablet Take 1 tablet (2.5 mg total) by mouth daily. 30 tablet 11     ascorbic acid (VITAMIN C) 100 MG tablet Take 100 mg by mouth daily.       calcium carbonate-vitamin D3 (CALCIUM 600 WITH VITAMIN D3) 600 mg(1,500mg) -200 unit per tablet Take 1 tablet by mouth daily.       cholecalciferol, vitamin D3, 1,000 unit capsule Take 3,000 Units by mouth daily.        cranberry 400 mg cap Take 1 capsule by mouth daily.       cyanocobalamin (VITAMIN B-12) 1000 MCG tablet Take 1,000 mcg by mouth daily.       furosemide (LASIX) 20 MG tablet TAKE 1 TABLET BY MOUTH AS NEEDED. 90 tablet 1     KLOR-CON 10 10 mEq CR tablet TAKE 1 TABLET BY MOUTH DAILY. TAKE ONLY IF TAKING FUROSEMIDE 90 tablet 0     levothyroxine (SYNTHROID, LEVOTHROID) 50 MCG tablet TAKE ONE TABLET BY MOUTH ONCE DAILY 90 tablet 3     lisinopril-hydrochlorothiazide (PRINZIDE,ZESTORETIC) 20-12.5 mg per tablet TAKE TWO TABS BY MOUTH DAILY 180 tablet 3     loratadine (CLARITIN) 10 mg tablet Take 10 mg by mouth daily.       pyridoxine (VITAMIN B-6) 100 MG tablet Take 100 mg by mouth daily.       simvastatin (ZOCOR) 20 MG tablet Take one-half tablet daily in the evening. 90 tablet 2     vitamin E 400 unit Tab Take by mouth.       metoprolol succinate (TOPROL-XL) 100 MG 24 hr tablet Take 1 tablet (100 mg total) by mouth daily. 90 tablet 2     warfarin (COUMADIN) 5 MG tablet Take 1 tablet (5 mg total) by mouth See Admin Instructions. Adjust dose based on INR results as directed. 90 tablet 2     No current facility-administered medications for this visit.      Allergies   Allergen Reactions     Amoxicillin Hives     Cephalexin Other (See Comments)     Reaction with intervenous administration.     Penicillins Hives     Latex Rash     Other  Environmental Allergy Other (See Comments)     Pollen causes seasonal allergies.     Social History   Substance Use Topics     Smoking status: Former Smoker     Types: Cigarettes     Quit date: 1991     Smokeless tobacco: Never Used     Alcohol use Yes       Review and/or order of clinical lab tests:  Review and/or order of radiology tests:  Review and/or order of medicine tests:  Discussion of test results with performing physician:  Decision to obtain old records and/or obtain history from someone other than the patient:  Review and summarization of old records and/or obtaining history from someone other than the patient and.or discussion of case with another health care provider:  Independent visualization of image, tracing or specimen itself:    Time: total time spent with the patient was 15 minutes of which >50% was spent in counseling and coordination of care     Roc Mares MD

## 2021-06-20 NOTE — LETTER
Letter by Roc Mares MD at      Author: Roc Mares MD Service: -- Author Type: --    Filed:  Encounter Date: 8/19/2020 Status: (Other)         [facility] INTERNAL MEDICINE  08/19/20    Patient: Miesha Quarles  YOB: 1928  Medical Record Number: [id number]                                                                  Opioid / Opioid Plus Controlled Substance Agreement    I understand that my care provider has prescribed an opioid (narcotic) controlled substance to help manage my condition(s). I am taking this medicine to help me function or work. I know this is strong medicine, and that it can cause serious side effects. Opioid medicine can be sedating, addicting and may cause a dependency on the drug. They can affect my ability to drive or think, and cause depression. They need to be taken exactly as prescribed. Combining opioids with certain medicines or chemicals (such as cocaine, sedatives and tranquilizers, sleeping pills, meth) can be dangerous or even fatal. Also, if I stop opioids suddenly, I may have severe withdrawal symptoms. Last, I understand that opioids do not work for all types of pain nor for all patients. If not helpful, I may be asked to stop them.        The risks, benefits, and side effects of these medicine(s) were explained to me. I agree that:    1. I will take part in other treatments as advised by my care team. This may be psychiatry or counseling, physical therapy, behavioral therapy, group treatment or a referral to a pain clinic. I will reduce or stop my medicine when my care team tells me to do so.  2. I will take my medicines as prescribed. I will not change the dose or schedule unless my care team tells me to. There will be no refills if I run out early.  I may be contactedwithout warning and asked to complete a urine drug test or pill count at any time.   3. I will keep all my appointments, and understand this is part of the monitoring of  opioids. My care team may require an office visit for EVERY opioid/controlled substance refill. If I miss appointments or dont follow instructions, my care team may stop my medicine.  4. I will not ask other providers to prescribe controlled substances, and I will not accept controlled substances from other people. If I need another prescribed controlled substance for a new reason, I will tell my care team within 1 business day.  5. I will use one pharmacy to fill all of my controlled substance prescriptions, and it is up to me to make sure that I do not run out of my medicines on weekends or holidays. If my care team is willing to refill my opioid prescription without a visit, I must request refills only during office hours, refills may take up to 3 days to process, and it may take up to 5 to 7 days for my medicine to be mailed and ready at my pharmacy. Prescriptions will not be mailed anywhere except my pharmacy.        [id number]  Rev 12/18         Registration to scan to EHR                             Page 1 of 2               Controlled Substance Agreement Opioid        [facility] INTERNAL MEDICINE  08/19/20  Patient: Miesha Quarles  YOB: 1928  Medical Record Number: [id number]                                                                  6. I am responsible for my prescriptions. If the medicine/prescription is lost or stolen, it will not be replaced. I also agree not to share controlled substance medicines with anyone.  7. I agree to not use ANY illegal or recreational drugs. This includes marijuana, cocaine, bath salts or other drugs. I agree not to use alcohol unless my care team says I may.          I agree to give urine samples whenever asked. If I dont give a urine sample, the care team may stop my medicine.    8. If I enroll in the [place] Medical Marijuana program, I will tell my care team. I will also sign an agreement to share my medical records with my care team.   9. I  will bring in my list of medicines (or my medicine bottles) each time I come to the clinic.   10. I will tell my care team right away if I become pregnant or have a new medical problem treated outside of my regular clinic.  11. I understand that this medicine can affect my thinking and judgment. It may be unsafe for me to drive, use machinery and do dangerous tasks. I will not do any of these things until I know how the medicine affects me. If my dose changes, I will wait to see how it affects me. I will contact my care team if I have concerns about medicine side effects.    I understand that if I do not follow any of the conditions above, my prescriptions or treatment may be stopped.      I agree that my provider, clinic care team, and pharmacy may work with any city, state or federal law enforcement agency that investigates the misuse, sale, or other diversion of my controlled medicine. I will allow my provider to discuss my care with or share a copy of this agreement with any other treating provider, pharmacy or emergency room where I receive care. I agree to give up (waive) any right of privacy or confidentiality with respect to these consents.     I have read this agreement and have asked questions about anything I did not understand.      ________________________________________________________________________  Patient signature - Date/Time -  Miesha Quarles                                      ________________________________________________________________________  Witness signature                                                            ________________________________________________________________________  Provider signature - Roc Mares MD      [id number]  Rev 12/18         Registration to scan to EHR                         Page 2 of 2                   Controlled Substance Agreement Opioid           Page 1 of 2  Opioid Pain Medicines (also known as Narcotics)  What You Need to Know    What are  opioids?   Opioids are pain medicines that must be prescribed by a doctor.  They are also known as narcotics.    Examples are:     morphine (MS Contin, Minnie)    oxycodone (Oxycontin)    oxycodone and acetaminophen (Percocet)    hydrocodone and acetaminophen (Vicodin, Norco)     fentanyl patch (Duragesic)     hydromorphone (Dilaudid)     methadone     What do opioids do well?   Opioids are best for short-term pain after a surgery or injury. They also work well for cancer pain. Unlike other pain medicines, they do not cause liver or kidney failure or ulcers. They may help some people with long-lasting (chronic) pain.     What do opioids NOT do well?   Opioids never get rid of pain entirely, and they do not work well for most patients with chronic pain. Opioids do not reduce swelling, one of the causes of pain. They also dont work well for nerve pain.                           For informational purposes only.  Not to replace the advice of your care provider.  Copyright [facility]. All right reserved. [employer] 353727-[id number] 02/18.      Page 2 of 2    Risks and side effects   Talk to your doctor before you start or decide to keep taking one of these medicines. Side effects include:    Lowering your breathing rate enough to cause death    Overdose, including death, especially if taking higher than prescribed doses    Long-term opioid use    Worse depression symptoms; less pleasure in things you usually enjoy    Feeling tired or sluggish    Slower thoughts or cloudy thinking    Being more sensitive to pain over time; pain is harder to control    Trouble sleeping or restless sleep    Changes in hormone levels (for example, less testosterone)    Changes in sex drive or ability to have sex    Constipation    Unsafe driving    Itching and sweating    Feeling dizzy    Nausea, vomiting and dry mouth    What else should I know about opioids?  When someone takes opioids for too long or too often, they become  dependent. This means that if you stop or reduce the medicine too quickly, you will have withdrawal symptoms.    Dependence is not the same as addiction. Addiction is when people keep using a substance that harms their body, their mind or their relations with others. If you have a history of drug or alcohol abuse, taking opioids can cause a relapse.    Over time, opioids dont work as well. Most people will need higher and higher doses. The higher the dose, the more serious the side effects. We dont know the long-term effects of opioids.      Prescribed opioids aren't the best way to manage chronic pain    Other ways to manage pain include:      Ibuprofen or acetaminophen.  You should always try this first.      Treat health problems that may be causing pain.      acupuncture or massage, deep breathing, meditation, visual imagery, aromatherapy.      Use heat or ice at the pain site      Physical therapy and exercise      Stop smoking      See a counselor or therapist                                                  People who have used opioids for a long time may have a lower quality of life, worse depression, higher levels of pain and more visits to doctors.    Never share your opioids with others. Be sure to store opioids in a secure place, locked if possible.Young children can easily swallow them and overdose.     You can overdose on opioids.  Signs of overdose include decrease or loss of consciousness, slowed breathing, trouble waking and blue lips.  If someone is worried about overdose, they should call 911.    If you are at risk for overdose, you may get naloxone (Narcan, a medicine that reverses the effects of opioids.  If you overdose, a friend or family member can give you Narcan while waiting for the ambulance.  They need to know the signs of overdose and how to give Narcan.    While you're taking opioids:    Don't use alcohol or street drugs. Taking them together can cause death.    Don't take any of these  medicines unless your doctor says its okay.  Taking these with opioids can cause death.    Benzodiazepines (such as lorazepam         or diazepam)    Muscle relaxers (such as cyclobenzaprine)    sleeping pills    other opioids    Safe disposal of opioids  Find your area drug take-back program, your pharmacy mail-back program, buy a special disposal bag (such as Deterra) from your pharmacy or flush them down the toilet.  Use the guidelines at:  www.fda.gov/drugs/resourcesforyou

## 2021-06-20 NOTE — PROGRESS NOTES
Office Visit - Follow Up   Miesha Quarles   89 y.o. female    Date of Visit: 10/9/2018    Chief Complaint   Patient presents with     Hospital Visit Follow Up     ER - fall f/u         Assessment and Plan   1. Falls frequently  Check a urinalysis to make sure she does not have a urine infection.  I suspect there is also stripping however.  Refer to therapy for gait analysis and strengthening.  She wants to do some tricia chi and if there is a neighborhood organization that can get her in a class that would be a good idea as well.  - Urinalysis-UC if Indicated  - Ambulatory referral to PT/OT    2. Unsteady gait  As above.  - Ambulatory referral to PT/OT    3. Knee abrasion  This was dressed with some bacitracin and a nonstick bandage.  Monitor closely for infection.    4. Atrial flutter, unspecified type (H)  INR today.  I discussed the importance of not falling given her chronic anticoagulation use.        Return in about 2 months (around 12/9/2018) for Next scheduled follow up.     History of Present Illness   This 89 y.o. old patient comes today for ER follow-up.  She took a fall in her home which prompted an ER visit and injured her shoulder which is better she is not taking any pain medicines any longer.  Then she took of another fall and scraped up her leg [facility] [employer].  She is trying to crawl over concrete barrier and tumbled.  She thinks she needs some tricia chi classes.  She is 89 years old.  Soon to be 89.  Quite active for 89-year-old but is on Coumadin.  She uses a cane.  They gave her a walker in the emergency room but she is only been using it intermittently.  No aches or pains now.  She has an abrasion over her left knee.  She is not doing anything for that.    Review of Systems: A comprehensive review of systems was negative except as noted.     Medications, Allergies and Problem List   Reviewed, reconciled and updated     Physical Exam   General Appearance:   Pleasant woman.  No focal neurologic  "deficits.  Small somewhat strongly of urine.  No focal neurologic deficits.  She has about a 3 x 2 superficial abrasion over the left knee.  No surrounding erythema or evidence of infection.    /62 (Patient Site: Right Arm, Patient Position: Sitting, Cuff Size: Adult Regular)  Pulse 80  Ht 5' 2\" (1.575 m)  Wt 176 lb (79.8 kg)  SpO2 98%  BMI 32.19 kg/m2         Additional Information   Current Outpatient Prescriptions   Medication Sig Dispense Refill     acetaminophen (TYLENOL) 650 MG CR tablet Take 650 mg by mouth as needed.        amLODIPine (NORVASC) 2.5 MG tablet Take 1 tablet (2.5 mg total) by mouth daily. 30 tablet 11     ascorbic acid (VITAMIN C) 100 MG tablet Take 100 mg by mouth daily.       calcium carbonate-vitamin D3 (CALCIUM 600 WITH VITAMIN D3) 600 mg(1,500mg) -200 unit per tablet Take 1 tablet by mouth daily.       cholecalciferol, vitamin D3, 1,000 unit capsule Take 3,000 Units by mouth daily.        cranberry 400 mg cap Take 1 capsule by mouth daily.       cyanocobalamin (VITAMIN B-12) 1000 MCG tablet Take 1,000 mcg by mouth daily.       furosemide (LASIX) 20 MG tablet TAKE 1 TABLET BY MOUTH AS NEEDED. 90 tablet 1     KLOR-CON 10 10 mEq CR tablet TAKE 1 TABLET BY MOUTH DAILY. TAKE ONLY IF TAKING FUROSEMIDE 90 tablet 0     levothyroxine (SYNTHROID, LEVOTHROID) 50 MCG tablet TAKE ONE TABLET BY MOUTH ONCE DAILY 90 tablet 3     lisinopril-hydrochlorothiazide (PRINZIDE,ZESTORETIC) 20-12.5 mg per tablet TAKE TWO TABS BY MOUTH DAILY 180 tablet 3     loratadine (CLARITIN) 10 mg tablet Take 10 mg by mouth daily.       metoprolol succinate (TOPROL-XL) 100 MG 24 hr tablet Take 1 tablet (100 mg total) by mouth daily. 90 tablet 2     pyridoxine (VITAMIN B-6) 100 MG tablet Take 100 mg by mouth daily.       simvastatin (ZOCOR) 20 MG tablet Take one-half tablet daily in the evening. 90 tablet 2     traMADol (ULTRAM) 50 mg tablet Take 1 tablet (50 mg total) by mouth every 6 (six) hours as needed for pain. " 8 tablet 0     vitamin E 400 unit Tab Take by mouth.       warfarin (COUMADIN) 5 MG tablet Take 1 tablet (5 mg total) by mouth See Admin Instructions. Adjust dose based on INR results as directed. 90 tablet 2     No current facility-administered medications for this visit.      Allergies   Allergen Reactions     Amoxicillin Hives     Cephalexin Other (See Comments)     Reaction with intervenous administration.     Penicillins Hives     Latex Rash     Other Environmental Allergy Other (See Comments)     Pollen causes seasonal allergies.     Social History   Substance Use Topics     Smoking status: Former Smoker     Types: Cigarettes     Quit date: 1991     Smokeless tobacco: Never Used     Alcohol use Yes       Review and/or order of clinical lab tests:  Review and/or order of radiology tests:  Review and/or order of medicine tests:  Discussion of test results with performing physician:  Decision to obtain old records and/or obtain history from someone other than the patient:  Review and summarization of old records and/or obtaining history from someone other than the patient and.or discussion of case with another health care provider:  Independent visualization of image, tracing or specimen itself:    Time: not applicable     Roc Mares MD

## 2021-06-20 NOTE — LETTER
Letter by Roc Mares MD at      Author: Roc Mares MD Service: -- Author Type: --    Filed:  Encounter Date: 8/20/2020 Status: (Other)         Miesha Quarles  [address] [place] [address]             August 20, 2020         Dear Ms. Quarles,    Below are the results from your recent visit:    Resulted Orders   Glycosylated Hemoglobin A1c   Result Value Ref Range    Hemoglobin A1c 7.0 (H) <=5.6 %      Comment:      Normal <5.7% Prediabete 5.7-6.4% Diabletes 6.5% or higher - adopted from ADA consensus guidelines   Lipid Cascade   Result Value Ref Range    Cholesterol 157 <=199 mg/dL    Triglycerides 87 <=149 mg/dL    HDL Cholesterol 49 (L) >=50 mg/dL    LDL Calculated 91 <=129 mg/dL    Patient Fasting > 8hrs? Yes    Comprehensive Metabolic Panel   Result Value Ref Range    Sodium 142 136 - 145 mmol/L    Potassium 3.6 3.5 - 5.0 mmol/L    Chloride 104 98 - 107 mmol/L    CO2 26 22 - 31 mmol/L    Anion Gap, Calculation 12 5 - 18 mmol/L    Glucose 118 70 - 125 mg/dL    BUN 17 8 - 28 mg/dL    Creatinine 1.06 0.60 - 1.10 mg/dL    GFR MDRD Af Amer 59 (L) >60 mL/min/1.73m2    GFR MDRD Non Af Amer 49 (L) >60 mL/min/1.73m2    Bilirubin, Total 0.5 0.0 - 1.0 mg/dL    Calcium 8.7 8.5 - 10.5 mg/dL    Protein, Total 7.4 6.0 - 8.0 g/dL    Albumin 3.1 (L) 3.5 - 5.0 g/dL    Alkaline Phosphatase 84 45 - 120 U/L    AST 13 0 - 40 U/L    ALT 10 0 - 45 U/L    Narrative    Fasting Glucose reference range is 70-99 mg/dL per  American Diabetes Association (ADA) guidelines.   Thyroid Cascade   Result Value Ref Range    TSH 2.98 0.30 - 5.00 uIU/mL        Labs look good Miesha.     Please call with questions or contact us using [facility].    Sincerely,        Electronically signed by Roc Mares MD

## 2021-06-21 NOTE — LETTER
Letter by Roc Mares MD at      Author: Roc Mares MD Service: -- Author Type: --    Filed:  Encounter Date: 3/2/2021 Status: (Other)         Miesha Quarles  [address] [place] [address]             March 2, 2021         Dear Ms. Quarles,    Below are the results from your recent visit:    Resulted Orders   Comprehensive Metabolic Panel   Result Value Ref Range    Sodium 141 136 - 145 mmol/L    Potassium 4.6 3.5 - 5.0 mmol/L    Chloride 103 98 - 107 mmol/L    CO2 25 22 - 31 mmol/L    Anion Gap, Calculation 13 5 - 18 mmol/L    Glucose 128 (H) 70 - 125 mg/dL    BUN 19 8 - 28 mg/dL    Creatinine 1.13 (H) 0.60 - 1.10 mg/dL    GFR MDRD Af Amer 55 (L) >60 mL/min/1.73m2    GFR MDRD Non Af Amer 45 (L) >60 mL/min/1.73m2    Bilirubin, Total 0.7 0.0 - 1.0 mg/dL    Calcium 8.7 8.5 - 10.5 mg/dL    Protein, Total 8.0 6.0 - 8.0 g/dL    Albumin 3.6 3.5 - 5.0 g/dL    Alkaline Phosphatase 86 45 - 120 U/L    AST 12 0 - 40 U/L    ALT <9 0 - 45 U/L    Narrative    Fasting Glucose reference range is 70-99 mg/dL per  American Diabetes Association (ADA) guidelines.   Lipid [place] FASTING   Result Value Ref Range    Cholesterol 154 <=199 mg/dL    Triglycerides 98 <=149 mg/dL    HDL Cholesterol 48 (L) >=50 mg/dL    LDL Calculated 86 <=129 mg/dL    Patient Fasting > 8hrs? Unknown    Glycosylated Hemoglobin A1c   Result Value Ref Range    Hemoglobin A1c 6.9 (H) <=5.6 %   HM2(CBC w/o Differential)   Result Value Ref Range    WBC 5.8 4.0 - 11.0 thou/uL    RBC 4.52 3.80 - 5.40 mill/uL    Hemoglobin 14.2 12.0 - 16.0 g/dL    Hematocrit 43.6 35.0 - 47.0 %    MCV 97 80 - 100 fL    MCH 31.4 27.0 - 34.0 pg    MCHC 32.6 32.0 - 36.0 g/dL    RDW 14.5 11.0 - 14.5 %    Platelets 198 140 - 440 thou/uL    MPV 10.1 (H) 7.0 - 10.0 fL   Vitamin D, Total (25-Hydroxy)   Result Value Ref Range    Vitamin D, Total (25-Hydroxy) 33.9 30.0 - 80.0 ng/mL    Narrative    Deficiency <10.0 ng/mL  Insufficiency 10.0-29.9 ng/mL  Sufficiency 30.0-80.0  ng/mL  Toxicity (possible) >100.0 ng/mL   Thyroid Cascade   Result Value Ref Range    TSH 3.99 0.30 - 5.00 uIU/mL       Vit D level looks good.     Please call with questions or contact us using MyChart.    Sincerely,        Electronically signed by Roc Mares MD

## 2021-06-21 NOTE — LETTER
Letter by Roc Mares MD at      Author: Roc Mares MD Service: -- Author Type: --    Filed:  Encounter Date: 2/26/2021 Status: (Other)         Miesha Quarles  [address]  [place] [address]             February 26, 2021         Dear Ms. Quarles,    Below are the results from your recent visit:    Resulted Orders   Comprehensive Metabolic Panel   Result Value Ref Range    Sodium 141 136 - 145 mmol/L    Potassium 4.6 3.5 - 5.0 mmol/L    Chloride 103 98 - 107 mmol/L    CO2 25 22 - 31 mmol/L    Anion Gap, Calculation 13 5 - 18 mmol/L    Glucose 128 (H) 70 - 125 mg/dL    BUN 19 8 - 28 mg/dL    Creatinine 1.13 (H) 0.60 - 1.10 mg/dL    GFR MDRD Af Amer 55 (L) >60 mL/min/1.73m2    GFR MDRD Non Af Amer 45 (L) >60 mL/min/1.73m2    Bilirubin, Total 0.7 0.0 - 1.0 mg/dL    Calcium 8.7 8.5 - 10.5 mg/dL    Protein, Total 8.0 6.0 - 8.0 g/dL    Albumin 3.6 3.5 - 5.0 g/dL    Alkaline Phosphatase 86 45 - 120 U/L    AST 12 0 - 40 U/L    ALT <9 0 - 45 U/L    Narrative    Fasting Glucose reference range is 70-99 mg/dL per  American Diabetes Association (ADA) guidelines.   Lipid [facility] FASTING   Result Value Ref Range    Cholesterol 154 <=199 mg/dL    Triglycerides 98 <=149 mg/dL    HDL Cholesterol 48 (L) >=50 mg/dL    LDL Calculated 86 <=129 mg/dL    Patient Fasting > 8hrs? Unknown    Glycosylated Hemoglobin A1c   Result Value Ref Range    Hemoglobin A1c 6.9 (H) <=5.6 %   HM2(CBC w/o Differential)   Result Value Ref Range    WBC 5.8 4.0 - 11.0 thou/uL    RBC 4.52 3.80 - 5.40 mill/uL    Hemoglobin 14.2 12.0 - 16.0 g/dL    Hematocrit 43.6 35.0 - 47.0 %    MCV 97 80 - 100 fL    MCH 31.4 27.0 - 34.0 pg    MCHC 32.6 32.0 - 36.0 g/dL    RDW 14.5 11.0 - 14.5 %    Platelets 198 140 - 440 thou/uL    MPV 10.1 (H) 7.0 - 10.0 fL   Thyroid Cascade   Result Value Ref Range    TSH 3.99 0.30 - 5.00 uIU/mL       Your labs look good Miesha.     Please call with questions or contact us using  [facility].    Sincerely,        Electronically signed by Roc Mares MD

## 2021-06-23 ENCOUNTER — COMMUNICATION - HEALTHEAST (OUTPATIENT)
Dept: ANTICOAGULATION | Facility: CLINIC | Age: 86
End: 2021-06-23

## 2021-06-23 NOTE — TELEPHONE ENCOUNTER
ANTICOAGULATION  MANAGEMENT    Assessment     Today's INR result of 2.70 is Therapeutic (goal INR of 2.0-3.0)        Warfarin taken as previously instructed    No new diet changes affecting INR    No new medication/supplements affecting INR    Continues to tolerate warfarin with no reported s/s of bleeding or thromboembolism     Previous INR was Therapeutic at 2.50 on 11/6/18.  (has had 4 cancellations for INR's since 12/4/18).    Plan:     Spoke with Miesha regarding INR result and instructed:     Warfarin Dosing Instructions:   (taking at dinner and has 5mg tabs).   - Continue current warfarin dose 2.5 mg daily.    Instructed patient to follow up no later than:  Scheduled to recheck on 2/4/19 during f/u visit with MD.   - pt related she had 4 INR appts cancelled, r/t not having a car available. The car shop was not able to schedule repairs till after 1/1/19.    Education provided: importance of consistent vitamin K intake, target INR goal and significance of current INR result, importance of notifying clinic for changes in medications, monitoring for bleeding signs and symptoms and importance of notifying clinic for diarrhea, nausea/vomiting, reduced intake and/or illness    Miesha verbalizes understanding and agrees to warfarin dosing plan.    Instructed to call the [facility] for any changes, questions or concerns. (#[phone])   ?   Reina Cruz RN    Subjective/Objective:      Miesha Quarles, a 89 y.o. female is on warfarin.     Miesha reports:     Home warfarin dose: as updated on anticoagulation calendar per template     Missed doses: No.  Reported faithfully taking her warfarin during dinner time everyday, and has not missed any doses.     Medication changes:  No     S/S of bleeding or thromboembolism:  No     New Injury or illness:  Yes:   Reported mouth is dry and affecting her taste buds.     Changes in diet or alcohol consumption:  No.  Continues to be consistent with her vegetables /  salads.     Upcoming surgery, procedure or cardioversion:  No    Anticoagulation Episode Summary     Current INR goal:   2.0-3.0   TTR:   61.1 % (1.3 y)   Next INR check:   2/22/2019   INR from last check:   2.70 (1/11/2019)   Weekly max warfarin dose:      Target end date:      INR check location:      Preferred lab:      Send INR reminders to:   ANTICOAGULATION POOL C (DTN,VAD,CGR,GAV)    Indications    Atrial flutter (H) [I48.92]           Comments:            Anticoagulation Care Providers     Provider Role Specialty Phone number    Roc Mares MD Referring Internal Medicine [phone]

## 2021-06-23 NOTE — TELEPHONE ENCOUNTER
Patient Returning Call  Reason for call:  INR results/dosing  Information relayed to patient:  n/a  Patient has additional questions:  Yes  If YES, what are your questions/concerns:   INR results/dosing  Okay to leave a detailed message?: Yes

## 2021-06-24 NOTE — TELEPHONE ENCOUNTER
ANTICOAGULATION  MANAGEMENT PROGRAM    Miesha Quarles is overdue for INR check.  Reminder call made.    Unable to reach Miesha and was unable to leave a voice mail.    Reina Cruz RN

## 2021-06-24 NOTE — TELEPHONE ENCOUNTER
ANTICOAGULATION  MANAGEMENT PROGRAM    Miesha Quarles is overdue for INR check.  Reminder call made.    Has cancelled 3 appt for f/u visit with Dr. Mares and INR checks    Unable to reach Miesha and was unable to leave a voice mail.    Reina Cruz RN

## 2021-06-24 NOTE — TELEPHONE ENCOUNTER
Noted - called and spoke with Miesha.     - she wants to stay put at this time with our bad weather.  She has cancelled appt multiple times.     - she is afraid to fall and break her bones.  Would like to get INR check when weather is a little better for her to drive and venture out.

## 2021-06-24 NOTE — TELEPHONE ENCOUNTER
Who is calling:  Isaias Miesha  Reason for Call:  Patient explained that she is overdue, because each time she scheduled an INR visit, the weather would get bad (snowstorm) and she would have to cancel due to the bad weather.  Patient has not been out of the house for a while due to the bad weather. Patient has not gotten to [employer] either.  Patient is watching the weather and when it gets better/nicer, she will schedule an appointment.  Patient explained that they are saying about two more weeks of this before it gets better.  Patient also advised that she does not have an answering machine/voicemail, and each time Jesus called, she was upstairs and could not get to the phone in time.    Date of last appointment with primary care: [id number]  Has the patient been recently seen:  No  Okay to leave a detailed message: No , there is no answering machine or voicemail. You can call patient if you would like, and patient will try to be available.

## 2021-06-25 NOTE — PROGRESS NOTES
Progress Notes by Saul Tilley DO at 8/3/2017  4:20 PM     Author: Saul Tilley DO Service: -- Author Type: Physician    Filed: 8/3/2017  5:26 PM Encounter Date: 8/3/2017 Status: Addendum    : Saul Tilley DO (Physician)    Related Notes: Original Note by Saul Tilley DO (Physician) filed at 8/3/2017  5:25 PM           Click to link to [facility]     [facility] NOTE    Thank you, Dr. Mares, for asking the [facility] team to see Ms. Miesha Quarles to evaluate atrial flutter.      Assessment/Recommendations   Assessment:    1. Atrial flutter with controlled rate.  No clear change in symptoms related to atrial flutter.   CHADSVASC:5 (age, prior CHF, female, DM, HTN)   2. Chronic congestive heart failure secondary to HFpEF (LVH, mild pulmonary HTN).  NYHA II.  Compensated.   3. HTN  4. DM, A1C:7.0.    Plan:  1.  Continue metoprolol  2.  Agree with Eliquis 5 mg BID, discussed long term anticoagulation in detail with patient.  Cost maybe an issue  3. Continue laxix and lisinopril   4. Follow-up in 6 months.       History of Present Illness    Ms. Miesha Quarles is a 88 y.o. female with hypertension, hyperlipidemia, diabetes mellitus, prior history of congestive heart failure likely related to left ventricular hypertrophy with diastolic dysfunction, mild pulmonary hypertension who presents to cardiology rapid access clinic for new onset atrial flutter.  Going to the patient she was recently evaluated by her primary care provider Dr. Mares who had noted that she was in an irregular rhythm.  Twelve-lead ECG which was personally reviewed demonstrated rate controlled atrial flutter.  Given these findings is recommended that she continue with her metoprolol was started on Eliquis 5 mg twice daily.  She currently has not started her Eliquis therapy.  I did discuss in detail regarding Eliquis and bleeding complications.  She is concerned  about the cost of this medication and will determine this with her pharmacy.  She was given a 30 day free supply card.    She does note rare episodes of palpitation.  States her dyspnea on exertion is markedly improved with Lasix therapy which she started a few months ago.  She denies any orthopnea, PND, lower extremity edema.  She denies any syncopal episodes.  Denies anginal chest pain      ECHO (personnaly reviewed):1/11/2017    No previous study for comparison.    Left ventricle ejection fraction is normal. The calculated left ventricular ejection fraction is 55%. No regional wall motion abnormality.    Normal right ventricular function.    Mild to moderate mitral valve regurgitation.    Mitral tricuspid valve regurgitation.  Pulmonary systolicpressure is 45 mmHg.      Physical Examination Review of Systems   Vitals:    08/03/17 1635   BP: 140/90   Pulse: 88   Resp: 16     Body mass index is 32.82 kg/(m^2).  Wt Readings from Last 3 Encounters:   08/03/17 178 lb (80.7 kg)   08/03/17 181 lb (82.1 kg)   08/01/17 181 lb (82.1 kg)       General Appearance:   no distress, obese body habitus   ENT/Mouth: membranes moist, no oral lesions or bleeding gums.      EYES:  no scleral icterus, normal conjunctivae   Neck: no carotid bruits or thyromegaly   Chest/Lungs:   lungs are clear to auscultation, no rales or wheezing,  sternal scar, equal chest wall expansion    Cardiovascular:   Irregular. Normal first and second heart sounds with no murmurs, rubs, or gallops; the carotid, radial and posterior tibial pulses are intact, Jugular venous pressure 6-8cm, no pitting edema bilaterally    Abdomen:  no organomegaly, masses, bruits, or tenderness; bowel sounds are present   Extremities: no cyanosis or clubbing   Skin: no xanthelasma, warm.    Neurologic: normal gait, normal  bilateral, no tremors     Psychiatric: alert and oriented x3, calm     General: WNL  Eyes: WNL  Ears/Nose/Throat: WNL  Lungs: Snoring, Wheezing  Heart:  Irregular Heartbeat, Leg Swelling  Stomach: WNL  Bladder: WNL  Muscle/Joints: Joint Pain  Skin: WNL  Nervous System: Daytime Sleepiness, Loss of Balance  Mental Health: WNL     Blood: WNL     Medical History  Surgical History Family History Social History   Past Medical History:   Diagnosis Date   ? Benign Essential Hypertension     Created by Conversion    ? Hypercholesterolemia     Created by Conversion    ? Hypothyroidism     Created by Conversion    ? Osteoarthritis Of The Knee     Created by Conversion    ? Osteopenia     Created by Conversion    ? Type 2 Diabetes Mellitus     Created by Conversion    ? Vitamin D Deficiency     Created by Conversion     Past Surgical History:   Procedure Laterality Date   ? TOTAL ABDOMINAL HYSTERECTOMY W/ BILATERAL SALPINGOOPHORECTOMY      Family History   Problem Relation Age of Onset   ? Coronary artery disease Brother     Social History     Social History   ? Marital status:      Spouse name: N/A   ? Number of children: N/A   ? Years of education: N/A     Occupational History   ? Not on file.     Social History Main Topics   ? Smoking status: Former Smoker     Types: Cigarettes     Quit date: 1991   ? Smokeless tobacco: Never Used   ? Alcohol use Yes   ? Drug use: Not on file   ? Sexual activity: Not on file     Other Topics Concern   ? Not on file     Social History Narrative          Medications  Allergies   Current Outpatient Prescriptions   Medication Sig Dispense Refill   ? acetaminophen (TYLENOL) 650 MG CR tablet Take 650 mg by mouth daily.      ? apixaban (ELIQUIS) 5 mg Tab tablet Take 1 tablet (5 mg total) by mouth 2 (two) times a day. 60 tablet 1   ? ascorbic acid (VITAMIN C) 100 MG tablet Take 100 mg by mouth daily.     ? calcium carbonate-vitamin D3 (CALCIUM 600 WITH VITAMIN D3) 600 mg(1,500mg) -200 unit per tablet Take 1 tablet by mouth daily.     ? cholecalciferol, vitamin D3, 1,000 unit capsule Take 3,000 Units by mouth daily.      ? cranberry 400 mg  cap Take 1 capsule by mouth daily.     ? cyanocobalamin (VITAMIN B-12) 1000 MCG tablet Take 1,000 mcg by mouth daily.     ? furosemide (LASIX) 20 MG tablet Take 1 tablet (20 mg total) by mouth as needed. (Patient taking differently: Take 20 mg by mouth as needed (Taken on Mon/Wed/Fri). ) 90 tablet 1   ? levothyroxine (SYNTHROID, LEVOTHROID) 50 MCG tablet TAKE ONE TABLET BY MOUTH ONE TIME DAILY 90 tablet 1   ? lisinopril-hydrochlorothiazide (PRINZIDE,ZESTORETIC) 20-12.5 mg per tablet TAKE TWO TABLETS BY MOUTH DAILY (Patient taking differently: No sig reported) 180 tablet 1   ? loratadine (CLARITIN) 10 mg tablet Take 10 mg by mouth daily.     ? metoprolol succinate (TOPROL-XL) 100 MG 24 hr tablet Take 100 mg by mouth daily.     ? potassium chloride (KLOR-CON) 10 MEQ CR tablet Take 1 tablet (10 mEq total) by mouth daily. TAKE ONLY IF TAKING FUROSEMIDE 90 tablet 1   ? pyridoxine (VITAMIN B-6) 100 MG tablet Take 100 mg by mouth daily.     ? simvastatin (ZOCOR) 20 MG tablet TAKE ONE TABLET BY MOUTH ONE TIME DAILY IN THE EVENING (Patient taking differently: TAKE ONE TABLET-HALF BY MOUTH ONE TIME DAILY IN THE EVENING) 90 tablet 2   ? vitamin E 400 unit Tab Take by mouth.       No current facility-administered medications for this visit.       Allergies   Allergen Reactions   ? Amoxicillin Hives   ? Cephalexin Other (See Comments)     Reaction with intervenous administration.   ? Penicillins Hives   ? Latex Rash   ? Other Environmental Allergy Other (See Comments)     Pollen causes seasonal allergies.         Lab Results    Chemistry/lipid CBC Cardiac Enzymes/BNP/TSH/INR   Lab Results   Component Value Date    CHOL 138 07/11/2017    HDL 42 (L) 07/11/2017    LDLCALC 82 07/11/2017    TRIG 71 07/11/2017    CREATININE 1.08 07/11/2017    BUN 25 07/11/2017    K 3.9 07/11/2017     07/11/2017     07/11/2017    CO2 25 07/11/2017    Lab Results   Component Value Date    WBC 6.1 05/23/2016    HGB 12.8 05/23/2016    HCT 38.1  05/23/2016    MCV 95 05/23/2016     05/23/2016    Lab Results   Component Value Date    CKTOTAL 90 02/27/2017    TROPONINI 0.01 01/09/2017     (H) 01/11/2017    TSH 2.32 07/11/2017     Lab Results   Component Value Date    HGBA1C 7.0 (H) 07/11/2017

## 2021-06-27 NOTE — PROGRESS NOTES
Progress Notes by Saul Tilley DO at 7/15/2019  1:10 PM     Author: Saul Tilley DO Service: -- Author Type: Physician    Filed: 7/15/2019  1:45 PM Encounter Date: 7/15/2019 Status: Signed    : Saul Tilley DO (Physician)           Click to link to [facility] Heart Care     [facility] HEART CARE NOTE      Assessment/Recommendations   Assessment:    1. Atrial flutter with controlled rate. No symptoms related to atrial flutter exception of rare palpitations..   CHADSVASC:6 (age, prior CHF, female, DM, HTN)   2. Chronic congestive heart failure secondary to HFpEF (LVH, mild pulmonary HTN).  NYHA early II.  Well compensated.   3. HTN  4. DM, A1C:7.0.  5. CKD stage III     Plan:  1. Continue metoprolol  2. On warfarin.   3. Continue HCTZ/lisinopril  4. As needed lasix   5.  Call instructions were provided to the patient.  She had a very stable course.  At this point I would recommend that she follows up as needed calls our clinic if anything new symptoms or concerns arise.          History of Present Illness    Ms. Miesha Quarles is a 89 y.o. female with hypertension, hyperlipidemia, diabetes mellitus, prior history of congestive heart failure likely related to left ventricular hypertrophy with diastolic dysfunction, mild pulmonary hypertension who presents to cardiology clinic for ongoing management of congestive heart failure, atrial flutter mild pulmonary hypertension.    Her last evaluation the patient has been switched over to warfarin therapy and is doing very well.  She is currently 2.5 mg daily.  INRs have been relatively well controlled with exception of recently slightly low INR.  She is no bleeding complication related warfarin.  She currently has no dyspnea on exertion.  She has rare episodes of palpitations lasting just a few minutes.  Her blood pressure at home is very well controlled compared to today.  Nuys any orthopnea lower extremity edema or PND symptoms.   She does not have to use her Lasix at this time.  She has been active this summer.  Continues to attend social outings.       ECHO:1/11/2017    No previous study for comparison.    Left ventricle ejection fraction is normal. The calculated left ventricular ejection fraction is 55%. No regional wall motion abnormality.    Normal right ventricular function.    Mild to moderate mitral valve regurgitation.    Mitral tricuspid valve regurgitation.  Pulmonary systolicpressure is 45 mmHg.      Physical Examination Review of Systems   Vitals:    07/15/19 1324   BP: 142/84   Pulse: 60   Resp: 16     Body mass index is 30.73 kg/m .  Wt Readings from Last 3 Encounters:   07/02/19 166 lb (75.3 kg)   04/01/19 163 lb (73.9 kg)   10/09/18 176 lb (79.8 kg)       General Appearance:   no distress, mild obese body habitus   ENT/Mouth: membranes moist, no oral lesions or bleeding gums.      EYES:  no scleral icterus, normal conjunctivae   Neck: no carotid bruits or thyromegaly   Chest/Lungs:   lungs are clear to auscultation, no rales or wheezing.     Cardiovascular:   Irregular (controlled rate). Normal first and second heart sounds with no murmurs, rubs, or gallops; the carotid, radial and posterior tibial pulses are intact, Jugular venous pressure 6-8cm, no pitting edema bilaterally    Abdomen:  no tenderness; bowel sounds are present   Extremities: no cyanosis or clubbing   Skin: no xanthelasma, warm.    Neurologic: normal gait, normal  bilateral, no tremors     Psychiatric: alert and oriented x3, calm     General: WNL  Eyes: WNL  Ears/Nose/Throat: WNL  Lungs: WNL  Heart: WNL  Stomach: WNL  Bladder: WNL  Muscle/Joints: WNL  Skin: WNL  Nervous System: WNL  Mental Health: WNL     Blood: WNL     Medical History  Surgical History Family History Social History   Past Medical History:   Diagnosis Date   ? Benign Essential Hypertension     Created by Conversion    ? Hypercholesterolemia     Created by Conversion    ? Hypothyroidism      Created by Conversion    ? Osteoarthritis Of The Knee     Created by Conversion    ? Osteopenia     Created by Conversion    ? Type 2 Diabetes Mellitus     Created by Conversion    ? Vitamin D Deficiency     Created by Conversion     Past Surgical History:   Procedure Laterality Date   ? TOTAL ABDOMINAL HYSTERECTOMY W/ BILATERAL SALPINGOOPHORECTOMY      Family History   Problem Relation Age of Onset   ? Coronary artery disease Brother     Social History     Socioeconomic History   ? Marital status:      Spouse name: Not on file   ? Number of children: Not on file   ? Years of education: Not on file   ? Highest education level: Not on file   Occupational History   ? Not on file   Social Needs   ? Financial resource strain: Not on file   ? Food insecurity:     Worry: Not on file     Inability: Not on file   ? Transportation needs:     Medical: Not on file     Non-medical: Not on file   Tobacco Use   ? Smoking status: Former Smoker     Types: Cigarettes     Last attempt to quit:      Years since quittin.5   ? Smokeless tobacco: Never Used   Substance and Sexual Activity   ? Alcohol use: Yes   ? Drug use: Not on file   ? Sexual activity: Not on file   Lifestyle   ? Physical activity:     Days per week: Not on file     Minutes per session: Not on file   ? Stress: Not on file   Relationships   ? Social connections:     Talks on phone: Not on file     Gets together: Not on file     Attends Hoahaoism service: Not on file     Active member of club or organization: Not on file     Attends meetings of clubs or organizations: Not on file     Relationship status: Not on file   ? Intimate partner violence:     Fear of current or ex partner: Not on file     Emotionally abused: Not on file     Physically abused: Not on file     Forced sexual activity: Not on file   Other Topics Concern   ? Not on file   Social History Narrative   ? Not on file          Medications  Allergies   Current Outpatient Medications    Medication Sig Dispense Refill   ? acetaminophen (TYLENOL) 650 MG CR tablet Take 650 mg by mouth as needed.      ? amLODIPine (NORVASC) 2.5 MG tablet Take 1 tablet (2.5 mg total) by mouth daily. 30 tablet 11   ? ascorbic acid (VITAMIN C) 100 MG tablet Take 100 mg by mouth daily.     ? calcium carbonate-vitamin D3 (CALCIUM 600 WITH VITAMIN D3) 600 mg(1,500mg) -200 unit per tablet Take 1 tablet by mouth daily.     ? cholecalciferol, vitamin D3, 1,000 unit capsule Take 3,000 Units by mouth daily.      ? cranberry 400 mg cap Take 1 capsule by mouth daily.     ? cyanocobalamin (VITAMIN B-12) 1000 MCG tablet Take 1,000 mcg by mouth daily.     ? furosemide (LASIX) 20 MG tablet TAKE 1 TABLET BY MOUTH AS NEEDED. 90 tablet 1   ? KLOR-CON 10 10 mEq CR tablet TAKE 1 TABLET BY MOUTH DAILY. TAKE ONLY IF TAKING FUROSEMIDE 90 tablet 0   ? levothyroxine (SYNTHROID, LEVOTHROID) 50 MCG tablet Take 1 tablet (50 mcg total) by mouth daily. 90 tablet 3   ? lisinopril-hydrochlorothiazide (PRINZIDE,ZESTORETIC) 20-12.5 mg per tablet TAKE TWO TABS BY MOUTH DAILY 180 tablet 3   ? loratadine (CLARITIN) 10 mg tablet Take 10 mg by mouth daily.     ? metoprolol succinate (TOPROL-XL) 100 MG 24 hr tablet Take 1 tablet (100 mg total) by mouth daily. 90 tablet 2   ? pyridoxine (VITAMIN B-6) 100 MG tablet Take 100 mg by mouth daily.     ? simvastatin (ZOCOR) 20 MG tablet TAKE 1/2 TABLET BY MOUTH DAILY IN THE EVENING. 90 tablet 0   ? traMADol (ULTRAM) 50 mg tablet TAKE 1 TABLET BY MOUTH EVERY 6 HOURS AS NEEDED FOR PAIN 30 tablet 0   ? vitamin E 400 unit Tab Take by mouth.     ? warfarin (COUMADIN) 5 MG tablet Take 1 tablet (5 mg total) by mouth See Admin Instructions. Adjust dose based on INR results as directed. 90 tablet 2     No current facility-administered medications for this visit.       Allergies   Allergen Reactions   ? Amoxicillin Hives   ? Cephalexin Other (See Comments)     Reaction with intervenous administration.   ? Penicillins Hives    ? Latex Rash   ? Other Environmental Allergy Other (See Comments)     Pollen causes seasonal allergies.         Lab Results    Chemistry/lipid CBC Cardiac Enzymes/BNP/TSH/INR   Lab Results   Component Value Date    CHOL 168 04/01/2019    HDL 51 04/01/2019    LDLCALC 100 04/01/2019    TRIG 87 04/01/2019    CREATININE 1.05 04/18/2019    BUN 16 04/18/2019    K 3.4 (L) 04/18/2019     04/18/2019     04/18/2019    CO2 25 04/18/2019    Lab Results   Component Value Date    WBC 8.0 10/10/2017    HGB 14.3 10/10/2017    HCT 41.4 10/10/2017    MCV 92 10/10/2017     10/10/2017    Lab Results   Component Value Date    CKTOTAL 90 02/27/2017    TROPONINI 0.02 10/10/2017     (H) 01/11/2017    TSH 2.27 04/01/2019    INR 1.70 (H) 07/02/2019     Lab Results   Component Value Date    HGBA1C 6.5 (H) 04/01/2019

## 2021-06-28 ENCOUNTER — COMMUNICATION - HEALTHEAST (OUTPATIENT)
Dept: ANTICOAGULATION | Facility: CLINIC | Age: 86
End: 2021-06-28

## 2021-06-28 ENCOUNTER — AMBULATORY - HEALTHEAST (OUTPATIENT)
Dept: LAB | Facility: CLINIC | Age: 86
End: 2021-06-28

## 2021-06-28 DIAGNOSIS — H34.8192 CENTRAL RETINAL VEIN OCCLUSION, UNSPECIFIED COMPLICATION STATUS, UNSPECIFIED LATERALITY (H): ICD-10-CM

## 2021-06-28 DIAGNOSIS — Z79.01 LONG TERM (CURRENT) USE OF ANTICOAGULANTS: ICD-10-CM

## 2021-06-28 DIAGNOSIS — I48.92 ATRIAL FLUTTER (H): ICD-10-CM

## 2021-06-28 DIAGNOSIS — I48.92 ATRIAL FLUTTER, UNSPECIFIED TYPE (H): ICD-10-CM

## 2021-06-28 LAB — INR PPP: 3.4 (ref 0.9–1.1)

## 2021-07-03 NOTE — ADDENDUM NOTE
Addendum Note by Laura Munoz MA at 10/26/2017 12:57 PM     Author: Laura Munoz MA Service: -- Author Type: Medical Assistant    Filed: 10/26/2017 12:57 PM Encounter Date: 10/26/2017 Status: Signed    : Laura Munoz MA (Medical Assistant)    Addended by: LAURA MUNOZ on: 10/26/2017 12:57 PM        Modules accepted: Orders         No

## 2021-07-03 NOTE — ADDENDUM NOTE
Addendum Note by Laura Munoz MA at 9/11/2018  1:25 PM     Author: Laura Munoz MA Service: -- Author Type: Medical Assistant    Filed: 9/11/2018  1:25 PM Encounter Date: 9/11/2018 Status: Signed    : Laura Munoz MA (Medical Assistant)    Addended by: LAURA MUNOZ on: 9/11/2018 01:25 PM        Modules accepted: Orders

## 2021-07-07 NOTE — TELEPHONE ENCOUNTER
Telephone Encounter by Bonnie Keller, RN at 6/28/2021  2:44 PM     Author: Bonnie Keller RN Service: -- Author Type: Registered Nurse    Filed: 6/28/2021  3:04 PM Encounter Date: 6/28/2021 Status: Signed    : Bonnie Keller RN (Registered Nurse)       ANTICOAGULATION MANAGEMENT     Miesha Quarles 89 y.o., female is on warfarin with Supratherapeutic INR result (goal range 2.0-3.0)    Recent labs: (last 7 days)     06/28/21  1312   INR 3.40*       ASSESSMENT     Source: Patient/Caregiver Call and Template      Warfarin dosing taken: Warfarin taken as instructed    Diet: No new diet changes affecting INR    Illness, Injury or hospitalization: No    Medication changes: None    Signs or symptoms of bleeding or clotting: No    Previous INR: therapeutic last 2(+) visits    Additional findings: None     PLAN     Recommended plan for no diet, medication or health factor changes affecting INR:     Dosing instructions: hold today then Decrease to 2.5 mg daily (12.5% change)    Follow up no later than: 2 weeks     Telephone call with Miesha who agrees to plan and repeated back plan correctly    Patient elected to schedule next visit 7/12/21    Education provided: importance of consistent vitamin K intake, target INR goal and significance of current INR result and importance of following up for INR monitoring at instructed interval    Plan made per [facility] anticoagulation protocol    Bonnie Keller  Anticoagulation Clinic   [phone]    Anticoagulation Episode Summary     Current INR goal:  2.0-3.0   TTR:  79.2 % (1 y)   Next INR check:  7/12/2021   INR from last check:  3.40 (6/28/2021)   Weekly max warfarin dose:     Target end date:     INR check location:     Preferred lab:     Send INR reminders to:  [facility]    Indications    Atrial flutter (H) [I48.92]           Comments:           Anticoagulation Care Providers     Provider Role Specialty Phone number    Roc Mares MD Referring Internal  Medicine [phone]

## 2021-07-09 DIAGNOSIS — I48.92 ATRIAL FLUTTER (H): Primary | ICD-10-CM

## 2021-07-19 ENCOUNTER — DOCUMENTATION ONLY (OUTPATIENT)
Dept: INTERNAL MEDICINE | Facility: CLINIC | Age: 86
End: 2021-07-19

## 2021-07-19 ENCOUNTER — TELEPHONE (OUTPATIENT)
Dept: ANTICOAGULATION | Facility: CLINIC | Age: 86
End: 2021-07-19

## 2021-07-19 NOTE — TELEPHONE ENCOUNTER
ANTICOAGULATION     Miesha Quarles is overdue for INR check.      spoke with Miesha who declined to schedule a lab appointment at this time. If calling back please schedule as soon as possible.    - has no car and no transportation at the moment.   - will ask a niece if she can take her to INR appt.   - will call back to schedule, when she can.    Reina Cruz RN

## 2021-07-30 ENCOUNTER — TELEPHONE (OUTPATIENT)
Dept: ANTICOAGULATION | Facility: CLINIC | Age: 86
End: 2021-07-30

## 2021-07-30 DIAGNOSIS — I10 ESSENTIAL HYPERTENSION, BENIGN: ICD-10-CM

## 2021-07-30 RX ORDER — AMLODIPINE BESYLATE 2.5 MG/1
TABLET ORAL
Qty: 90 TABLET | Refills: 0 | Status: SHIPPED | OUTPATIENT
Start: 2021-07-30 | End: 2022-05-16

## 2021-07-30 NOTE — TELEPHONE ENCOUNTER
ANTICOAGULATION     Miesha Quarles is overdue for INR check.      Was unable to reach patient and was unable to leave a voicemail. If patient calls, please schedule INR check as soon as possible.     Reina Cruz RN

## 2021-08-03 NOTE — TELEPHONE ENCOUNTER
ANTICOAGULATION     Miesha Quarles is overdue for INR check.      spoke with Miesha who declined to schedule a lab appointment at this time. If calling back please schedule as soon as possible.    - has no transportation and only has niece who can take her.   - she will call back and set up appt, on when niece is available.    Reina Cruz RN

## 2021-08-13 ENCOUNTER — TELEPHONE (OUTPATIENT)
Dept: ANTICOAGULATION | Facility: CLINIC | Age: 86
End: 2021-08-13

## 2021-08-13 NOTE — TELEPHONE ENCOUNTER
ANTICOAGULATION     Miesha Quarles is overdue for INR check.      Spoke with Miesha and scheduled lab appointment on 8/17 @ [facility]    Reina Cruz RN

## 2021-08-17 ENCOUNTER — LAB (OUTPATIENT)
Dept: LAB | Facility: CLINIC | Age: 86
End: 2021-08-17
Payer: COMMERCIAL

## 2021-08-17 ENCOUNTER — ANTICOAGULATION THERAPY VISIT (OUTPATIENT)
Dept: ANTICOAGULATION | Facility: CLINIC | Age: 86
End: 2021-08-17

## 2021-08-17 DIAGNOSIS — I48.92 ATRIAL FLUTTER (H): ICD-10-CM

## 2021-08-17 DIAGNOSIS — I48.92 ATRIAL FLUTTER (H): Primary | ICD-10-CM

## 2021-08-17 LAB — INR BLD: 1.7 (ref 0.9–1.1)

## 2021-08-17 PROCEDURE — 85610 PROTHROMBIN TIME: CPT

## 2021-08-17 PROCEDURE — 36415 COLL VENOUS BLD VENIPUNCTURE: CPT

## 2021-08-17 NOTE — PROGRESS NOTES
ANTICOAGULATION MANAGEMENT     Miesha OSORIO Willam 89 year old female is on warfarin with subtherapeutic INR result. (Goal INR 2.0-3.0)    Recent labs: (last 7 days)     08/17/21  1307   INR 1.7*       ASSESSMENT     Source(s): Chart Review, Patient/Caregiver Call and Template       Warfarin doses taken: More warfarin taken than planned which may be affecting INR    - was taking more warfarin than instructed.   - did reported taking 5mg warfarin dose some time ago, instead of 2.5mg    Diet: No new diet changes identified    New illness, injury, or hospitalization: No    Medication/supplement changes: None noted    Signs or symptoms of bleeding or clotting: No    Previous INR: Supratherapeutic at 3.40 on 6/28/21.    Additional findings: None     PLAN     Recommended plan for no diet, medication or health factor changes affecting INR     Dosing Instructions:   - tonight, advised one time booster with 5mg warfarin dose,   - Continue your current warfarin dose with 5mg on Fridays and 2.5mg all other days.   - next INR in 2 weeks       Summary  As of 8/17/2021    Full warfarin instructions:  8/17: 5 mg; Otherwise 5 mg every Fri; 2.5 mg all other days   Next INR check:  8/31/2021             Telephone call with Miesha who verbalizes understanding and agrees to plan    Patient offered & declined to schedule next visit    Education provided: Importance of consistent vitamin K intake, Target INR goal and significance of current INR result and Importance of taking warfarin as instructed    Plan made per [facility] anticoagulation protocol    Reina Cruz RN  Anticoagulation Clinic  8/17/2021    _______________________________________________________________________     Anticoagulation Episode Summary     Current INR goal:  2.0-3.0   TTR:  73.5 % (1 y)   Target end date:     Send INR reminders to:  [facility]    Indications    Atrial flutter (H) [I48.92]           Comments:           Anticoagulation Care Providers      Provider Role Specialty Phone number    Roc Mares MD Referring Internal Medicine [phone]

## 2021-09-01 ENCOUNTER — TELEPHONE (OUTPATIENT)
Dept: ANTICOAGULATION | Facility: CLINIC | Age: 86
End: 2021-09-01

## 2021-09-01 NOTE — TELEPHONE ENCOUNTER
ANTICOAGULATION     Miesha Quarles is overdue for INR check.      Spoke with Miesha and scheduled lab appointment on 9/7/21 @ [facility].    Reina Cruz RN

## 2021-09-14 ENCOUNTER — ANTICOAGULATION THERAPY VISIT (OUTPATIENT)
Dept: ANTICOAGULATION | Facility: CLINIC | Age: 86
End: 2021-09-14

## 2021-09-14 ENCOUNTER — LAB (OUTPATIENT)
Dept: LAB | Facility: CLINIC | Age: 86
End: 2021-09-14
Payer: COMMERCIAL

## 2021-09-14 DIAGNOSIS — I48.92 ATRIAL FLUTTER (H): Primary | ICD-10-CM

## 2021-09-14 DIAGNOSIS — I48.92 ATRIAL FLUTTER (H): ICD-10-CM

## 2021-09-14 LAB — INR BLD: 1.6 (ref 0.9–1.1)

## 2021-09-14 PROCEDURE — 85610 PROTHROMBIN TIME: CPT | Performed by: INTERNAL MEDICINE

## 2021-09-14 NOTE — PROGRESS NOTES
ANTICOAGULATION MANAGEMENT     Miesha OSORIO Quarles 89 year old female is on warfarin with subtherapeutic INR result. (Goal INR 2.0-3.0)    Recent labs: (last 7 days)     09/14/21  1402   INR 1.6*       ASSESSMENT     Source(s): Chart Review, Patient/Caregiver Call and Template       Warfarin doses taken: Missed dose(s) may be affecting INR    One missed warfarin dose 6 days ago.    Diet: No new diet changes identified    New illness, injury, or hospitalization: No    Medication/supplement changes: None noted    Signs or symptoms of bleeding or clotting: No    Previous INR: Subtherapeutic at 1.7 on 8/17/21.    Additional findings: None     PLAN     Recommended plan for temporary change(s) affecting INR     Dosing Instructions:  (evenings. 5mg tabs)   - Increase your warfarin dose to 5mg on Mon/Fri and 2.5mg al other days.   - (12.5% change)   - next INR in 1-2 weeks       Summary  As of 9/14/2021    Full warfarin instructions:  5 mg every Mon, Fri; 2.5 mg all other days   Next INR check:  9/28/2021             Telephone call with Miesha ([phone]) who verbalizes understanding and agrees to plan    Patient offered & declined to schedule next visit    Education provided: Goal range and significance of current result and Importance of taking warfarin as instructed    Plan made per [facility] anticoagulation protocol    Reina Cruz, RN  Anticoagulation Clinic  9/14/2021    _______________________________________________________________________     Anticoagulation Episode Summary     Current INR goal:  2.0-3.0   TTR:  65.8 % (1 y)   Target end date:     Send INR reminders to:  [facility]    Indications    Atrial flutter (H) [I48.92]           Comments:           Anticoagulation Care Providers     Provider Role Specialty Phone number    Roc Mares MD Referring Internal Medicine [phone]

## 2021-10-01 ENCOUNTER — TELEPHONE (OUTPATIENT)
Dept: ANTICOAGULATION | Facility: CLINIC | Age: 86
End: 2021-10-01

## 2021-10-01 NOTE — TELEPHONE ENCOUNTER
ANTICOAGULATION     Miesha Quarles is overdue for INR check.      Spoke with Miesha and scheduled lab appointment on 10/5 @ [facility]    Reina Cruz RN

## 2021-10-05 NOTE — TELEPHONE ENCOUNTER
Called and talked with Miesha.     - she had to reschedule INR appt today, and rescheduled for 10/18/21.     - her car would not start.     - reported no abnormal bleeding or bruisings.  Staying consistent with her diet and taking her medications as instructed.

## 2021-10-05 NOTE — TELEPHONE ENCOUNTER
Pt left VM this AM stating she won't be able to make it to her appointment today 2/2 car trouble. Please call back to re-schedule. Thanks!

## 2021-10-19 ENCOUNTER — TELEPHONE (OUTPATIENT)
Dept: ANTICOAGULATION | Facility: CLINIC | Age: 86
End: 2021-10-19

## 2021-10-19 NOTE — TELEPHONE ENCOUNTER
Patient is calling back today in regards to a message that was left. Reviewed the message below and patient stated that she is having car issues and would like to talk to INR nurse. Please call [phone]. Thank you

## 2021-10-19 NOTE — TELEPHONE ENCOUNTER
ANTICOAGULATION     Miesha Quarles is overdue for INR check.      Spoke with Miesha and scheduled lab appointment on 10/26/21 @ [facility]    Car still out of comission   Asking a friend for ride.    Reina Cruz RN

## 2021-10-19 NOTE — TELEPHONE ENCOUNTER
ANTICOAGULATION     Miesha Quarles is overdue for INR check.      Was unable to reach patient and was unable to leave a voicemail. If patient calls, please schedule INR check as soon as possible.     Michaelle Wilson RN

## 2021-10-23 DIAGNOSIS — I10 ESSENTIAL HYPERTENSION, BENIGN: ICD-10-CM

## 2021-10-26 ENCOUNTER — LAB (OUTPATIENT)
Dept: LAB | Facility: CLINIC | Age: 86
End: 2021-10-26
Payer: COMMERCIAL

## 2021-10-26 ENCOUNTER — ANTICOAGULATION THERAPY VISIT (OUTPATIENT)
Dept: ANTICOAGULATION | Facility: CLINIC | Age: 86
End: 2021-10-26

## 2021-10-26 DIAGNOSIS — I48.92 ATRIAL FLUTTER (H): ICD-10-CM

## 2021-10-26 DIAGNOSIS — I48.92 ATRIAL FLUTTER (H): Primary | ICD-10-CM

## 2021-10-26 LAB — INR BLD: 3.3 (ref 0.9–1.1)

## 2021-10-26 PROCEDURE — 36416 COLLJ CAPILLARY BLOOD SPEC: CPT

## 2021-10-26 PROCEDURE — 85610 PROTHROMBIN TIME: CPT

## 2021-10-26 RX ORDER — METOPROLOL SUCCINATE 100 MG/1
TABLET, EXTENDED RELEASE ORAL
Qty: 90 TABLET | Refills: 1 | Status: SHIPPED | OUTPATIENT
Start: 2021-10-26 | End: 2022-05-13

## 2021-10-26 NOTE — PROGRESS NOTES
ANTICOAGULATION MANAGEMENT     Miesha Quarles 89 year old female is on warfarin with supratherapeutic INR result. (Goal INR 2.0-3.0)    Recent labs: (last 7 days)     10/26/21  1429   INR 3.3*       ASSESSMENT     Source(s): Chart Review, Patient/Caregiver Call and Template       Warfarin doses taken: Warfarin taken as instructed    Diet: No new diet changes identified    New illness, injury, or hospitalization: Yes:    Flare-up of her arthritis all over and relates it to change in weather.    Medication/supplement changes: Yes.    Taking Arthritis Acetaminophen for pains.    Signs or symptoms of bleeding or clotting: No    Previous INR: Subtherapeutic last 2 INR results.    Additional findings: None     PLAN     Recommended plan for temporary change(s) affecting INR     Dosing Instructions:   - Continue your current warfarin dose with next INR in 2 weeks       Summary  As of 10/26/2021    Full warfarin instructions:  10/26: Hold; Otherwise 5 mg every Mon, Fri; 2.5 mg all other days   Next INR check:  11/9/2021             Telephone call with Miesha who verbalizes understanding and agrees to plan    Patient offered & declined to schedule next visit    - she will call d/t car is in [employer] shop and a friend gave her a ride.    Education provided: Potential interaction between warfarin and alcohol and Potential interaction between warfarin and Arthritis Tylenol    Plan made per [facility] anticoagulation protocol    Reina Cruz RN  Anticoagulation Clinic  10/26/2021    _______________________________________________________________________     Anticoagulation Episode Summary     Current INR goal:  2.0-3.0   TTR:  61.2 % (1 y)   Target end date:     Send INR reminders to:  [facility]    Indications    Atrial flutter (H) [I48.92]           Comments:           Anticoagulation Care Providers     Provider Role Specialty Phone number    Roc Mares MD Referring Internal Medicine [phone]

## 2021-11-09 DIAGNOSIS — E78.00 HYPERCHOLESTEREMIA: ICD-10-CM

## 2021-11-09 RX ORDER — SIMVASTATIN 20 MG
TABLET ORAL
Qty: 45 TABLET | Refills: 3 | OUTPATIENT
Start: 2021-11-09

## 2021-11-18 DIAGNOSIS — E78.00 HYPERCHOLESTEREMIA: ICD-10-CM

## 2021-11-18 RX ORDER — SIMVASTATIN 20 MG
TABLET ORAL
Qty: 45 TABLET | Refills: 1 | Status: SHIPPED | OUTPATIENT
Start: 2021-11-18 | End: 2022-05-13

## 2021-11-22 ENCOUNTER — ANTICOAGULATION THERAPY VISIT (OUTPATIENT)
Dept: ANTICOAGULATION | Facility: CLINIC | Age: 86
End: 2021-11-22

## 2021-11-22 ENCOUNTER — OFFICE VISIT (OUTPATIENT)
Dept: INTERNAL MEDICINE | Facility: CLINIC | Age: 86
End: 2021-11-22
Payer: MEDICARE

## 2021-11-22 VITALS
TEMPERATURE: 97.8 F | HEIGHT: 62 IN | BODY MASS INDEX: 31.66 KG/M2 | DIASTOLIC BLOOD PRESSURE: 62 MMHG | SYSTOLIC BLOOD PRESSURE: 120 MMHG

## 2021-11-22 DIAGNOSIS — I48.3 TYPICAL ATRIAL FLUTTER (H): ICD-10-CM

## 2021-11-22 DIAGNOSIS — I48.92 ATRIAL FLUTTER (H): Primary | ICD-10-CM

## 2021-11-22 DIAGNOSIS — M15.0 PRIMARY OSTEOARTHRITIS INVOLVING MULTIPLE JOINTS: ICD-10-CM

## 2021-11-22 DIAGNOSIS — R06.7 SNEEZING: ICD-10-CM

## 2021-11-22 DIAGNOSIS — I10 ESSENTIAL HYPERTENSION, BENIGN: ICD-10-CM

## 2021-11-22 DIAGNOSIS — N18.31 STAGE 3A CHRONIC KIDNEY DISEASE (H): ICD-10-CM

## 2021-11-22 DIAGNOSIS — E03.9 HYPOTHYROIDISM, UNSPECIFIED TYPE: ICD-10-CM

## 2021-11-22 DIAGNOSIS — E11.8 TYPE 2 DIABETES MELLITUS WITH COMPLICATION (H): Primary | ICD-10-CM

## 2021-11-22 DIAGNOSIS — Z23 HIGH PRIORITY FOR 2019-NCOV VACCINE: ICD-10-CM

## 2021-11-22 LAB
ALBUMIN SERPL-MCNC: 3 G/DL (ref 3.5–5)
ALP SERPL-CCNC: 97 U/L (ref 45–120)
ALT SERPL W P-5'-P-CCNC: <9 U/L (ref 0–45)
ANION GAP SERPL CALCULATED.3IONS-SCNC: 14 MMOL/L (ref 5–18)
AST SERPL W P-5'-P-CCNC: 10 U/L (ref 0–40)
BILIRUB SERPL-MCNC: 0.7 MG/DL (ref 0–1)
BUN SERPL-MCNC: 16 MG/DL (ref 8–28)
CALCIUM SERPL-MCNC: 8.5 MG/DL (ref 8.5–10.5)
CHLORIDE BLD-SCNC: 103 MMOL/L (ref 98–107)
CHOLEST SERPL-MCNC: 154 MG/DL
CO2 SERPL-SCNC: 24 MMOL/L (ref 22–31)
CREAT SERPL-MCNC: 1.08 MG/DL (ref 0.6–1.1)
FASTING STATUS PATIENT QL REPORTED: NORMAL
GFR SERPL CREATININE-BSD FRML MDRD: 45 ML/MIN/1.73M2
GLUCOSE BLD-MCNC: 121 MG/DL (ref 70–125)
HBA1C MFR BLD: 7 % (ref 0–5.6)
HDLC SERPL-MCNC: 51 MG/DL
INR BLD: 3 (ref 0.9–1.1)
LDLC SERPL CALC-MCNC: 90 MG/DL
POTASSIUM BLD-SCNC: 3.6 MMOL/L (ref 3.5–5)
PROT SERPL-MCNC: 7.4 G/DL (ref 6–8)
SODIUM SERPL-SCNC: 141 MMOL/L (ref 136–145)
TRIGL SERPL-MCNC: 66 MG/DL
TSH SERPL DL<=0.005 MIU/L-ACNC: 4.23 UIU/ML (ref 0.3–5)

## 2021-11-22 PROCEDURE — 91300 COVID-19,PF,PFIZER (12+ YRS): CPT | Performed by: INTERNAL MEDICINE

## 2021-11-22 PROCEDURE — U0005 INFEC AGEN DETEC AMPLI PROBE: HCPCS | Performed by: INTERNAL MEDICINE

## 2021-11-22 PROCEDURE — 36415 COLL VENOUS BLD VENIPUNCTURE: CPT | Performed by: INTERNAL MEDICINE

## 2021-11-22 PROCEDURE — 83036 HEMOGLOBIN GLYCOSYLATED A1C: CPT | Performed by: INTERNAL MEDICINE

## 2021-11-22 PROCEDURE — 90662 IIV NO PRSV INCREASED AG IM: CPT | Performed by: INTERNAL MEDICINE

## 2021-11-22 PROCEDURE — 0004A COVID-19,PF,PFIZER (12+ YRS): CPT | Performed by: INTERNAL MEDICINE

## 2021-11-22 PROCEDURE — 80053 COMPREHEN METABOLIC PANEL: CPT | Performed by: INTERNAL MEDICINE

## 2021-11-22 PROCEDURE — G0008 ADMIN INFLUENZA VIRUS VAC: HCPCS | Performed by: INTERNAL MEDICINE

## 2021-11-22 PROCEDURE — 84443 ASSAY THYROID STIM HORMONE: CPT | Performed by: INTERNAL MEDICINE

## 2021-11-22 PROCEDURE — 85610 PROTHROMBIN TIME: CPT | Performed by: INTERNAL MEDICINE

## 2021-11-22 PROCEDURE — 99214 OFFICE O/P EST MOD 30 MIN: CPT | Mod: 25 | Performed by: INTERNAL MEDICINE

## 2021-11-22 PROCEDURE — 80061 LIPID PANEL: CPT | Performed by: INTERNAL MEDICINE

## 2021-11-22 PROCEDURE — U0003 INFECTIOUS AGENT DETECTION BY NUCLEIC ACID (DNA OR RNA); SEVERE ACUTE RESPIRATORY SYNDROME CORONAVIRUS 2 (SARS-COV-2) (CORONAVIRUS DISEASE [COVID-19]), AMPLIFIED PROBE TECHNIQUE, MAKING USE OF HIGH THROUGHPUT TECHNOLOGIES AS DESCRIBED BY CMS-2020-01-R: HCPCS | Performed by: INTERNAL MEDICINE

## 2021-11-22 RX ORDER — TRAMADOL HYDROCHLORIDE 50 MG/1
50 TABLET ORAL EVERY 6 HOURS PRN
Qty: 20 TABLET | Refills: 0 | Status: SHIPPED | OUTPATIENT
Start: 2021-11-22 | End: 2022-05-10

## 2021-11-22 NOTE — LETTER
November 24, 2021      Miesha Quarles  [address] [place] [address]        Dear ,    We are writing to inform you of your test results.    Covid test is negative.  All the rest of your labs look good.       Resulted Orders   Symptomatic COVID-19 Virus (Coronavirus) by PCR Nose   Result Value Ref Range    SARS CoV2 PCR Negative Negative, Testing sent to reference lab. Results will be returned via unsolicited result      Comment:      NEGATIVE: SARS-CoV-2 (COVID-19) RNA not detected, presumed negative.    Narrative    Testing was performed using the Aptima SARS-CoV-2 Assay on the  [employer] Instrument System. Additional information about this  Emergency Use Authorization (EUA) assay can be found via the Lab  Guide. This test should be ordered for the detection of SARS-CoV-2 in  individuals who meet SARS-CoV-2 clinical and/or epidemiological  criteria. Test performance is unknown in asymptomatic patients. This  test is for in vitro diagnostic use under the FDA EUA for  laboratories certified under CLIA to perform high complexity testing.  This test has not been FDA cleared or approved. A negative result  does not rule out the presence of PCR inhibitors in the specimen or  target RNA in concentration below the limit of detection for the  assay. The possibility of a false negative should be considered if  the patient's recent exposure or clinical presentation suggests  COVID-19. This test was validated by the [facility]. This laboratory is certified under  the Clinical Laboratory Improvement Amendments of 1988 (CLIA-88) as  qualified to perform high complexity laboratory testing.   Hemoglobin A1c   Result Value Ref Range    Hemoglobin A1C 7.0 (H) 0.0 - 5.6 %      Comment:      Normal <5.7%   Prediabetes 5.7-6.4%    Diabetes 6.5% or higher     Note: Adopted from ADA consensus guidelines.   Comprehensive metabolic panel (BMP + Alb, Alk Phos, ALT, AST,  Total. Bili, TP)   Result Value Ref Range    Sodium 141 136 - 145 mmol/L    Potassium 3.6 3.5 - 5.0 mmol/L    Chloride 103 98 - 107 mmol/L    Carbon Dioxide (CO2) 24 22 - 31 mmol/L    Anion Gap 14 5 - 18 mmol/L    Urea Nitrogen 16 8 - 28 mg/dL    Creatinine 1.08 0.60 - 1.10 mg/dL    Calcium 8.5 8.5 - 10.5 mg/dL    Glucose 121 70 - 125 mg/dL    Alkaline Phosphatase 97 45 - 120 U/L    AST 10 0 - 40 U/L    ALT <9 0 - 45 U/L    Protein Total 7.4 6.0 - 8.0 g/dL    Albumin 3.0 (L) 3.5 - 5.0 g/dL    Bilirubin Total 0.7 0.0 - 1.0 mg/dL    GFR Estimate 45 (L) >60 mL/min/1.73m2      Comment:      As of July 11, 2021, eGFR is calculated by the CKD-EPI creatinine equation, without race adjustment. eGFR can be influenced by muscle mass, exercise, and diet. The reported eGFR is an estimation only and is only applicable if the renal function is stable.   Lipid panel reflex to direct LDL Fasting   Result Value Ref Range    Cholesterol 154 <=199 mg/dL    Triglycerides 66 <=149 mg/dL    Direct Measure HDL 51 >=50 mg/dL      Comment:      HDL Cholesterol Reference Range:     0-2 years:   No reference ranges established for patients under 2 years old  at [facility] for lipid analytes.    2-8 years:  Greater than 45 mg/dL     18 years and older:   Female: Greater than or equal to 50 mg/dL   Male:   Greater than or equal to 40 mg/dL    LDL Cholesterol Calculated 90 <=129 mg/dL    Patient Fasting > 8hrs? Unknown    TSH with free T4 reflex   Result Value Ref Range    TSH 4.23 0.30 - 5.00 uIU/mL       If you have any questions or concerns, please call the clinic at the number listed above.       Sincerely,      Roc Mares MD

## 2021-11-22 NOTE — PROGRESS NOTES
Office Visit - Follow Up   Miesha Quarles   89 year old female    Date of Visit: 11/22/2021    Chief Complaint   Patient presents with     Diabetes     fasting - needs booster      Imm/Inj     COVID-19 VACCINE        Assessment and Plan   1. Type 2 diabetes mellitus with complication (H)  Labs today for monitoring.  I'll see her back in 3 to 4 months for her annual wellness.  - Hemoglobin A1c; Future  - Comprehensive metabolic panel (BMP + Alb, Alk Phos, ALT, AST, Total. Bili, TP); Future  - Lipid panel reflex to direct LDL Fasting; Future    2. Stage 3a chronic kidney disease (H)  Labs today for monitoring.  - Comprehensive metabolic panel (BMP + Alb, Alk Phos, ALT, AST, Total. Bili, TP); Future    3. Hypothyroidism, unspecified type  Seems euthyroid.  Check TSH.  - TSH with free T4 reflex; Future    4. Primary osteoarthritis involving multiple joints  I did renew her tramadol which she takes very sparingly.  - traMADol (ULTRAM) 50 MG tablet; Take 1 tablet (50 mg) by mouth every 6 hours as needed  Dispense: 20 tablet; Refill: 0    5. Benign Essential Hypertension  Good control.  Continue regimen.    6. High priority for 2019-nCoV vaccine    - COVID-19,PF,PFIZER (12+ Yrs PURPLE LABEL)    7. Sneezing    - Symptomatic COVID-19 Virus (Coronavirus) by PCR Nose        No follow-ups on file.     History of Present Illness   This 89 year old Miesha comes in today after an extended absence.  I saw her after a fall this last spring and she then failed to follow-up after that.  She reports has been doing well.  No further falls.  Her boyfriend who is nearing 100 years old is still living on dialysis.  She herself has a lot of help from family members.  A grand niece accompanies her today.  Sugars have been controlled.  Her biggest concern is ongoing aches and pains from DJD of multiple sites.  Her knees are bone-on-bone.  She uses a rare tramadol which seems to work well for her.  She would like a refill of that.   "Sugars have been okay she believes.  She had no bleeding on her warfarin.  She is due for Covid boosters and flu shot today.  She was doing some sneezing when she came in so they put her in an isolation room.  No other symptoms of Covid    Review of Systems: A comprehensive review of systems was negative except as noted.     Medications, Allergies and Problem List   Reviewed, reconciled and updated  Post Discharge Medication Reconciliation Status:      Physical Exam   General Appearance:   Delightful woman who looks well.  Vitals noted.    /62 (BP Location: Left arm, Patient Position: Sitting, Cuff Size: Adult Small)   Temp 97.8  F (36.6  C)   Ht 1.575 m (5' 2\")   BMI 31.66 kg/m           Additional Information   Current Outpatient Medications   Medication Sig Dispense Refill     acetaminophen (TYLENOL) 650 MG CR tablet [ACETAMINOPHEN (TYLENOL) 650 MG CR TABLET] Take 650 mg by mouth as needed.        amLODIPine (NORVASC) 2.5 MG tablet [AMLODIPINE (NORVASC) 2.5 MG TABLET] TAKE 1 TABLET EVERY DAY 90 tablet 0     ascorbic acid (VITAMIN C) 100 MG tablet [ASCORBIC ACID (VITAMIN C) 100 MG TABLET] Take 100 mg by mouth daily.       calcium carbonate-vitamin D3 (CALCIUM 600 WITH VITAMIN D3) 600 mg(1,500mg) -200 unit per tablet [CALCIUM CARBONATE-VITAMIN D3 (CALCIUM 600 WITH VITAMIN D3) 600 MG(1,500MG) -200 UNIT PER TABLET] Take 1 tablet by mouth daily.       cholecalciferol, vitamin D3, 1,000 unit capsule [CHOLECALCIFEROL, VITAMIN D3, 1,000 UNIT CAPSULE] Take 3,000 Units by mouth daily.        cyanocobalamin (VITAMIN B-12) 1000 MCG tablet [CYANOCOBALAMIN (VITAMIN B-12) 1000 MCG TABLET] Take 1,000 mcg by mouth daily.       furosemide (LASIX) 20 MG tablet [FUROSEMIDE (LASIX) 20 MG TABLET] TAKE 1 TABLET BY MOUTH AS NEEDED. 90 tablet 1     KLOR-CON 10 10 mEq CR tablet [KLOR-CON 10 10 MEQ CR TABLET] TAKE 1 TABLET BY MOUTH DAILY. TAKE ONLY IF TAKING FUROSEMIDE 90 tablet 0     levothyroxine (SYNTHROID, LEVOTHROID) 50 MCG " tablet [LEVOTHYROXINE (SYNTHROID, LEVOTHROID) 50 MCG TABLET] TAKE 1 TABLET BY MOUTH EVERY DAY 90 tablet 2     lisinopriL-hydrochlorothiazide (PRINZIDE,ZESTORETIC) 20-12.5 mg per tablet [LISINOPRIL-HYDROCHLOROTHIAZIDE (PRINZIDE,ZESTORETIC) 20-12.5 MG PER TABLET] TAKE 2 TABLETS EVERY  tablet 2     loratadine (CLARITIN) 10 mg tablet [LORATADINE (CLARITIN) 10 MG TABLET] Take 10 mg by mouth as needed.        MEDICATION CANNOT BE REORDERED - PLEASE MANUALLY REORDER AND DISCONTINUE THE OLD ORDER [VITAMIN E 400 UNIT TAB] Take by mouth.       metoprolol succinate ER (TOPROL-XL) 100 MG 24 hr tablet TAKE 1 TABLET BY MOUTH EVERY DAY 90 tablet 1     pyridoxine (VITAMIN B-6) 100 MG tablet [PYRIDOXINE (VITAMIN B-6) 100 MG TABLET] Take 100 mg by mouth daily.       simvastatin (ZOCOR) 20 MG tablet [SIMVASTATIN (ZOCOR) 20 MG TABLET] TAKE 1/2 TABLET BY MOUTH DAILY IN THE EVENING. 45 tablet 1     traMADol (ULTRAM) 50 MG tablet Take 1 tablet (50 mg) by mouth every 6 hours as needed 20 tablet 0     warfarin ANTICOAGULANT (COUMADIN/JANTOVEN) 5 MG tablet [WARFARIN ANTICOAGULANT (COUMADIN/JANTOVEN) 5 MG TABLET] Takes 1/2 tablet (2.5mg) daily and Takes 1 tablet (5mg) on  /  by mouth as directed.  Adjust dose based on INR results. 80 tablet 2     Allergies   Allergen Reactions     Amoxicillin Hives     Cephalexin Other (See Comments)     Reaction with intervenous administration.     Penicillins Hives     Latex Rash     Other Environmental Allergy Other (See Comments)     Pollen causes seasonal allergies.     Social History     Tobacco Use     Smoking status: Former Smoker     Types: Cigarettes     Quit date: 1991     Years since quittin.9     Smokeless tobacco: Never Used   Substance Use Topics     Alcohol use: Yes     Drug use: None       Review and/or order of clinical lab tests:  Review and/or order of radiology tests:  Review and/or order of medicine tests:  Discussion of test results with performing  physician:  Decision to obtain old records and/or obtain history from someone other than the patient:  Review and summarization of old records and/or obtaining history from someone other than the patient and.or discussion of case with another health care provider:  Independent visualization of image, tracing or specimen itself:    Time:      SAUL ALEXANDER MD

## 2021-11-22 NOTE — PROGRESS NOTES
ANTICOAGULATION MANAGEMENT     Miesha Quarles 89 year old female is on warfarin with therapeutic INR result. (Goal INR 2.0-3.0)    Recent labs: (last 7 days)     11/22/21  1336   INR 3.0*       ASSESSMENT     Source(s): Chart Review, Patient/Caregiver Call and Template       Warfarin doses taken: Warfarin taken as instructed    Diet: Decreased greens/vitamin K in diet; plans to resume previous intake    - her stove broke down.    New illness, injury, or hospitalization: No    - has been sneezing.    Medication/supplement changes: None noted    - vaccinated with Quad. High dose Flu shot and Pfizer Covid-19 booster shot.    Signs or symptoms of bleeding or clotting: No    Previous INR: Supratherapeutic at 3.3 and previous was subtherapeutic at 1.6    Additional findings:  Yes.  Tested for Covid-19 virus test.     PLAN     Recommended plan for temporary change(s) affecting INR     Dosing Instructions:   - Continue your current warfarin dose with next INR in 2 weeks       Summary  As of 11/22/2021    Full warfarin instructions:  5 mg every Mon, Fri; 2.5 mg all other days   Next INR check:               Telephone call with  Miesha ([phone]) who verbalizes understanding and agrees to plan    - advised extra servings wkly of Vitamin-K foods.    Patient offered & declined to schedule next visit    Education provided: Importance of consistent vitamin K intake, Impact of vitamin K foods on INR and Goal range and significance of current result    Plan made per [facility] anticoagulation protocol    Reina Cruz RN  Anticoagulation Clinic  11/22/2021    _______________________________________________________________________     Anticoagulation Episode Summary     Current INR goal:  2.0-3.0   TTR:  53.8 % (1 y)   Target end date:     Send INR reminders to:  [facility]    Indications    Atrial flutter (H) [I48.92]           Comments:           Anticoagulation Care Providers     Provider Role Specialty Phone number     Roc Mares MD [facility] Internal Medicine [phone]

## 2021-11-23 LAB — SARS-COV-2 RNA RESP QL NAA+PROBE: NEGATIVE

## 2021-12-13 ENCOUNTER — TELEPHONE (OUTPATIENT)
Dept: ANTICOAGULATION | Facility: CLINIC | Age: 86
End: 2021-12-13
Payer: MEDICARE

## 2021-12-13 NOTE — TELEPHONE ENCOUNTER
ANTICOAGULATION     Miesha Quarles is overdue for INR check.      spoke with Miesha who declined to schedule a lab appointment at this time. If calling back please schedule as soon as possible.    - will check with her niece when she can bring her to appt next week.   - car troubles.    Reina Cruz RN

## 2021-12-30 ENCOUNTER — TELEPHONE (OUTPATIENT)
Dept: ANTICOAGULATION | Facility: CLINIC | Age: 86
End: 2021-12-30
Payer: MEDICARE

## 2021-12-30 NOTE — LETTER
December 31, 2021      Miesha Quarles  [address]  [place] [address]      You are currently under the care of [facility] for your warfarin (Coumadin ) therapy.  We are contacting you because our records show you were due for an INR on 12/6/2021.    Last INR was on 11/22/21 and was at your maximum range of 3.0     (ideal target goal is 2.0 - 3.0)    There are potentially serious risks when taking warfarin without careful monitoring and we want to make sure you are safely managed.  Routine INR monitoring is required for warfarin refills.    Miesha I see that you presented in [facility] on 12/3/21 due to headaches.  We really need to check your INR.  Any pains, muscle aches, taking any pain meds (like Tramadol and Excedrin) can increase your risk for bleeding.  Please call soon to check your INR.    Please call [phone] as soon as possible to schedule an appointment.  If there has been a change in your care or other concerns, please let us know so we can help and or update our records.     Sincerely,       [facility]  Reina Cruz RN

## 2021-12-31 NOTE — TELEPHONE ENCOUNTER
ANTICOAGULATION     Miesha Quarles is overdue for INR check.      Was unable to reach patient and was unable to leave a voicemail. If patient calls, please schedule INR check as soon as possible.     - mailed reminder letter to make INR appt.    Reina Cruz RN

## 2022-01-01 ENCOUNTER — TELEPHONE (OUTPATIENT)
Dept: ANTICOAGULATION | Facility: CLINIC | Age: 87
End: 2022-01-01

## 2022-01-01 ENCOUNTER — LAB (OUTPATIENT)
Dept: LAB | Facility: CLINIC | Age: 87
End: 2022-01-01
Payer: MEDICARE

## 2022-01-01 ENCOUNTER — TRANSFERRED RECORDS (OUTPATIENT)
Dept: HEALTH INFORMATION MANAGEMENT | Facility: CLINIC | Age: 87
End: 2022-01-01

## 2022-01-01 ENCOUNTER — HOSPITAL ENCOUNTER (OUTPATIENT)
Dept: PHYSICAL THERAPY | Facility: REHABILITATION | Age: 87
Discharge: HOME | End: 2022-09-01
Payer: MEDICARE

## 2022-01-01 ENCOUNTER — TELEPHONE (OUTPATIENT)
Dept: INTERNAL MEDICINE | Facility: CLINIC | Age: 87
End: 2022-01-01

## 2022-01-01 ENCOUNTER — HOSPITAL ENCOUNTER (OUTPATIENT)
Dept: PHYSICAL THERAPY | Facility: REHABILITATION | Age: 87
Discharge: HOME | End: 2022-07-20
Attending: INTERNAL MEDICINE
Payer: MEDICARE

## 2022-01-01 ENCOUNTER — ANTICOAGULATION THERAPY VISIT (OUTPATIENT)
Dept: ANTICOAGULATION | Facility: CLINIC | Age: 87
End: 2022-01-01

## 2022-01-01 ENCOUNTER — PATIENT OUTREACH (OUTPATIENT)
Dept: NURSING | Facility: CLINIC | Age: 87
End: 2022-01-01
Payer: MEDICARE

## 2022-01-01 ENCOUNTER — PATIENT OUTREACH (OUTPATIENT)
Dept: CARE COORDINATION | Facility: CLINIC | Age: 87
End: 2022-01-01

## 2022-01-01 ENCOUNTER — NURSE TRIAGE (OUTPATIENT)
Dept: NURSING | Facility: CLINIC | Age: 87
End: 2022-01-01

## 2022-01-01 ENCOUNTER — HOSPITAL ENCOUNTER (OUTPATIENT)
Dept: PHYSICAL THERAPY | Facility: REHABILITATION | Age: 87
Discharge: HOME | End: 2022-08-03
Payer: MEDICARE

## 2022-01-01 ENCOUNTER — OFFICE VISIT (OUTPATIENT)
Dept: INTERNAL MEDICINE | Facility: CLINIC | Age: 87
End: 2022-01-01
Payer: MEDICARE

## 2022-01-01 VITALS
OXYGEN SATURATION: 98 % | TEMPERATURE: 98.1 F | WEIGHT: 170 LBS | HEART RATE: 78 BPM | DIASTOLIC BLOOD PRESSURE: 64 MMHG | SYSTOLIC BLOOD PRESSURE: 124 MMHG | BODY MASS INDEX: 31.28 KG/M2 | HEIGHT: 62 IN

## 2022-01-01 DIAGNOSIS — Z79.01 LONG TERM (CURRENT) USE OF ANTICOAGULANTS: ICD-10-CM

## 2022-01-01 DIAGNOSIS — Z91.81 HISTORY OF RECENT FALL: ICD-10-CM

## 2022-01-01 DIAGNOSIS — M15.0 PRIMARY OSTEOARTHRITIS INVOLVING MULTIPLE JOINTS: ICD-10-CM

## 2022-01-01 DIAGNOSIS — I10 ESSENTIAL HYPERTENSION, BENIGN: ICD-10-CM

## 2022-01-01 DIAGNOSIS — I48.3 TYPICAL ATRIAL FLUTTER (H): Primary | ICD-10-CM

## 2022-01-01 DIAGNOSIS — I48.92 ATRIAL FLUTTER (H): Primary | ICD-10-CM

## 2022-01-01 DIAGNOSIS — Z91.81 AT HIGH RISK FOR FALLS: ICD-10-CM

## 2022-01-01 DIAGNOSIS — I48.92 ATRIAL FLUTTER (H): ICD-10-CM

## 2022-01-01 DIAGNOSIS — I50.32 CHRONIC DIASTOLIC CONGESTIVE HEART FAILURE (H): ICD-10-CM

## 2022-01-01 DIAGNOSIS — E11.8 TYPE 2 DIABETES MELLITUS WITH COMPLICATION (H): ICD-10-CM

## 2022-01-01 DIAGNOSIS — Z23 NEED FOR VACCINATION: Primary | ICD-10-CM

## 2022-01-01 DIAGNOSIS — Z91.81 AT HIGH RISK FOR FALLS: Primary | ICD-10-CM

## 2022-01-01 DIAGNOSIS — E03.9 HYPOTHYROIDISM, UNSPECIFIED TYPE: ICD-10-CM

## 2022-01-01 DIAGNOSIS — I10 ESSENTIAL HYPERTENSION, BENIGN: Primary | ICD-10-CM

## 2022-01-01 DIAGNOSIS — I48.3 TYPICAL ATRIAL FLUTTER (H): ICD-10-CM

## 2022-01-01 DIAGNOSIS — E03.9 HYPOTHYROID: ICD-10-CM

## 2022-01-01 DIAGNOSIS — I50.31 ACUTE DIASTOLIC CONGESTIVE HEART FAILURE (H): ICD-10-CM

## 2022-01-01 LAB
ALBUMIN SERPL BCG-MCNC: 3 G/DL (ref 3.5–5.2)
ALP SERPL-CCNC: 71 U/L (ref 35–104)
ALT SERPL W P-5'-P-CCNC: <5 U/L (ref 10–35)
ANION GAP SERPL CALCULATED.3IONS-SCNC: 8 MMOL/L (ref 7–15)
AST SERPL W P-5'-P-CCNC: 12 U/L (ref 10–35)
BILIRUB SERPL-MCNC: 0.6 MG/DL
BUN SERPL-MCNC: 16.6 MG/DL (ref 8–23)
CALCIUM SERPL-MCNC: 8.3 MG/DL (ref 8.2–9.6)
CHLORIDE SERPL-SCNC: 103 MMOL/L (ref 98–107)
CREAT SERPL-MCNC: 1.05 MG/DL (ref 0.51–0.95)
DEPRECATED HCO3 PLAS-SCNC: 29 MMOL/L (ref 22–29)
GFR SERPL CREATININE-BSD FRML MDRD: 49 ML/MIN/1.73M2
GLUCOSE SERPL-MCNC: 124 MG/DL (ref 70–99)
HBA1C MFR BLD: 6.7 % (ref 0–5.6)
INR BLD: 1.6 (ref 0.9–1.1)
INR BLD: 1.8 (ref 0.9–1.1)
INR BLD: 2.4 (ref 0.9–1.1)
INR BLD: 2.6 (ref 0.9–1.1)
INR BLD: 3.2 (ref 0.9–1.1)
INR BLD: 3.5 (ref 0.9–1.1)
INR BLD: 4.6 (ref 0.9–1.1)
POTASSIUM SERPL-SCNC: 4.5 MMOL/L (ref 3.4–5.3)
PROT SERPL-MCNC: 7.3 G/DL (ref 6.4–8.3)
RETINOPATHY: NEGATIVE
SODIUM SERPL-SCNC: 140 MMOL/L (ref 136–145)
T4 FREE SERPL-MCNC: 1.29 NG/DL (ref 0.9–1.7)
TSH SERPL DL<=0.005 MIU/L-ACNC: 7.16 UIU/ML (ref 0.3–4.2)

## 2022-01-01 PROCEDURE — 84443 ASSAY THYROID STIM HORMONE: CPT | Performed by: INTERNAL MEDICINE

## 2022-01-01 PROCEDURE — 97161 PT EVAL LOW COMPLEX 20 MIN: CPT | Mod: GP | Performed by: PHYSICAL THERAPIST

## 2022-01-01 PROCEDURE — G0008 ADMIN INFLUENZA VIRUS VAC: HCPCS | Performed by: INTERNAL MEDICINE

## 2022-01-01 PROCEDURE — 90662 IIV NO PRSV INCREASED AG IM: CPT | Performed by: INTERNAL MEDICINE

## 2022-01-01 PROCEDURE — 99214 OFFICE O/P EST MOD 30 MIN: CPT | Mod: 25 | Performed by: INTERNAL MEDICINE

## 2022-01-01 PROCEDURE — 85610 PROTHROMBIN TIME: CPT

## 2022-01-01 PROCEDURE — 84439 ASSAY OF FREE THYROXINE: CPT | Performed by: INTERNAL MEDICINE

## 2022-01-01 PROCEDURE — 97116 GAIT TRAINING THERAPY: CPT | Mod: GP | Performed by: PHYSICAL THERAPIST

## 2022-01-01 PROCEDURE — 97112 NEUROMUSCULAR REEDUCATION: CPT | Mod: GP | Performed by: PHYSICAL THERAPIST

## 2022-01-01 PROCEDURE — 36416 COLLJ CAPILLARY BLOOD SPEC: CPT

## 2022-01-01 PROCEDURE — 97110 THERAPEUTIC EXERCISES: CPT | Mod: GP | Performed by: PHYSICAL THERAPIST

## 2022-01-01 PROCEDURE — 97530 THERAPEUTIC ACTIVITIES: CPT | Mod: GP | Performed by: PHYSICAL THERAPIST

## 2022-01-01 PROCEDURE — 36415 COLL VENOUS BLD VENIPUNCTURE: CPT | Performed by: INTERNAL MEDICINE

## 2022-01-01 PROCEDURE — 80053 COMPREHEN METABOLIC PANEL: CPT | Performed by: INTERNAL MEDICINE

## 2022-01-01 PROCEDURE — 85610 PROTHROMBIN TIME: CPT | Performed by: INTERNAL MEDICINE

## 2022-01-01 PROCEDURE — 83036 HEMOGLOBIN GLYCOSYLATED A1C: CPT | Performed by: INTERNAL MEDICINE

## 2022-01-01 RX ORDER — TRAMADOL HYDROCHLORIDE 50 MG/1
TABLET ORAL
Qty: 20 TABLET | Refills: 0 | Status: SHIPPED | OUTPATIENT
Start: 2022-01-01 | End: 2023-01-01

## 2022-01-01 RX ORDER — LEVOTHYROXINE SODIUM 50 UG/1
TABLET ORAL
Qty: 90 TABLET | Refills: 0 | Status: SHIPPED | OUTPATIENT
Start: 2022-01-01 | End: 2023-01-01

## 2022-01-01 RX ORDER — LISINOPRIL AND HYDROCHLOROTHIAZIDE 12.5; 2 MG/1; MG/1
TABLET ORAL
Qty: 180 TABLET | Refills: 2 | Status: ON HOLD | OUTPATIENT
Start: 2022-01-01 | End: 2023-01-01

## 2022-01-01 RX ORDER — WARFARIN SODIUM 5 MG/1
TABLET ORAL
Qty: 60 TABLET | Refills: 1 | Status: SHIPPED | OUTPATIENT
Start: 2022-01-01 | End: 2023-01-01 | Stop reason: DRUGHIGH

## 2022-01-01 RX ORDER — AMLODIPINE BESYLATE 2.5 MG/1
TABLET ORAL
Qty: 90 TABLET | Refills: 0 | Status: ON HOLD | OUTPATIENT
Start: 2022-01-01 | End: 2023-01-01

## 2022-01-01 RX ORDER — AMLODIPINE BESYLATE 2.5 MG/1
TABLET ORAL
Qty: 90 TABLET | Refills: 0 | Status: SHIPPED | OUTPATIENT
Start: 2022-01-01 | End: 2022-01-01

## 2022-01-01 SDOH — ECONOMIC STABILITY: FOOD INSECURITY: WITHIN THE PAST 12 MONTHS, YOU WORRIED THAT YOUR FOOD WOULD RUN OUT BEFORE YOU GOT MONEY TO BUY MORE.: NEVER TRUE

## 2022-01-01 SDOH — ECONOMIC STABILITY: TRANSPORTATION INSECURITY
IN THE PAST 12 MONTHS, HAS THE LACK OF TRANSPORTATION KEPT YOU FROM MEDICAL APPOINTMENTS OR FROM GETTING MEDICATIONS?: NO

## 2022-01-01 SDOH — ECONOMIC STABILITY: INCOME INSECURITY: IN THE LAST 12 MONTHS, WAS THERE A TIME WHEN YOU WERE NOT ABLE TO PAY THE MORTGAGE OR RENT ON TIME?: NO

## 2022-01-01 SDOH — ECONOMIC STABILITY: FOOD INSECURITY: WITHIN THE PAST 12 MONTHS, THE FOOD YOU BOUGHT JUST DIDN'T LAST AND YOU DIDN'T HAVE MONEY TO GET MORE.: NEVER TRUE

## 2022-01-01 SDOH — ECONOMIC STABILITY: TRANSPORTATION INSECURITY
IN THE PAST 12 MONTHS, HAS LACK OF TRANSPORTATION KEPT YOU FROM MEETINGS, WORK, OR FROM GETTING THINGS NEEDED FOR DAILY LIVING?: NO

## 2022-01-01 ASSESSMENT — SOCIAL DETERMINANTS OF HEALTH (SDOH): HOW HARD IS IT FOR YOU TO PAY FOR THE VERY BASICS LIKE FOOD, HOUSING, MEDICAL CARE, AND HEATING?: SOMEWHAT HARD

## 2022-01-01 ASSESSMENT — ACTIVITIES OF DAILY LIVING (ADL): DEPENDENT_IADLS:: SHOPPING;TRANSPORTATION

## 2022-01-13 ENCOUNTER — TELEPHONE (OUTPATIENT)
Dept: ANTICOAGULATION | Facility: CLINIC | Age: 87
End: 2022-01-13
Payer: MEDICARE

## 2022-01-13 NOTE — TELEPHONE ENCOUNTER
ANTICOAGULATION     Miesha Quarles is overdue for INR check.      spoke with Miesha who declined to schedule a lab appointment at this time. If calling back please schedule as soon as possible.    - her friends that provide transportation is in [facility] / and the other  who provides ride has a virus (Covid-19)     - she will call back, as soon as she is able to find transportation.    Reina Cruz RN

## 2022-01-26 ENCOUNTER — TELEPHONE (OUTPATIENT)
Dept: ANTICOAGULATION | Facility: CLINIC | Age: 87
End: 2022-01-26
Payer: MEDICARE

## 2022-01-26 NOTE — TELEPHONE ENCOUNTER
Anticoagulation clinic notificiation    Dr. Mares,    Your patient, Miesha Quarles,  is past due for an INR check  The patient s last INR was 3.0 on 11/22/2021 and was due to come back on 12/06/2021.    Miesha Quarles received phone calls and letters over the last several weeks in attempt to arrange a follow up appointment.  Miesha Quarles will be sent another letter today.     No action is required from you unless you have additional information or if you would like to reach out personally to Miesha Quarles.    Please don t hesitate to contact the Anticoagulation Management Program at [phone] if you have any questions or concerns.     Sincerely,     [facility]

## 2022-01-26 NOTE — LETTER
January 26, 2022      Miesha Quarles  [address]  [place] [address]      You are currently under the care of [facility] for your warfarin (Coumadin ) therapy.  We are contacting you because our records show you were due for an INR on 12/6/2021.    There are potentially serious risks when taking warfarin without careful monitoring and we want to make sure you are safely managed.  Routine INR monitoring is required for warfarin refills.     Please call [phone] as soon as possible to schedule an appointment.  If there has been a change in your care or other concerns, please let us know so we can help and or update our records.     Sincerely,       [facility]

## 2022-02-09 ENCOUNTER — TELEPHONE (OUTPATIENT)
Dept: ANTICOAGULATION | Facility: CLINIC | Age: 87
End: 2022-02-09
Payer: MEDICARE

## 2022-02-09 NOTE — TELEPHONE ENCOUNTER
Anticoagulation clinic notificiation    Dr. Mares,    Your patient, Miesha Quarles,  is past due for an INR check  The patient s last INR was 3.0 on 11/22/2021 and was due to come back on 12/6/2021.    Miesha OSORIO Quarles received phone calls and letters over the last several weeks in attempt to arrange a follow up appointment.  Miesha Quarles will be sent another letter today.     No action is required from you unless you have additional information or if you would like to reach out personally to Miesha DEVON Willam.      Sincerely,     [facility]tico[facility]

## 2022-02-09 NOTE — LETTER
February 9, 2022      Miesha Quarles  [address]  [place] [address]              Dear Miesha,      You are currently under the care of [facility] for your warfarin (Coumadin ) therapy.  We are contacting you because our records show you were due for an INR on 12/06/2021.   (last INR was in 11/22/21)    There are potentially serious risks when taking warfarin without careful monitoring and we want to make sure you are safely managed.  Routine INR monitoring is required for warfarin refills.     Please call [phone] as soon as possible to schedule an appointment.  If there has been a change in your care or other concerns, please let us know so we can help and or update our records.     Sincerely,       [facility]

## 2022-02-10 NOTE — TELEPHONE ENCOUNTER
Called and spoke with Miesha,     - she stated her car is still in the shop and working on it.       - she does not have a car and the people that usually brings her, are all working.     - she will try and call her grand-daughter to see if she can give her a ride.  (She owns her own business)

## 2022-03-04 ENCOUNTER — LAB (OUTPATIENT)
Dept: LAB | Facility: CLINIC | Age: 87
End: 2022-03-04
Payer: MEDICARE

## 2022-03-04 ENCOUNTER — ANTICOAGULATION THERAPY VISIT (OUTPATIENT)
Dept: ANTICOAGULATION | Facility: CLINIC | Age: 87
End: 2022-03-04

## 2022-03-04 DIAGNOSIS — I48.92 ATRIAL FLUTTER (H): Primary | ICD-10-CM

## 2022-03-04 DIAGNOSIS — I48.3 TYPICAL ATRIAL FLUTTER (H): ICD-10-CM

## 2022-03-04 LAB — INR BLD: 4.4 (ref 0.9–1.1)

## 2022-03-04 PROCEDURE — 36416 COLLJ CAPILLARY BLOOD SPEC: CPT

## 2022-03-04 PROCEDURE — 85610 PROTHROMBIN TIME: CPT

## 2022-03-04 NOTE — PROGRESS NOTES
"ANTICOAGULATION MANAGEMENT     Miesha Mcintyreace 89 year old female is on warfarin with supratherapeutic INR result. (Goal INR 2.0-3.0)    Recent labs: (last 7 days)     03/04/22  1316   INR 4.4*       ASSESSMENT       Source(s): Chart Review, Patient/Caregiver Call and Template       Warfarin doses taken: Warfarin taken as instructed    Diet: No new diet changes identified    New illness, injury, or hospitalization: Yes:    Ongoing back, hips, knees pains d/t arthritis and weather changes.  She said \"joints are bone on bone.\"   Hard to ambulate.    Medication/supplement changes:  Yes.    She just bought - ES-Tylenol 500mg and said, she will take 1-2 tabs q 8hrs PRN for arthritic pains.  Informed to ensure she does not exceed 3000mg in a 24hr period.    Signs or symptoms of bleeding or clotting: No    Previous INR: Therapeutic last visit at 3.0; previously outside of goal range at 3.3   (last INR was 11/22/21 with calls and letters sent to patient).    Additional findings:  Reported she has annual check with Dr. Mares on 3/30/22.       PLAN     Recommended plan for ongoing change(s) affecting INR     Dosing Instructions:   (evenings. 5mg tabs)    Hold 2 days of warfarin 3/4-5 doses then Decrease your warfarin dose (11.1% change) with next INR in 7-10 days       Summary  As of 3/4/2022    Full warfarin instructions:  5 mg every Fri; 2.5 mg all other days   Next INR check:  3/11/2022             Telephone call with  Miesha ([phone]) who verbalizes understanding and agrees to plan    Patient elected to schedule next visit  one wk.    - she got her car back from [employer] this week and drove to INR appt.    Education provided: Importance of consistent vitamin K intake, Goal range and significance of current result, Importance of therapeutic range, Importance of following up at instructed interval, Potential interaction between warfarin and ES-Tylenol 500mg, Monitoring for bleeding signs and symptoms and When to " seek medical attention/emergency care    Plan made per [facility] anticoagulation protocol    Reina Cruz RN  Anticoagulation Clinic  3/4/2022    _______________________________________________________________________     Anticoagulation Episode Summary     Current INR goal:  2.0-3.0   TTR:  45.5 % (8.8 mo)   Target end date:     Send INR reminders to:  [facility]    Indications    Atrial flutter (H) [I48.92]           Comments:           Anticoagulation Care Providers     Provider Role Specialty Phone number    Roc Mares MD Referring Internal Medicine [phone]

## 2022-03-14 ENCOUNTER — TELEPHONE (OUTPATIENT)
Dept: ANTICOAGULATION | Facility: CLINIC | Age: 87
End: 2022-03-14
Payer: MEDICARE

## 2022-03-14 NOTE — TELEPHONE ENCOUNTER
ANTICOAGULATION     Miesha Quarles is overdue for INR check.      Spoke with Negrito and scheduled lab appointment on 3/22/22 @ [facility]    - will be asking her niece to bring her to appt.    Reina Cruz RN

## 2022-03-22 ENCOUNTER — LAB (OUTPATIENT)
Dept: LAB | Facility: CLINIC | Age: 87
End: 2022-03-22
Payer: MEDICARE

## 2022-03-22 ENCOUNTER — ANTICOAGULATION THERAPY VISIT (OUTPATIENT)
Dept: ANTICOAGULATION | Facility: CLINIC | Age: 87
End: 2022-03-22

## 2022-03-22 DIAGNOSIS — I48.3 TYPICAL ATRIAL FLUTTER (H): ICD-10-CM

## 2022-03-22 DIAGNOSIS — I48.92 ATRIAL FLUTTER (H): Primary | ICD-10-CM

## 2022-03-22 LAB — INR BLD: 3.9 (ref 0.9–1.1)

## 2022-03-22 PROCEDURE — 85610 PROTHROMBIN TIME: CPT

## 2022-03-22 PROCEDURE — 36416 COLLJ CAPILLARY BLOOD SPEC: CPT

## 2022-03-22 NOTE — PROGRESS NOTES
ANTICOAGULATION MANAGEMENT     Miesha Quarles 89 year old female is on warfarin with supratherapeutic INR result. (Goal INR 2.0-3.0)    Recent labs: (last 7 days)     03/22/22  1358   INR 3.9*       ASSESSMENT       Source(s): Chart Review, Patient/Caregiver Call and Template       Warfarin doses taken: Warfarin taken as instructed    Diet: No new diet changes identified    New illness, injury, or hospitalization: No    Medication/supplement changes: None noted    Signs or symptoms of bleeding or clotting: No    Previous INR: Supratherapeutic at 4.4 on 3/4/22.    Additional findings: None       PLAN     Recommended plan for no diet, medication or health factor changes affecting INR     Dosing Instructions:   (evenings. 5mg tabs)    hold dose then decrease your warfarin dose (12.5% change) with next INR in 1-2 weeks       Summary  As of 3/22/2022    Full warfarin instructions:  3/22: Hold; Otherwise 2.5 mg every day   Next INR check:  4/5/2022             Telephone call with  Miesha ([phone]) who verbalizes understanding and agrees to plan    Check at provider office visit - INR on 3/30/22 during Annual check up with Dr. Mares.    Education provided: Importance of consistent vitamin K intake, Goal range and significance of current result and Monitoring for bleeding signs and symptoms    Plan made per [facility] anticoagulation protocol    Reina Cruz, RN  Anticoagulation Clinic  3/22/2022    _______________________________________________________________________     Anticoagulation Episode Summary     Current INR goal:  2.0-3.0   TTR:  45.9 % (8.8 mo)   Target end date:     Send INR reminders to:  [facility]    Indications    Atrial flutter (H) [I48.92]           Comments:           Anticoagulation Care Providers     Provider Role Specialty Phone number    Roc Mares MD Referring Internal Medicine [phone]

## 2022-04-01 ENCOUNTER — ANTICOAGULATION THERAPY VISIT (OUTPATIENT)
Dept: ANTICOAGULATION | Facility: CLINIC | Age: 87
End: 2022-04-01

## 2022-04-01 ENCOUNTER — LAB (OUTPATIENT)
Dept: LAB | Facility: CLINIC | Age: 87
End: 2022-04-01
Payer: MEDICARE

## 2022-04-01 DIAGNOSIS — I48.92 ATRIAL FLUTTER (H): Primary | ICD-10-CM

## 2022-04-01 DIAGNOSIS — I48.3 TYPICAL ATRIAL FLUTTER (H): ICD-10-CM

## 2022-04-01 LAB — INR BLD: 2.9 (ref 0.9–1.1)

## 2022-04-01 PROCEDURE — 36416 COLLJ CAPILLARY BLOOD SPEC: CPT

## 2022-04-01 PROCEDURE — 85610 PROTHROMBIN TIME: CPT

## 2022-04-01 NOTE — PROGRESS NOTES
ANTICOAGULATION MANAGEMENT     Miesha Quarles 89 year old female is on warfarin with therapeutic INR result. (Goal INR 2.0-3.0)    Recent labs: (last 7 days)     04/01/22  1332   INR 2.9*       ASSESSMENT       Source(s): Chart Review, Patient/Caregiver Call and Template       Warfarin doses taken: Warfarin recently held for 1 day which may be affecting INR    Template was wrong and taking more than instructed.   Held warfarin dose on 3/22 as instructed.    Diet: No new diet changes identified    New illness, injury, or hospitalization: No    Medication/supplement changes: None noted    Signs or symptoms of bleeding or clotting: No    Previous INR: Supratherapeutic last 2 INR results.    Additional findings: None       PLAN     Recommended plan for temporary change(s) affecting INR     Dosing Instructions:   (evenings. 5mg tabs)    continue your current warfarin dose with next INR in 1-2 weeks       Summary  As of 4/1/2022    Full warfarin instructions:  2.5 mg every day   Next INR check:  4/15/2022             Telephone call with  Miesha ([phone]) who verbalizes understanding and agrees to plan    - ensure to take correct warfarin dose.    Check at provider office visit - INR on 4/21/22 during annual check with Dr. Mares.   - transportation problems.    Education provided: Importance of consistent vitamin K intake, Impact of vitamin K foods on INR, Goal range and significance of current result, Importance of taking warfarin as instructed and Monitoring for bleeding signs and symptoms    Plan made per [facility] anticoagulation protocol    Reina Cruz, RN  Anticoagulation Clinic  4/1/2022    _______________________________________________________________________     Anticoagulation Episode Summary     Current INR goal:  2.0-3.0   TTR:  45.9 % (8.8 mo)   Target end date:     Send INR reminders to:  [facility]    Indications    Atrial flutter (H) [I48.92]           Comments:           Anticoagulation  Care Providers     Provider Role Specialty Phone number    Roc Mares MD Referring Internal Medicine [phone]

## 2022-04-21 ENCOUNTER — ANTICOAGULATION THERAPY VISIT (OUTPATIENT)
Dept: ANTICOAGULATION | Facility: CLINIC | Age: 87
End: 2022-04-21

## 2022-04-21 ENCOUNTER — OFFICE VISIT (OUTPATIENT)
Dept: INTERNAL MEDICINE | Facility: CLINIC | Age: 87
End: 2022-04-21
Payer: MEDICARE

## 2022-04-21 VITALS
HEIGHT: 62 IN | WEIGHT: 166 LBS | BODY MASS INDEX: 30.55 KG/M2 | OXYGEN SATURATION: 96 % | HEART RATE: 96 BPM | SYSTOLIC BLOOD PRESSURE: 124 MMHG | DIASTOLIC BLOOD PRESSURE: 68 MMHG

## 2022-04-21 DIAGNOSIS — R91.8 LUNG FIELD ABNORMAL FINDING ON EXAMINATION: ICD-10-CM

## 2022-04-21 DIAGNOSIS — Z79.01 LONG TERM (CURRENT) USE OF ANTICOAGULANTS: ICD-10-CM

## 2022-04-21 DIAGNOSIS — E78.00 PURE HYPERCHOLESTEROLEMIA: Chronic | ICD-10-CM

## 2022-04-21 DIAGNOSIS — I50.31 ACUTE DIASTOLIC CONGESTIVE HEART FAILURE (H): ICD-10-CM

## 2022-04-21 DIAGNOSIS — I10 ESSENTIAL HYPERTENSION, BENIGN: ICD-10-CM

## 2022-04-21 DIAGNOSIS — E87.6 HYPOPOTASSEMIA: ICD-10-CM

## 2022-04-21 DIAGNOSIS — R06.2 WHEEZING: ICD-10-CM

## 2022-04-21 DIAGNOSIS — I48.3 TYPICAL ATRIAL FLUTTER (H): ICD-10-CM

## 2022-04-21 DIAGNOSIS — N18.31 STAGE 3A CHRONIC KIDNEY DISEASE (H): ICD-10-CM

## 2022-04-21 DIAGNOSIS — I48.92 ATRIAL FLUTTER (H): Primary | ICD-10-CM

## 2022-04-21 DIAGNOSIS — R94.31 ABNORMAL ECG: ICD-10-CM

## 2022-04-21 DIAGNOSIS — Z00.00 ENCOUNTER FOR MEDICARE ANNUAL WELLNESS EXAM: Primary | ICD-10-CM

## 2022-04-21 DIAGNOSIS — E11.8 TYPE 2 DIABETES MELLITUS WITH COMPLICATION (H): ICD-10-CM

## 2022-04-21 LAB
ALBUMIN SERPL-MCNC: 3.3 G/DL (ref 3.5–5)
ALP SERPL-CCNC: 89 U/L (ref 45–120)
ALT SERPL W P-5'-P-CCNC: 10 U/L (ref 0–45)
ANION GAP SERPL CALCULATED.3IONS-SCNC: 12 MMOL/L (ref 5–18)
AST SERPL W P-5'-P-CCNC: 13 U/L (ref 0–40)
BILIRUB SERPL-MCNC: 1.3 MG/DL (ref 0–1)
BUN SERPL-MCNC: 24 MG/DL (ref 8–28)
CALCIUM SERPL-MCNC: 9.4 MG/DL (ref 8.5–10.5)
CHLORIDE BLD-SCNC: 103 MMOL/L (ref 98–107)
CHOLEST SERPL-MCNC: 150 MG/DL
CO2 SERPL-SCNC: 28 MMOL/L (ref 22–31)
CREAT SERPL-MCNC: 1.29 MG/DL (ref 0.6–1.1)
ERYTHROCYTE [DISTWIDTH] IN BLOOD BY AUTOMATED COUNT: 16.5 % (ref 10–15)
FASTING STATUS PATIENT QL REPORTED: YES
GFR SERPL CREATININE-BSD FRML MDRD: 39 ML/MIN/1.73M2
GLUCOSE BLD-MCNC: 129 MG/DL (ref 70–125)
HBA1C MFR BLD: 6.7 % (ref 0–5.6)
HCT VFR BLD AUTO: 39.1 % (ref 35–47)
HDLC SERPL-MCNC: 45 MG/DL
HGB BLD-MCNC: 12.1 G/DL (ref 11.7–15.7)
INR BLD: 2.9 (ref 0.9–1.1)
LDLC SERPL CALC-MCNC: 86 MG/DL
MCH RBC QN AUTO: 31 PG (ref 26.5–33)
MCHC RBC AUTO-ENTMCNC: 30.9 G/DL (ref 31.5–36.5)
MCV RBC AUTO: 100 FL (ref 78–100)
NT-PROBNP SERPL-MCNC: 3888 PG/ML (ref 0–450)
PLATELET # BLD AUTO: 204 10E3/UL (ref 150–450)
POTASSIUM BLD-SCNC: 3.7 MMOL/L (ref 3.5–5)
PROT SERPL-MCNC: 8.3 G/DL (ref 6–8)
RBC # BLD AUTO: 3.9 10E6/UL (ref 3.8–5.2)
SODIUM SERPL-SCNC: 143 MMOL/L (ref 136–145)
TRIGL SERPL-MCNC: 94 MG/DL
WBC # BLD AUTO: 6.6 10E3/UL (ref 4–11)

## 2022-04-21 PROCEDURE — 99214 OFFICE O/P EST MOD 30 MIN: CPT | Mod: 25 | Performed by: INTERNAL MEDICINE

## 2022-04-21 PROCEDURE — 93005 ELECTROCARDIOGRAM TRACING: CPT | Performed by: INTERNAL MEDICINE

## 2022-04-21 PROCEDURE — 80061 LIPID PANEL: CPT | Performed by: INTERNAL MEDICINE

## 2022-04-21 PROCEDURE — U0003 INFECTIOUS AGENT DETECTION BY NUCLEIC ACID (DNA OR RNA); SEVERE ACUTE RESPIRATORY SYNDROME CORONAVIRUS 2 (SARS-COV-2) (CORONAVIRUS DISEASE [COVID-19]), AMPLIFIED PROBE TECHNIQUE, MAKING USE OF HIGH THROUGHPUT TECHNOLOGIES AS DESCRIBED BY CMS-2020-01-R: HCPCS | Performed by: INTERNAL MEDICINE

## 2022-04-21 PROCEDURE — U0005 INFEC AGEN DETEC AMPLI PROBE: HCPCS | Performed by: INTERNAL MEDICINE

## 2022-04-21 PROCEDURE — 83036 HEMOGLOBIN GLYCOSYLATED A1C: CPT | Performed by: INTERNAL MEDICINE

## 2022-04-21 PROCEDURE — 83880 ASSAY OF NATRIURETIC PEPTIDE: CPT | Performed by: INTERNAL MEDICINE

## 2022-04-21 PROCEDURE — G0439 PPPS, SUBSEQ VISIT: HCPCS | Performed by: INTERNAL MEDICINE

## 2022-04-21 PROCEDURE — 93010 ELECTROCARDIOGRAM REPORT: CPT | Mod: OFF | Performed by: INTERNAL MEDICINE

## 2022-04-21 PROCEDURE — 85610 PROTHROMBIN TIME: CPT | Performed by: INTERNAL MEDICINE

## 2022-04-21 PROCEDURE — 36415 COLL VENOUS BLD VENIPUNCTURE: CPT | Performed by: INTERNAL MEDICINE

## 2022-04-21 PROCEDURE — 85027 COMPLETE CBC AUTOMATED: CPT | Performed by: INTERNAL MEDICINE

## 2022-04-21 PROCEDURE — 80053 COMPREHEN METABOLIC PANEL: CPT | Performed by: INTERNAL MEDICINE

## 2022-04-21 RX ORDER — FUROSEMIDE 20 MG
TABLET ORAL
Qty: 90 TABLET | Refills: 1 | Status: SHIPPED | OUTPATIENT
Start: 2022-04-21 | End: 2022-06-15

## 2022-04-21 RX ORDER — POTASSIUM CHLORIDE 750 MG/1
TABLET, EXTENDED RELEASE ORAL
Qty: 90 TABLET | Refills: 0 | Status: SHIPPED | OUTPATIENT
Start: 2022-04-21 | End: 2023-01-01

## 2022-04-21 ASSESSMENT — ACTIVITIES OF DAILY LIVING (ADL): CURRENT_FUNCTION: NO ASSISTANCE NEEDED

## 2022-04-21 NOTE — PROGRESS NOTES
ANTICOAGULATION MANAGEMENT     Miesha OSORIO Quarles 89 year old female is on warfarin with therapeutic INR result. (Goal INR 2.0-3.0)    Recent labs: (last 7 days)     04/21/22  1538   INR 2.9*       ASSESSMENT       Source(s): Chart Review    Previous INR was Therapeutic last visit; previously outside of goal range    Medication, diet, health changes since last INR , ov today, resuming lasix           PLAN     Unable to reach Miesha today.    No instructions provided. Unable to leave voicemail.    Follow up required to confirm warfarin dose taken and assess for changes    Michaelle Wilson RN  Anticoagulation Clinic  4/21/2022

## 2022-04-21 NOTE — LETTER
April 25, 2022      Miesha OSORIO Quarles  [address] [place] [address]        Dear ,    We are writing to inform you of your test results.    I will review these with you on the phone next week.         Resulted Orders   Comprehensive metabolic panel (BMP + Alb, Alk Phos, ALT, AST, Total. Bili, TP)   Result Value Ref Range    Sodium 143 136 - 145 mmol/L    Potassium 3.7 3.5 - 5.0 mmol/L    Chloride 103 98 - 107 mmol/L    Carbon Dioxide (CO2) 28 22 - 31 mmol/L    Anion Gap 12 5 - 18 mmol/L    Urea Nitrogen 24 8 - 28 mg/dL    Creatinine 1.29 (H) 0.60 - 1.10 mg/dL    Calcium 9.4 8.5 - 10.5 mg/dL    Glucose 129 (H) 70 - 125 mg/dL    Alkaline Phosphatase 89 45 - 120 U/L    AST 13 0 - 40 U/L    ALT 10 0 - 45 U/L    Protein Total 8.3 (H) 6.0 - 8.0 g/dL    Albumin 3.3 (L) 3.5 - 5.0 g/dL    Bilirubin Total 1.3 (H) 0.0 - 1.0 mg/dL    GFR Estimate 39 (L) >60 mL/min/1.73m2      Comment:      Effective December 21, 2021 eGFRcr in adults is calculated using the 2021 CKD-EPI creatinine equation which includes age and gender (Evelyn et al., NE, DOI: 10.1056/NLAWtg0077734)   BNP-N terminal pro   Result Value Ref Range    N Terminal Pro BNP Outpatient 3,888 (H) 0 - 450 pg/mL      Comment:      Reference range shown and results flagged as abnormal are for the outpatient, non acute settings. Establishing a baseline value for each individual patient is useful for follow-up.    Suggested inpatient cut points for confirming diagnosis of CHF in an acute setting are:  >450 pg/mL (age 18 to less than 50)  >900 pg/mL (age 50 to less than 75)  >1800 pg/mL (75 yrs and older)    An inpatient or emergency department NT-proPBNP <300 pg/mL effectively rules out acute CHF, with 99% negative predictive value.       Hemoglobin A1c   Result Value Ref Range    Hemoglobin A1C 6.7 (H) 0.0 - 5.6 %      Comment:      Normal <5.7%   Prediabetes 5.7-6.4%    Diabetes 6.5% or higher     Note: Adopted from ADA consensus guidelines.   CBC with  platelets   Result Value Ref Range    WBC Count 6.6 4.0 - 11.0 10e3/uL    RBC Count 3.90 3.80 - 5.20 10e6/uL    Hemoglobin 12.1 11.7 - 15.7 g/dL    Hematocrit 39.1 35.0 - 47.0 %     78 - 100 fL    MCH 31.0 26.5 - 33.0 pg    MCHC 30.9 (L) 31.5 - 36.5 g/dL    RDW 16.5 (H) 10.0 - 15.0 %    Platelet Count 204 150 - 450 10e3/uL   Lipid panel reflex to direct LDL Fasting   Result Value Ref Range    Cholesterol 150 <=199 mg/dL    Triglycerides 94 <=149 mg/dL    Direct Measure HDL 45 (L) >=50 mg/dL      Comment:      HDL Cholesterol Reference Range:     0-2 years:   No reference ranges established for patients under 2 years old  at [facility] for lipid analytes.    2-8 years:  Greater than 45 mg/dL     18 years and older:   Female: Greater than or equal to 50 mg/dL   Male:   Greater than or equal to 40 mg/dL    LDL Cholesterol Calculated 86 <=129 mg/dL    Patient Fasting > 8hrs? Yes        If you have any questions or concerns, please call the clinic at the number listed above.       Sincerely,      Roc Mares MD
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
## 2022-04-21 NOTE — PATIENT INSTRUCTIONS
Patient Education   Personalized Prevention Plan  You are due for the preventive services outlined below.  Your care team is available to assist you in scheduling these services.  If you have already completed any of these items, please share that information with your care team to update in your medical record.  Health Maintenance Due   Topic Date Due     Diabetic Foot Exam  Never done     Zoster (Shingles) Vaccine (2 of 2) 05/26/2015     Eye Exam  06/22/2021     FALL RISK ASSESSMENT  02/25/2022     Hemoglobin  02/25/2022       Urinary Incontinence, Female (Adult)   Urinary incontinence means loss of bladder control. This problem affects many women, especially as they get older. If you have incontinence, you may be embarrassed to ask for help. But know that this problem can be treated.   Types of Incontinence  There are different types of incontinence. Two of the main types are described here. You can have more than one type.     Stress incontinence. With this type, urine leaks when pressure (stress) is put on the bladder. This may happen when you cough, sneeze, or laugh. Stress incontinence most often occurs because the pelvic floor muscles that support the bladder and urethra are weak. This can happen after pregnancy and vaginal childbirth or a hysterectomy. It can also be due to excess body weight or hormone changes.    Urge incontinence (also called overactive bladder). With this type, a sudden urge to urinate is felt often. This may happen even though there may not be much urine in the bladder. The need to urinate often during the night is common. Urge incontinence most often occurs because of bladder spasms. This may be due to bladder irritation or infection. Damage to bladder nerves or pelvic muscles, constipation, and certain medicines can also lead to urge incontinence.  Treatment depends on the cause. Further evaluation is needed to find the type you have. This will likely include an exam and certain  tests. Based on the results, you and your healthcare provider can then plan treatment. Until a diagnosis is made, the home care tips below can help ease symptoms.   Home care    Do pelvic floor muscle exercises, if they are prescribed. The pelvic floor muscles help support the bladder and urethra. Many women find that their symptoms improve when doing special exercises that strengthen these muscles. To do the exercises, contract the muscles you would use to stop your stream of urine. But do this when you re not urinating. Hold for 10 seconds, then relax. Repeat 10 to 20 times in a row, at least 3 times a day. Your healthcare provider may give you other instructions for how to do the exercises and how often.    Keep a bladder diary. This helps track how often and how much you urinate over a set period of time. Bring this diary with you to your next visit with the provider. The information can help your provider learn more about your bladder problem.    Lose weight, if advised to by your provider. Extra weight puts pressure on the bladder. Your provider can help you create a weight-loss plan that s right for you. This may include exercising more and making certain diet changes.    Don't have foods and drinks that may irritate the bladder. These can include alcohol and caffeinated drinks.    Quit smoking. Smoking and other tobacco use can lead to a long-term (chronic) cough that strains the pelvic floor muscles. Smoking may also damage the bladder and urethra. Talk with your provider about treatments or methods you can use to quit smoking.    If drinking large amounts of fluid makes you have symptoms, you may be advised to limit your fluid intake. You may also be advised to drink most of your fluids during the day and to limit fluids at night.    If you re worried about urine leakage or accidents, you may wear absorbent pads to catch urine. Change the pads often. This helps reduce discomfort. It may also reduce the  risk of skin or bladder infections.    Follow-up care  Follow up with your healthcare provider, or as directed. It may take some to find the right treatment for your problem. But healthy lifestyle changes can be made right away. These include such things as exercising on a regular basis, eating a healthy diet, losing weight (if needed), and quitting smoking. Your treatment plan may include special therapies or medicines. Certain procedures or surgery may also be options. Talk about any questions you have with your provider.   When to seek medical advice  Call the healthcare provider right away if any of these occur:    Fever of 100.4 F (38 C) or higher, or as directed by your provider    Bladder pain or fullness    Belly swelling    Nausea or vomiting    Back pain    Weakness, dizziness, or fainting  KAYAK last reviewed this educational content on 1/1/2020 2000-2021 [employer]. All rights reserved. This information is not intended as a substitute for professional medical care. Always follow your healthcare professional's instructions.          Kegel Exercises  Kegel exercises are done to help strengthen the muscles in your pelvic floor. You don t need special clothing or equipment. They re easy to learn and simple to do. And if you do them right, no one can tell you re doing them, so they can be done almost anywhere. Your healthcare provider, nurse, or physical therapist can answer any questions you have and help you get started.   A weak pelvic floor  The pelvic floor muscles may weaken due to aging, pregnancy and vaginal childbirth, injury, surgery, chronic cough, or lack of exercise. If the pelvic floor is weak, your bladder and other pelvic organs may sag out of place. The urethra may also open too easily and allow urine to leak out. Kegel exercises can help you strengthen your pelvic floor muscles. Then they can better support the pelvic organs and control urine flow.     How Kegel  exercises are done  Try each of the Kegel exercises described below. When you re doing them, try not to move your leg, buttock, or stomach muscles:     Contract as if you were stopping your urine stream. But do it when you re not urinating.    Tighten your rectum as if trying not to pass gas. Contract your anus, but don t move your buttocks.    You may place a finger or 2 in the vagina and squeeze your finger with your vagina to learn which muscles to tighten.  Try to hold each Kegel for a slow count to 5. You probably won t be able to hold them for that long at first. But keep practicing. It will get easier as your pelvic floor gets stronger. Eventually, special weights that you place in your vagina may be recommended to help make your Kegels even more effective. Talk to your healthcare provider if you have trouble doing Kegel exercises.   Helpful tips  Here are some tips to follow:    Do your Kegels as often as you can. The more you do them, the faster you ll feel the results.    Pick an activity you do often as a reminder. For instance, do your Kegels every time you sit down.    Tighten your pelvic floor before you sneeze, get up from a chair, cough, laugh, or lift. This can help prevent urine, gas, or stool leakage.  [employer] last reviewed this educational content on 8/1/2020 2000-2021 [employer]. All rights reserved. This information is not intended as a substitute for professional medical care. Always follow your healthcare professional's instructions.

## 2022-04-22 LAB
ATRIAL RATE - MUSE: 79 BPM
DIASTOLIC BLOOD PRESSURE - MUSE: NORMAL MMHG
INTERPRETATION ECG - MUSE: NORMAL
P AXIS - MUSE: NORMAL DEGREES
PR INTERVAL - MUSE: NORMAL MS
QRS DURATION - MUSE: 94 MS
QT - MUSE: 410 MS
QTC - MUSE: 463 MS
R AXIS - MUSE: -17 DEGREES
SARS-COV-2 RNA RESP QL NAA+PROBE: NEGATIVE
SYSTOLIC BLOOD PRESSURE - MUSE: NORMAL MMHG
T AXIS - MUSE: 106 DEGREES
VENTRICULAR RATE- MUSE: 77 BPM

## 2022-04-22 NOTE — PROGRESS NOTES
ANTICOAGULATION MANAGEMENT     Miesha Quarles 89 year old female is on warfarin with therapeutic INR result. (Goal INR 2.0-3.0)    Recent labs: (last 7 days)     04/21/22  1538   INR 2.9*       ASSESSMENT       Source(s): Chart Review, Patient/Caregiver Call and Template       Warfarin doses taken: Warfarin taken as instructed    Diet: No new diet changes identified    New illness, injury, or hospitalization: Yes..   Reported ongoing wheezing d/t her allergies from her dog.    Reported she had annual check with Dr. Mares on 4/21/22.  (she awaiting lab results that was drawn).    Medication/supplement changes: None noted    Signs or symptoms of bleeding or clotting: No    Previous INR: Therapeutic last visit at 2.9; previously outside of goal range at 3.9    Additional findings:  She reported awaiting to get scheduled for echocardiogram.       PLAN     Recommended plan for ongoing change(s) affecting INR     Dosing Instructions: continue your current warfarin dose with next INR in 2 weeks       Summary  As of 4/21/2022    Full warfarin instructions:  2.5 mg every day   Next INR check:  5/6/2022             Telephone call with  Miesha ([phone]) who verbalizes understanding and agrees to plan    - will await to schedule next INR, depending on lab results and if new orders are given.    Patient offered & declined to schedule next visit    Education provided: Importance of consistent vitamin K intake and Goal range and significance of current result    Plan made per [facility] anticoagulation protocol    Reina Cruz, RN  Anticoagulation Clinic  4/22/2022    _______________________________________________________________________     Anticoagulation Episode Summary     Current INR goal:  2.0-3.0   TTR:  47.4 % (8.7 mo)   Target end date:     Send INR reminders to:  [facility]    Indications    Atrial flutter (H) [I48.92]           Comments:           Anticoagulation Care Providers     Provider Role Specialty  Phone number    Roc Mares MD Referring Internal Medicine [phone]

## 2022-04-25 ENCOUNTER — VIRTUAL VISIT (OUTPATIENT)
Dept: INTERNAL MEDICINE | Facility: CLINIC | Age: 87
End: 2022-04-25
Payer: MEDICARE

## 2022-04-25 DIAGNOSIS — R91.8 LUNG FIELD ABNORMAL FINDING ON EXAMINATION: ICD-10-CM

## 2022-04-25 DIAGNOSIS — R06.2 WHEEZING: Primary | ICD-10-CM

## 2022-04-25 DIAGNOSIS — R94.31 ABNORMAL ECG: ICD-10-CM

## 2022-04-25 PROCEDURE — 99441 PR PHYSICIAN TELEPHONE EVALUATION 5-10 MIN: CPT | Mod: 95 | Performed by: INTERNAL MEDICINE

## 2022-04-25 NOTE — Clinical Note
Pt needs her echo scheduled and then follow up with me after that, hopefully in the next couple of weeks.  Could you help facilitate that?  She also may not be home, and staying at her nieces--FYI--if she doesn't answer at home.

## 2022-04-25 NOTE — PROGRESS NOTES
Miesha is a 89 year old who is being evaluated via a billable telephone visit.      What phone number would you like to be contacted at? [phone]   How would you like to obtain your AVS? Mail a copy  Phone call duration: 9 minutes.    Following joins me on a telephone visit.  Pleasant woman with multiple medical problems.  She was having shortness of breath and wheezing last week when I saw her for her annual wellness.  She has a history of heart failure as well as recent work being done in her house construction dust etc.  She also had an exposure to a dog and believes she was allergic to that.  I was most concerned about heart failure however given her lung exam.  I placed patient back on furosemide but she has not taken it until just now right before I got on the phone with her.  She tells me she is feeling better than when we last spoke last week.  I had ordered an echocardiogram but that has not been scheduled as of yet.  She has no new concerns for me today.    1. Wheezing  Possibly multifactorial.  She is feeling better.  I did urge her to take the diuretic daily and follow-up with me as soon as she gets her echo done.    2. Lung field abnormal finding on examination  As above.  Lung x-ray was clear    3. Abnormal ECG  Awaiting echo.  I will see her back soon thereafter.

## 2022-05-07 DIAGNOSIS — M15.0 PRIMARY OSTEOARTHRITIS INVOLVING MULTIPLE JOINTS: ICD-10-CM

## 2022-05-10 RX ORDER — TRAMADOL HYDROCHLORIDE 50 MG/1
TABLET ORAL
Qty: 20 TABLET | Refills: 0 | Status: SHIPPED | OUTPATIENT
Start: 2022-05-10 | End: 2022-01-01

## 2022-05-10 NOTE — TELEPHONE ENCOUNTER
Routing refill request to provider for review/approval because:  Controlled substance request    Last Written Prescription Date:  11/22/21  Last Fill Quantity: 20,  # refills: 0   Last office visit provider:  4/25/22     Requested Prescriptions   Pending Prescriptions Disp Refills     traMADol (ULTRAM) 50 MG tablet [Pharmacy Med Name: TRAMADOL HCL 50 MG TABLET] 20 tablet 0     Sig: TAKE 1 TABLET BY MOUTH EVERY 6 HOURS AS NEEDED       There is no refill protocol information for this order          Luis Ragsdale RN 05/10/22 11:10 AM

## 2022-05-11 DIAGNOSIS — E78.00 HYPERCHOLESTEREMIA: ICD-10-CM

## 2022-05-11 DIAGNOSIS — I10 ESSENTIAL HYPERTENSION, BENIGN: ICD-10-CM

## 2022-05-11 DIAGNOSIS — E03.9 HYPOTHYROID: ICD-10-CM

## 2022-05-13 RX ORDER — LEVOTHYROXINE SODIUM 50 UG/1
50 TABLET ORAL DAILY
Qty: 90 TABLET | Refills: 1 | Status: SHIPPED | OUTPATIENT
Start: 2022-05-13 | End: 2022-01-01

## 2022-05-13 NOTE — TELEPHONE ENCOUNTER
"Last Written Prescription Date:  5/11/21  Last Fill Quantity: 90,  # refills: 2   Last office visit provider:  4/25/22     Requested Prescriptions   Pending Prescriptions Disp Refills     amLODIPine (NORVASC) 2.5 MG tablet 90 tablet 0     Sig: [AMLODIPINE (NORVASC) 2.5 MG TABLET] TAKE 1 TABLET EVERY DAY       Calcium Channel Blockers Protocol  Failed - 5/11/2022  2:52 PM        Failed - Normal serum creatinine on file in past 12 months     Recent Labs   Lab Test 04/21/22  1538   CR 1.29*       Ok to refill medication if creatinine is low          Passed - Blood pressure under 140/90 in past 12 months     BP Readings from Last 3 Encounters:   04/21/22 124/68   11/22/21 120/62   03/31/21 124/78                 Passed - Recent (12 mo) or future (30 days) visit within the authorizing provider's specialty     Patient has had an office visit with the authorizing provider or a provider within the authorizing providers department within the previous 12 mos or has a future within next 30 days. See \"Patient Info\" tab in in[facility], or \"Choose Columns\" in Meds & Orders section of the refill encounter.              Passed - Medication is active on med list        Passed - Patient is age 18 or older        Passed - No active pregnancy on record        Passed - No positive pregnancy test in past 12 months           levothyroxine (SYNTHROID/LEVOTHROID) 50 MCG tablet 90 tablet 2       Thyroid Protocol Passed - 5/11/2022  2:52 PM        Passed - Patient is 12 years or older        Passed - Recent (12 mo) or future (30 days) visit within the authorizing provider's specialty     Patient has had an office visit with the authorizing provider or a provider within the authorizing providers department within the previous 12 mos or has a future within next 30 days. See \"Patient Info\" tab in inrumret, or \"Choose Columns\" in Meds & Orders section of the refill encounter.              Passed - Medication is active on med list        Passed - " Normal TSH on file in past 12 months     Recent Labs   Lab Test 11/22/21  1336   TSH 4.23              Passed - No active pregnancy on record     If patient is pregnant or has had a positive pregnancy test, please check TSH.          Passed - No positive pregnancy test in past 12 months     If patient is pregnant or has had a positive pregnancy test, please check TSH.               Emelia Ma RN 05/13/22 3:15 PM

## 2022-05-13 NOTE — TELEPHONE ENCOUNTER
"Routing refill request to provider for review/approval because:  Labs out of range:      Last Written Prescription Date:  7/30/21  Last Fill Quantity: 90,  # refills: 0   Last office visit provider:  4/25/22     Requested Prescriptions   Pending Prescriptions Disp Refills     amLODIPine (NORVASC) 2.5 MG tablet 90 tablet 0     Sig: [AMLODIPINE (NORVASC) 2.5 MG TABLET] TAKE 1 TABLET EVERY DAY       Calcium Channel Blockers Protocol  Failed - 5/11/2022  2:52 PM        Failed - Normal serum creatinine on file in past 12 months     Recent Labs   Lab Test 04/21/22  1538   CR 1.29*       Ok to refill medication if creatinine is low          Passed - Blood pressure under 140/90 in past 12 months     BP Readings from Last 3 Encounters:   04/21/22 124/68   11/22/21 120/62   03/31/21 124/78                 Passed - Recent (12 mo) or future (30 days) visit within the authorizing provider's specialty     Patient has had an office visit with the authorizing provider or a provider within the authorizing providers department within the previous 12 mos or has a future within next 30 days. See \"Patient Info\" tab in inbasket, or \"Choose Columns\" in Meds & Orders section of the refill encounter.              Passed - Medication is active on med list        Passed - Patient is age 18 or older        Passed - No active pregnancy on record        Passed - No positive pregnancy test in past 12 months         Signed Prescriptions Disp Refills    levothyroxine (SYNTHROID/LEVOTHROID) 50 MCG tablet 90 tablet 1     Sig: Take 1 tablet (50 mcg) by mouth daily       Thyroid Protocol Passed - 5/11/2022  2:52 PM        Passed - Patient is 12 years or older        Passed - Recent (12 mo) or future (30 days) visit within the authorizing provider's specialty     Patient has had an office visit with the authorizing provider or a provider within the authorizing providers department within the previous 12 mos or has a future within next 30 days. See " "\"Patient Info\" tab in inbasket, or \"Choose Columns\" in Meds & Orders section of the refill encounter.              Passed - Medication is active on med list        Passed - Normal TSH on file in past 12 months     Recent Labs   Lab Test 11/22/21  1336   TSH 4.23              Passed - No active pregnancy on record     If patient is pregnant or has had a positive pregnancy test, please check TSH.          Passed - No positive pregnancy test in past 12 months     If patient is pregnant or has had a positive pregnancy test, please check TSH.               Emelia Ma RN 05/13/22 3:16 PM  "

## 2022-05-13 NOTE — TELEPHONE ENCOUNTER
"Routing refill request to provider for review/approval because:  Drug interaction warning    Last office visit provider:  4/25/2022 virtual visit Dr. Mares    Metoprolol Last Written Prescription Date:  10/26/2021  Last Fill Quantity: 90,  # refills: 1     Simvastatin Last Written Prescription Date:  11/18/2021  Last Fill Quantity: 45,  # refills: 1       Requested Prescriptions   Pending Prescriptions Disp Refills     metoprolol succinate ER (TOPROL XL) 100 MG 24 hr tablet [Pharmacy Med Name: METOPROLOL SUCC  MG TAB] 90 tablet 3     Sig: TAKE 1 TABLET BY MOUTH EVERY DAY       Beta-Blockers Protocol Passed - 5/11/2022 11:57 AM        Passed - Blood pressure under 140/90 in past 12 months     BP Readings from Last 3 Encounters:   04/21/22 124/68   11/22/21 120/62   03/31/21 124/78                 Passed - Patient is age 6 or older        Passed - Recent (12 mo) or future (30 days) visit within the authorizing provider's specialty     Patient has had an office visit with the authorizing provider or a provider within the authorizing providers department within the previous 12 mos or has a future within next 30 days. See \"Patient Info\" tab in inbasket, or \"Choose Columns\" in Meds & Orders section of the refill encounter.              Passed - Medication is active on med list           simvastatin (ZOCOR) 20 MG tablet [Pharmacy Med Name: SIMVASTATIN 20 MG TABLET] 45 tablet 3     Sig: TAKE 1/2 TABLET BY MOUTH DAILY IN THE EVENING.       Statins Protocol Passed - 5/11/2022 11:57 AM        Passed - LDL on file in past 12 months     Recent Labs   Lab Test 04/21/22  1538   LDL 86             Passed - No abnormal creatine kinase in past 12 months     No lab results found.             Passed - Recent (12 mo) or future (30 days) visit within the authorizing provider's specialty     Patient has had an office visit with the authorizing provider or a provider within the authorizing providers department within the previous 12 " "mos or has a future within next 30 days. See \"Patient Info\" tab in inbasket, or \"Choose Columns\" in Meds & Orders section of the refill encounter.              Passed - Medication is active on med list        Passed - Patient is age 18 or older        Passed - No active pregnancy on record        Passed - No positive pregnancy test in past 12 months             Charito Hauser RN 05/13/22 2:17 PM  "

## 2022-05-16 RX ORDER — SIMVASTATIN 20 MG
TABLET ORAL
Qty: 45 TABLET | Refills: 3 | Status: SHIPPED | OUTPATIENT
Start: 2022-05-16 | End: 2023-01-01

## 2022-05-16 RX ORDER — METOPROLOL SUCCINATE 100 MG/1
TABLET, EXTENDED RELEASE ORAL
Qty: 90 TABLET | Refills: 3 | Status: SHIPPED | OUTPATIENT
Start: 2022-05-16

## 2022-05-16 RX ORDER — AMLODIPINE BESYLATE 2.5 MG/1
TABLET ORAL
Qty: 90 TABLET | Refills: 0 | Status: SHIPPED | OUTPATIENT
Start: 2022-05-16 | End: 2022-05-27

## 2022-05-18 ENCOUNTER — HOSPITAL ENCOUNTER (OUTPATIENT)
Dept: CARDIOLOGY | Facility: HOSPITAL | Age: 87
Discharge: HOME | End: 2022-05-18
Attending: INTERNAL MEDICINE | Admitting: INTERNAL MEDICINE
Payer: MEDICARE

## 2022-05-18 DIAGNOSIS — R94.31 ABNORMAL ECG: ICD-10-CM

## 2022-05-18 DIAGNOSIS — I48.3 TYPICAL ATRIAL FLUTTER (H): ICD-10-CM

## 2022-05-18 LAB — LVEF ECHO: NORMAL

## 2022-05-18 PROCEDURE — 93306 TTE W/DOPPLER COMPLETE: CPT | Mod: 26 | Performed by: INTERNAL MEDICINE

## 2022-05-18 PROCEDURE — 999N000208 ECHOCARDIOGRAM COMPLETE

## 2022-05-18 PROCEDURE — 255N000002 HC RX 255 OP 636: Performed by: INTERNAL MEDICINE

## 2022-05-18 RX ADMIN — PERFLUTREN 2 ML: 6.52 INJECTION, SUSPENSION INTRAVENOUS at 09:58

## 2022-05-25 ENCOUNTER — TELEPHONE (OUTPATIENT)
Dept: ANTICOAGULATION | Facility: CLINIC | Age: 87
End: 2022-05-25
Payer: MEDICARE

## 2022-05-25 NOTE — TELEPHONE ENCOUNTER
ANTICOAGULATION     Miesha Quarles is overdue for INR check.      Spoke with Miesha and scheduled lab appointment on 5/27/22    Reina Cruz RN

## 2022-05-26 DIAGNOSIS — I10 ESSENTIAL HYPERTENSION, BENIGN: ICD-10-CM

## 2022-05-27 ENCOUNTER — ANTICOAGULATION THERAPY VISIT (OUTPATIENT)
Dept: ANTICOAGULATION | Facility: CLINIC | Age: 87
End: 2022-05-27

## 2022-05-27 ENCOUNTER — LAB (OUTPATIENT)
Dept: LAB | Facility: CLINIC | Age: 87
End: 2022-05-27
Payer: MEDICARE

## 2022-05-27 DIAGNOSIS — I48.92 ATRIAL FLUTTER (H): Primary | ICD-10-CM

## 2022-05-27 DIAGNOSIS — I48.3 TYPICAL ATRIAL FLUTTER (H): ICD-10-CM

## 2022-05-27 LAB — INR BLD: 2.1 (ref 0.9–1.1)

## 2022-05-27 PROCEDURE — 36416 COLLJ CAPILLARY BLOOD SPEC: CPT

## 2022-05-27 PROCEDURE — 85610 PROTHROMBIN TIME: CPT

## 2022-05-27 RX ORDER — AMLODIPINE BESYLATE 2.5 MG/1
TABLET ORAL
Qty: 90 TABLET | Refills: 0 | Status: SHIPPED | OUTPATIENT
Start: 2022-05-27 | End: 2022-01-01

## 2022-05-27 NOTE — PROGRESS NOTES
ANTICOAGULATION MANAGEMENT     Miesha Quarles 89 year old female is on warfarin with therapeutic INR result. (Goal INR 2.0-3.0)    Recent labs: (last 7 days)     05/27/22  1351   INR 2.1*       ASSESSMENT       Source(s): Chart Review, Patient/Caregiver Call and Template       Warfarin doses taken: Warfarin taken as instructed    Diet: No new diet changes identified    New illness, injury, or hospitalization: No    Medication/supplement changes: None noted    Signs or symptoms of bleeding or clotting: No    Previous INR: Therapeutic last 2 visits    Additional findings: None       PLAN     Recommended plan for no diet, medication or health factor changes affecting INR     Dosing Instructions:    continue your current warfarin dose with next INR in 4 weeks       Summary  As of 5/27/2022    Full warfarin instructions:  2.5 mg every day   Next INR check:  6/10/2022             Telephone call with  Miesha  ([phone]) who verbalizes understanding and agrees to plan    Check at provider office visit - INR on 6/15/22 during OV with Dr. Mares.    Education provided: Importance of consistent vitamin K intake and Goal range and significance of current result    Plan made per [facility] anticoagulation protocol    Reina Cruz, RN  Anticoagulation Clinic  5/27/2022    _______________________________________________________________________     Anticoagulation Episode Summary     Current INR goal:  2.0-3.0   TTR:  47.6 % (8.8 mo)   Target end date:     Send INR reminders to:  [facility]    Indications    Atrial flutter (H) [I48.92]           Comments:           Anticoagulation Care Providers     Provider Role Specialty Phone number    Roc Mares MD Referring Internal Medicine [phone]

## 2022-05-27 NOTE — TELEPHONE ENCOUNTER
"Routing refill request to provider for review/approval because:  Labs not current:  Cr    Last Written Prescription Date:  5/16/22  Last Fill Quantity: 90,  # refills: 0   Last office visit provider:  4/25/22     Requested Prescriptions   Pending Prescriptions Disp Refills     amLODIPine (NORVASC) 2.5 MG tablet [Pharmacy Med Name: AMLODIPINE BESYLATE 2.5 MG Tablet] 90 tablet 0     Sig: TAKE 1 TABLET EVERY DAY       Calcium Channel Blockers Protocol  Failed - 5/26/2022  3:07 AM        Failed - Normal serum creatinine on file in past 12 months     Recent Labs   Lab Test 04/21/22  1538   CR 1.29*       Ok to refill medication if creatinine is low          Passed - Blood pressure under 140/90 in past 12 months     BP Readings from Last 3 Encounters:   04/21/22 124/68   11/22/21 120/62   03/31/21 124/78                 Passed - Recent (12 mo) or future (30 days) visit within the authorizing provider's specialty     Patient has had an office visit with the authorizing provider or a provider within the authorizing providers department within the previous 12 mos or has a future within next 30 days. See \"Patient Info\" tab in inbasket, or \"Choose Columns\" in Meds & Orders section of the refill encounter.              Passed - Medication is active on med list        Passed - Patient is age 18 or older        Passed - No active pregnancy on record        Passed - No positive pregnancy test in past 12 months             Shira Hurley 05/26/22 9:39 PM  "

## 2022-06-01 ENCOUNTER — DOCUMENTATION ONLY (OUTPATIENT)
Dept: INTERNAL MEDICINE | Facility: CLINIC | Age: 87
End: 2022-06-01
Payer: MEDICARE

## 2022-06-01 DIAGNOSIS — Z79.01 LONG TERM (CURRENT) USE OF ANTICOAGULANTS: Primary | ICD-10-CM

## 2022-06-01 NOTE — PROGRESS NOTES
ANTICOAGULATION MANAGEMENT      Miesha Quarles due for annual renewal of referral to anticoagulation monitoring. Order pended for your review and signature.      ANTICOAGULATION SUMMARY      Warfarin indication(s)     Atrial Flutter and Central retinal vein occlusion    Heart valve present?  NO       Current goal range   INR: 2.0-3.0     Goal appropriate for indication? Yes, INR 2-3 appropriate for hx of DVT, PE, hypercoagulable state, Afib, LVAD, or bileaflet AVR without risk factors     Current duration of therapy Indefinite/long term therapy   Time in Therapeutic Range (TTR)  (Goal > 60%) 47.6 %       Office visit with referring provider's group within last year Yes on 4/25/2022       Reina Cruz RN

## 2022-06-15 ENCOUNTER — ANTICOAGULATION THERAPY VISIT (OUTPATIENT)
Dept: ANTICOAGULATION | Facility: CLINIC | Age: 87
End: 2022-06-15

## 2022-06-15 ENCOUNTER — OFFICE VISIT (OUTPATIENT)
Dept: INTERNAL MEDICINE | Facility: CLINIC | Age: 87
End: 2022-06-15
Payer: MEDICARE

## 2022-06-15 VITALS
HEART RATE: 85 BPM | TEMPERATURE: 98.2 F | DIASTOLIC BLOOD PRESSURE: 62 MMHG | HEIGHT: 62 IN | OXYGEN SATURATION: 100 % | WEIGHT: 166 LBS | BODY MASS INDEX: 30.55 KG/M2 | SYSTOLIC BLOOD PRESSURE: 122 MMHG

## 2022-06-15 DIAGNOSIS — Z91.81 HISTORY OF RECENT FALL: Primary | ICD-10-CM

## 2022-06-15 DIAGNOSIS — R06.2 WHEEZING: ICD-10-CM

## 2022-06-15 DIAGNOSIS — I48.3 TYPICAL ATRIAL FLUTTER (H): ICD-10-CM

## 2022-06-15 DIAGNOSIS — E11.8 TYPE 2 DIABETES MELLITUS WITH COMPLICATION (H): ICD-10-CM

## 2022-06-15 DIAGNOSIS — Z79.01 LONG TERM (CURRENT) USE OF ANTICOAGULANTS: ICD-10-CM

## 2022-06-15 DIAGNOSIS — N18.31 STAGE 3A CHRONIC KIDNEY DISEASE (H): ICD-10-CM

## 2022-06-15 DIAGNOSIS — I50.32 CHRONIC DIASTOLIC CONGESTIVE HEART FAILURE (H): ICD-10-CM

## 2022-06-15 DIAGNOSIS — Z91.81 AT HIGH RISK FOR FALLS: ICD-10-CM

## 2022-06-15 DIAGNOSIS — I48.92 ATRIAL FLUTTER (H): Primary | ICD-10-CM

## 2022-06-15 LAB
ANION GAP SERPL CALCULATED.3IONS-SCNC: 14 MMOL/L (ref 5–18)
BUN SERPL-MCNC: 27 MG/DL (ref 8–28)
CALCIUM SERPL-MCNC: 9.1 MG/DL (ref 8.5–10.5)
CHLORIDE BLD-SCNC: 103 MMOL/L (ref 98–107)
CO2 SERPL-SCNC: 24 MMOL/L (ref 22–31)
CREAT SERPL-MCNC: 1.35 MG/DL (ref 0.6–1.1)
GFR SERPL CREATININE-BSD FRML MDRD: 36 ML/MIN/1.73M2
GLUCOSE BLD-MCNC: 120 MG/DL (ref 70–125)
INR BLD: 2.3 (ref 0.9–1.1)
POTASSIUM BLD-SCNC: 3.7 MMOL/L (ref 3.5–5)
SODIUM SERPL-SCNC: 141 MMOL/L (ref 136–145)

## 2022-06-15 PROCEDURE — 80048 BASIC METABOLIC PNL TOTAL CA: CPT | Performed by: INTERNAL MEDICINE

## 2022-06-15 PROCEDURE — 99214 OFFICE O/P EST MOD 30 MIN: CPT | Performed by: INTERNAL MEDICINE

## 2022-06-15 PROCEDURE — 36415 COLL VENOUS BLD VENIPUNCTURE: CPT | Performed by: INTERNAL MEDICINE

## 2022-06-15 PROCEDURE — 85610 PROTHROMBIN TIME: CPT | Mod: QW | Performed by: INTERNAL MEDICINE

## 2022-06-15 RX ORDER — FUROSEMIDE 20 MG
20 TABLET ORAL DAILY
Status: ON HOLD | COMMUNITY
End: 2023-01-01

## 2022-06-15 NOTE — LETTER
June 16, 2022      Miesha Quarles  [address]  [place] [address]        Dear ,    We are writing to inform you of your test results.    Kidney function probably stable on the every other day water medicine.  Please continue same dosing       Resulted Orders   Basic metabolic panel  (Ca, Cl, CO2, Creat, Gluc, K, Na, BUN)   Result Value Ref Range    Sodium 141 136 - 145 mmol/L    Potassium 3.7 3.5 - 5.0 mmol/L    Chloride 103 98 - 107 mmol/L    Carbon Dioxide (CO2) 24 22 - 31 mmol/L    Anion Gap 14 5 - 18 mmol/L    Urea Nitrogen 27 8 - 28 mg/dL    Creatinine 1.35 (H) 0.60 - 1.10 mg/dL    Calcium 9.1 8.5 - 10.5 mg/dL    Glucose 120 70 - 125 mg/dL    GFR Estimate 36 (L) >60 mL/min/1.73m2      Comment:      Effective December 21, 2021 eGFRcr in adults is calculated using the 2021 CKD-EPI creatinine equation which includes age and gender (Evelyn et al., NEJM, DOI: 10.1056/PALEse7278801)       If you have any questions or concerns, please call the clinic at the number listed above.       Sincerely,      Roc Mares MD

## 2022-06-15 NOTE — PROGRESS NOTES
"  Assessment & Plan     1. History of recent fall  She seems to be healing up as expected.  No evidence of infection on her abrasions.  Refer to physical therapy for gait analysis.  I have urged her to utilize walker at all times.  - Physical Therapy Referral; Future    2. At high risk for falls  I am concerned given her anticoagulation.  She needs to not fall down.  She is to use walker at all times.  - Walker Order for DME - ONLY FOR DME  - Physical Therapy Referral; Future    3. Wheezing  Resolved.  Likely due to some heart failure.  Continue diuretic with furosemide every other day.    4. Typical atrial flutter (H)  Rate controlled.  Continue anticoagulation per    5. Type 2 diabetes mellitus with complication (H)  Recent A1c looked good.    6. Stage 3a chronic kidney disease (H)  Recheck renal function today on her diuretic.  - Basic metabolic panel  (Ca, Cl, CO2, Creat, Gluc, K, Na, BUN); Future    7. Chronic diastolic congestive heart failure (H)  As above.  I will see her back in 3 months, sooner if needed.  - Basic metabolic panel  (Ca, Cl, CO2, Creat, Gluc, K, Na, BUN); Future                 Return in about 3 months (around 9/15/2022) for Follow up, with me, in person.    SAUL ALEXANDER MD  [facility]    Subjective    Miesha is a 89 year old, presenting for the following health issues:  Follow Up (Echo follow up - pt reports breathing has been stable/better) and Fall (Fell 5/28 at home & went to urgent care; would like Rx for Walker with Seat)      HPI     Miesha comes in today for follow-up.  Last time I saw her she was having wheezing which I thought was probably multifactorial but I thought was more likely due to some heart failure.  BNP was high.  I added diuretic and she is feeling markedly better.  No ongoing wheezing or difficulty with breathing.  She is only taking her furosemide every other day because \"it makes her pee her pants\".  She took a fall while going out " "to meet her friend in the driveway.  Skinned up her knees and her hand.  Went to urgent care.  They did do a head CT given her anticoagulation and that looked fine.  She felt fine afterwards she and she is tells me she is surprised about that.  Things are healing up.  She would like to have another walker with wheels.  Currently her walker she has to lift up as it has no wheels on it.      Review of Systems         Objective    /62 (BP Location: Left arm, Patient Position: Sitting, Cuff Size: Adult Regular)   Pulse 85   Temp 98.2  F (36.8  C)   Ht 1.575 m (5' 2\")   Wt 75.3 kg (166 lb)   SpO2 100%   BMI 30.36 kg/m    Body mass index is 30.36 kg/m .  Physical Exam   Delightful elderly woman who looks well.  No respiratory distress.  O2 sat 100% on room air.  She has some healing abrasions over her hands and knees bilaterally.                    .  ..  "

## 2022-07-13 NOTE — PROGRESS NOTES
ANTICOAGULATION MANAGEMENT     Miesha OSORIO Quarles 89 year old female is on warfarin with therapeutic INR result. (Goal INR 2.0-3.0)    Recent labs: (last 7 days)     07/13/22  1346   INR 2.4*       ASSESSMENT       Source(s): Chart Review, Patient/Caregiver Call and Template       Warfarin doses taken: Warfarin taken as instructed    Diet: Change in alcohol intake may be affecting INR.   Reported was on vacation and had 3 glasses of vodka / tonic within one wk.    New illness, injury, or hospitalization: No   Complained of back and knee pains.    Medication/supplement changes: None noted    Signs or symptoms of bleeding or clotting: No    Previous INR: Therapeutic last 4 visits    Additional findings: None       PLAN     Recommended plan for temporary change(s) affecting INR     Dosing Instructions:    continue your current warfarin dose with next INR in 4 weeks       Summary  As of 7/13/2022    Full warfarin instructions:  2.5 mg every day   Next INR check:  8/10/2022             Telephone call with  Miesha ([phone]) who verbalizes understanding and agrees to plan    Lab visit scheduled - INR on 8/3/22 during PT @ [facility].    Education provided: Importance of consistent vitamin K intake, Potential interaction between warfarin and alcohol and Goal range and significance of current result    Plan made per [facility] anticoagulation protocol    Reina Cruz RN  Anticoagulation Clinic  7/13/2022    _______________________________________________________________________     Anticoagulation Episode Summary     Current INR goal:  2.0-3.0   TTR:  59.0 % (8.8 mo)   Target end date:  Indefinite   Send INR reminders to:  [facility]    Indications    Atrial flutter (H) [I48.92]  Long term (current) use of anticoagulants [Z79.01]           Comments:           Anticoagulation Care Providers     Provider Role Specialty Phone number    Roc Mares MD Referring Internal Medicine [phone]

## 2022-07-22 NOTE — PROGRESS NOTES
[facility]    OUTPATIENT PHYSICAL THERAPY ORTHOPEDIC EVALUATION  PLAN OF TREATMENT FOR OUTPATIENT REHABILITATION  (COMPLETE FOR INITIAL CLAIMS ONLY)  Patient's Last Name, First Name, M.I.  YOB: 1928  Miesha Quarles    Provider s Name:  [facility]   Medical Record No.  [id number]   Start of Care Date:  07/20/22   Onset Date:      Type:     _X__PT   ___OT   ___SLP Medical Diagnosis:  Z91.81 (ICD-10-CM) - At high risk for falls  Z91.81 (ICD-10-CM) - History of recent fall     PT Diagnosis:  (P) gait abnormality, unsteady gait, B knee pain, B hip pain, low back pain   Visits from SOC:  1      _________________________________________________________________________________  Plan of Treatment/Functional Goals:  (P) balance training, bed mobility training, gait training, joint mobilization, manual therapy, motor coordination training, neuromuscular re-education, ROM, strengthening, stretching, transfer training           Goals  Goal Identifier: (P) walking  Goal Description: (P) Patient will be able to complete 6 minute walk test with appropriate device and safelywithout need for rest.  Target Date: (P) 10/20/22    Goal Identifier: (P) cleaning house  Goal Description: (P) Patient will report improved ability to clean house for 30 minutes without pain increase > 3/10.  Target Date: (P) 10/20/22    Goal Identifier: (P) strength and endurance  Goal Description: (P) Patient will be able to complete x 8 sit <> stand without use of UE off 20 inch surface.  Target Date: (P) 10/20/22    Goal Identifier: (P) HEP  Goal Description: (P) Patient will be able to demonstrate 6 exercises to promote progression to self management.  Target Date: (P) 10/20/22                                                Therapy Frequency:  (P) 1 time/week  Predicted Duration of Therapy  Intervention:  (P) 8-12 weeks    Shahla Chi, PT                 I CERTIFY THE NEED FOR THESE SERVICES FURNISHED UNDER        THIS PLAN OF TREATMENT AND WHILE UNDER MY CARE     (Physician co-signature of this document indicates review and certification of the therapy plan).                     Certification Date From:  07/20/22   Certification Date To:  10/19/22    Referring Provider:  Roc Mares MD    Initial Assessment        See Epic Evaluation Start of Care Date: 07/20/22 07/20/22 2175   General Information   Type of Visit Initial OP Ortho PT Evaluation   Start of Care Date 07/20/22   Referring Physician Roc Mares MD   Patient/Family Goals Statement walk better   Orders Evaluate and Treat   Date of Order 06/15/22   Certification Required? Yes   Medical Diagnosis Z91.81 (ICD-10-CM) - At high risk for falls  Z91.81 (ICD-10-CM) - History of recent fall   Surgical/Medical history reviewed Yes   Precautions/Limitations fall precautions   Presentation and Etiology   Pertinent history of current problem (include personal factors and/or comorbidities that impact the POC) Patient is a 89 year old who has been experiencing increased frequency of falls along with low back, hip, and knee pain. Patient feels mostly knees that have been injured from falls. Declined knee surgery when in 80's. Having increased difficulty walking due to pain and balance.   Impairments A. Pain;B. Decreased WB tolerance;C. Swelling;D. Decreased ROM;E. Decreased flexibility;F. Decreased strength and endurance;G. Impaired balance;H. Impaired gait;J. Burning;K. Numbness;L. Tingling   Functional Limitations perform activities of daily living;perform desired leisure / sports activities   Symptom Location anterior medial knee, pain both sides across back, B hip pain, B shoulder pain   How/Where did it occur With a fall;Other  (also arthritis)   Chronicity Chronic   Pain rating (0-10 point scale) Best (/10);Worst (/10);Other   Best  (/10) 0/10  (when laying down in bed or sitting)   Worst (/10)   (fell and went to urgent care, unable to rate pain)   Pain rating comment patient has difficulty rating pain   Pain quality A. Sharp;B. Dull;C. Aching;D. Burning;G. Cramping  (no shooting or stabbing recently)   Frequency of pain/symptoms C. With activity   Pain/symptoms are: Worse in the morning   Pain/symptoms exacerbated by B. Walking;D. Carrying;C. Lifting;G. Certain positions;I. Bending;J. ADL;K. Home tasks   Pain/symptoms eased by C. Rest;F. Certain positions;I. OTC medication(s);G. Heat;H. Cold   Progression of symptoms since onset: Worsened   Prior Level of Function   Prior Level of Function-Mobility patient reports she was independent   Prior Level of Function-ADLs patient reports she was independent   Current Level of Function   [facility]   Home/community accessibility 18 stairs to get up bedroom 1 railing opposite chair lift (needs a new battery), 5 steps to enter 2 railings   Fall Risk Screen   Fall screen completed by PT   Have you fallen 2 or more times in the past year? Yes   Have you fallen and had an injury in the past year? Yes   Timed Up and Go score (seconds) 35 seconds   Is patient a fall risk? Department fall risk interventions implemented;Yes   Abuse Screen (yes response referral indicated)   Feels Unsafe at Home or Work/School no   Feels Threatened by Someone no   Does Anyone Try to Keep You From Having Contact with Others or Doing Things Outside Your Home? no   Physical Signs of Abuse Present no   Functional Scales   Functional Scales Other  (lower extremity functional scale 45/80)   Knee Objective Findings   Posture forward head, rounded shoulders   Gait/Locomotion excess lateral shift, antalgic gait, decreased step length, unequal step length, poor use of SPC, non functional pace   Balance/Proprioception (Single Leg Stance) unable to attempt   Knee ROM Comment patient had difficulty getting into  position to measure knee ROM formally based on seated equal B will assess further if able   Knee/Hip Strength Comments knee strength R 3+/5 ext, 3/5 flex, L side 3+/5 ext, 3/5 flex hip ext/abd B 3/5   Accessory Motion/Joint Mobility hip ROM limited due to decreased tissue extensibility assess further at follow up visit   Planned Therapy Interventions   Planned Therapy Interventions balance training;bed mobility training;gait training;joint mobilization;manual therapy;motor coordination training;neuromuscular re-education;ROM;strengthening;stretching;transfer training   Clinical Impression   Criteria for Skilled Therapeutic Interventions Met yes, treatment indicated   PT Diagnosis gait abnormality, unsteady gait, B knee pain, B hip pain, low back pain   Influenced by the following impairments decreased stregnth, impaired balance, impaired tissue extensibility ROM   Functional limitations due to impairments transfers, ambulation, bed mobility, falls risk, household tasks   Clinical Presentation Stable/Uncomplicated   Clinical Presentation Rationale Presents as expected based on chart review   Clinical Decision Making (Complexity) Low complexity   Therapy Frequency 1 time/week   Predicted Duration of Therapy Intervention (days/wks) 8-12 weeks   Risk & Benefits of therapy have been explained Yes   Patient, Family & other staff in agreement with plan of care Yes   Clinical Impression Comments Patient presents with extensive muscle imbalance causing impairment in ambulation and increased risk of falls.  Patient will require skilled PT for education in home safety and improved muscle balance to promote improved safety at home and in community.   Education Assessment   Preferred Learning Style Demonstration;Pictures/video   Ortho Goal 1   Goal Identifier walking   Goal Description Patient will be able to complete 6 minute walk test with appropriate device and safelywithout need for rest.   Goal Progress initiated   Target  Date 10/20/22   Ortho Goal 2   Goal Identifier cleaning house   Goal Description Patient will report improved ability to clean house for 30 minutes without pain increase > 3/10.   Goal Progress initiated   Target Date 10/20/22   Ortho Goal 3   Goal Identifier strength and endurance   Goal Description Patient will be able to complete x 8 sit <> stand without use of UE off 20 inch surface.   Goal Progress initiated   Target Date 10/20/22   Ortho Goal 4   Goal Identifier HEP   Goal Description Patient will be able to demonstrate 6 exercises to promote progression to self management.   Goal Progress initiated   Target Date 10/20/22   Total Evaluation Time   PT Eval, Low Complexity Minutes (31849) 20   Therapy Certification   Certification date from 07/20/22   Certification date to 10/19/22   Medical Diagnosis Z91.81 (ICD-10-CM) - At high risk for falls  Z91.81 (ICD-10-CM) - History of recent fall   Shahla Chi, PT

## 2022-07-27 NOTE — PROGRESS NOTES
[facility]    OUTPATIENT PHYSICAL THERAPY ORTHOPEDIC EVALUATION  PLAN OF TREATMENT FOR OUTPATIENT REHABILITATION  (COMPLETE FOR INITIAL CLAIMS ONLY)  Patient's Last Name, First Name, M.I.  YOB: 1928  Miesha Quarles    Provider s Name:  [facility]   Medical Record No.  [id number]   Start of Care Date:  07/20/22   Onset Date:      Type:     _X__PT   ___OT   ___SLP Medical Diagnosis:  Z91.81 (ICD-10-CM) - At high risk for falls  Z91.81 (ICD-10-CM) - History of recent fall     PT Diagnosis:  gait abnormality, unsteady gait, B knee pain, B hip pain, low back pain   Visits from SOC:  1      _________________________________________________________________________________  Plan of Treatment/Functional Goals:  balance training, bed mobility training, gait training, joint mobilization, manual therapy, motor coordination training, neuromuscular re-education, ROM, strengthening, stretching, transfer training           Goals  Goal Identifier: walking  Goal Description: Patient will be able to complete 6 minute walk test with appropriate device and safelywithout need for rest.  Target Date: 10/20/22    Goal Identifier: cleaning house  Goal Description: Patient will report improved ability to clean house for 30 minutes without pain increase > 3/10.  Target Date: 10/20/22    Goal Identifier: strength and endurance  Goal Description: Patient will be able to complete x 8 sit <> stand without use of UE off 20 inch surface.  Target Date: 10/20/22    Goal Identifier: HEP  Goal Description: Patient will be able to demonstrate 6 exercises to promote progression to self management.  Target Date: 10/20/22                                                Therapy Frequency:  1 time/week  Predicted Duration of Therapy Intervention:  8-12 weeks    Shahla Chi PT                 I  CERTIFY THE NEED FOR THESE SERVICES FURNISHED UNDER        THIS PLAN OF TREATMENT AND WHILE UNDER MY CARE     (Physician co-signature of this document indicates review and certification of the therapy plan).                     Certification Date From:  07/20/22   Certification Date To:  10/19/22    Referring Provider:  Roc Mares MD    Initial Assessment        See Epic Evaluation Start of Care Date: 07/20/22 07/20/22 2404   General Information   Type of Visit Initial OP Ortho PT Evaluation   Start of Care Date 07/20/22   Referring Physician Roc Mares MD   Patient/Family Goals Statement walk better   Orders Evaluate and Treat   Date of Order 06/15/22   Certification Required? Yes   Medical Diagnosis Z91.81 (ICD-10-CM) - At high risk for falls  Z91.81 (ICD-10-CM) - History of recent fall   Surgical/Medical history reviewed Yes   Precautions/Limitations fall precautions   Presentation and Etiology   Pertinent history of current problem (include personal factors and/or comorbidities that impact the POC) Patient is a 89 year old who has been experiencing increased frequency of falls along with low back, hip, and knee pain. Patient feels mostly knees that have been injured from falls. Declined knee surgery when in 80's. Having increased difficulty walking due to pain and balance.   Impairments A. Pain;B. Decreased WB tolerance;C. Swelling;D. Decreased ROM;E. Decreased flexibility;F. Decreased strength and endurance;G. Impaired balance;H. Impaired gait;J. Burning;K. Numbness;L. Tingling   Functional Limitations perform activities of daily living;perform desired leisure / sports activities   Symptom Location anterior medial knee, pain both sides across back, B hip pain, B shoulder pain   How/Where did it occur With a fall;Other  (also arthritis)   Chronicity Chronic   Pain rating (0-10 point scale) Best (/10);Worst (/10);Other   Best (/10) 0/10  (when laying down in bed or sitting)   Worst (/10)   (fell  and went to urgent care, unable to rate pain)   Pain rating comment patient has difficulty rating pain   Pain quality A. Sharp;B. Dull;C. Aching;D. Burning;G. Cramping  (no shooting or stabbing recently)   Frequency of pain/symptoms C. With activity   Pain/symptoms are: Worse in the morning   Pain/symptoms exacerbated by B. Walking;D. Carrying;C. Lifting;G. Certain positions;I. Bending;J. ADL;K. Home tasks   Pain/symptoms eased by C. Rest;F. Certain positions;I. OTC medication(s);G. Heat;H. Cold   Progression of symptoms since onset: Worsened   Prior Level of Function   Prior Level of Function-Mobility patient reports she was independent   Prior Level of Function-ADLs patient reports she was independent   Current Level of Function   [facility]   Home/community accessibility 18 stairs to get up bedroom 1 railing opposite chair lift (needs a new battery), 5 steps to enter 2 railings   Fall Risk Screen   Fall screen completed by PT   Have you fallen 2 or more times in the past year? Yes   Have you fallen and had an injury in the past year? Yes   Timed Up and Go score (seconds) 35 seconds   Is patient a fall risk? Department fall risk interventions implemented;Yes   Abuse Screen (yes response referral indicated)   Feels Unsafe at Home or Work/School no   Feels Threatened by Someone no   Does Anyone Try to Keep You From Having Contact with Others or Doing Things Outside Your Home? no   Physical Signs of Abuse Present no   Functional Scales   Functional Scales Other  (lower extremity functional scale 45/80)   Knee Objective Findings   Posture forward head, rounded shoulders   Gait/Locomotion excess lateral shift, antalgic gait, decreased step length, unequal step length, poor use of SPC, non functional pace   Balance/Proprioception (Single Leg Stance) unable to attempt   Knee ROM Comment patient had difficulty getting into position to measure knee ROM formally based on seated equal B will assess  further if able   Knee/Hip Strength Comments knee strength R 3+/5 ext, 3/5 flex, L side 3+/5 ext, 3/5 flex hip ext/abd B 3/5   Accessory Motion/Joint Mobility hip ROM limited due to decreased tissue extensibility assess further at follow up visit   Planned Therapy Interventions   Planned Therapy Interventions balance training;bed mobility training;gait training;joint mobilization;manual therapy;motor coordination training;neuromuscular re-education;ROM;strengthening;stretching;transfer training   Clinical Impression   Criteria for Skilled Therapeutic Interventions Met yes, treatment indicated   PT Diagnosis gait abnormality, unsteady gait, B knee pain, B hip pain, low back pain   Influenced by the following impairments decreased stregnth, impaired balance, impaired tissue extensibility ROM   Functional limitations due to impairments transfers, ambulation, bed mobility, falls risk, household tasks   Clinical Presentation Stable/Uncomplicated   Clinical Presentation Rationale Presents as expected based on chart review   Clinical Decision Making (Complexity) Low complexity   Therapy Frequency 1 time/week   Predicted Duration of Therapy Intervention (days/wks) 8-12 weeks   Risk & Benefits of therapy have been explained Yes   Patient, Family & other staff in agreement with plan of care Yes   Clinical Impression Comments Patient presents with extensive muscle imbalance causing impairment in ambulation and increased risk of falls.  Patient will require skilled PT for education in home safety and improved muscle balance to promote improved safety at home and in community.   Education Assessment   Preferred Learning Style Demonstration;Pictures/video   Ortho Goal 1   Goal Identifier walking   Goal Description Patient will be able to complete 6 minute walk test with appropriate device and safelywithout need for rest.   Goal Progress initiated   Target Date 10/20/22   Ortho Goal 2   Goal Identifier cleaning house   Goal  Description Patient will report improved ability to clean house for 30 minutes without pain increase > 3/10.   Goal Progress initiated   Target Date 10/20/22   Ortho Goal 3   Goal Identifier strength and endurance   Goal Description Patient will be able to complete x 8 sit <> stand without use of UE off 20 inch surface.   Goal Progress initiated   Target Date 10/20/22   Ortho Goal 4   Goal Identifier HEP   Goal Description Patient will be able to demonstrate 6 exercises to promote progression to self management.   Goal Progress initiated   Target Date 10/20/22   Total Evaluation Time   PT Eval, Low Complexity Minutes (38853) 20   Therapy Certification   Certification date from 07/20/22   Certification date to 10/19/22   Medical Diagnosis Z91.81 (ICD-10-CM) - At high risk for falls  Z91.81 (ICD-10-CM) - History of recent fall   Shahla Chi, PT

## 2022-07-27 NOTE — ADDENDUM NOTE
Encounter addended by: Francia Chi, PT on: 7/27/2022 7:54 AM   Actions taken: Clinical Note Signed, Flowsheet accepted, Document created, Document edited

## 2022-08-01 NOTE — TELEPHONE ENCOUNTER
"Routing refill request to provider for review/approval because:  Labs out of range:  cr    Last Written Prescription Date:  5/20/21  Last Fill Quantity: 180,  # refills: 2   Last office visit provider:  6/15/22     Requested Prescriptions   Pending Prescriptions Disp Refills     lisinopril-hydrochlorothiazide (ZESTORETIC) 20-12.5 MG tablet [Pharmacy Med Name: LISINOPRIL/HYDROCHLOROTHIAZIDE 20-12.5 MG Tablet] 180 tablet 2     Sig: TAKE 2 TABLETS EVERY DAY       Diuretics (Including Combos) Protocol Failed - 7/30/2022  3:35 PM        Failed - Normal serum creatinine on file in past 12 months     Recent Labs   Lab Test 06/15/22  1522   CR 1.35*              Passed - Blood pressure under 140/90 in past 12 months     BP Readings from Last 3 Encounters:   06/15/22 122/62   04/21/22 124/68   11/22/21 120/62                 Passed - Recent (12 mo) or future (30 days) visit within the authorizing provider's specialty     Patient has had an office visit with the authorizing provider or a provider within the authorizing providers department within the previous 12 mos or has a future within next 30 days. See \"Patient Info\" tab in inbasket, or \"Choose Columns\" in Meds & Orders section of the refill encounter.              Passed - Medication is active on med list        Passed - Patient is age 18 or older        Passed - No active pregancy on record        Passed - Normal serum potassium on file in past 12 months     Recent Labs   Lab Test 06/15/22  1522   POTASSIUM 3.7                    Passed - Normal serum sodium on file in past 12 months     Recent Labs   Lab Test 06/15/22  1522                 Passed - No positive pregnancy test in past 12 months       ACE Inhibitors (Including Combos) Protocol Failed - 7/30/2022  3:35 PM        Failed - Normal serum creatinine on file in past 12 months     Recent Labs   Lab Test 06/15/22  1522   CR 1.35*       Ok to refill medication if creatinine is low          Passed - Blood " "pressure under 140/90 in past 12 months     BP Readings from Last 3 Encounters:   06/15/22 122/62   04/21/22 124/68   11/22/21 120/62                 Passed - Recent (12 mo) or future (30 days) visit within the authorizing provider's specialty     Patient has had an office visit with the authorizing provider or a provider within the authorizing providers department within the previous 12 mos or has a future within next 30 days. See \"Patient Info\" tab in inbasket, or \"Choose Columns\" in Meds & Orders section of the refill encounter.              Passed - Medication is active on med list        Passed - Patient is age 18 or older        Passed - No active pregnancy on record        Passed - Normal serum potassium on file in past 12 months     Recent Labs   Lab Test 06/15/22  1522   POTASSIUM 3.7             Passed - No positive pregnancy test within past 12 months         Signed Prescriptions Disp Refills    levothyroxine (SYNTHROID/LEVOTHROID) 50 MCG tablet 90 tablet 0     Sig: TAKE 1 TABLET EVERY DAY       Thyroid Protocol Passed - 7/30/2022  3:35 PM        Passed - Patient is 12 years or older        Passed - Recent (12 mo) or future (30 days) visit within the authorizing provider's specialty     Patient has had an office visit with the authorizing provider or a provider within the authorizing providers department within the previous 12 mos or has a future within next 30 days. See \"Patient Info\" tab in inbasket, or \"Choose Columns\" in Meds & Orders section of the refill encounter.              Passed - Medication is active on med list        Passed - Normal TSH on file in past 12 months     Recent Labs   Lab Test 11/22/21  1336   TSH 4.23              Passed - No active pregnancy on record     If patient is pregnant or has had a positive pregnancy test, please check TSH.          Passed - No positive pregnancy test in past 12 months     If patient is pregnant or has had a positive pregnancy test, please check " TSH.               Emelia Ma RN 07/31/22 7:51 PM

## 2022-08-01 NOTE — TELEPHONE ENCOUNTER
"Last Written Prescription Date:  5/13/22  Last Fill Quantity: 90,  # refills: 1   Last office visit provider:  6/15/22     Requested Prescriptions   Pending Prescriptions Disp Refills     levothyroxine (SYNTHROID/LEVOTHROID) 50 MCG tablet [Pharmacy Med Name: LEVOTHYROXINE SODIUM 50 MCG Tablet] 90 tablet 1     Sig: TAKE 1 TABLET EVERY DAY       Thyroid Protocol Passed - 7/30/2022  3:35 PM        Passed - Patient is 12 years or older        Passed - Recent (12 mo) or future (30 days) visit within the authorizing provider's specialty     Patient has had an office visit with the authorizing provider or a provider within the authorizing providers department within the previous 12 mos or has a future within next 30 days. See \"Patient Info\" tab in inbasket, or \"Choose Columns\" in Meds & Orders section of the refill encounter.              Passed - Medication is active on med list        Passed - Normal TSH on file in past 12 months     Recent Labs   Lab Test 11/22/21  1336   TSH 4.23              Passed - No active pregnancy on record     If patient is pregnant or has had a positive pregnancy test, please check TSH.          Passed - No positive pregnancy test in past 12 months     If patient is pregnant or has had a positive pregnancy test, please check TSH.             lisinopril-hydrochlorothiazide (ZESTORETIC) 20-12.5 MG tablet [Pharmacy Med Name: LISINOPRIL/HYDROCHLOROTHIAZIDE 20-12.5 MG Tablet] 180 tablet 2     Sig: TAKE 2 TABLETS EVERY DAY       Diuretics (Including Combos) Protocol Failed - 7/30/2022  3:35 PM        Failed - Normal serum creatinine on file in past 12 months     Recent Labs   Lab Test 06/15/22  1522   CR 1.35*              Passed - Blood pressure under 140/90 in past 12 months     BP Readings from Last 3 Encounters:   06/15/22 122/62   04/21/22 124/68   11/22/21 120/62                 Passed - Recent (12 mo) or future (30 days) visit within the authorizing provider's specialty     Patient has had an " "office visit with the authorizing provider or a provider within the authorizing providers department within the previous 12 mos or has a future within next 30 days. See \"Patient Info\" tab in inbasket, or \"Choose Columns\" in Meds & Orders section of the refill encounter.              Passed - Medication is active on med list        Passed - Patient is age 18 or older        Passed - No active pregancy on record        Passed - Normal serum potassium on file in past 12 months     Recent Labs   Lab Test 06/15/22  1522   POTASSIUM 3.7                    Passed - Normal serum sodium on file in past 12 months     Recent Labs   Lab Test 06/15/22  1522                 Passed - No positive pregnancy test in past 12 months       ACE Inhibitors (Including Combos) Protocol Failed - 7/30/2022  3:35 PM        Failed - Normal serum creatinine on file in past 12 months     Recent Labs   Lab Test 06/15/22  1522   CR 1.35*       Ok to refill medication if creatinine is low          Passed - Blood pressure under 140/90 in past 12 months     BP Readings from Last 3 Encounters:   06/15/22 122/62   04/21/22 124/68   11/22/21 120/62                 Passed - Recent (12 mo) or future (30 days) visit within the authorizing provider's specialty     Patient has had an office visit with the authorizing provider or a provider within the authorizing providers department within the previous 12 mos or has a future within next 30 days. See \"Patient Info\" tab in inbasket, or \"Choose Columns\" in Meds & Orders section of the refill encounter.              Passed - Medication is active on med list        Passed - Patient is age 18 or older        Passed - No active pregnancy on record        Passed - Normal serum potassium on file in past 12 months     Recent Labs   Lab Test 06/15/22  1522   POTASSIUM 3.7             Passed - No positive pregnancy test within past 12 months             Emelia Ma RN 07/31/22 7:51 PM  "

## 2022-08-03 NOTE — PROGRESS NOTES
ANTICOAGULATION MANAGEMENT     Miesha Quarles 89 year old female is on warfarin with subtherapeutic INR result. (Goal INR 2.0-3.0)    Recent labs: (last 7 days)     08/03/22  1320   INR 1.8*       ASSESSMENT       Source(s): Chart Review, Patient/Caregiver Call and Template       Warfarin doses taken: Warfarin taken as instructed    Diet: Increased greens/vitamin K in diet; plans to resume previous intake    New illness, injury, or hospitalization: No.   Recent eye exam with Dr. Sosa.    Medication/supplement changes: None noted    Signs or symptoms of bleeding or clotting: No    Previous INR: Therapeutic last 5 visits    Additional findings: None       PLAN     Recommended plan for temporary change(s) affecting INR     Dosing Instructions:   (evenings. 5mg tabs)    booster dose then continue your current warfarin dose with next INR in 2 weeks       Summary  As of 8/3/2022    Full warfarin instructions:  8/4: 5 mg; Otherwise 2.5 mg every day   Next INR check:  8/17/2022             Detailed voice message left for  Miesha ([phone]) with dosing instructions and follow up date.    - already dosed for tonight.  Will f/u with Miesha to ensure she is consistent eating her Vitamin-K foods; Some days she has increased intake.    Lab visit scheduled - INR on 8/25/22 at neuro PT @ [facility].   - appt coincides with her PT, as she has problems with transportation at times.    Education provided: Please call back ([phone]) if any changes to your diet, medications or how you've been taking warfarin, Importance of consistent vitamin K intake and Goal range and significance of current result    Plan made per [facility] anticoagulation protocol    Reina Cruz, RN  Anticoagulation Clinic  8/3/2022    _______________________________________________________________________     Anticoagulation Episode Summary     Current INR goal:  2.0-3.0   TTR:  56.2 % (8.7 mo)   Target end date:  Indefinite   Send INR reminders to:   [facility]    Indications    Atrial flutter (H) [I48.92]  Long term (current) use of anticoagulants [Z79.01]           Comments:           Anticoagulation Care Providers     Provider Role Specialty Phone number    Roc Mares MD Referring Internal Medicine [phone]

## 2022-08-04 NOTE — PROGRESS NOTES
Called and spoke with Miesha, ([phone])     - she reported did not take the 5mg warfarin booster dose last night.     - she was not able to retrieve her VM on her cell phone.     - tonight, advised to take one time booster with 5mg warfarin.     - reported, she ate more salad than usual this week.     - next INR check on 8/25/22 to coincide with her neuro PT @ [facility].     - also requested refill on her Warfarin 5mg and send to [facility] in [place].

## 2022-08-26 NOTE — TELEPHONE ENCOUNTER
"ANTICOAGULATION     Miesha DEVON Quarles is overdue for INR check.      Spoke with Miesha and scheduled lab appointment on 9/1    - missed INR appt on 8/25/22.   - she was not aware on the current date today.  Yesterday was :\"busy putting her stove in and which has not been working since Thanksgiving.\"      Reina Cruz RN    "

## 2022-09-01 NOTE — PROGRESS NOTES
ANTICOAGULATION MANAGEMENT     Miesha OSORIO Quarles 89 year old female is on warfarin with subtherapeutic INR result. (Goal INR 2.0-3.0)    Recent labs: (last 7 days)     09/01/22  1337   INR 1.6*       ASSESSMENT       Source(s): Chart Review, Patient/Caregiver Call and Template       Warfarin doses taken: Warfarin taken as instructed    Diet: No new diet changes identified    New illness, injury, or hospitalization: No    Medication/supplement changes: None noted    Signs or symptoms of bleeding or clotting: No    Previous INR: Subtherapeutic at 1.8 on 8/3/22.    Additional findings: None       PLAN     Recommended plan for no diet, medication or health factor changes affecting INR     Dosing Instructions: Increase your warfarin dose (14.3% change) with next INR in   1-2 weeks.       Summary  As of 9/1/2022    Full warfarin instructions:  5 mg every Thu; 2.5 mg all other days   Next INR check:  9/15/2022             Telephone call with  Miesha ([phone]) who verbalizes understanding and agrees to plan    Check at provider office visit - INR on 9/15/22     Education provided: Importance of consistent vitamin K intake, Goal range and significance of current result and Monitoring for clotting signs and symptoms    Plan made per [facility] anticoagulation protocol    Renia Cruz, RN  Anticoagulation Clinic  9/1/2022    _______________________________________________________________________     Anticoagulation Episode Summary     Current INR goal:  2.0-3.0   TTR:  54.1 % (8.8 mo)   Target end date:  Indefinite   Send INR reminders to:  [facility]    Indications    Atrial flutter (H) [I48.92]  Long term (current) use of anticoagulants [Z79.01]           Comments:           Anticoagulation Care Providers     Provider Role Specialty Phone number    Roc Mares MD Referring Internal Medicine [phone]

## 2022-09-15 NOTE — PROGRESS NOTES
1. Benign Essential Hypertension  Excellent control.  Continue same regimen.  - Comprehensive metabolic panel (BMP + Alb, Alk Phos, ALT, AST, Total. Bili, TP); Future    2. Chronic diastolic congestive heart failure (H)  She seems euvolemic.  Salt restriction urged.  It is unclear if she has been compliant with her furosemide.  - Comprehensive metabolic panel (BMP + Alb, Alk Phos, ALT, AST, Total. Bili, TP); Future    3. Primary osteoarthritis involving multiple joints  She uses occasional rare tramadol especially if the weather is changing.  Tolerates it without difficulty  - traMADol (ULTRAM) 50 MG tablet; TAKE 1 TABLET BY MOUTH EVERY 6 HOURS AS NEEDED  Dispense: 20 tablet; Refill: 0    4. Need for vaccination  Flu shot today  - INFLUENZA, QUAD, HIGH DOSE, PF, 65YR + (FLUZONE HD)    5. Hypothyroidism, unspecified type  Seems euthyroid.  Check TSH.  - TSH with free T4 reflex; Future    6. 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  Continue same.  - Hemoglobin A1c; Future    Subjective   Miesha is a 89 year old, presenting for the following health issues:  Follow Up (3 month recheck; Needs INR & flu shot )      AMBER Whiteside comes in today for follow-up of her multi medical problems.  She reports has been doing well.  Offers no new concerns.  Her breathing seems stable.  Sugars have been fine.  No falls since we last met.  Bowels and bladder are stable.  Boyfriend Wero is doing okay.  He is 99 on dialysis.  She continues to live in her two-story home.  Refuses to give that up.  She has a lift that can take up stairs but it is currently broken.  She is going to probably try to replace that.  She absolutely refuses to consider leaving her home and going to a single floor living.    She has A. fib flutter and is on chronic warfarin.  She is resistant to trying a direct oral coagulant or discussing a watchman device with cardiologist.    Review of Systems         Objective    /64  "(BP Location: Left arm, Patient Position: Sitting, Cuff Size: Adult Regular)   Pulse 78   Temp 98.1  F (36.7  C)   Ht 1.575 m (5' 2\")   Wt 77.1 kg (170 lb)   SpO2 98%   BMI 31.09 kg/m    Body mass index is 31.09 kg/m .  Physical Exam   Delightful woman.  Good spirits today.  Looks well.  Weight stable.  No edema.  Lungs with just a few bibasilar crackles.                    "

## 2022-10-04 NOTE — TELEPHONE ENCOUNTER
ANTICOAGULATION     Miesha Quarles is overdue for INR check.     Was unable to reach patient and was unable to leave a voicemail. If patient calls, please schedule INR check as soon as possible.     - calling patient for the last 4 days - with no success.  Will send letter, that is due to check INR, in light of last INR was supra at 4.5    - patient was a no show on 9/21/22.    Per Dr. Mares, Also called Miesha's niece - Casie Villar ([phone])   - left detailed message to inform and have her Aunt Miesha schedule an INR soon, as her last testing was abnormal.   - call to schedule INR at [phone].    Reina Cruz RN

## 2022-10-04 NOTE — TELEPHONE ENCOUNTER
"Clinic Action Needed?  Yes, please advise, contact pt.     [facility] Triage Call   Presenting Problem:  Pt reports she fell and hit her head on a wood cabinet on Fri, 9/30. Hit head on L side above ear. Denies loss of consciousness. Denies bleeding or open wounds.Says she always has some unsteady walking but no worse than usual. No vomiting, severe headache, dizziness or vision changes. Asks if she should \"go to the hospital to get an x-ray.\". Pt is on warfarin.       Patient Teaching/Discussion: Advised pt we need to send a note to her PCP to ask for his/her advice. We will call her back shortly. Pt voiced understanding and agreement.       Routed to: [place] -  Dr Mares care team [place]        Reason for Disposition    Taking Coumadin (warfarin) or other strong blood thinner, or known bleeding disorder (e.g., thrombocytopenia)    Additional Information    Negative: [1] ACUTE NEURO SYMPTOM AND [2] present now  (DEFINITION: difficult to awaken OR confused thinking and talking OR slurred speech OR weakness of arms OR unsteady walking)    Negative: Knocked out (unconscious) > 1 minute    Negative: Seizure (convulsion) occurred  (Exception: prior history of seizures and now alert and without Acute Neuro Symptoms)    Negative: Penetrating head injury (e.g., knife, gun shot wound, metal object)    Negative: [1] Major bleeding (e.g., actively dripping or spurting) AND [2] can't be stopped    Negative: [1] Dangerous mechanism of injury (e.g., MVA, diving, trampoline, contact sports, fall > 10 feet or 3 meters) AND [2] NECK pain AND [3] began < 1 hour after injury    Negative: Sounds like a life-threatening emergency to the triager    Negative: [1] Diagnosed with concussion AND [2] within last 14 days    Negative: [1] Traumatic brain injury (mTBI; concussion) AND [2] more than 14 days since head injury    Negative: Can't remember what happened (amnesia)    Negative: Vomiting once or more    Negative: [1] Loss of vision or double " "vision AND [2] present now    Negative: Watery or blood-tinged fluid dripping from the NOSE or EARS now  (Exception: tears from crying or nosebleed from nasal trauma)    Negative: [1] One or two \"black eyes\" (bruising, purple color of eyelids) AND [2] onset within 24 hours of head injury    Negative: Large swelling or bruise > 2 inches (5 cm)    Negative: Skin is split open or gaping  (or length > 1/2 inch or 12 mm)    Negative: [1] Bleeding AND [2] won't stop after 10 minutes of direct pressure (using correct technique)    Negative: Sounds like a serious injury to the triager    Negative: [1] ACUTE NEURO SYMPTOM AND [2] now fine  (DEFINITION: difficult to awaken OR confused thinking and talking OR slurred speech OR weakness of arms OR unsteady walking)    Negative: [1] Knocked out (unconscious) < 1 minute AND [2] now fine    Negative: [1] SEVERE headache AND [2] not improved 2 hours after pain medicine/ice packs    Negative: Dangerous injury (e.g., MVA, diving, trampoline, contact sports, fall > 10 feet or 3 meters) or severe blow from hard object (e.g., golf club or baseball bat)    Protocols used: HEAD INJURY-A-AH      "

## 2022-10-04 NOTE — TELEPHONE ENCOUNTER
Why is she calling today to ask this?  Why didn't she call after the event?  Is she having some symptoms?  I would recommend that she be evaluated either in the urgency room or emergency room, to be on safe side.

## 2022-10-04 NOTE — LETTER
[facility]  [address] [facility] [address]  Phone: [phone]  Fax: [phone]       October 6, 2022        Miesha Quarles  [address]  [place] [address]            Dear Miesha Whiteside, I have been unable to reach by phone since 10/4/22 to set up an INR appt.    You are currently under the care of [facility] for your warfarin (Coumadin , Jantoven ) therapy.  We are contacting you because our records show you were due for an INR on 9/23/22.    Your last INR on 9/15/22 was very high at 4.6, indicating blood was very thin.  As you know we want to maintain your INR at 2.0 - 3.0 and not any higher than 3.0    There are potentially serious risks when taking warfarin without careful monitoring and we want to make sure you are safely managed.  Routine lab monitoring is required for warfarin refills.     Please call and [phone] as soon as possible to schedule an appointment.  If there has been a change in your care or other concerns, please let us know so we can help and or update our records.     Sincerely,       [facility]

## 2022-10-04 NOTE — TELEPHONE ENCOUNTER
I called and spoke with patient and advise her Dr. Mares recommendations. Patient verbalized understanding and will coordinate transportation.

## 2022-10-05 NOTE — TELEPHONE ENCOUNTER
ANTICOAGULATION     Miesha Quarles is overdue for INR check.      Was unable to reach patient and was unable to leave a voicemail. If patient calls, please schedule INR check as soon as possible. will try later    Tried multiple times to reach patient and was unsuccessful.  Sent letter to schedule INR appt soon.  Last INR was supra at 4.6 on 9/15/22 and was due to check on 9/23.    Michaelle Wilson RN

## 2022-10-07 NOTE — ADDENDUM NOTE
Encounter addended by: Francia Chi, PT on: 10/7/2022 9:40 AM   Actions taken: Episode resolved, Pend clinical note, Flowsheet accepted, Clinical Note Signed

## 2022-10-07 NOTE — PROGRESS NOTES
[facility]    Outpatient Physical Therapy Discharge Note  Patient: Miesha Quarles  : 1928    Beginning/End Dates of Reporting Period:  2022 to 2022    Referring Provider: Roc Mares MD     Therapy Diagnosis: risk of falls, low back pain, hip pain     Client Self Report: Patient reports no change since evaluation is completing exercises, exercises don't feel they are helping yet.    Objective Measurements:  Objective Measure: hip mobility/ROM  Details: decreased L as compared to R in flex, IR limited by 25%  Objective Measure: lumbar spine ROM     Objective Measure: timed walk test  Details: assess next visit  Objective Measure: 30 second sit <> stand  Details: unable to complete without UE unless 2 air pads on top of standard height chair with cue for proper set up, able to complete 3 in a row prior to need for rest  Objective Measure: TUG  Details: 57 seconds, 37, 37 seconds         Goals:  Goal Identifier walking   Goal Description Patient will be able to complete 6 minute walk test with appropriate device and safelywithout need for rest.   Target Date 10/20/22   Date Met      Progress (detail required for progress note): patient is able to stand and ambulate at slow non functional pace with slow turns and need for frequent cue with use of 4WW     Goal Identifier cleaning house   Goal Description Patient will report improved ability to clean house for 30 minutes without pain increase > 3/10.   Target Date 10/20/22   Date Met      Progress (detail required for progress note): no change     Goal Identifier strength and endurance   Goal Description Patient will be able to complete x 8 sit <> stand without use of UE off 20 inch surface.   Target Date 10/20/22   Date Met      Progress (detail required for progress note): able to complete off elevated surface (standard chair with air ex x 2)  x 5 in 1 minute need for rest break     Goal Identifier HEP   Goal Description Patient will be able to demonstrate 6 exercises to promote progression to self management.   Target Date 10/20/22   Date Met      Progress (detail required for progress note): continues to require progression and revision     Goal Identifier     Goal Description     Target Date     Date Met      Progress (detail required for progress note):       Goal Identifier     Goal Description     Target Date     Date Met      Progress (detail required for progress note):       Goal Identifier     Goal Description     Target Date     Date Met      Progress (detail required for progress note):       Goal Identifier     Goal Description     Target Date     Date Met      Progress (detail required for progress note):             Plan:  Discharge from therapy.    Discharge:    Reason for Discharge: Patient chooses to discontinue therapy.  Patient did not show to 2nd appointment and has not scheduled follow up appointments. Will require new referral to return to PT.        Discharge Plan: Patient to continue home program.

## 2022-10-14 NOTE — TELEPHONE ENCOUNTER
ANTICOAGULATION     Miesha Quarles is overdue for INR check.      Was unable to reach patient and was unable to leave a voicemail. If patient calls, please schedule INR check as soon as possible.    - a letter was sent to patient.   - also called and LM with jovi Villar ([phone])    Reina Cruz RN

## 2022-10-14 NOTE — TELEPHONE ENCOUNTER
Reason for Call:  Other returning call    Detailed comments: patient calling [facility] anti coag nurse back.    Cannot get a hold of RN.    Please contact patient back.  Thank you.    Phone Number Patient can be reached at: Home number on file [phone] (home)    Best Time: any    Can we leave a detailed message on this number? YES    Call taken on 10/14/2022 at 1:38 PM by Trisha Nino

## 2022-10-14 NOTE — TELEPHONE ENCOUNTER
Called and spoke with Miesha     - advised the importance of checking INR.     - scheduled INR on 10/17/22 @ [facility].

## 2022-10-17 NOTE — PROGRESS NOTES
ANTICOAGULATION MANAGEMENT     Miesha OSORIO Willam 89 year old female is on warfarin with supratherapeutic INR result. (Goal INR 2.0-3.0)    Recent labs: (last 7 days)     10/17/22  1527   INR 3.2*       ASSESSMENT       Source(s): Chart Review and Patient/Caregiver Call       Warfarin doses taken: Less warfarin taken than planned which may be affecting INR took 2.5 mg daily this week    Diet: No new diet changes identified    New illness, injury, or hospitalization: No    Medication/supplement changes: None noted    Signs or symptoms of bleeding or clotting: No    Previous INR: Supratherapeutic    Additional findings: None       PLAN     Recommended plan for no diet, medication or health factor changes affecting INR     Dosing Instructions: hold dose then continue your current warfarin dose with next INR in 2 weeks       Summary  As of 10/17/2022    Full warfarin instructions:  10/17: Hold; Otherwise 2.5 mg every day   Next INR check:  10/31/2022             Telephone call with Miesha who verbalizes understanding and agrees to plan    Lab visit scheduled    Education provided: None required    Plan made per [facility] anticoagulation protocol    Michaelle Wilson RN  Anticoagulation Clinic  10/17/2022    _______________________________________________________________________     Anticoagulation Episode Summary     Current INR goal:  2.0-3.0   TTR:  47.4 % (8.8 mo)   Target end date:  Indefinite   Send INR reminders to:  [facility]    Indications    Atrial flutter (H) [I48.92]  Long term (current) use of anticoagulants [Z79.01]           Comments:           Anticoagulation Care Providers     Provider Role Specialty Phone number    Roc Mares MD Referring Internal Medicine [phone]

## 2022-11-08 NOTE — TELEPHONE ENCOUNTER
ANTICOAGULATION     Miesha Quarles is overdue for INR check.      Spoke with Edvin avery who declined to schedule a lab appointment at this time. If calling back please schedule as soon as possible.    - she will inform her Aunt to schedule INR appt - [phone]    Reina Cruz RN

## 2022-11-10 NOTE — TELEPHONE ENCOUNTER
"Routing refill request to provider for review/approval because:  Labs out of range:  cr    Last Written Prescription Date:  5/27/22  Last Fill Quantity: 90,  # refills: 0   Last office visit provider:  9/15/22     Requested Prescriptions   Pending Prescriptions Disp Refills     amLODIPine (NORVASC) 2.5 MG tablet [Pharmacy Med Name: AMLODIPINE BESYLATE 2.5 MG TAB] 90 tablet 0     Sig: TAKE 1 TABLET BY MOUTH DAILY       Calcium Channel Blockers Protocol  Failed - 11/8/2022  2:31 PM        Failed - Normal serum creatinine on file in past 12 months     Recent Labs   Lab Test 09/15/22  1459   CR 1.05*       Ok to refill medication if creatinine is low          Passed - Blood pressure under 140/90 in past 12 months     BP Readings from Last 3 Encounters:   09/15/22 124/64   06/15/22 122/62   04/21/22 124/68                 Passed - Recent (12 mo) or future (30 days) visit within the authorizing provider's specialty     Patient has had an office visit with the authorizing provider or a provider within the authorizing providers department within the previous 12 mos or has a future within next 30 days. See \"Patient Info\" tab in inbasket, or \"Choose Columns\" in Meds & Orders section of the refill encounter.              Passed - Medication is active on med list        Passed - Patient is age 18 or older        Passed - No active pregnancy on record        Passed - No positive pregnancy test in past 12 months             Emelia Ma RN 11/10/22 4:13 PM  "

## 2022-11-14 NOTE — PROGRESS NOTES
ANTICOAGULATION MANAGEMENT     Miesha Quarles 89 year old female is on warfarin with supratherapeutic INR result. (Goal INR 2.0-3.0)    Recent labs: (last 7 days)     11/14/22  1555   INR 3.5*       ASSESSMENT       Source(s): Chart Review and Template       Warfarin doses taken: Less warfarin taken than planned which may be affecting INR    Diet: No new diet changes identified    New illness, injury, or hospitalization: No    Medication/supplement changes: None noted    Signs or symptoms of bleeding or clotting: No    Previous INR: Supratherapeutic    Additional findings: None       PLAN     Unable to reach Miesha today.    No instructions provided. Unable to leave voicemail.    Follow up required to confirm warfarin dose taken and assess for changes, discuss out of range result  and discuss dosing instructions and confirm understanding of instructions    Michaelle Wilson RN  Anticoagulation Clinic  11/14/2022

## 2022-11-15 NOTE — PROGRESS NOTES
ANTICOAGULATION MANAGEMENT     Miesha Quarles 89 year old female is on warfarin with supratherapeutic INR result. (Goal INR 2.0-3.0)    Recent labs: (last 7 days)     11/14/22  1555   INR 3.5*       ASSESSMENT       Source(s): Chart Review, Patient/Caregiver Call and Template       Warfarin doses taken: Template incorrect; verbally confirmed home dose with Miesha.    Reported she ran out of warfarin RX on 11/9-11, had nobody to  RX for her and took 5mg dose on 11/12.    Diet: No new diet changes identified    New illness, injury, or hospitalization: No    Medication/supplement changes: None noted    Signs or symptoms of bleeding or clotting: No    Previous INR: Supratherapeutic last 2 INR results.    Additional findings: None     If INR continues to be supra, will need to change and lower warfarin tablet size. Currently has 5mg tabs and cutting them in halves.       PLAN     Recommended plan for temporary change(s) affecting INR     Dosing Instructions:    hold 2 days doses of warfarin on 11/15-16.    then continue your current warfarin dose with next INR in 1-2 weeks       Summary  As of 11/14/2022    Full warfarin instructions:  11/15: Hold; 11/16: Hold; Otherwise 2.5 mg every day; Starting 11/14/2022   Next INR check:  11/28/2022             Telephone call with  Miesha ([phone]) who verbalizes understanding and agrees to plan    - Ensure to take correct warfarin dose - 2.5mg daily.    Patient elected to schedule next visit 1-2 wks.    Education provided:     Taking warfarin: Importance of taking warfarin as instructed    Goal range and lab monitoring: goal range and significance of current result    Dietary considerations: importance of consistent vitamin K intake    Symptom monitoring: monitoring for bleeding signs and symptoms    Plan made per [facility] anticoagulation protocol    Reina Cruz, RN  Anticoagulation  Clinic  11/15/2022    _______________________________________________________________________     Anticoagulation Episode Summary     Current INR goal:  2.0-3.0   TTR:  46.7 % (8.7 mo)   Target end date:  Indefinite   Send INR reminders to:  [facility]    Indications    Atrial flutter (H) [I48.92]  Long term (current) use of anticoagulants [Z79.01]           Comments:           Anticoagulation Care Providers     Provider Role Specialty Phone number    Roc Mares MD Referring Internal Medicine [phone]

## 2022-12-01 NOTE — TELEPHONE ENCOUNTER
Okay for care coordination.  They should make an appointment with me to discuss though if she is not doing well.

## 2022-12-01 NOTE — TELEPHONE ENCOUNTER
12/01/2022 Pt's niece, Mariluz, came in person to ask if PCP could call and inform about steps in establishing PCA care. Pt's niece specified that she wanted more information personally from Dr. Mares.

## 2022-12-01 NOTE — TELEPHONE ENCOUNTER
Please review message below. I believe if we do a care coordination referral, they can help provide some information to family/patient.     Referral for care coordination?

## 2022-12-02 NOTE — PROGRESS NOTES
Clinic Care Coordination Contact  Community Health Worker Initial Outreach    CHW Initial Information Gathering:  Referral Source: PCP  Preferred Hospital: [facility]  [phone]  Current living arrangement:: I live alone  Type of residence:: Private home - stairs  Community Resources: None  Supplies Currently Used at Home: None  Equipment Currently Used at Home: walker, rolling  Informal Support system:: Family  No PCP office visit in Past Year: No  Transportation means:: Family  CHW Additional Questions  MyChart active?: No    CHW Note:    CHW contacted patients jovi Mcgraw regarding a referral to CCC. CHW introduced self and role of CCC.    Mariluz shared that she was interested in seeing if the patient qualified for PCA services and how to initiate services.     Mariluz shared that the patients knees are getting bad and she has a difficult time getting up her stairs. Mariluz is interested in seeing if they would qualify for any type of assistance to help cover the cost of lift chair.    CHW scheduled patient with ABDIEL Boo on 12/9/22 at 9AM to discuss PCA questions and lift chair cost assistance.    Patient accepts CC: Yes. Patient scheduled for assessment with ABDIEL Boo on 12/9/22 at 9AM. Patient noted desire to discuss PCA questions/lift chair for patients stairs.       Terri Glez  Community Health Worker   [facility] Care Coordination  [facility]   [email or web address]  Office: [phone]

## 2022-12-06 NOTE — TELEPHONE ENCOUNTER
ANTICOAGULATION     Miesha Quarles is overdue for INR check.      Left message for patient'S NIECE (Mariluz) to call and schedule lab appointment as soon as possible. If returning call, please schedule.     Reina Cruz RN

## 2022-12-08 NOTE — PROGRESS NOTES
ANTICOAGULATION MANAGEMENT     Miesha Quarles 89 year old female is on warfarin with therapeutic INR result. (Goal INR 2.0-3.0)    Recent labs: (last 7 days)     12/08/22  1258   INR 2.6*       ASSESSMENT       Source(s): Chart Review, Patient/Caregiver Call and Template       Warfarin doses taken: Warfarin taken as instructed    Diet: No new diet changes identified    New illness, injury, or hospitalization: No    Medication/supplement changes: None noted    Signs or symptoms of bleeding or clotting: No    Previous INR: Supratherapeutic last 3 INR results.    Additional findings:  Currently staying with her gena Trevino, while finishing up at her home.       PLAN     Recommended plan for no diet, medication or health factor changes affecting INR     Dosing Instructions: Continue your current warfarin dose with next INR in 2 weeks       Summary  As of 12/8/2022    Full warfarin instructions:  2.5 mg every day; Starting 12/8/2022   Next INR check:  12/22/2022             Telephone call with  Miesha and gena Belinda ([phone]) who verbalizes understanding and agrees to plan    Check at provider office visit - INR on 12/21/22 at [facility] with Dr. Mares.    Education provided:     Taking warfarin: Importance of taking warfarin as instructed    Goal range and lab monitoring: goal range and significance of current result    Dietary considerations: importance of consistent vitamin K intake    Plan made per [facility] anticoagulation protocol    Reina Cruz RN  Anticoagulation Clinic  12/8/2022    _______________________________________________________________________     Anticoagulation Episode Summary     Current INR goal:  2.0-3.0   TTR:  47.8 % (9.3 mo)   Target end date:  Indefinite   Send INR reminders to:  [facility]    Indications    Atrial flutter (H) [I48.92]  Long term (current) use of anticoagulants [Z79.01]           Comments:           Anticoagulation Care Providers     Provider Role Specialty Phone  number    Roc Mares MD [facility] Internal Medicine [phone]

## 2022-12-09 NOTE — LETTER
[facility] CARE COORDINATION  [facility]  December 9, 2022    Miesha Quarles  [address]  [place] [address]      Dear Miesha/Mariluz,        I am a clinic care coordinator who works with SAUL ALEXANDER MD with the [facility]. I wanted to thank you for spending the time to talk with me.  Below is a description of clinic care coordination and how I can further assist you.       The clinic care coordination team is made up of a registered nurse, , financial resource worker and community health worker who understand the health care system. The goal of clinic care coordination is to help you manage your health and improve access to the health care system. Our team works alongside your provider to assist you in determining your health and social needs. We can help you obtain health care and community resources, providing you with necessary information and education. We can work with you through any barriers and develop a care plan that helps coordinate and strengthen the communication between you and your care team.    Please feel free to contact me with any questions or concerns regarding care coordination and what we can offer.      We are focused on providing you with the highest-quality healthcare experience possible.    Sincerely,     Ema Gaines,   [facility]  [phone]      Enclosed: I have enclosed a copy of the Patient Centered Plan of Care. This has helpful information and goals that we have talked about. Please keep this in an easy to access place to use as needed.

## 2022-12-09 NOTE — LETTER
[facility]  Patient Centered Plan of Care  About Me:        Patient Name:  Miesha Quarles    YOB: 1928  Age:         89 year old   Messi MRN:    [id number] Telephone Information:  Home Phone [phone]   Mobile [phone]       Address:  [facility] [address] [place] [address] Email address:  No e-mail address on record      Emergency Contact(s)    Name Relationship Lgl Grd Work Phone Home Phone Mobile Phone   1. JULIO HOOKS,K* Niece   [phone]   2. KATIE ERNST Significant ot*   [phone]            Primary language:  English     needed? No   [employer]:  [phone] op. 1  Other communication barriers:Access to technology; Caregiver    Preferred Method of Communication:     Current living arrangement: I live alone    Mobility Status/ Medical Equipment: Independent w/Device        Health Maintenance  Health Maintenance Reviewed: Due/Overdue   Health Maintenance Due   Topic Date Due     HF ACTION PLAN  Never done           My Access Plan  Medical Emergency 911   Primary Clinic Line [phone]   24 Hour Appointment Line [phone] or  [phone] ([phone]) (toll-free)   24 Hour Nurse Line [phone] (toll-free)   Preferred Urgent Care [facility], [phone]     Preferred Hospital [facility]  [phone]     Preferred Pharmacy [id number] - [place], [place] - [address] [facility] [address] [facility]     Behavioral Health Crisis Line The National Suicide Prevention Lifeline at 1-985.572.2288 or Text/Call 988             My Care Team Members  Patient Care Team       Relationship Specialty Notifications Start End    Roc Mares MD PCP - General Internal Medicine  7/30/21     Phone: [phone] Fax: [phone] [address] [facility] [address]    Roc Mares MD Assigned PCP   6/16/21     Phone: [phone] Fax: [phone] [address] [facility] [address]     Terri Glez MA Community Health Worker Primary Care - CC Admissions 12/2/22     Ema Gaines LSW Lead Care Coordinator Primary Care - CC Admissions 12/9/22     Phone: [phone]                 My Care Plans  Self Management and Treatment Plan  Care Plan  Care Plan: Physical Activity     Problem: Patient is inactive     Goal: Work with physical therapy to increase stamina and balance.     Start Date: 12/9/2022 Expected End Date: 3/3/2023    Priority: High    Note:     Barriers: none noted.   Strengths: will set up PCP appt, has family support.   Patient expressed understanding of goal: nizeeshan set up goal.   Action steps to achieve this goal:  1. I will attend appt with PCP to discuss needs.  2. I will schedule home care PT if ordered.  3. I will report progress towards this goal at scheduled outreach telephone calls from the CCC team.                             Action Plans on File:                       Advance Care Plans/Directives Type:   No data recorded    My Medical and Care Information  Problem List   Patient Active Problem List   Diagnosis     Osteopenia     Plantar Fasciitis     Muscle Aches, Generalized (Myalgias)     Vitamin D Deficiency     Osteoarthritis Of The Knee     Hypothyroidism     Type 2 diabetes mellitus with complication (H)     Hypercholesterolemia     Benign Essential Hypertension     Edema     Hyperproteinemia     Atrial flutter (H)     CKD (chronic kidney disease) stage 3, GFR 30-59 ml/min (H)     Central retinal vein occlusion     Long term (current) use of anticoagulants     Chronic diastolic congestive heart failure (H)      Current Medications and Allergies:   Current Outpatient Medications   Medication Instructions     acetaminophen (TYLENOL) 650 mg, PRN     amLODIPine (NORVASC) 2.5 MG tablet TAKE 1 TABLET BY MOUTH DAILY     calcium carbonate-vitamin D3 (CALCIUM 600 WITH VITAMIN D3) 600 mg(1,500mg) -200 unit per tablet 1 tablet, DAILY     cholecalciferol (VITAMIN D3) 3,000  Units, DAILY     furosemide (LASIX) 20 mg, Oral, EVERY OTHER DAY     levothyroxine (SYNTHROID/LEVOTHROID) 50 MCG tablet TAKE 1 TABLET EVERY DAY     lisinopril-hydrochlorothiazide (ZESTORETIC) 20-12.5 MG tablet TAKE 2 TABLETS EVERY DAY     loratadine (CLARITIN) 10 mg, PRN     MEDICATION CANNOT BE REORDERED - PLEASE MANUALLY REORDER AND DISCONTINUE THE OLD ORDER [VITAMIN E 400 UNIT TAB] Take by mouth.     metoprolol succinate ER (TOPROL XL) 100 MG 24 hr tablet TAKE 1 TABLET BY MOUTH EVERY DAY     potassium chloride ER (KLOR-CON) 10 MEQ CR tablet [KLOR-CON 10 10 MEQ CR TABLET] TAKE 1 TABLET BY MOUTH DAILY. TAKE ONLY IF TAKING FUROSEMIDE     pyridOXINE (VITAMIN B6) 100 mg, DAILY     simvastatin (ZOCOR) 20 MG tablet TAKE 1/2 TABLET BY MOUTH DAILY IN THE EVENING.     traMADol (ULTRAM) 50 MG tablet TAKE 1 TABLET BY MOUTH EVERY 6 HOURS AS NEEDED     vitamin B-12 (CYANOCOBALAMIN) 1,000 mcg, DAILY     vitamin C (ASCORBIC ACID) 100 mg, DAILY     warfarin ANTICOAGULANT (COUMADIN) 5 MG tablet New Dose - Take 1/2 tablet (2.5mg) by mouth daily, as directed.  Adjust dose based on INR results.         Care Coordination Start Date: 12/2/2022   Frequency of Care Coordination: monthly     Form Last Updated: 12/09/2022

## 2022-12-09 NOTE — PROGRESS NOTES
Clinic Care Coordination Contact    Clinic Care Coordination Contact  OUTREACH    Referral Information:  Referral Source: PCP    Primary Diagnosis: Psychosocial    Chief Complaint   Patient presents with     Clinic Care Coordination - Initial        Universal Utilization: will address no shows.  Family is involved now to assist with transportation and follow up.   Clinic Utilization  Difficulty keeping appointments:: No  Compliance Concerns: No  No-Show Concerns: No  No PCP office visit in Past Year: No  Utilization    Hospital Admissions  0             ED Visits  0             No Show Count (past year)  7                Current as of: 12/8/2022 11:24 PM              Clinical Concerns:  Talked to jovi Mcgraw, on consent to communicate. She was not with patient at the assessment.   Current Medical Concerns:  89 yr old, uses a walker to ambulate. Having less strength to go up stairs.      Current Behavioral Concerns: none noted.     Education Provided to patient: reviewed role of care coordination and resources.    Pain  Pain (GOAL):: No  Health Maintenance Reviewed: Due/Overdue   Health Maintenance Due   Topic Date Due     HF ACTION PLAN  Never done       Clinical Pathway: None    Medication Management:  Medication review status: Medications reviewed and no changes reported per patient.        No concerns.      Functional Status:  Dependent ADLs:: Ambulation-walker  Dependent IADLs:: Shopping, Transportation  Bed or wheelchair confined:: No  Mobility Status: Independent w/Device  Fallen 2 or more times in the past year?: No  Any fall with injury in the past year?: No    Living Situation:  Current living arrangement:: I live alone  Type of residence:: Private home - stairs    Lifestyle & Psychosocial Needs:    Social Determinants of Health     Tobacco Use: Medium Risk     Smoking Tobacco Use: Former     Smokeless Tobacco Use: Never     Passive Exposure: Not on file   Alcohol Use: Not on file   Financial Resource  Strain: Medium Risk     Difficulty of Paying Living Expenses: Somewhat hard   Food Insecurity: No Food Insecurity     Worried About Running Out of Food in the Last Year: Never true     Ran Out of Food in the Last Year: Never true   Transportation Needs: No Transportation Needs     Lack of Transportation (Medical): No     Lack of Transportation (Non-Medical): No   Physical Activity: Not on file   Stress: Not on file   Social Connections: Not on file   Intimate Partner Violence: Not on file   Depression: Not at risk     PHQ-2 Score: 0   Housing Stability: Low Risk      Unable to Pay for Housing in the Last Year: No     Number of Places Lived in the Last Year: 1     Unstable Housing in the Last Year: No     Diet:: Regular  Inadequate nutrition (GOAL):: No  Tube Feeding: No  Inadequate activity/exercise (GOAL):: No  Significant changes in sleep pattern (GOAL): No  Transportation means:: Family     Jain or spiritual beliefs that impact treatment:: No  Mental health DX:: No  Mental health management concern (GOAL):: No  Chemical Dependency Status: No Current Concerns  Informal Support system:: Family        Mariluz and her sister are helping their aunt, who lives alone in her own home. Currently, the boiler is out at her house and she does not have electricity. Patient is staying with Mariluz's sister as the house issues are resolved. Mariluz is paying for these expenses as patient does not have the resources.  Patient has income, but her bills use most of it. Mariluz is overseeing her bill paying and sees that she doesn't have surplus.  Patient is living in what was her grandmother's house and it is in a life estate where patient can live there during her life, but she does not own it. Not eligible for a reverse mortgage.  Patient owns a house in [place] that is rented out, which prevents her from accessing any programs for low income people. Mariluz was interested in what Medicare covers.  Patient would benefit from  having someone come over several times a week to help her.  She is not eligible for MA and therefore, cannot get a PCA. Reviewed that they can hire help and this is difficult with patient's limited income.  The house in [place] may be sold to the renter in the next year or so. Until then, Mariluz will discuss options with her sister and patient. Reviewed Medicare home care and she thinks that would benefit patient as she needs to get stronger. She would benefit from someone coming to the house to recommend bathroom equipment.  Discussed that Medicare does not cover bathroom equipment and would be out of pocket or gotten from other people.   Mariluz agreed to set up appt for PCP to discuss home care referral.  She will call writer if she has other questions.  She wants to stay in touch, especially when she will be done with home care. One option to assist may be through the [facility].      Resources and Interventions:  Current Resources:      Community Resources: None  Supplies Currently Used at Home: None  Equipment Currently Used at Home: walker, rolling  Employment Status: retired     Advance Care Plan/Directive  Advanced Care Plans/Directives on file:: No  Advanced Care Plan/Directive Status: Declined Further Information    Referrals Placed: Home Care       Care Plan:  Care Plan: Physical Activity     Problem: Patient is inactive     Goal: Work with physical therapy to increase stamina and balance.     Start Date: 12/9/2022 Expected End Date: 3/3/2023    Priority: High    Note:     Barriers: none noted.   Strengths: will set up PCP appt, has family support.   Patient expressed understanding of goal: jovi set up goal.   Action steps to achieve this goal:  1. I will attend appt with PCP to discuss needs.  2. I will schedule home care PT if ordered.  3. I will report progress towards this goal at scheduled outreach telephone calls from the CCC team.                            Patient/Caregiver  understanding: Nizeeshan will set up PCP appt to get referral for home care.     Outreach Frequency: monthly  Future Appointments              In 1 week [facility]          Plan: CCC SW will continue to monitor, support patient with current goals and will be available to assist as needs arise. CCC CHW will reach out to nizeeshan on a monthly basis to discuss progression of goals.      CCC SW will perform Chart Review in 45 days.

## 2022-12-26 NOTE — TELEPHONE ENCOUNTER
Patient requesting a refill on amlodipine 2.5mg     Covering provider: Dr. Shay.         Medication Question or Refill        What medication are you calling about (include dose and sig)?: Amlodipinebesylate 2.5 mg    Controlled Substance Agreement on file:   CSA -- Patient Level:     [Media Unavailable] Controlled Substance Agreement - Opioid - Scan on 8/19/2020   [Media Unavailable] Controlled Substance Agreement - Opioid - Scan on 7/3/2019       Who prescribed the medication?: Dr. Mares    Do you need a refill? Yes: The patient tore the label off so the niece is unsure    When did you use the medication last? Today 12/26 AM    Patient offered an appointment? Yes: Wants to have prescription transferred    Do you have any questions or concerns?  No    Preferred Pharmacy:  [facility] [address] - [place], [place] - [address] [place] [address]  Phone: [phone] Fax: [phone]    [facility] - [facility] [address]  [address] [facility] [address]  Phone: [phone] Fax: [phone]      Okay to leave a detailed message?: Yes at Home number on file [phone] (home)

## 2022-12-29 NOTE — TELEPHONE ENCOUNTER
ANTICOAGULATION     Miesha Quarles is overdue for INR check.      Left message for patient's niece, Mariluz Villar to call and schedule lab appointment as soon as possible. If returning call, please schedule.     Reina Cruz RN

## 2023-01-01 ENCOUNTER — NURSING HOME VISIT (OUTPATIENT)
Dept: GERIATRICS | Facility: CLINIC | Age: 88
End: 2023-01-01
Payer: COMMERCIAL

## 2023-01-01 ENCOUNTER — APPOINTMENT (OUTPATIENT)
Dept: OCCUPATIONAL THERAPY | Facility: HOSPITAL | Age: 88
DRG: 291 | End: 2023-01-01
Payer: COMMERCIAL

## 2023-01-01 ENCOUNTER — LAB REQUISITION (OUTPATIENT)
Dept: LAB | Facility: CLINIC | Age: 88
End: 2023-01-01
Payer: COMMERCIAL

## 2023-01-01 ENCOUNTER — ANTICOAGULATION THERAPY VISIT (OUTPATIENT)
Dept: ANTICOAGULATION | Facility: CLINIC | Age: 88
End: 2023-01-01

## 2023-01-01 ENCOUNTER — TELEPHONE (OUTPATIENT)
Dept: GERIATRICS | Facility: CLINIC | Age: 88
End: 2023-01-01
Payer: MEDICARE

## 2023-01-01 ENCOUNTER — TELEPHONE (OUTPATIENT)
Dept: GERIATRICS | Facility: CLINIC | Age: 88
End: 2023-01-01

## 2023-01-01 ENCOUNTER — TELEPHONE (OUTPATIENT)
Dept: CARDIOLOGY | Facility: CLINIC | Age: 88
End: 2023-01-01
Payer: MEDICARE

## 2023-01-01 ENCOUNTER — LAB REQUISITION (OUTPATIENT)
Dept: LAB | Facility: CLINIC | Age: 88
End: 2023-01-01

## 2023-01-01 ENCOUNTER — PATIENT OUTREACH (OUTPATIENT)
Dept: CARE COORDINATION | Facility: CLINIC | Age: 88
End: 2023-01-01

## 2023-01-01 ENCOUNTER — TELEPHONE (OUTPATIENT)
Dept: INTERNAL MEDICINE | Facility: CLINIC | Age: 88
End: 2023-01-01
Payer: MEDICARE

## 2023-01-01 ENCOUNTER — NURSING HOME VISIT (OUTPATIENT)
Dept: GERIATRICS | Facility: CLINIC | Age: 88
End: 2023-01-01
Payer: MEDICARE

## 2023-01-01 ENCOUNTER — APPOINTMENT (OUTPATIENT)
Dept: CT IMAGING | Facility: HOSPITAL | Age: 88
DRG: 291 | End: 2023-01-01
Attending: EMERGENCY MEDICINE
Payer: COMMERCIAL

## 2023-01-01 ENCOUNTER — DISCHARGE SUMMARY NURSING HOME (OUTPATIENT)
Dept: GERIATRICS | Facility: CLINIC | Age: 88
End: 2023-01-01
Payer: MEDICARE

## 2023-01-01 ENCOUNTER — ANTICOAGULATION THERAPY VISIT (OUTPATIENT)
Dept: ANTICOAGULATION | Facility: CLINIC | Age: 88
End: 2023-01-01
Payer: MEDICARE

## 2023-01-01 ENCOUNTER — APPOINTMENT (OUTPATIENT)
Dept: OCCUPATIONAL THERAPY | Facility: HOSPITAL | Age: 88
DRG: 291 | End: 2023-01-01
Attending: INTERNAL MEDICINE
Payer: COMMERCIAL

## 2023-01-01 ENCOUNTER — HOSPITAL ENCOUNTER (INPATIENT)
Facility: HOSPITAL | Age: 88
LOS: 7 days | Discharge: NURSING, REHAB OR LONG-TERM CARE | DRG: 291 | End: 2023-02-07
Attending: STUDENT IN AN ORGANIZED HEALTH CARE EDUCATION/TRAINING PROGRAM | Admitting: INTERNAL MEDICINE
Payer: COMMERCIAL

## 2023-01-01 ENCOUNTER — DOCUMENTATION ONLY (OUTPATIENT)
Dept: OTHER | Facility: CLINIC | Age: 88
End: 2023-01-01
Payer: MEDICARE

## 2023-01-01 ENCOUNTER — TRANSITIONAL CARE UNIT VISIT (OUTPATIENT)
Dept: GERIATRICS | Facility: CLINIC | Age: 88
End: 2023-01-01
Payer: COMMERCIAL

## 2023-01-01 ENCOUNTER — DISCHARGE SUMMARY NURSING HOME (OUTPATIENT)
Dept: GERIATRICS | Facility: CLINIC | Age: 88
End: 2023-01-01
Payer: COMMERCIAL

## 2023-01-01 ENCOUNTER — DOCUMENTATION ONLY (OUTPATIENT)
Dept: ANTICOAGULATION | Facility: CLINIC | Age: 88
End: 2023-01-01

## 2023-01-01 ENCOUNTER — PATIENT OUTREACH (OUTPATIENT)
Dept: CARE COORDINATION | Facility: CLINIC | Age: 88
End: 2023-01-01
Payer: MEDICARE

## 2023-01-01 ENCOUNTER — APPOINTMENT (OUTPATIENT)
Dept: PHYSICAL THERAPY | Facility: HOSPITAL | Age: 88
DRG: 291 | End: 2023-01-01
Payer: COMMERCIAL

## 2023-01-01 ENCOUNTER — APPOINTMENT (OUTPATIENT)
Dept: RADIOLOGY | Facility: HOSPITAL | Age: 88
DRG: 291 | End: 2023-01-01
Attending: EMERGENCY MEDICINE
Payer: COMMERCIAL

## 2023-01-01 ENCOUNTER — APPOINTMENT (OUTPATIENT)
Dept: PHYSICAL THERAPY | Facility: HOSPITAL | Age: 88
DRG: 291 | End: 2023-01-01
Attending: INTERNAL MEDICINE
Payer: COMMERCIAL

## 2023-01-01 ENCOUNTER — LAB (OUTPATIENT)
Dept: LAB | Facility: CLINIC | Age: 88
End: 2023-01-01
Payer: COMMERCIAL

## 2023-01-01 ENCOUNTER — APPOINTMENT (OUTPATIENT)
Dept: CT IMAGING | Facility: HOSPITAL | Age: 88
End: 2023-01-01
Attending: FAMILY MEDICINE
Payer: COMMERCIAL

## 2023-01-01 ENCOUNTER — APPOINTMENT (OUTPATIENT)
Dept: CARDIOLOGY | Facility: HOSPITAL | Age: 88
DRG: 291 | End: 2023-01-01
Attending: INTERNAL MEDICINE
Payer: COMMERCIAL

## 2023-01-01 ENCOUNTER — HOSPITAL ENCOUNTER (EMERGENCY)
Facility: HOSPITAL | Age: 88
Discharge: HOME | End: 2023-02-17
Attending: FAMILY MEDICINE | Admitting: FAMILY MEDICINE
Payer: COMMERCIAL

## 2023-01-01 ENCOUNTER — DOCUMENTATION ONLY (OUTPATIENT)
Dept: ANTICOAGULATION | Facility: CLINIC | Age: 88
End: 2023-01-01
Payer: MEDICARE

## 2023-01-01 ENCOUNTER — OFFICE VISIT (OUTPATIENT)
Dept: INTERNAL MEDICINE | Facility: CLINIC | Age: 88
End: 2023-01-01
Payer: COMMERCIAL

## 2023-01-01 ENCOUNTER — OFFICE VISIT (OUTPATIENT)
Dept: CARDIOLOGY | Facility: CLINIC | Age: 88
End: 2023-01-01
Payer: COMMERCIAL

## 2023-01-01 ENCOUNTER — DOCUMENTATION ONLY (OUTPATIENT)
Dept: GERIATRICS | Facility: CLINIC | Age: 88
End: 2023-01-01
Payer: MEDICARE

## 2023-01-01 ENCOUNTER — OFFICE VISIT (OUTPATIENT)
Dept: CARDIOLOGY | Facility: CLINIC | Age: 88
End: 2023-01-01
Attending: INTERNAL MEDICINE
Payer: COMMERCIAL

## 2023-01-01 VITALS — RESPIRATION RATE: 18 BRPM | DIASTOLIC BLOOD PRESSURE: 62 MMHG | SYSTOLIC BLOOD PRESSURE: 138 MMHG | HEART RATE: 96 BPM

## 2023-01-01 VITALS
HEART RATE: 78 BPM | HEIGHT: 63 IN | OXYGEN SATURATION: 95 % | DIASTOLIC BLOOD PRESSURE: 68 MMHG | WEIGHT: 153 LBS | TEMPERATURE: 97.8 F | SYSTOLIC BLOOD PRESSURE: 117 MMHG | BODY MASS INDEX: 27.11 KG/M2 | RESPIRATION RATE: 18 BRPM

## 2023-01-01 VITALS
HEART RATE: 120 BPM | TEMPERATURE: 97.8 F | OXYGEN SATURATION: 96 % | HEIGHT: 62 IN | SYSTOLIC BLOOD PRESSURE: 139 MMHG | RESPIRATION RATE: 20 BRPM | WEIGHT: 161.4 LBS | BODY MASS INDEX: 29.7 KG/M2 | DIASTOLIC BLOOD PRESSURE: 90 MMHG

## 2023-01-01 VITALS
BODY MASS INDEX: 27.86 KG/M2 | TEMPERATURE: 98.4 F | OXYGEN SATURATION: 95 % | DIASTOLIC BLOOD PRESSURE: 70 MMHG | SYSTOLIC BLOOD PRESSURE: 119 MMHG | HEIGHT: 62 IN | RESPIRATION RATE: 20 BRPM | WEIGHT: 151.4 LBS | HEART RATE: 75 BPM

## 2023-01-01 VITALS
WEIGHT: 151 LBS | BODY MASS INDEX: 27.79 KG/M2 | TEMPERATURE: 97.6 F | HEIGHT: 62 IN | OXYGEN SATURATION: 93 % | DIASTOLIC BLOOD PRESSURE: 75 MMHG | HEART RATE: 95 BPM | RESPIRATION RATE: 16 BRPM | SYSTOLIC BLOOD PRESSURE: 130 MMHG

## 2023-01-01 VITALS
HEIGHT: 62 IN | WEIGHT: 155 LBS | RESPIRATION RATE: 14 BRPM | BODY MASS INDEX: 28.52 KG/M2 | SYSTOLIC BLOOD PRESSURE: 117 MMHG | HEART RATE: 106 BPM | DIASTOLIC BLOOD PRESSURE: 73 MMHG

## 2023-01-01 VITALS
HEART RATE: 61 BPM | RESPIRATION RATE: 20 BRPM | HEIGHT: 62 IN | WEIGHT: 159.2 LBS | TEMPERATURE: 98.1 F | SYSTOLIC BLOOD PRESSURE: 132 MMHG | OXYGEN SATURATION: 96 % | BODY MASS INDEX: 29.3 KG/M2 | DIASTOLIC BLOOD PRESSURE: 66 MMHG

## 2023-01-01 VITALS
BODY MASS INDEX: 29.85 KG/M2 | OXYGEN SATURATION: 96 % | RESPIRATION RATE: 20 BRPM | HEIGHT: 62 IN | DIASTOLIC BLOOD PRESSURE: 77 MMHG | TEMPERATURE: 97.8 F | WEIGHT: 162.2 LBS | HEART RATE: 136 BPM | SYSTOLIC BLOOD PRESSURE: 132 MMHG

## 2023-01-01 VITALS
HEIGHT: 63 IN | SYSTOLIC BLOOD PRESSURE: 130 MMHG | WEIGHT: 149 LBS | OXYGEN SATURATION: 92 % | DIASTOLIC BLOOD PRESSURE: 75 MMHG | RESPIRATION RATE: 16 BRPM | HEART RATE: 95 BPM | BODY MASS INDEX: 26.4 KG/M2 | TEMPERATURE: 97.6 F

## 2023-01-01 VITALS
HEART RATE: 68 BPM | DIASTOLIC BLOOD PRESSURE: 82 MMHG | OXYGEN SATURATION: 97 % | SYSTOLIC BLOOD PRESSURE: 143 MMHG | TEMPERATURE: 97.6 F | RESPIRATION RATE: 20 BRPM

## 2023-01-01 VITALS
TEMPERATURE: 97.8 F | DIASTOLIC BLOOD PRESSURE: 68 MMHG | RESPIRATION RATE: 18 BRPM | SYSTOLIC BLOOD PRESSURE: 117 MMHG | HEART RATE: 78 BPM | WEIGHT: 153 LBS | HEIGHT: 63 IN | BODY MASS INDEX: 27.11 KG/M2 | OXYGEN SATURATION: 95 %

## 2023-01-01 VITALS
HEIGHT: 62 IN | BODY MASS INDEX: 30.44 KG/M2 | RESPIRATION RATE: 18 BRPM | DIASTOLIC BLOOD PRESSURE: 82 MMHG | TEMPERATURE: 98.3 F | WEIGHT: 165.4 LBS | SYSTOLIC BLOOD PRESSURE: 126 MMHG | OXYGEN SATURATION: 96 % | HEART RATE: 64 BPM

## 2023-01-01 VITALS
TEMPERATURE: 98.3 F | DIASTOLIC BLOOD PRESSURE: 76 MMHG | HEART RATE: 104 BPM | BODY MASS INDEX: 30.44 KG/M2 | OXYGEN SATURATION: 96 % | SYSTOLIC BLOOD PRESSURE: 112 MMHG | RESPIRATION RATE: 18 BRPM | HEIGHT: 62 IN | WEIGHT: 165.4 LBS

## 2023-01-01 VITALS
RESPIRATION RATE: 19 BRPM | BODY MASS INDEX: 27.27 KG/M2 | SYSTOLIC BLOOD PRESSURE: 118 MMHG | WEIGHT: 153.88 LBS | HEART RATE: 110 BPM | OXYGEN SATURATION: 94 % | TEMPERATURE: 99 F | DIASTOLIC BLOOD PRESSURE: 56 MMHG | HEIGHT: 63 IN

## 2023-01-01 VITALS
WEIGHT: 153 LBS | SYSTOLIC BLOOD PRESSURE: 128 MMHG | HEART RATE: 76 BPM | BODY MASS INDEX: 28.16 KG/M2 | DIASTOLIC BLOOD PRESSURE: 72 MMHG | OXYGEN SATURATION: 91 % | HEIGHT: 62 IN | TEMPERATURE: 97.8 F | RESPIRATION RATE: 16 BRPM

## 2023-01-01 VITALS
HEIGHT: 62 IN | HEART RATE: 110 BPM | WEIGHT: 165.4 LBS | TEMPERATURE: 98.3 F | OXYGEN SATURATION: 96 % | BODY MASS INDEX: 30.44 KG/M2 | RESPIRATION RATE: 18 BRPM | DIASTOLIC BLOOD PRESSURE: 92 MMHG | SYSTOLIC BLOOD PRESSURE: 130 MMHG

## 2023-01-01 VITALS
RESPIRATION RATE: 20 BRPM | TEMPERATURE: 97.8 F | WEIGHT: 162.2 LBS | OXYGEN SATURATION: 96 % | DIASTOLIC BLOOD PRESSURE: 75 MMHG | SYSTOLIC BLOOD PRESSURE: 123 MMHG | HEART RATE: 87 BPM | BODY MASS INDEX: 29.67 KG/M2

## 2023-01-01 VITALS
WEIGHT: 163.6 LBS | RESPIRATION RATE: 18 BRPM | DIASTOLIC BLOOD PRESSURE: 80 MMHG | OXYGEN SATURATION: 96 % | HEIGHT: 62 IN | HEART RATE: 91 BPM | BODY MASS INDEX: 30.11 KG/M2 | TEMPERATURE: 98.3 F | SYSTOLIC BLOOD PRESSURE: 118 MMHG

## 2023-01-01 VITALS
DIASTOLIC BLOOD PRESSURE: 69 MMHG | BODY MASS INDEX: 27.1 KG/M2 | TEMPERATURE: 97.1 F | OXYGEN SATURATION: 99 % | WEIGHT: 153 LBS | HEART RATE: 75 BPM | SYSTOLIC BLOOD PRESSURE: 132 MMHG | RESPIRATION RATE: 16 BRPM

## 2023-01-01 VITALS
HEART RATE: 107 BPM | SYSTOLIC BLOOD PRESSURE: 96 MMHG | DIASTOLIC BLOOD PRESSURE: 68 MMHG | BODY MASS INDEX: 31.5 KG/M2 | RESPIRATION RATE: 20 BRPM | TEMPERATURE: 97 F | WEIGHT: 171.2 LBS | HEIGHT: 62 IN | OXYGEN SATURATION: 98 %

## 2023-01-01 VITALS
BODY MASS INDEX: 34.78 KG/M2 | SYSTOLIC BLOOD PRESSURE: 122 MMHG | DIASTOLIC BLOOD PRESSURE: 62 MMHG | WEIGHT: 189 LBS | HEIGHT: 62 IN | OXYGEN SATURATION: 90 % | TEMPERATURE: 98 F | HEART RATE: 88 BPM

## 2023-01-01 VITALS
DIASTOLIC BLOOD PRESSURE: 95 MMHG | BODY MASS INDEX: 28.93 KG/M2 | OXYGEN SATURATION: 96 % | WEIGHT: 157.2 LBS | SYSTOLIC BLOOD PRESSURE: 158 MMHG | HEIGHT: 62 IN | RESPIRATION RATE: 20 BRPM | HEART RATE: 81 BPM | TEMPERATURE: 98 F

## 2023-01-01 VITALS
DIASTOLIC BLOOD PRESSURE: 74 MMHG | RESPIRATION RATE: 20 BRPM | TEMPERATURE: 96 F | SYSTOLIC BLOOD PRESSURE: 120 MMHG | OXYGEN SATURATION: 91 % | WEIGHT: 152 LBS | BODY MASS INDEX: 26.93 KG/M2 | HEART RATE: 98 BPM

## 2023-01-01 DIAGNOSIS — G47.419 NARCOLEPSY: Primary | ICD-10-CM

## 2023-01-01 DIAGNOSIS — I50.9 ACUTE DECOMPENSATED HEART FAILURE (H): Primary | ICD-10-CM

## 2023-01-01 DIAGNOSIS — I10 ESSENTIAL HYPERTENSION, BENIGN: ICD-10-CM

## 2023-01-01 DIAGNOSIS — N18.31 STAGE 3A CHRONIC KIDNEY DISEASE (H): ICD-10-CM

## 2023-01-01 DIAGNOSIS — I48.92 UNSPECIFIED ATRIAL FLUTTER (H): ICD-10-CM

## 2023-01-01 DIAGNOSIS — E11.8 TYPE 2 DIABETES MELLITUS WITH COMPLICATION (H): ICD-10-CM

## 2023-01-01 DIAGNOSIS — I50.32 CHRONIC HEART FAILURE WITH PRESERVED EJECTION FRACTION (H): ICD-10-CM

## 2023-01-01 DIAGNOSIS — U07.1 COVID-19: ICD-10-CM

## 2023-01-01 DIAGNOSIS — R41.89 COGNITIVE IMPAIRMENT: ICD-10-CM

## 2023-01-01 DIAGNOSIS — I50.32 CHRONIC DIASTOLIC CONGESTIVE HEART FAILURE (H): Primary | ICD-10-CM

## 2023-01-01 DIAGNOSIS — I50.32 CHRONIC HEART FAILURE WITH PRESERVED EJECTION FRACTION (H): Primary | ICD-10-CM

## 2023-01-01 DIAGNOSIS — M79.672 LEFT FOOT PAIN: ICD-10-CM

## 2023-01-01 DIAGNOSIS — F51.01 PRIMARY INSOMNIA: ICD-10-CM

## 2023-01-01 DIAGNOSIS — N18.9 ACUTE KIDNEY INJURY SUPERIMPOSED ON CKD (H): ICD-10-CM

## 2023-01-01 DIAGNOSIS — E87.5 HYPERKALEMIA: ICD-10-CM

## 2023-01-01 DIAGNOSIS — I10 BENIGN ESSENTIAL HYPERTENSION: ICD-10-CM

## 2023-01-01 DIAGNOSIS — E11.22 TYPE 2 DIABETES MELLITUS WITH STAGE 3 CHRONIC KIDNEY DISEASE, WITHOUT LONG-TERM CURRENT USE OF INSULIN, UNSPECIFIED WHETHER STAGE 3A OR 3B CKD (H): ICD-10-CM

## 2023-01-01 DIAGNOSIS — N18.30 TYPE 2 DIABETES MELLITUS WITH STAGE 3 CHRONIC KIDNEY DISEASE, WITHOUT LONG-TERM CURRENT USE OF INSULIN, UNSPECIFIED WHETHER STAGE 3A OR 3B CKD (H): ICD-10-CM

## 2023-01-01 DIAGNOSIS — I07.1 TRICUSPID VALVE INSUFFICIENCY, UNSPECIFIED ETIOLOGY: ICD-10-CM

## 2023-01-01 DIAGNOSIS — G89.29 CHRONIC BACK PAIN, UNSPECIFIED BACK LOCATION, UNSPECIFIED BACK PAIN LATERALITY: ICD-10-CM

## 2023-01-01 DIAGNOSIS — R79.89 OTHER SPECIFIED ABNORMAL FINDINGS OF BLOOD CHEMISTRY: ICD-10-CM

## 2023-01-01 DIAGNOSIS — I48.19 PERSISTENT ATRIAL FIBRILLATION (H): ICD-10-CM

## 2023-01-01 DIAGNOSIS — Z79.01 LONG TERM (CURRENT) USE OF ANTICOAGULANTS: ICD-10-CM

## 2023-01-01 DIAGNOSIS — G47.33 OSA (OBSTRUCTIVE SLEEP APNEA): Primary | ICD-10-CM

## 2023-01-01 DIAGNOSIS — G47.419 NARCOLEPSY: ICD-10-CM

## 2023-01-01 DIAGNOSIS — E78.5 HYPERLIPIDEMIA, UNSPECIFIED: ICD-10-CM

## 2023-01-01 DIAGNOSIS — I50.22 CHRONIC SYSTOLIC (CONGESTIVE) HEART FAILURE (H): ICD-10-CM

## 2023-01-01 DIAGNOSIS — R05.9 COUGH, UNSPECIFIED TYPE: Primary | ICD-10-CM

## 2023-01-01 DIAGNOSIS — R60.9 EDEMA, UNSPECIFIED: ICD-10-CM

## 2023-01-01 DIAGNOSIS — R06.02 SHORTNESS OF BREATH: ICD-10-CM

## 2023-01-01 DIAGNOSIS — M62.81 GENERALIZED MUSCLE WEAKNESS: ICD-10-CM

## 2023-01-01 DIAGNOSIS — I48.3 TYPICAL ATRIAL FLUTTER (H): Primary | ICD-10-CM

## 2023-01-01 DIAGNOSIS — E03.9 HYPOTHYROIDISM, UNSPECIFIED TYPE: Primary | ICD-10-CM

## 2023-01-01 DIAGNOSIS — E03.9 HYPOTHYROIDISM, UNSPECIFIED TYPE: ICD-10-CM

## 2023-01-01 DIAGNOSIS — M79.672 LEFT FOOT PAIN: Primary | ICD-10-CM

## 2023-01-01 DIAGNOSIS — G47.411 PRIMARY NARCOLEPSY WITH CATAPLEXY: ICD-10-CM

## 2023-01-01 DIAGNOSIS — I50.32 CHRONIC DIASTOLIC CONGESTIVE HEART FAILURE (H): ICD-10-CM

## 2023-01-01 DIAGNOSIS — Z53.9 ERRONEOUS ENCOUNTER--DISREGARD: Primary | ICD-10-CM

## 2023-01-01 DIAGNOSIS — R60.9 EDEMA, UNSPECIFIED TYPE: ICD-10-CM

## 2023-01-01 DIAGNOSIS — F03.90 DEMENTIA, UNSPECIFIED DEMENTIA SEVERITY, UNSPECIFIED DEMENTIA TYPE, UNSPECIFIED WHETHER BEHAVIORAL, PSYCHOTIC, OR MOOD DISTURBANCE OR ANXIETY (H): ICD-10-CM

## 2023-01-01 DIAGNOSIS — R06.9 UNSPECIFIED ABNORMALITIES OF BREATHING: ICD-10-CM

## 2023-01-01 DIAGNOSIS — E03.9 HYPOTHYROIDISM, UNSPECIFIED: ICD-10-CM

## 2023-01-01 DIAGNOSIS — I50.33 ACUTE ON CHRONIC HEART FAILURE WITH PRESERVED EJECTION FRACTION (HFPEF) (H): Primary | ICD-10-CM

## 2023-01-01 DIAGNOSIS — I48.3 TYPICAL ATRIAL FLUTTER (H): ICD-10-CM

## 2023-01-01 DIAGNOSIS — I10 PRIMARY HYPERTENSION: ICD-10-CM

## 2023-01-01 DIAGNOSIS — I50.33 ACUTE ON CHRONIC HEART FAILURE WITH PRESERVED EJECTION FRACTION (HFPEF) (H): ICD-10-CM

## 2023-01-01 DIAGNOSIS — R06.00 DYSPNEA, UNSPECIFIED TYPE: ICD-10-CM

## 2023-01-01 DIAGNOSIS — I10 BENIGN ESSENTIAL HYPERTENSION: Primary | ICD-10-CM

## 2023-01-01 DIAGNOSIS — E78.00 PURE HYPERCHOLESTEROLEMIA: Chronic | ICD-10-CM

## 2023-01-01 DIAGNOSIS — N18.31 CHRONIC KIDNEY DISEASE, STAGE 3A (H): ICD-10-CM

## 2023-01-01 DIAGNOSIS — M54.9 CHRONIC BACK PAIN, UNSPECIFIED BACK LOCATION, UNSPECIFIED BACK PAIN LATERALITY: ICD-10-CM

## 2023-01-01 DIAGNOSIS — F90.9 ADHD (ATTENTION DEFICIT HYPERACTIVITY DISORDER): Primary | ICD-10-CM

## 2023-01-01 DIAGNOSIS — I48.91 UNSPECIFIED ATRIAL FIBRILLATION (H): ICD-10-CM

## 2023-01-01 DIAGNOSIS — E78.00 PURE HYPERCHOLESTEROLEMIA: ICD-10-CM

## 2023-01-01 DIAGNOSIS — R40.4 UNRESPONSIVE EPISODE: ICD-10-CM

## 2023-01-01 DIAGNOSIS — I50.33 ACUTE ON CHRONIC DIASTOLIC CONGESTIVE HEART FAILURE (H): Primary | ICD-10-CM

## 2023-01-01 DIAGNOSIS — N17.9 ACUTE KIDNEY INJURY SUPERIMPOSED ON CKD (H): ICD-10-CM

## 2023-01-01 DIAGNOSIS — L03.116 CELLULITIS OF LEFT FOOT: Primary | ICD-10-CM

## 2023-01-01 DIAGNOSIS — Z91.81 HISTORY OF FALLING: ICD-10-CM

## 2023-01-01 DIAGNOSIS — R63.5 WEIGHT GAIN: Primary | ICD-10-CM

## 2023-01-01 DIAGNOSIS — I48.0 PAROXYSMAL ATRIAL FIBRILLATION (H): ICD-10-CM

## 2023-01-01 DIAGNOSIS — R53.1 GENERAL WEAKNESS: ICD-10-CM

## 2023-01-01 DIAGNOSIS — G47.411 NARCOLEPSY AND CATAPLEXY: ICD-10-CM

## 2023-01-01 DIAGNOSIS — T14.8XXA BLISTER: Primary | ICD-10-CM

## 2023-01-01 DIAGNOSIS — I12.9 BENIGN HYPERTENSIVE KIDNEY DISEASE WITH CHRONIC KIDNEY DISEASE STAGE I THROUGH STAGE IV, OR UNSPECIFIED: ICD-10-CM

## 2023-01-01 DIAGNOSIS — R79.1 SUPRATHERAPEUTIC INR: ICD-10-CM

## 2023-01-01 DIAGNOSIS — L03.818 CELLULITIS OF OTHER SPECIFIED SITE: ICD-10-CM
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
## 2023-01-01 DIAGNOSIS — I50.31 ACUTE HEART FAILURE WITH PRESERVED EJECTION FRACTION (HFPEF) (H): Primary | ICD-10-CM

## 2023-01-01 DIAGNOSIS — R60.9 DEPENDENT EDEMA: ICD-10-CM

## 2023-01-01 DIAGNOSIS — E05.00 THYROTOXICOSIS WITH DIFFUSE GOITER WITHOUT THYROTOXIC CRISIS OR STORM: ICD-10-CM

## 2023-01-01 DIAGNOSIS — E87.6 HYPOKALEMIA: ICD-10-CM

## 2023-01-01 DIAGNOSIS — I73.9 PAD (PERIPHERAL ARTERY DISEASE) (H): Primary | ICD-10-CM

## 2023-01-01 DIAGNOSIS — I48.20 CHRONIC ATRIAL FIBRILLATION (H): ICD-10-CM

## 2023-01-01 DIAGNOSIS — S90.822D BLISTER OF LEFT FOOT, SUBSEQUENT ENCOUNTER: ICD-10-CM

## 2023-01-01 DIAGNOSIS — S90.822D BLISTER OF LEFT FOOT, SUBSEQUENT ENCOUNTER: Primary | ICD-10-CM

## 2023-01-01 DIAGNOSIS — N18.30 STAGE 3 CHRONIC KIDNEY DISEASE, UNSPECIFIED WHETHER STAGE 3A OR 3B CKD (H): ICD-10-CM

## 2023-01-01 DIAGNOSIS — E78.00 HYPERCHOLESTEROLEMIA: ICD-10-CM

## 2023-01-01 LAB
ABO/RH(D): NORMAL
ALBUMIN SERPL BCG-MCNC: 3.2 G/DL (ref 3.5–5.2)
ALBUMIN UR-MCNC: NEGATIVE MG/DL
ALBUMIN UR-MCNC: NEGATIVE MG/DL
ALP SERPL-CCNC: 88 U/L (ref 35–104)
ALT SERPL W P-5'-P-CCNC: 11 U/L (ref 10–35)
ANION GAP SERPL CALCULATED.3IONS-SCNC: 10 MMOL/L (ref 7–15)
ANION GAP SERPL CALCULATED.3IONS-SCNC: 10 MMOL/L (ref 7–15)
ANION GAP SERPL CALCULATED.3IONS-SCNC: 12 MMOL/L (ref 7–15)
ANION GAP SERPL CALCULATED.3IONS-SCNC: 13 MMOL/L (ref 7–15)
ANION GAP SERPL CALCULATED.3IONS-SCNC: 13 MMOL/L (ref 7–15)
ANION GAP SERPL CALCULATED.3IONS-SCNC: 14 MMOL/L (ref 7–15)
ANION GAP SERPL CALCULATED.3IONS-SCNC: 14 MMOL/L (ref 7–15)
ANION GAP SERPL CALCULATED.3IONS-SCNC: 15 MMOL/L (ref 7–15)
ANION GAP SERPL CALCULATED.3IONS-SCNC: 7 MMOL/L (ref 7–15)
ANION GAP SERPL CALCULATED.3IONS-SCNC: 7 MMOL/L (ref 7–15)
ANION GAP SERPL CALCULATED.3IONS-SCNC: 8 MMOL/L (ref 7–15)
ANION GAP SERPL CALCULATED.3IONS-SCNC: 8 MMOL/L (ref 7–15)
ANION GAP SERPL CALCULATED.3IONS-SCNC: 9 MMOL/L (ref 7–15)
ANTIBODY SCREEN: NEGATIVE
APPEARANCE UR: CLEAR
APPEARANCE UR: CLEAR
APTT PPP: 47 SECONDS (ref 22–38)
AST SERPL W P-5'-P-CCNC: 20 U/L (ref 10–35)
BACTERIA #/AREA URNS HPF: ABNORMAL /HPF
BACTERIA UR CULT: NORMAL
BASE EXCESS BLDV CALC-SCNC: 11.9 MMOL/L
BASE EXCESS BLDV CALC-SCNC: 6.4 MMOL/L
BASOPHILS # BLD AUTO: 0 10E3/UL (ref 0–0.2)
BASOPHILS # BLD AUTO: 0.1 10E3/UL (ref 0–0.2)
BASOPHILS # BLD AUTO: 0.1 10E3/UL (ref 0–0.2)
BASOPHILS NFR BLD AUTO: 1 %
BILIRUB SERPL-MCNC: 2.3 MG/DL
BILIRUB UR QL STRIP: NEGATIVE
BILIRUB UR QL STRIP: NEGATIVE
BUN SERPL-MCNC: 17.4 MG/DL (ref 8–23)
BUN SERPL-MCNC: 17.9 MG/DL (ref 8–23)
BUN SERPL-MCNC: 18.3 MG/DL (ref 8–23)
BUN SERPL-MCNC: 18.5 MG/DL (ref 8–23)
BUN SERPL-MCNC: 20.2 MG/DL (ref 8–23)
BUN SERPL-MCNC: 20.4 MG/DL (ref 8–23)
BUN SERPL-MCNC: 22.8 MG/DL (ref 8–23)
BUN SERPL-MCNC: 23.9 MG/DL (ref 8–23)
BUN SERPL-MCNC: 24 MG/DL (ref 8–23)
BUN SERPL-MCNC: 24.1 MG/DL (ref 8–23)
BUN SERPL-MCNC: 26 MG/DL (ref 8–23)
BUN SERPL-MCNC: 26.6 MG/DL (ref 8–23)
BUN SERPL-MCNC: 28.9 MG/DL (ref 8–23)
BUN SERPL-MCNC: 31.9 MG/DL (ref 8–23)
BUN SERPL-MCNC: 38.2 MG/DL (ref 8–23)
BUN SERPL-MCNC: 38.9 MG/DL (ref 8–23)
BUN SERPL-MCNC: 63.6 MG/DL (ref 8–23)
CALCIUM SERPL-MCNC: 8 MG/DL (ref 8.2–9.6)
CALCIUM SERPL-MCNC: 8.1 MG/DL (ref 8.2–9.6)
CALCIUM SERPL-MCNC: 8.2 MG/DL (ref 8.2–9.6)
CALCIUM SERPL-MCNC: 8.3 MG/DL (ref 8.2–9.6)
CALCIUM SERPL-MCNC: 8.4 MG/DL (ref 8.2–9.6)
CALCIUM SERPL-MCNC: 8.5 MG/DL (ref 8.2–9.6)
CALCIUM SERPL-MCNC: 8.5 MG/DL (ref 8.2–9.6)
CALCIUM SERPL-MCNC: 8.7 MG/DL (ref 8.2–9.6)
CALCIUM SERPL-MCNC: 8.7 MG/DL (ref 8.2–9.6)
CHLORIDE SERPL-SCNC: 102 MMOL/L (ref 98–107)
CHLORIDE SERPL-SCNC: 103 MMOL/L (ref 98–107)
CHLORIDE SERPL-SCNC: 103 MMOL/L (ref 98–107)
CHLORIDE SERPL-SCNC: 108 MMOL/L (ref 98–107)
CHLORIDE SERPL-SCNC: 110 MMOL/L (ref 98–107)
CHLORIDE SERPL-SCNC: 93 MMOL/L (ref 98–107)
CHLORIDE SERPL-SCNC: 94 MMOL/L (ref 98–107)
CHLORIDE SERPL-SCNC: 94 MMOL/L (ref 98–107)
CHLORIDE SERPL-SCNC: 95 MMOL/L (ref 98–107)
CHLORIDE SERPL-SCNC: 96 MMOL/L (ref 98–107)
CHLORIDE SERPL-SCNC: 97 MMOL/L (ref 98–107)
CHLORIDE SERPL-SCNC: 97 MMOL/L (ref 98–107)
CHLORIDE SERPL-SCNC: 99 MMOL/L (ref 98–107)
CHOLEST SERPL-MCNC: 156 MG/DL
COLOR UR AUTO: ABNORMAL
COLOR UR AUTO: YELLOW
CREAT SERPL-MCNC: 0.98 MG/DL (ref 0.51–0.95)
CREAT SERPL-MCNC: 1.01 MG/DL (ref 0.51–0.95)
CREAT SERPL-MCNC: 1.01 MG/DL (ref 0.51–0.95)
CREAT SERPL-MCNC: 1.02 MG/DL (ref 0.51–0.95)
CREAT SERPL-MCNC: 1.03 MG/DL (ref 0.51–0.95)
CREAT SERPL-MCNC: 1.03 MG/DL (ref 0.51–0.95)
CREAT SERPL-MCNC: 1.04 MG/DL (ref 0.51–0.95)
CREAT SERPL-MCNC: 1.09 MG/DL (ref 0.51–0.95)
CREAT SERPL-MCNC: 1.09 MG/DL (ref 0.51–0.95)
CREAT SERPL-MCNC: 1.11 MG/DL (ref 0.51–0.95)
CREAT SERPL-MCNC: 1.12 MG/DL (ref 0.51–0.95)
CREAT SERPL-MCNC: 1.18 MG/DL (ref 0.51–0.95)
CREAT SERPL-MCNC: 1.19 MG/DL (ref 0.51–0.95)
CREAT SERPL-MCNC: 1.23 MG/DL (ref 0.51–0.95)
CREAT SERPL-MCNC: 1.25 MG/DL (ref 0.51–0.95)
CREAT SERPL-MCNC: 1.4 MG/DL (ref 0.51–0.95)
CREAT SERPL-MCNC: 1.91 MG/DL (ref 0.51–0.95)
DEPRECATED HCO3 PLAS-SCNC: 12 MMOL/L (ref 22–29)
DEPRECATED HCO3 PLAS-SCNC: 14 MMOL/L (ref 22–29)
DEPRECATED HCO3 PLAS-SCNC: 16 MMOL/L (ref 22–29)
DEPRECATED HCO3 PLAS-SCNC: 20 MMOL/L (ref 22–29)
DEPRECATED HCO3 PLAS-SCNC: 25 MMOL/L (ref 22–29)
DEPRECATED HCO3 PLAS-SCNC: 25 MMOL/L (ref 22–29)
DEPRECATED HCO3 PLAS-SCNC: 27 MMOL/L (ref 22–29)
DEPRECATED HCO3 PLAS-SCNC: 28 MMOL/L (ref 22–29)
DEPRECATED HCO3 PLAS-SCNC: 28 MMOL/L (ref 22–29)
DEPRECATED HCO3 PLAS-SCNC: 30 MMOL/L (ref 22–29)
DEPRECATED HCO3 PLAS-SCNC: 34 MMOL/L (ref 22–29)
DEPRECATED HCO3 PLAS-SCNC: 35 MMOL/L (ref 22–29)
DEPRECATED HCO3 PLAS-SCNC: 38 MMOL/L (ref 22–29)
DEPRECATED HCO3 PLAS-SCNC: 38 MMOL/L (ref 22–29)
DEPRECATED HCO3 PLAS-SCNC: 39 MMOL/L (ref 22–29)
DEPRECATED HCO3 PLAS-SCNC: 40 MMOL/L (ref 22–29)
DEPRECATED HCO3 PLAS-SCNC: 41 MMOL/L (ref 22–29)
EOSINOPHIL # BLD AUTO: 0.1 10E3/UL (ref 0–0.7)
EOSINOPHIL # BLD AUTO: 0.2 10E3/UL (ref 0–0.7)
EOSINOPHIL # BLD AUTO: 0.3 10E3/UL (ref 0–0.7)
EOSINOPHIL NFR BLD AUTO: 2 %
EOSINOPHIL NFR BLD AUTO: 2 %
EOSINOPHIL NFR BLD AUTO: 4 %
ERYTHROCYTE [DISTWIDTH] IN BLOOD BY AUTOMATED COUNT: 15.9 % (ref 10–15)
ERYTHROCYTE [DISTWIDTH] IN BLOOD BY AUTOMATED COUNT: 15.9 % (ref 10–15)
ERYTHROCYTE [DISTWIDTH] IN BLOOD BY AUTOMATED COUNT: 17 % (ref 10–15)
ERYTHROCYTE [DISTWIDTH] IN BLOOD BY AUTOMATED COUNT: 17.2 % (ref 10–15)
ERYTHROCYTE [DISTWIDTH] IN BLOOD BY AUTOMATED COUNT: 17.2 % (ref 10–15)
ERYTHROCYTE [DISTWIDTH] IN BLOOD BY AUTOMATED COUNT: 17.4 % (ref 10–15)
ERYTHROCYTE [DISTWIDTH] IN BLOOD BY AUTOMATED COUNT: 17.6 % (ref 10–15)
FERRITIN SERPL-MCNC: 268 NG/ML (ref 11–328)
GFR SERPL CREATININE-BSD FRML MDRD: 24 ML/MIN/1.73M2
GFR SERPL CREATININE-BSD FRML MDRD: 35 ML/MIN/1.73M2
GFR SERPL CREATININE-BSD FRML MDRD: 40 ML/MIN/1.73M2
GFR SERPL CREATININE-BSD FRML MDRD: 41 ML/MIN/1.73M2
GFR SERPL CREATININE-BSD FRML MDRD: 42 ML/MIN/1.73M2
GFR SERPL CREATININE-BSD FRML MDRD: 43 ML/MIN/1.73M2
GFR SERPL CREATININE-BSD FRML MDRD: 45 ML/MIN/1.73M2
GFR SERPL CREATININE-BSD FRML MDRD: 46 ML/MIN/1.73M2
GFR SERPL CREATININE-BSD FRML MDRD: 47 ML/MIN/1.73M2
GFR SERPL CREATININE-BSD FRML MDRD: 47 ML/MIN/1.73M2
GFR SERPL CREATININE-BSD FRML MDRD: 50 ML/MIN/1.73M2
GFR SERPL CREATININE-BSD FRML MDRD: 51 ML/MIN/1.73M2
GFR SERPL CREATININE-BSD FRML MDRD: 53 ML/MIN/1.73M2
GLUCOSE SERPL-MCNC: 102 MG/DL (ref 70–99)
GLUCOSE SERPL-MCNC: 104 MG/DL (ref 70–99)
GLUCOSE SERPL-MCNC: 105 MG/DL (ref 70–99)
GLUCOSE SERPL-MCNC: 108 MG/DL (ref 70–99)
GLUCOSE SERPL-MCNC: 110 MG/DL (ref 70–99)
GLUCOSE SERPL-MCNC: 112 MG/DL (ref 70–99)
GLUCOSE SERPL-MCNC: 123 MG/DL (ref 70–99)
GLUCOSE SERPL-MCNC: 135 MG/DL (ref 70–99)
GLUCOSE SERPL-MCNC: 223 MG/DL (ref 70–99)
GLUCOSE SERPL-MCNC: 88 MG/DL (ref 70–99)
GLUCOSE SERPL-MCNC: 89 MG/DL (ref 70–99)
GLUCOSE SERPL-MCNC: 94 MG/DL (ref 70–99)
GLUCOSE SERPL-MCNC: 96 MG/DL (ref 70–99)
GLUCOSE SERPL-MCNC: 97 MG/DL (ref 70–99)
GLUCOSE SERPL-MCNC: 98 MG/DL (ref 70–99)
GLUCOSE UR STRIP-MCNC: NEGATIVE MG/DL
GLUCOSE UR STRIP-MCNC: NEGATIVE MG/DL
HCO3 BLDV-SCNC: 31 MMOL/L (ref 24–30)
HCO3 BLDV-SCNC: 37 MMOL/L (ref 24–30)
HCT VFR BLD AUTO: 29.4 % (ref 35–47)
HCT VFR BLD AUTO: 31 % (ref 35–47)
HCT VFR BLD AUTO: 31.9 % (ref 35–47)
HCT VFR BLD AUTO: 32.9 % (ref 35–47)
HCT VFR BLD AUTO: 34.3 % (ref 35–47)
HCT VFR BLD AUTO: 38.3 % (ref 35–47)
HCT VFR BLD AUTO: 44.7 % (ref 35–47)
HDLC SERPL-MCNC: 53 MG/DL
HGB BLD-MCNC: 10.2 G/DL (ref 11.7–15.7)
HGB BLD-MCNC: 10.5 G/DL (ref 11.7–15.7)
HGB BLD-MCNC: 11.9 G/DL (ref 11.7–15.7)
HGB BLD-MCNC: 14.1 G/DL (ref 11.7–15.7)
HGB BLD-MCNC: 8.7 G/DL (ref 11.7–15.7)
HGB BLD-MCNC: 8.8 G/DL (ref 11.7–15.7)
HGB BLD-MCNC: 9 G/DL (ref 11.7–15.7)
HGB BLD-MCNC: 9.4 G/DL (ref 11.7–15.7)
HGB BLD-MCNC: 9.7 G/DL (ref 11.7–15.7)
HGB UR QL STRIP: NEGATIVE
HGB UR QL STRIP: NEGATIVE
HOLD SPECIMEN: NORMAL
HYALINE CASTS: 1 /LPF
HYALINE CASTS: 4 /LPF
IMM GRANULOCYTES # BLD: 0 10E3/UL
IMM GRANULOCYTES # BLD: 0 10E3/UL
IMM GRANULOCYTES # BLD: 0.1 10E3/UL
IMM GRANULOCYTES NFR BLD: 0 %
IMM GRANULOCYTES NFR BLD: 1 %
IMM GRANULOCYTES NFR BLD: 1 %
INR BLD: 3.4 (ref 0.9–1.1)
INR PPP: 1.53 (ref 0.85–1.15)
INR PPP: 1.6 (ref 0.85–1.15)
INR PPP: 1.68 (ref 0.85–1.15)
INR PPP: 1.7 (ref 0.85–1.15)
INR PPP: 1.85 (ref 0.85–1.15)
INR PPP: 1.86 (ref 0.85–1.15)
INR PPP: 2 (ref 0.85–1.15)
INR PPP: 2.14 (ref 0.85–1.15)
INR PPP: 2.34 (ref 0.85–1.15)
INR PPP: 2.45 (ref 0.85–1.15)
INR PPP: 2.61 (ref 0.85–1.15)
INR PPP: 2.61 (ref 0.85–1.15)
INR PPP: 2.65 (ref 0.85–1.15)
INR PPP: 2.74 (ref 0.85–1.15)
INR PPP: 2.78 (ref 0.85–1.15)
INR PPP: 2.86 (ref 0.85–1.15)
INR PPP: 3.06 (ref 0.85–1.15)
INR PPP: 3.33 (ref 0.85–1.15)
INR PPP: 4.03 (ref 0.85–1.15)
INR PPP: 4.13 (ref 0.85–1.15)
INR PPP: 4.23 (ref 0.85–1.15)
INR PPP: 4.28 (ref 0.85–1.15)
INR PPP: 4.51 (ref 0.85–1.15)
INR PPP: 4.65 (ref 0.85–1.15)
IRON BINDING CAPACITY (ROCHE): 197 UG/DL (ref 240–430)
IRON SATN MFR SERPL: 25 % (ref 15–46)
IRON SERPL-MCNC: 49 UG/DL (ref 37–145)
KETONES UR STRIP-MCNC: NEGATIVE MG/DL
KETONES UR STRIP-MCNC: NEGATIVE MG/DL
LACTATE SERPL-SCNC: 1.9 MMOL/L (ref 0.7–2)
LACTATE SERPL-SCNC: 2.5 MMOL/L (ref 0.7–2)
LACTATE SERPL-SCNC: 2.9 MMOL/L (ref 0.7–2)
LACTATE SERPL-SCNC: 3 MMOL/L (ref 0.7–2)
LACTATE SERPL-SCNC: 3.3 MMOL/L (ref 0.7–2)
LDLC SERPL CALC-MCNC: 82 MG/DL
LEUKOCYTE ESTERASE UR QL STRIP: ABNORMAL
LEUKOCYTE ESTERASE UR QL STRIP: NEGATIVE
LVEF ECHO: NORMAL
LYMPHOCYTES # BLD AUTO: 0.5 10E3/UL (ref 0.8–5.3)
LYMPHOCYTES # BLD AUTO: 0.5 10E3/UL (ref 0.8–5.3)
LYMPHOCYTES # BLD AUTO: 1.2 10E3/UL (ref 0.8–5.3)
LYMPHOCYTES NFR BLD AUTO: 19 %
LYMPHOCYTES NFR BLD AUTO: 6 %
LYMPHOCYTES NFR BLD AUTO: 9 %
MAGNESIUM SERPL-MCNC: 1.5 MG/DL (ref 1.7–2.3)
MAGNESIUM SERPL-MCNC: 1.5 MG/DL (ref 1.7–2.3)
MAGNESIUM SERPL-MCNC: 1.6 MG/DL (ref 1.7–2.3)
MAGNESIUM SERPL-MCNC: 1.8 MG/DL (ref 1.7–2.3)
MAGNESIUM SERPL-MCNC: 1.9 MG/DL (ref 1.7–2.3)
MAGNESIUM SERPL-MCNC: 2 MG/DL (ref 1.7–2.3)
MAGNESIUM SERPL-MCNC: 2.1 MG/DL (ref 1.7–2.3)
MAGNESIUM SERPL-MCNC: 2.2 MG/DL (ref 1.7–2.3)
MCH RBC QN AUTO: 30.8 PG (ref 26.5–33)
MCH RBC QN AUTO: 30.9 PG (ref 26.5–33)
MCH RBC QN AUTO: 31.1 PG (ref 26.5–33)
MCH RBC QN AUTO: 31.2 PG (ref 26.5–33)
MCH RBC QN AUTO: 31.3 PG (ref 26.5–33)
MCH RBC QN AUTO: 31.3 PG (ref 26.5–33)
MCH RBC QN AUTO: 31.4 PG (ref 26.5–33)
MCHC RBC AUTO-ENTMCNC: 30.3 G/DL (ref 31.5–36.5)
MCHC RBC AUTO-ENTMCNC: 30.4 G/DL (ref 31.5–36.5)
MCHC RBC AUTO-ENTMCNC: 30.6 G/DL (ref 31.5–36.5)
MCHC RBC AUTO-ENTMCNC: 30.6 G/DL (ref 31.5–36.5)
MCHC RBC AUTO-ENTMCNC: 31 G/DL (ref 31.5–36.5)
MCHC RBC AUTO-ENTMCNC: 31.1 G/DL (ref 31.5–36.5)
MCHC RBC AUTO-ENTMCNC: 31.5 G/DL (ref 31.5–36.5)
MCV RBC AUTO: 101 FL (ref 78–100)
MCV RBC AUTO: 101 FL (ref 78–100)
MCV RBC AUTO: 102 FL (ref 78–100)
MCV RBC AUTO: 103 FL (ref 78–100)
MCV RBC AUTO: 103 FL (ref 78–100)
MCV RBC AUTO: 99 FL (ref 78–100)
MCV RBC AUTO: 99 FL (ref 78–100)
MONOCYTES # BLD AUTO: 0.4 10E3/UL (ref 0–1.3)
MONOCYTES # BLD AUTO: 0.6 10E3/UL (ref 0–1.3)
MONOCYTES # BLD AUTO: 0.8 10E3/UL (ref 0–1.3)
MONOCYTES NFR BLD AUTO: 12 %
MONOCYTES NFR BLD AUTO: 7 %
MONOCYTES NFR BLD AUTO: 8 %
MUCOUS THREADS #/AREA URNS LPF: PRESENT /LPF
NEUTROPHILS # BLD AUTO: 4.1 10E3/UL (ref 1.6–8.3)
NEUTROPHILS # BLD AUTO: 5 10E3/UL (ref 1.6–8.3)
NEUTROPHILS # BLD AUTO: 6 10E3/UL (ref 1.6–8.3)
NEUTROPHILS NFR BLD AUTO: 63 %
NEUTROPHILS NFR BLD AUTO: 80 %
NEUTROPHILS NFR BLD AUTO: 83 %
NITRATE UR QL: NEGATIVE
NITRATE UR QL: NEGATIVE
NONHDLC SERPL-MCNC: 103 MG/DL
NRBC # BLD AUTO: 0 10E3/UL
NRBC # BLD AUTO: 0.1 10E3/UL
NRBC # BLD AUTO: 0.1 10E3/UL
NRBC BLD AUTO-RTO: 0 /100
NRBC BLD AUTO-RTO: 2 /100
NRBC BLD AUTO-RTO: 2 /100
NT-PROBNP SERPL-MCNC: 6483 PG/ML (ref 0–1800)
OXYHGB MFR BLDV: 22.1 % (ref 70–75)
OXYHGB MFR BLDV: 22.7 % (ref 70–75)
PCO2 BLDV: 51 MM HG (ref 35–50)
PCO2 BLDV: 61 MM HG (ref 35–50)
PH BLDV: 7.39 [PH] (ref 7.35–7.45)
PH BLDV: 7.4 [PH] (ref 7.35–7.45)
PH UR STRIP: 6 [PH] (ref 5–7)
PH UR STRIP: 7 [PH] (ref 5–7)
PHOSPHATE SERPL-MCNC: 2 MG/DL (ref 2.5–4.5)
PLATELET # BLD AUTO: 181 10E3/UL (ref 150–450)
PLATELET # BLD AUTO: 200 10E3/UL (ref 150–450)
PLATELET # BLD AUTO: 203 10E3/UL (ref 150–450)
PLATELET # BLD AUTO: 207 10E3/UL (ref 150–450)
PLATELET # BLD AUTO: 221 10E3/UL (ref 150–450)
PLATELET # BLD AUTO: 234 10E3/UL (ref 150–450)
PLATELET # BLD AUTO: 236 10E3/UL (ref 150–450)
PLATELET # BLD AUTO: 343 10E3/UL (ref 150–450)
PLATELET # BLD AUTO: 377 10E3/UL (ref 150–450)
PO2 BLDV: 19 MM HG (ref 25–47)
PO2 BLDV: 20 MM HG (ref 25–47)
POTASSIUM SERPL-SCNC: 2.6 MMOL/L (ref 3.4–5.3)
POTASSIUM SERPL-SCNC: 2.9 MMOL/L (ref 3.4–5.3)
POTASSIUM SERPL-SCNC: 3.1 MMOL/L (ref 3.4–5.3)
POTASSIUM SERPL-SCNC: 3.1 MMOL/L (ref 3.4–5.3)
POTASSIUM SERPL-SCNC: 3.2 MMOL/L (ref 3.4–5.3)
POTASSIUM SERPL-SCNC: 3.3 MMOL/L (ref 3.4–5.3)
POTASSIUM SERPL-SCNC: 3.4 MMOL/L (ref 3.4–5.3)
POTASSIUM SERPL-SCNC: 3.5 MMOL/L (ref 3.4–5.3)
POTASSIUM SERPL-SCNC: 3.6 MMOL/L (ref 3.4–5.3)
POTASSIUM SERPL-SCNC: 3.6 MMOL/L (ref 3.4–5.3)
POTASSIUM SERPL-SCNC: 3.7 MMOL/L (ref 3.4–5.3)
POTASSIUM SERPL-SCNC: 3.7 MMOL/L (ref 3.4–5.3)
POTASSIUM SERPL-SCNC: 4 MMOL/L (ref 3.4–5.3)
POTASSIUM SERPL-SCNC: 4 MMOL/L (ref 3.4–5.3)
POTASSIUM SERPL-SCNC: 4.4 MMOL/L (ref 3.4–5.3)
POTASSIUM SERPL-SCNC: 4.5 MMOL/L (ref 3.4–5.3)
POTASSIUM SERPL-SCNC: 4.7 MMOL/L (ref 3.4–5.3)
POTASSIUM SERPL-SCNC: 4.8 MMOL/L (ref 3.4–5.3)
POTASSIUM SERPL-SCNC: 5.3 MMOL/L (ref 3.4–5.3)
POTASSIUM SERPL-SCNC: 6.5 MMOL/L (ref 3.4–5.3)
PROT SERPL-MCNC: 7.9 G/DL (ref 6.4–8.3)
RBC # BLD AUTO: 2.91 10E6/UL (ref 3.8–5.2)
RBC # BLD AUTO: 3.02 10E6/UL (ref 3.8–5.2)
RBC # BLD AUTO: 3.09 10E6/UL (ref 3.8–5.2)
RBC # BLD AUTO: 3.26 10E6/UL (ref 3.8–5.2)
RBC # BLD AUTO: 3.37 10E6/UL (ref 3.8–5.2)
RBC # BLD AUTO: 3.86 10E6/UL (ref 3.8–5.2)
RBC # BLD AUTO: 4.51 10E6/UL (ref 3.8–5.2)
RBC URINE: 1 /HPF
RBC URINE: <1 /HPF
SAO2 % BLDV: 22.7 % (ref 70–75)
SAO2 % BLDV: 23.2 % (ref 70–75)
SARS-COV-2 RNA RESP QL NAA+PROBE: NEGATIVE
SODIUM SERPL-SCNC: 135 MMOL/L (ref 136–145)
SODIUM SERPL-SCNC: 136 MMOL/L (ref 136–145)
SODIUM SERPL-SCNC: 137 MMOL/L (ref 136–145)
SODIUM SERPL-SCNC: 139 MMOL/L (ref 136–145)
SODIUM SERPL-SCNC: 139 MMOL/L (ref 136–145)
SODIUM SERPL-SCNC: 140 MMOL/L (ref 136–145)
SODIUM SERPL-SCNC: 140 MMOL/L (ref 136–145)
SODIUM SERPL-SCNC: 141 MMOL/L (ref 136–145)
SODIUM SERPL-SCNC: 142 MMOL/L (ref 136–145)
SODIUM SERPL-SCNC: 142 MMOL/L (ref 136–145)
SODIUM SERPL-SCNC: 143 MMOL/L (ref 136–145)
SODIUM SERPL-SCNC: 145 MMOL/L (ref 136–145)
SODIUM SERPL-SCNC: 146 MMOL/L (ref 136–145)
SP GR UR STRIP: 1.01 (ref 1–1.03)
SP GR UR STRIP: 1.01 (ref 1–1.03)
SPECIMEN EXPIRATION DATE: NORMAL
SQUAMOUS EPITHELIAL: <1 /HPF
SQUAMOUS EPITHELIAL: <1 /HPF
T4 FREE SERPL-MCNC: 1.15 NG/DL (ref 0.9–1.7)
TRIGL SERPL-MCNC: 104 MG/DL
TROPONIN T SERPL HS-MCNC: 49 NG/L
TROPONIN T SERPL HS-MCNC: 49 NG/L
TROPONIN T SERPL HS-MCNC: 54 NG/L
TROPONIN T SERPL HS-MCNC: 56 NG/L
TROPONIN T SERPL HS-MCNC: 61 NG/L
TSH SERPL DL<=0.005 MIU/L-ACNC: 16.1 UIU/ML (ref 0.3–4.2)
TSH SERPL DL<=0.005 MIU/L-ACNC: 4.22 UIU/ML (ref 0.3–4.2)
TSH SERPL DL<=0.005 MIU/L-ACNC: 8.03 UIU/ML (ref 0.3–4.2)
UROBILINOGEN UR STRIP-MCNC: 2 MG/DL
UROBILINOGEN UR STRIP-MCNC: <2 MG/DL
WBC # BLD AUTO: 6.1 10E3/UL (ref 4–11)
WBC # BLD AUTO: 6.4 10E3/UL (ref 4–11)
WBC # BLD AUTO: 7.2 10E3/UL (ref 4–11)
WBC # BLD AUTO: 7.7 10E3/UL (ref 4–11)
WBC # BLD AUTO: 7.8 10E3/UL (ref 4–11)
WBC URINE: 1 /HPF
WBC URINE: <1 /HPF

## 2023-01-01 PROCEDURE — 99309 SBSQ NF CARE MODERATE MDM 30: CPT | Performed by: NURSE PRACTITIONER

## 2023-01-01 PROCEDURE — 99310 SBSQ NF CARE HIGH MDM 45: CPT | Performed by: INTERNAL MEDICINE

## 2023-01-01 PROCEDURE — 97110 THERAPEUTIC EXERCISES: CPT | Mod: GO

## 2023-01-01 PROCEDURE — 85610 PROTHROMBIN TIME: CPT | Performed by: NURSE PRACTITIONER

## 2023-01-01 PROCEDURE — 99233 SBSQ HOSP IP/OBS HIGH 50: CPT | Performed by: HOSPITALIST

## 2023-01-01 PROCEDURE — 80048 BASIC METABOLIC PNL TOTAL CA: CPT | Performed by: HOSPITALIST

## 2023-01-01 PROCEDURE — 99309 SBSQ NF CARE MODERATE MDM 30: CPT | Performed by: FAMILY MEDICINE

## 2023-01-01 PROCEDURE — P9604 ONE-WAY ALLOW PRORATED TRIP: HCPCS | Performed by: INTERNAL MEDICINE

## 2023-01-01 PROCEDURE — 250N000013 HC RX MED GY IP 250 OP 250 PS 637: Performed by: INTERNAL MEDICINE

## 2023-01-01 PROCEDURE — 85610 PROTHROMBIN TIME: CPT | Performed by: FAMILY MEDICINE

## 2023-01-01 PROCEDURE — 250N000011 HC RX IP 250 OP 636: Performed by: INTERNAL MEDICINE

## 2023-01-01 PROCEDURE — 36415 COLL VENOUS BLD VENIPUNCTURE: CPT | Performed by: HOSPITALIST

## 2023-01-01 PROCEDURE — 210N000001 HC R&B IMCU HEART CARE

## 2023-01-01 PROCEDURE — 99232 SBSQ HOSP IP/OBS MODERATE 35: CPT | Performed by: INTERNAL MEDICINE

## 2023-01-01 PROCEDURE — P9604 ONE-WAY ALLOW PRORATED TRIP: HCPCS | Mod: ORL | Performed by: INTERNAL MEDICINE

## 2023-01-01 PROCEDURE — 99309 SBSQ NF CARE MODERATE MDM 30: CPT | Performed by: INTERNAL MEDICINE

## 2023-01-01 PROCEDURE — 97535 SELF CARE MNGMENT TRAINING: CPT | Mod: GO

## 2023-01-01 PROCEDURE — 84443 ASSAY THYROID STIM HORMONE: CPT | Performed by: INTERNAL MEDICINE

## 2023-01-01 PROCEDURE — 250N000013 HC RX MED GY IP 250 OP 250 PS 637: Performed by: HOSPITALIST

## 2023-01-01 PROCEDURE — 93005 ELECTROCARDIOGRAM TRACING: CPT | Performed by: FAMILY MEDICINE

## 2023-01-01 PROCEDURE — P9604 ONE-WAY ALLOW PRORATED TRIP: HCPCS | Performed by: FAMILY MEDICINE

## 2023-01-01 PROCEDURE — 84100 ASSAY OF PHOSPHORUS: CPT | Performed by: HOSPITALIST

## 2023-01-01 PROCEDURE — 85610 PROTHROMBIN TIME: CPT | Performed by: INTERNAL MEDICINE

## 2023-01-01 PROCEDURE — 85018 HEMOGLOBIN: CPT | Performed by: HOSPITALIST

## 2023-01-01 PROCEDURE — 85025 COMPLETE CBC W/AUTO DIFF WBC: CPT | Performed by: STUDENT IN AN ORGANIZED HEALTH CARE EDUCATION/TRAINING PROGRAM

## 2023-01-01 PROCEDURE — 97530 THERAPEUTIC ACTIVITIES: CPT | Mod: GP

## 2023-01-01 PROCEDURE — 250N000011 HC RX IP 250 OP 636: Performed by: HOSPITALIST

## 2023-01-01 PROCEDURE — 80048 BASIC METABOLIC PNL TOTAL CA: CPT | Performed by: FAMILY MEDICINE

## 2023-01-01 PROCEDURE — 36415 COLL VENOUS BLD VENIPUNCTURE: CPT | Performed by: INTERNAL MEDICINE

## 2023-01-01 PROCEDURE — 99305 1ST NF CARE MODERATE MDM 35: CPT | Performed by: FAMILY MEDICINE

## 2023-01-01 PROCEDURE — 36415 COLL VENOUS BLD VENIPUNCTURE: CPT | Mod: ORL | Performed by: INTERNAL MEDICINE

## 2023-01-01 PROCEDURE — 36415 COLL VENOUS BLD VENIPUNCTURE: CPT | Performed by: FAMILY MEDICINE

## 2023-01-01 PROCEDURE — 82805 BLOOD GASES W/O2 SATURATION: CPT | Performed by: FAMILY MEDICINE

## 2023-01-01 PROCEDURE — 81001 URINALYSIS AUTO W/SCOPE: CPT | Performed by: EMERGENCY MEDICINE

## 2023-01-01 PROCEDURE — 85610 PROTHROMBIN TIME: CPT | Mod: ORL | Performed by: INTERNAL MEDICINE

## 2023-01-01 PROCEDURE — 84132 ASSAY OF SERUM POTASSIUM: CPT | Performed by: INTERNAL MEDICINE

## 2023-01-01 PROCEDURE — 85610 PROTHROMBIN TIME: CPT | Performed by: STUDENT IN AN ORGANIZED HEALTH CARE EDUCATION/TRAINING PROGRAM

## 2023-01-01 PROCEDURE — 86901 BLOOD TYPING SEROLOGIC RH(D): CPT | Performed by: STUDENT IN AN ORGANIZED HEALTH CARE EDUCATION/TRAINING PROGRAM

## 2023-01-01 PROCEDURE — P9603 ONE-WAY ALLOW PRORATED MILES: HCPCS | Performed by: FAMILY MEDICINE

## 2023-01-01 PROCEDURE — 97116 GAIT TRAINING THERAPY: CPT | Mod: GP

## 2023-01-01 PROCEDURE — 85049 AUTOMATED PLATELET COUNT: CPT | Performed by: HOSPITALIST

## 2023-01-01 PROCEDURE — 97110 THERAPEUTIC EXERCISES: CPT | Mod: GP

## 2023-01-01 PROCEDURE — 36415 COLL VENOUS BLD VENIPUNCTURE: CPT | Performed by: NURSE PRACTITIONER

## 2023-01-01 PROCEDURE — 82728 ASSAY OF FERRITIN: CPT | Performed by: HOSPITALIST

## 2023-01-01 PROCEDURE — 70450 CT HEAD/BRAIN W/O DYE: CPT

## 2023-01-01 PROCEDURE — 99223 1ST HOSP IP/OBS HIGH 75: CPT | Performed by: INTERNAL MEDICINE

## 2023-01-01 PROCEDURE — 82805 BLOOD GASES W/O2 SATURATION: CPT | Performed by: EMERGENCY MEDICINE

## 2023-01-01 PROCEDURE — 84443 ASSAY THYROID STIM HORMONE: CPT | Mod: ORL | Performed by: INTERNAL MEDICINE

## 2023-01-01 PROCEDURE — 82310 ASSAY OF CALCIUM: CPT | Performed by: INTERNAL MEDICINE

## 2023-01-01 PROCEDURE — 84484 ASSAY OF TROPONIN QUANT: CPT | Performed by: FAMILY MEDICINE

## 2023-01-01 PROCEDURE — 80048 BASIC METABOLIC PNL TOTAL CA: CPT | Mod: ORL | Performed by: INTERNAL MEDICINE

## 2023-01-01 PROCEDURE — P9604 ONE-WAY ALLOW PRORATED TRIP: HCPCS | Performed by: NURSE PRACTITIONER

## 2023-01-01 PROCEDURE — 80053 COMPREHEN METABOLIC PANEL: CPT | Performed by: STUDENT IN AN ORGANIZED HEALTH CARE EDUCATION/TRAINING PROGRAM

## 2023-01-01 PROCEDURE — 258N000003 HC RX IP 258 OP 636: Performed by: INTERNAL MEDICINE

## 2023-01-01 PROCEDURE — 84132 ASSAY OF SERUM POTASSIUM: CPT | Performed by: HOSPITALIST

## 2023-01-01 PROCEDURE — 99285 EMERGENCY DEPT VISIT HI MDM: CPT | Mod: 25

## 2023-01-01 PROCEDURE — U0005 INFEC AGEN DETEC AMPLI PROBE: HCPCS | Performed by: FAMILY MEDICINE

## 2023-01-01 PROCEDURE — 97129 THER IVNTJ 1ST 15 MIN: CPT | Mod: GO

## 2023-01-01 PROCEDURE — 99233 SBSQ HOSP IP/OBS HIGH 50: CPT | Performed by: INTERNAL MEDICINE

## 2023-01-01 PROCEDURE — 83735 ASSAY OF MAGNESIUM: CPT | Performed by: FAMILY MEDICINE

## 2023-01-01 PROCEDURE — 250N000013 HC RX MED GY IP 250 OP 250 PS 637: Performed by: STUDENT IN AN ORGANIZED HEALTH CARE EDUCATION/TRAINING PROGRAM

## 2023-01-01 PROCEDURE — 82310 ASSAY OF CALCIUM: CPT | Performed by: HOSPITALIST

## 2023-01-01 PROCEDURE — 84132 ASSAY OF SERUM POTASSIUM: CPT | Mod: ORL | Performed by: INTERNAL MEDICINE

## 2023-01-01 PROCEDURE — 83605 ASSAY OF LACTIC ACID: CPT | Performed by: HOSPITALIST

## 2023-01-01 PROCEDURE — 99232 SBSQ HOSP IP/OBS MODERATE 35: CPT | Performed by: HOSPITALIST

## 2023-01-01 PROCEDURE — 36416 COLLJ CAPILLARY BLOOD SPEC: CPT

## 2023-01-01 PROCEDURE — 99214 OFFICE O/P EST MOD 30 MIN: CPT | Performed by: INTERNAL MEDICINE

## 2023-01-01 PROCEDURE — 36415 COLL VENOUS BLD VENIPUNCTURE: CPT | Performed by: EMERGENCY MEDICINE

## 2023-01-01 PROCEDURE — P9047 ALBUMIN (HUMAN), 25%, 50ML: HCPCS | Performed by: INTERNAL MEDICINE

## 2023-01-01 PROCEDURE — U0003 INFECTIOUS AGENT DETECTION BY NUCLEIC ACID (DNA OR RNA); SEVERE ACUTE RESPIRATORY SYNDROME CORONAVIRUS 2 (SARS-COV-2) (CORONAVIRUS DISEASE [COVID-19]), AMPLIFIED PROBE TECHNIQUE, MAKING USE OF HIGH THROUGHPUT TECHNOLOGIES AS DESCRIBED BY CMS-2020-01-R: HCPCS | Performed by: FAMILY MEDICINE

## 2023-01-01 PROCEDURE — 71045 X-RAY EXAM CHEST 1 VIEW: CPT

## 2023-01-01 PROCEDURE — 97165 OT EVAL LOW COMPLEX 30 MIN: CPT | Mod: GO

## 2023-01-01 PROCEDURE — 83735 ASSAY OF MAGNESIUM: CPT | Performed by: HOSPITALIST

## 2023-01-01 PROCEDURE — 81001 URINALYSIS AUTO W/SCOPE: CPT | Performed by: FAMILY MEDICINE

## 2023-01-01 PROCEDURE — 93005 ELECTROCARDIOGRAM TRACING: CPT | Performed by: EMERGENCY MEDICINE

## 2023-01-01 PROCEDURE — 85027 COMPLETE CBC AUTOMATED: CPT | Performed by: FAMILY MEDICINE

## 2023-01-01 PROCEDURE — 85730 THROMBOPLASTIN TIME PARTIAL: CPT | Performed by: STUDENT IN AN ORGANIZED HEALTH CARE EDUCATION/TRAINING PROGRAM

## 2023-01-01 PROCEDURE — 85027 COMPLETE CBC AUTOMATED: CPT | Performed by: HOSPITALIST

## 2023-01-01 PROCEDURE — 93306 TTE W/DOPPLER COMPLETE: CPT | Mod: 26 | Performed by: INTERNAL MEDICINE

## 2023-01-01 PROCEDURE — 83605 ASSAY OF LACTIC ACID: CPT | Performed by: INTERNAL MEDICINE

## 2023-01-01 PROCEDURE — 83550 IRON BINDING TEST: CPT | Performed by: HOSPITALIST

## 2023-01-01 PROCEDURE — 80048 BASIC METABOLIC PNL TOTAL CA: CPT | Performed by: INTERNAL MEDICINE

## 2023-01-01 PROCEDURE — 72125 CT NECK SPINE W/O DYE: CPT

## 2023-01-01 PROCEDURE — 83735 ASSAY OF MAGNESIUM: CPT | Performed by: STUDENT IN AN ORGANIZED HEALTH CARE EDUCATION/TRAINING PROGRAM

## 2023-01-01 PROCEDURE — 99239 HOSP IP/OBS DSCHRG MGMT >30: CPT | Performed by: HOSPITALIST

## 2023-01-01 PROCEDURE — 250N000009 HC RX 250: Performed by: HOSPITALIST

## 2023-01-01 PROCEDURE — 84484 ASSAY OF TROPONIN QUANT: CPT | Mod: 91 | Performed by: FAMILY MEDICINE

## 2023-01-01 PROCEDURE — 84439 ASSAY OF FREE THYROXINE: CPT | Performed by: INTERNAL MEDICINE

## 2023-01-01 PROCEDURE — 97162 PT EVAL MOD COMPLEX 30 MIN: CPT | Mod: GP

## 2023-01-01 PROCEDURE — 83735 ASSAY OF MAGNESIUM: CPT | Performed by: INTERNAL MEDICINE

## 2023-01-01 PROCEDURE — 83605 ASSAY OF LACTIC ACID: CPT | Performed by: EMERGENCY MEDICINE

## 2023-01-01 PROCEDURE — 85004 AUTOMATED DIFF WBC COUNT: CPT | Performed by: FAMILY MEDICINE

## 2023-01-01 PROCEDURE — 85027 COMPLETE CBC AUTOMATED: CPT | Performed by: INTERNAL MEDICINE

## 2023-01-01 PROCEDURE — 85025 COMPLETE CBC W/AUTO DIFF WBC: CPT | Performed by: INTERNAL MEDICINE

## 2023-01-01 PROCEDURE — 80061 LIPID PANEL: CPT | Performed by: INTERNAL MEDICINE

## 2023-01-01 PROCEDURE — 250N000011 HC RX IP 250 OP 636: Performed by: STUDENT IN AN ORGANIZED HEALTH CARE EDUCATION/TRAINING PROGRAM

## 2023-01-01 PROCEDURE — 84484 ASSAY OF TROPONIN QUANT: CPT | Performed by: STUDENT IN AN ORGANIZED HEALTH CARE EDUCATION/TRAINING PROGRAM

## 2023-01-01 PROCEDURE — P9603 ONE-WAY ALLOW PRORATED MILES: HCPCS | Mod: ORL | Performed by: INTERNAL MEDICINE

## 2023-01-01 PROCEDURE — 87086 URINE CULTURE/COLONY COUNT: CPT | Performed by: EMERGENCY MEDICINE

## 2023-01-01 PROCEDURE — 83880 ASSAY OF NATRIURETIC PEPTIDE: CPT | Performed by: STUDENT IN AN ORGANIZED HEALTH CARE EDUCATION/TRAINING PROGRAM

## 2023-01-01 PROCEDURE — 82310 ASSAY OF CALCIUM: CPT | Performed by: FAMILY MEDICINE

## 2023-01-01 PROCEDURE — 36415 COLL VENOUS BLD VENIPUNCTURE: CPT | Performed by: STUDENT IN AN ORGANIZED HEALTH CARE EDUCATION/TRAINING PROGRAM

## 2023-01-01 PROCEDURE — 93306 TTE W/DOPPLER COMPLETE: CPT

## 2023-01-01 PROCEDURE — 99316 NF DSCHRG MGMT 30 MIN+: CPT | Performed by: NURSE PRACTITIONER

## 2023-01-01 PROCEDURE — 85610 PROTHROMBIN TIME: CPT

## 2023-01-01 PROCEDURE — 99204 OFFICE O/P NEW MOD 45 MIN: CPT | Performed by: NURSE PRACTITIONER

## 2023-01-01 RX ORDER — POTASSIUM CHLORIDE 1500 MG/1
40 TABLET, EXTENDED RELEASE ORAL ONCE
Status: DISCONTINUED | OUTPATIENT
Start: 2023-01-01 | End: 2023-01-01

## 2023-01-01 RX ORDER — METHYLPHENIDATE HYDROCHLORIDE 5 MG/1
2.5 TABLET ORAL DAILY
Qty: 30 TABLET | Refills: 0 | Status: SHIPPED | OUTPATIENT
Start: 2023-01-01 | End: 2023-01-01

## 2023-01-01 RX ORDER — BUMETANIDE 2 MG/1
2 TABLET ORAL DAILY
Qty: 30 TABLET | Refills: 0 | Status: SHIPPED | OUTPATIENT
Start: 2023-01-01 | End: 2023-01-01

## 2023-01-01 RX ORDER — ACETAMINOPHEN 325 MG/1
650 TABLET ORAL EVERY 8 HOURS PRN
Status: DISCONTINUED | OUTPATIENT
Start: 2023-01-01 | End: 2023-01-01 | Stop reason: HOSPADM

## 2023-01-01 RX ORDER — LEVOTHYROXINE SODIUM 75 UG/1
75 TABLET ORAL DAILY
COMMUNITY

## 2023-01-01 RX ORDER — SENNOSIDES 8.6 MG
650 CAPSULE ORAL EVERY 8 HOURS PRN
Status: DISCONTINUED | OUTPATIENT
Start: 2023-01-01 | End: 2023-01-01

## 2023-01-01 RX ORDER — ONDANSETRON 4 MG/1
4 TABLET, ORALLY DISINTEGRATING ORAL EVERY 6 HOURS PRN
Status: DISCONTINUED | OUTPATIENT
Start: 2023-01-01 | End: 2023-01-01 | Stop reason: HOSPADM

## 2023-01-01 RX ORDER — METHYLPHENIDATE HYDROCHLORIDE 2.5 MG/1
2.5 TABLET, CHEWABLE ORAL DAILY
COMMUNITY
End: 2023-01-01

## 2023-01-01 RX ORDER — MAGNESIUM SULFATE 4 G/50ML
4 INJECTION INTRAVENOUS ONCE
Status: COMPLETED | OUTPATIENT
Start: 2023-01-01 | End: 2023-01-01

## 2023-01-01 RX ORDER — WARFARIN SODIUM 2.5 MG/1
2.5 TABLET ORAL
Status: DISCONTINUED | OUTPATIENT
Start: 2023-01-01 | End: 2023-01-01 | Stop reason: HOSPADM

## 2023-01-01 RX ORDER — ACETAMINOPHEN 500 MG
1000 TABLET ORAL 3 TIMES DAILY
COMMUNITY

## 2023-01-01 RX ORDER — POTASSIUM CHLORIDE 1500 MG/1
20 TABLET, EXTENDED RELEASE ORAL ONCE
Status: COMPLETED | OUTPATIENT
Start: 2023-01-01 | End: 2023-01-01

## 2023-01-01 RX ORDER — FUROSEMIDE 20 MG
20 TABLET ORAL DAILY
COMMUNITY

## 2023-01-01 RX ORDER — SERTRALINE HYDROCHLORIDE 25 MG/1
25 TABLET, FILM COATED ORAL DAILY
COMMUNITY
End: 2023-01-01

## 2023-01-01 RX ORDER — BUMETANIDE 2 MG/1
2 TABLET ORAL DAILY
Status: DISCONTINUED | OUTPATIENT
Start: 2023-01-01 | End: 2023-01-01 | Stop reason: HOSPADM

## 2023-01-01 RX ORDER — NITROGLYCERIN 0.4 MG/1
0.4 TABLET SUBLINGUAL EVERY 5 MIN PRN
Status: DISCONTINUED | OUTPATIENT
Start: 2023-01-01 | End: 2023-01-01 | Stop reason: HOSPADM

## 2023-01-01 RX ORDER — ALBUMIN (HUMAN) 12.5 G/50ML
50 SOLUTION INTRAVENOUS ONCE
Status: COMPLETED | OUTPATIENT
Start: 2023-01-01 | End: 2023-01-01

## 2023-01-01 RX ORDER — MAGNESIUM OXIDE 400 MG/1
400 TABLET ORAL EVERY 4 HOURS
Status: COMPLETED | OUTPATIENT
Start: 2023-01-01 | End: 2023-01-01

## 2023-01-01 RX ORDER — POTASSIUM CHLORIDE 7.45 MG/ML
10 INJECTION INTRAVENOUS ONCE
Status: COMPLETED | OUTPATIENT
Start: 2023-01-01 | End: 2023-01-01

## 2023-01-01 RX ORDER — POTASSIUM CHLORIDE 1500 MG/1
20 TABLET, EXTENDED RELEASE ORAL ONCE
Status: DISCONTINUED | OUTPATIENT
Start: 2023-01-01 | End: 2023-01-01

## 2023-01-01 RX ORDER — MORPHINE SULFATE 30 MG/1
2.5 TABLET ORAL EVERY 4 HOURS PRN
Qty: 20 TABLET | Refills: 0 | Status: SHIPPED | OUTPATIENT
Start: 2023-01-01

## 2023-01-01 RX ORDER — FUROSEMIDE 10 MG/ML
80 INJECTION INTRAMUSCULAR; INTRAVENOUS ONCE
Status: COMPLETED | OUTPATIENT
Start: 2023-01-01 | End: 2023-01-01

## 2023-01-01 RX ORDER — METHYLPHENIDATE HYDROCHLORIDE 2.5 MG/1
2.5 TABLET, CHEWABLE ORAL DAILY
Qty: 30 TABLET | Refills: 0 | Status: CANCELLED | OUTPATIENT
Start: 2023-01-01

## 2023-01-01 RX ORDER — SIMVASTATIN 10 MG
10 TABLET ORAL AT BEDTIME
Status: DISCONTINUED | OUTPATIENT
Start: 2023-01-01 | End: 2023-01-01 | Stop reason: HOSPADM

## 2023-01-01 RX ORDER — POTASSIUM CHLORIDE 750 MG/1
10 TABLET, EXTENDED RELEASE ORAL DAILY
COMMUNITY
Start: 2023-01-01

## 2023-01-01 RX ORDER — METOPROLOL SUCCINATE 100 MG/1
100 TABLET, EXTENDED RELEASE ORAL DAILY
Status: DISCONTINUED | OUTPATIENT
Start: 2023-01-01 | End: 2023-01-01 | Stop reason: HOSPADM

## 2023-01-01 RX ORDER — WARFARIN SODIUM 1 MG/1
TABLET ORAL
COMMUNITY
Start: 2023-01-01 | End: 2023-01-01

## 2023-01-01 RX ORDER — FUROSEMIDE 20 MG
20 TABLET ORAL DAILY
Qty: 90 TABLET | Refills: 3 | Status: SHIPPED | OUTPATIENT
Start: 2023-01-01 | End: 2023-01-01

## 2023-01-01 RX ORDER — LIDOCAINE 40 MG/G
CREAM TOPICAL
Status: DISCONTINUED | OUTPATIENT
Start: 2023-01-01 | End: 2023-01-01 | Stop reason: HOSPADM

## 2023-01-01 RX ORDER — GUAIFENESIN 200 MG/10ML
200 LIQUID ORAL EVERY 4 HOURS PRN
COMMUNITY

## 2023-01-01 RX ORDER — WARFARIN SODIUM 2.5 MG/1
TABLET ORAL
COMMUNITY
Start: 2023-01-01 | End: 2023-01-01

## 2023-01-01 RX ORDER — DEXMETHYLPHENIDATE HYDROCHLORIDE 2.5 MG/1
2.5 TABLET ORAL DAILY
Qty: 30 TABLET | Refills: 0 | Status: SHIPPED | OUTPATIENT
Start: 2023-01-01 | End: 2023-01-01 | Stop reason: DRUGHIGH

## 2023-01-01 RX ORDER — POTASSIUM CHLORIDE 1500 MG/1
40 TABLET, EXTENDED RELEASE ORAL ONCE
Status: COMPLETED | OUTPATIENT
Start: 2023-01-01 | End: 2023-01-01

## 2023-01-01 RX ORDER — HEPARIN SODIUM 5000 [USP'U]/.5ML
5000 INJECTION, SOLUTION INTRAVENOUS; SUBCUTANEOUS EVERY 12 HOURS
Status: DISCONTINUED | OUTPATIENT
Start: 2023-01-01 | End: 2023-01-01

## 2023-01-01 RX ORDER — METHYLPHENIDATE HYDROCHLORIDE 5 MG/1
2.5 TABLET ORAL DAILY
COMMUNITY
End: 2023-01-01

## 2023-01-01 RX ORDER — POTASSIUM CHLORIDE 7.45 MG/ML
10 INJECTION INTRAVENOUS
Status: COMPLETED | OUTPATIENT
Start: 2023-01-01 | End: 2023-01-01

## 2023-01-01 RX ORDER — GUAIFENESIN 200 MG/10ML
10 LIQUID ORAL EVERY 4 HOURS PRN
COMMUNITY
End: 2023-01-01

## 2023-01-01 RX ORDER — MAGNESIUM OXIDE 400 MG/1
400 TABLET ORAL EVERY 4 HOURS
Status: DISCONTINUED | OUTPATIENT
Start: 2023-01-01 | End: 2023-01-01 | Stop reason: HOSPADM

## 2023-01-01 RX ORDER — POTASSIUM CHLORIDE 1500 MG/1
20 TABLET, EXTENDED RELEASE ORAL DAILY
COMMUNITY
Start: 2023-01-01 | End: 2023-01-01 | Stop reason: DRUGHIGH

## 2023-01-01 RX ORDER — LEVOTHYROXINE SODIUM 25 UG/1
50 TABLET ORAL DAILY
Status: DISCONTINUED | OUTPATIENT
Start: 2023-01-01 | End: 2023-01-01 | Stop reason: HOSPADM

## 2023-01-01 RX ORDER — ZINC OXIDE 25 %
PASTE (GRAM) TOPICAL 2 TIMES DAILY
COMMUNITY

## 2023-01-01 RX ORDER — FUROSEMIDE 10 MG/ML
40 INJECTION INTRAMUSCULAR; INTRAVENOUS EVERY 8 HOURS
Status: DISCONTINUED | OUTPATIENT
Start: 2023-01-01 | End: 2023-01-01

## 2023-01-01 RX ORDER — NYSTATIN 100000 [USP'U]/G
POWDER TOPICAL
COMMUNITY
Start: 2023-01-01

## 2023-01-01 RX ORDER — FUROSEMIDE 10 MG/ML
40 INJECTION INTRAMUSCULAR; INTRAVENOUS EVERY 12 HOURS
Status: DISCONTINUED | OUTPATIENT
Start: 2023-01-01 | End: 2023-01-01

## 2023-01-01 RX ORDER — LANOLIN ALCOHOL/MO/W.PET/CERES
3 CREAM (GRAM) TOPICAL AT BEDTIME
COMMUNITY
End: 2023-01-01

## 2023-01-01 RX ORDER — LISINOPRIL AND HYDROCHLOROTHIAZIDE 12.5; 2 MG/1; MG/1
1 TABLET ORAL DAILY
COMMUNITY
End: 2023-01-01

## 2023-01-01 RX ORDER — SIMVASTATIN 10 MG
10 TABLET ORAL AT BEDTIME
COMMUNITY

## 2023-01-01 RX ORDER — ONDANSETRON 2 MG/ML
4 INJECTION INTRAMUSCULAR; INTRAVENOUS EVERY 6 HOURS PRN
Status: DISCONTINUED | OUTPATIENT
Start: 2023-01-01 | End: 2023-01-01 | Stop reason: HOSPADM

## 2023-01-01 RX ORDER — DOXYCYCLINE 100 MG/1
100 CAPSULE ORAL 2 TIMES DAILY
COMMUNITY
Start: 2023-01-01 | End: 2023-01-01

## 2023-01-01 RX ORDER — ASCORBIC ACID 250 MG
125 TABLET ORAL DAILY
Status: DISCONTINUED | OUTPATIENT
Start: 2023-01-01 | End: 2023-01-01 | Stop reason: HOSPADM

## 2023-01-01 RX ADMIN — SIMVASTATIN 10 MG: 10 TABLET, FILM COATED ORAL at 21:44

## 2023-01-01 RX ADMIN — WARFARIN SODIUM 1.5 MG: 1 TABLET ORAL at 17:12

## 2023-01-01 RX ADMIN — SIMVASTATIN 10 MG: 10 TABLET, FILM COATED ORAL at 23:45

## 2023-01-01 RX ADMIN — DEXTROSE MONOHYDRATE 250 ML: 50 INJECTION, SOLUTION INTRAVENOUS at 16:10

## 2023-01-01 RX ADMIN — POTASSIUM CHLORIDE 20 MEQ: 1500 TABLET, EXTENDED RELEASE ORAL at 13:22

## 2023-01-01 RX ADMIN — METOPROLOL SUCCINATE 100 MG: 100 TABLET, EXTENDED RELEASE ORAL at 08:25

## 2023-01-01 RX ADMIN — BUMETANIDE 2 MG: 2 TABLET ORAL at 08:58

## 2023-01-01 RX ADMIN — Medication 125 MG: at 09:03

## 2023-01-01 RX ADMIN — POTASSIUM CHLORIDE 20 MEQ: 1500 TABLET, EXTENDED RELEASE ORAL at 17:28

## 2023-01-01 RX ADMIN — POTASSIUM CHLORIDE 20 MEQ: 1500 TABLET, EXTENDED RELEASE ORAL at 06:30

## 2023-01-01 RX ADMIN — FUROSEMIDE 15 MG/HR: 10 INJECTION, SOLUTION INTRAVENOUS at 06:25

## 2023-01-01 RX ADMIN — POTASSIUM CHLORIDE 20 MEQ: 1500 TABLET, EXTENDED RELEASE ORAL at 01:15

## 2023-01-01 RX ADMIN — LEVOTHYROXINE SODIUM 50 MCG: 0.03 TABLET ORAL at 07:59

## 2023-01-01 RX ADMIN — SIMVASTATIN 10 MG: 10 TABLET, FILM COATED ORAL at 21:13

## 2023-01-01 RX ADMIN — POTASSIUM CHLORIDE 20 MEQ: 1500 TABLET, EXTENDED RELEASE ORAL at 20:05

## 2023-01-01 RX ADMIN — ACETAMINOPHEN 650 MG: 325 TABLET ORAL at 20:45

## 2023-01-01 RX ADMIN — Medication 125 MG: at 09:15

## 2023-01-01 RX ADMIN — METOPROLOL SUCCINATE 100 MG: 100 TABLET, EXTENDED RELEASE ORAL at 07:59

## 2023-01-01 RX ADMIN — METOPROLOL SUCCINATE 100 MG: 100 TABLET, EXTENDED RELEASE ORAL at 08:58

## 2023-01-01 RX ADMIN — FUROSEMIDE 15 MG/HR: 10 INJECTION, SOLUTION INTRAVENOUS at 10:33

## 2023-01-01 RX ADMIN — POTASSIUM CHLORIDE 40 MEQ: 1500 TABLET, EXTENDED RELEASE ORAL at 17:48

## 2023-01-01 RX ADMIN — POTASSIUM CHLORIDE 10 MEQ: 7.46 INJECTION, SOLUTION INTRAVENOUS at 22:08

## 2023-01-01 RX ADMIN — Medication 400 MG: at 08:58

## 2023-01-01 RX ADMIN — Medication 125 MG: at 08:58

## 2023-01-01 RX ADMIN — Medication 400 MG: at 11:35

## 2023-01-01 RX ADMIN — WARFARIN SODIUM 0.5 MG: 1 TABLET ORAL at 20:40

## 2023-01-01 RX ADMIN — FUROSEMIDE 40 MG: 10 INJECTION, SOLUTION INTRAMUSCULAR; INTRAVENOUS at 21:44

## 2023-01-01 RX ADMIN — METOPROLOL SUCCINATE 100 MG: 100 TABLET, EXTENDED RELEASE ORAL at 08:59

## 2023-01-01 RX ADMIN — FUROSEMIDE 15 MG/HR: 10 INJECTION, SOLUTION INTRAVENOUS at 09:05

## 2023-01-01 RX ADMIN — POTASSIUM CHLORIDE 10 MEQ: 7.46 INJECTION, SOLUTION INTRAVENOUS at 19:00

## 2023-01-01 RX ADMIN — Medication 400 MG: at 05:53

## 2023-01-01 RX ADMIN — ACETAMINOPHEN 650 MG: 325 TABLET ORAL at 23:04

## 2023-01-01 RX ADMIN — LEVOTHYROXINE SODIUM 50 MCG: 0.03 TABLET ORAL at 08:58

## 2023-01-01 RX ADMIN — PHYTONADIONE 0.5 MG: 10 INJECTION, EMULSION INTRAMUSCULAR; INTRAVENOUS; SUBCUTANEOUS at 17:28

## 2023-01-01 RX ADMIN — Medication 125 MG: at 07:59

## 2023-01-01 RX ADMIN — FUROSEMIDE 15 MG/HR: 10 INJECTION, SOLUTION INTRAVENOUS at 00:02

## 2023-01-01 RX ADMIN — METOPROLOL SUCCINATE 100 MG: 100 TABLET, EXTENDED RELEASE ORAL at 08:27

## 2023-01-01 RX ADMIN — FUROSEMIDE 15 MG/HR: 10 INJECTION, SOLUTION INTRAVENOUS at 17:22

## 2023-01-01 RX ADMIN — FUROSEMIDE 40 MG: 10 INJECTION, SOLUTION INTRAMUSCULAR; INTRAVENOUS at 21:55

## 2023-01-01 RX ADMIN — Medication 400 MG: at 01:33

## 2023-01-01 RX ADMIN — FUROSEMIDE 80 MG: 10 INJECTION, SOLUTION INTRAVENOUS at 05:41

## 2023-01-01 RX ADMIN — Medication 400 MG: at 07:59

## 2023-01-01 RX ADMIN — BUMETANIDE 2 MG: 2 TABLET ORAL at 08:00

## 2023-01-01 RX ADMIN — POTASSIUM CHLORIDE 10 MEQ: 7.46 INJECTION, SOLUTION INTRAVENOUS at 23:44

## 2023-01-01 RX ADMIN — SIMVASTATIN 10 MG: 10 TABLET, FILM COATED ORAL at 21:53

## 2023-01-01 RX ADMIN — LEVOTHYROXINE SODIUM 50 MCG: 0.03 TABLET ORAL at 09:15

## 2023-01-01 RX ADMIN — Medication 400 MG: at 12:34

## 2023-01-01 RX ADMIN — FUROSEMIDE 40 MG: 10 INJECTION, SOLUTION INTRAMUSCULAR; INTRAVENOUS at 10:36

## 2023-01-01 RX ADMIN — Medication 1 MG: at 02:09

## 2023-01-01 RX ADMIN — FUROSEMIDE 40 MG: 10 INJECTION, SOLUTION INTRAMUSCULAR; INTRAVENOUS at 09:41

## 2023-01-01 RX ADMIN — POTASSIUM CHLORIDE 40 MEQ: 1500 TABLET, EXTENDED RELEASE ORAL at 07:59

## 2023-01-01 RX ADMIN — FUROSEMIDE 15 MG/HR: 10 INJECTION, SOLUTION INTRAVENOUS at 03:02

## 2023-01-01 RX ADMIN — LEVOTHYROXINE SODIUM 50 MCG: 0.03 TABLET ORAL at 08:59

## 2023-01-01 RX ADMIN — Medication 400 MG: at 08:27

## 2023-01-01 RX ADMIN — SIMVASTATIN 10 MG: 10 TABLET, FILM COATED ORAL at 22:05

## 2023-01-01 RX ADMIN — POTASSIUM CHLORIDE 10 MEQ: 7.46 INJECTION, SOLUTION INTRAVENOUS at 00:48

## 2023-01-01 RX ADMIN — FUROSEMIDE 15 MG/HR: 10 INJECTION, SOLUTION INTRAVENOUS at 04:08

## 2023-01-01 RX ADMIN — FUROSEMIDE 15 MG/HR: 10 INJECTION, SOLUTION INTRAVENOUS at 21:30

## 2023-01-01 RX ADMIN — ACETAMINOPHEN 650 MG: 325 TABLET ORAL at 10:37

## 2023-01-01 RX ADMIN — POTASSIUM CHLORIDE 20 MEQ: 1500 TABLET, EXTENDED RELEASE ORAL at 08:27

## 2023-01-01 RX ADMIN — POTASSIUM CHLORIDE 40 MEQ: 1500 TABLET, EXTENDED RELEASE ORAL at 08:25

## 2023-01-01 RX ADMIN — LEVOTHYROXINE SODIUM 50 MCG: 0.03 TABLET ORAL at 08:25

## 2023-01-01 RX ADMIN — Medication 1 MG: at 23:04

## 2023-01-01 RX ADMIN — LEVOTHYROXINE SODIUM 50 MCG: 0.03 TABLET ORAL at 09:01

## 2023-01-01 RX ADMIN — POTASSIUM CHLORIDE 40 MEQ: 1500 TABLET, EXTENDED RELEASE ORAL at 01:30

## 2023-01-01 RX ADMIN — FUROSEMIDE 15 MG/HR: 10 INJECTION, SOLUTION INTRAVENOUS at 14:02

## 2023-01-01 RX ADMIN — FUROSEMIDE 40 MG: 10 INJECTION, SOLUTION INTRAMUSCULAR; INTRAVENOUS at 00:04

## 2023-01-01 RX ADMIN — ALBUMIN HUMAN 50 G: 0.25 SOLUTION INTRAVENOUS at 09:17

## 2023-01-01 RX ADMIN — METOPROLOL SUCCINATE 100 MG: 100 TABLET, EXTENDED RELEASE ORAL at 09:01

## 2023-01-01 RX ADMIN — MAGNESIUM SULFATE HEPTAHYDRATE 4 G: 80 INJECTION, SOLUTION INTRAVENOUS at 18:42

## 2023-01-01 RX ADMIN — Medication 125 MG: at 08:25

## 2023-01-01 RX ADMIN — FUROSEMIDE 15 MG/HR: 10 INJECTION, SOLUTION INTRAVENOUS at 20:24

## 2023-01-01 RX ADMIN — METOPROLOL SUCCINATE 100 MG: 100 TABLET, EXTENDED RELEASE ORAL at 09:15

## 2023-01-01 RX ADMIN — SIMVASTATIN 10 MG: 10 TABLET, FILM COATED ORAL at 20:29

## 2023-01-01 RX ADMIN — FUROSEMIDE 15 MG/HR: 10 INJECTION, SOLUTION INTRAVENOUS at 14:28

## 2023-01-01 RX ADMIN — Medication 125 MG: at 08:59

## 2023-01-01 RX ADMIN — LEVOTHYROXINE SODIUM 50 MCG: 0.03 TABLET ORAL at 08:27

## 2023-01-01 RX ADMIN — POTASSIUM CHLORIDE 40 MEQ: 1500 TABLET, EXTENDED RELEASE ORAL at 11:36

## 2023-01-01 RX ADMIN — POTASSIUM CHLORIDE 40 MEQ: 1500 TABLET, EXTENDED RELEASE ORAL at 16:11

## 2023-01-01 RX ADMIN — SIMVASTATIN 10 MG: 10 TABLET, FILM COATED ORAL at 20:40

## 2023-01-01 RX ADMIN — Medication 125 MG: at 08:29

## 2023-01-01 ASSESSMENT — ACTIVITIES OF DAILY LIVING (ADL)
ADLS_ACUITY_SCORE: 45
ADLS_ACUITY_SCORE: 49
TRANSFERRING: 1-->ASSISTANCE (EQUIPMENT/PERSON) NEEDED (NOT DEVELOPMENTALLY APPROPRIATE)
ADLS_ACUITY_SCORE: 35
ADLS_ACUITY_SCORE: 49
DRESSING/BATHING: BATHING DIFFICULTY, ASSISTANCE 1 PERSON
ADLS_ACUITY_SCORE: 49
ADLS_ACUITY_SCORE: 45
WALKING_OR_CLIMBING_STAIRS: AMBULATION DIFFICULTY, REQUIRES EQUIPMENT;AMBULATION DIFFICULTY, ASSISTANCE 1 PERSON
ADLS_ACUITY_SCORE: 51
ADLS_ACUITY_SCORE: 45
ADLS_ACUITY_SCORE: 49
ADLS_ACUITY_SCORE: 45
ADLS_ACUITY_SCORE: 45
ADLS_ACUITY_SCORE: 37
ADLS_ACUITY_SCORE: 51
ADLS_ACUITY_SCORE: 55
WALKING_OR_CLIMBING_STAIRS_DIFFICULTY: YES
ADLS_ACUITY_SCORE: 49
ADLS_ACUITY_SCORE: 35
EQUIPMENT_CURRENTLY_USED_AT_HOME: WALKER, ROLLING
ADLS_ACUITY_SCORE: 35
ADLS_ACUITY_SCORE: 49
ADLS_ACUITY_SCORE: 47
ADLS_ACUITY_SCORE: 49
NUMBER_OF_TIMES_PATIENT_HAS_FALLEN_WITHIN_LAST_SIX_MONTHS: 1
ADLS_ACUITY_SCORE: 49
ADLS_ACUITY_SCORE: 45
ADLS_ACUITY_SCORE: 51
ADLS_ACUITY_SCORE: 37
DOING_ERRANDS_INDEPENDENTLY_DIFFICULTY: YES
ADLS_ACUITY_SCORE: 45
ADLS_ACUITY_SCORE: 55
TRANSFERRING: 1-->ASSISTANCE (EQUIPMENT/PERSON) NEEDED
ADLS_ACUITY_SCORE: 51
ADLS_ACUITY_SCORE: 45
ADLS_ACUITY_SCORE: 49
ADLS_ACUITY_SCORE: 49
BATHING: 1-->ASSISTANCE NEEDED
ADLS_ACUITY_SCORE: 35
ADLS_ACUITY_SCORE: 49
DRESS: 0-->INDEPENDENT
ADLS_ACUITY_SCORE: 49
CONCENTRATING,_REMEMBERING_OR_MAKING_DECISIONS_DIFFICULTY: NO
ADLS_ACUITY_SCORE: 51
ADLS_ACUITY_SCORE: 51
ADLS_ACUITY_SCORE: 55
ADLS_ACUITY_SCORE: 53
ADLS_ACUITY_SCORE: 49
ADLS_ACUITY_SCORE: 37
VISION_MANAGEMENT: GLASSES
ADLS_ACUITY_SCORE: 37
ADLS_ACUITY_SCORE: 45
ADLS_ACUITY_SCORE: 49
ADLS_ACUITY_SCORE: 45
ADLS_ACUITY_SCORE: 49
ADLS_ACUITY_SCORE: 45
ADLS_ACUITY_SCORE: 51
ADLS_ACUITY_SCORE: 49
ADLS_ACUITY_SCORE: 55
ADLS_ACUITY_SCORE: 45
DRESSING/BATHING_DIFFICULTY: YES
ADLS_ACUITY_SCORE: 49
DIFFICULTY_EATING/SWALLOWING: NO
ADLS_ACUITY_SCORE: 45
ADLS_ACUITY_SCORE: 45
DRESS: 1-->ASSISTANCE (EQUIPMENT/PERSON) NEEDED (NOT DEVELOPMENTALLY APPROPRIATE)
ADLS_ACUITY_SCORE: 45
TOILETING_ISSUES: NO
FALL_HISTORY_WITHIN_LAST_SIX_MONTHS: YES
ADLS_ACUITY_SCORE: 55
ADLS_ACUITY_SCORE: 49
ADLS_ACUITY_SCORE: 55
WEAR_GLASSES_OR_BLIND: YES
ADLS_ACUITY_SCORE: 55
ADLS_ACUITY_SCORE: 51
ADLS_ACUITY_SCORE: 49
ADLS_ACUITY_SCORE: 49
ADLS_ACUITY_SCORE: 53
ADLS_ACUITY_SCORE: 55
ADLS_ACUITY_SCORE: 45
ADLS_ACUITY_SCORE: 49
ADLS_ACUITY_SCORE: 49
ADLS_ACUITY_SCORE: 45
ADLS_ACUITY_SCORE: 49
ADLS_ACUITY_SCORE: 51
ADLS_ACUITY_SCORE: 49
ADLS_ACUITY_SCORE: 45

## 2023-01-01 ASSESSMENT — ENCOUNTER SYMPTOMS
SHORTNESS OF BREATH: 0
BACK PAIN: 0
UNEXPECTED WEIGHT CHANGE: 1
ABDOMINAL PAIN: 0
SEIZURES: 0
PALPITATIONS: 0
COUGH: 1
SHORTNESS OF BREATH: 1
FEVER: 0
DIZZINESS: 0
LIGHT-HEADEDNESS: 0
DIARRHEA: 0
VOMITING: 0

## 2023-01-06 NOTE — PROGRESS NOTES
Clinic Care Coordination Contact    Community Health Worker Follow Up    Care Gaps:     Health Maintenance Due   Topic Date Due     HF ACTION PLAN  Never done     PHQ-2 (once per calendar year)  01/01/2023         Care Plan:   Care Plan: Physical Activity     Problem: Patient is inactive     Goal: Work with physical therapy to increase stamina and balance.     Start Date: 12/9/2022 Expected End Date: 3/3/2023    This Visit's Progress: 10%    Priority: High    Note:     Barriers: none noted.   Strengths: will set up PCP appt, has family support.   Patient expressed understanding of goal: jovi set up goal.   Action steps to achieve this goal:  1. I will attend appt with PCP to discuss needs.  2. I will schedule home care PT if ordered.  3. I will report progress towards this goal at scheduled outreach telephone calls from the CCC team.    Discussed 1/6/23                        Intervention and Education during outreach: CHW gave patient contact information and encouraged patient to reach out with any questions or concerns.    CHW Note:    CHW contacted patients jovi Mcgraw to review/update CCC goals.    Mariluz shared that the patient has not started physical therapy. Mariluz plans on scheduling the patient an appointment with her PCP to get the referral for PT. Mariluz will update [facility] team at next scheduled outreach.    Mariluz shared that the patient is staying with her sister while home improvements are being made. Mariluz is hoping to have the patient back in her own home soon. Mariluz will update CCC team at next scheduled outreach.    Mariluz denied any other needs or concerns at this time but will reach out to CCC as needed.     CHW Plan: CHW will continue to support patient with goals through routine scheduled outreach. CHW will outreach in one month on 2/7/23.      Terri Glez  Community Health Worker   [facility] Care Coordination  [facility]   [email or web address]   Office: [phone]

## 2023-01-09 NOTE — PROGRESS NOTES
ANTICOAGULATION MANAGEMENT     Miesha Quarles 89 year old female is on warfarin with supratherapeutic INR result. (Goal INR 2.0-3.0)    Recent labs: (last 7 days)     01/09/23  1333   INR 3.4*       ASSESSMENT       Source(s): Chart Review, Patient/Caregiver Call and Template       Warfarin doses taken: Warfarin taken as instructed    Diet: No new diet changes identified    New illness, injury, or hospitalization: Yes:    Reported a FALL 2x today; the 2nd fall she did hit her head.   Denied any headaches, dizziness, vision problems, pains, or changes to mental status.    Medication/supplement changes: None noted    Signs or symptoms of bleeding or clotting: No    Previous INR: Therapeutic last visit at 2.6; previously outside of goal range at 3.5    Additional findings:  Mariluz reported, currently staying at a [facility], d/t work is being done at [facility].       PLAN     Recommended plan for temporary change(s) affecting INR     Dosing Instructions: hold dose then continue your current warfarin dose with next INR in 1-2 weeks       Summary  As of 1/9/2023    Full warfarin instructions:  1/9: Hold; Otherwise 2.5 mg every day   Next INR check:  1/23/2023             3-way telephone call with daughter, Mariluz and Belinda avery ([phone]) who verbalizes understanding and agrees to plan.   - advised observation d/t a fall today, any changes with her mental status, please get checked in ED.  Agreed with plan.    Patient offered & declined to schedule next visit    Education provided:     Taking warfarin: Importance of taking warfarin as instructed    Goal range and lab monitoring: goal range and significance of current result    Symptom monitoring: monitoring for bleeding signs and symptoms and if you hit your head or have a bad fall seek emergency care    Plan made per [facility] anticoagulation protocol    Reina Cruz, RN  Anticoagulation  Clinic  1/9/2023    _______________________________________________________________________     Anticoagulation Episode Summary     Current INR goal:  2.0-3.0   TTR:  48.0 % (10.4 mo)   Target end date:  Indefinite   Send INR reminders to:  [facility]    Indications    Atrial flutter (H) [I48.92]  Long term (current) use of anticoagulants [Z79.01]           Comments:           Anticoagulation Care Providers     Provider Role Specialty Phone number    Roc Mares MD Referring Internal Medicine [phone]

## 2023-01-19 NOTE — PROGRESS NOTES
Care Coordination Clinician Chart Review    Situation: Patient chart reviewed by Care Coordinator.       Background: Care Coordination Program started: 12/2/2022. Initial assessment completed and patient-centered care plan(s) were developed with participation from patient. Lead CC handed patient off to CHW for continued outreaches.       Assessment: Per chart review, patient outreach completed by CC CHW on 1-6-2023.  Patient is actively working to accomplish goal(s). Patient's goal(s) appropriate and relevant at this time. Patient is not due for updated Plan of Care.  Assessments will be completed annually or as needed/with change of patient status.      Care Plan: Physical Activity     Problem: Patient is inactive     Goal: Work with physical therapy to increase stamina and balance.     Start Date: 12/9/2022 Expected End Date: 3/3/2023    This Visit's Progress: 10%    Priority: High    Note:     Barriers: none noted.   Strengths: will set up PCP appt, has family support.   Patient expressed understanding of goal: jovi set up goal.   Action steps to achieve this goal:  1. I will attend appt with PCP to discuss needs.  2. I will schedule home care PT if ordered.  3. I will report progress towards this goal at scheduled outreach telephone calls from the CCC team.    Discussed 1/6/23                           Plan/Recommendations: The patient will continue working with Care Coordination to achieve goal(s) as above. CHW will continue outreaches at minimum every 30 days and will involve Lead CC as needed or if patient is ready to move to Maintenance. Lead CC will continue to monitor CHW outreaches and patient's progress to goal(s) every 6 weeks.     Plan of Care updated and sent to patient: No

## 2023-01-31 PROBLEM — R06.00 DYSPNEA, UNSPECIFIED TYPE: Status: ACTIVE | Noted: 2023-01-01

## 2023-01-31 NOTE — ED PROVIDER NOTES
NAME: Miesha Quarles  AGE: 89 year old female  YOB: 1928  MRN: [id number]  EVALUATION DATE & TIME: 2023  4:45 PM    PCP: Roc Mares  ED PROVIDER: Keira Dean MD.    Chief Complaint   Patient presents with     Fall     Shortness of Breath     Trauma       FINAL IMPRESSION:  1. Dyspnea, unspecified type    2. Fall, initial encounter    3. Hypokalemia      MEDICAL DECISION MAKIN:59 PM I met with the patient, obtained history, performed an initial exam, and discussed options and plan for diagnostics and treatment here in the ED.   6:46 PM I spoke to the hospitalist Dr Acuna regarding patient.  6:49 PM I updated patient and family with plan for care.    89 y/o F with h/o T2DM, atrial flutter, on warfarin, CKD, HFpEF among others who presents with shortness of breath. Per patient/family has had increased leg swelling and shortness of breaht for last several weeks.  Per family she did have a recent fall.  Given this and is on warfarin CT of her head and C-spine were obtained which do not show any acute findings.  She is no signs of significant chest or abdominal trauma.  No spinal tenderness.     Regarding her shortness of breath differential includes but not limited to ACS, CHF, reactive airway disease, pneumonia, COVID, PE, among others.  Her EKG shows A. fib with PVCs, no ST elevation, she is having no chest pain.  Initial troponin 61, suspect demand but will get a delta troponin.  I considered PE however presentation is more consistent with CHF and she is on warfarin with elevated INR today. BNP is elevated and she appears volume up on exam.  I do suspect her symptoms are related to CHF.  Potassium came back low at 2.6, replacement ordered, will also check mangesium.  Discussed with hospitalist we will replace her potassium before starting IV diuretics.  Lactic acid is 3.3-->2.9.  She has no leukocytosis, no fever or focal infectious symptoms, lower suspicion for sepsis. Vbg with  pH 7.40, pCo2 51. Able to wean her down to 4 L during ED course. Recommended admission for further management. Family in agreement. Hospitalist accepted patient for cardiac telemetry admission.    Medical Decision Making    History:    Supplemental history from: Documented in chart, if applicable    External Record(s) reviewed: Documented in chart, if applicable.    Work Up:    Chart documentation includes differential considered and any EKGs or imaging interpreted by provider.    In additional to work up documented, I considered the following work up: Documented in chart, if applicable.    External consultation:    Discussion of management with another provider: Documented in chart, if applicable    Complicating factors:    Care impacted by chronic illness: Anticoagulated State, Chronic Kidney Disease, Diabetes, Heart Disease, Hyperlipidemia and Hypertension    Care affected by social determinants of health: N/A    Disposition considerations: Admit.      MEDICATIONS GIVEN IN THE EMERGENCY:  Medications   potassium chloride 10 mEq in 100 mL sterile water infusion (10 mEq Intravenous New Bag 1/31/23 1900)   potassium chloride ER (KLOR-CON M) CR tablet 40 mEq (40 mEq Oral Given 1/31/23 1748)       NEW PRESCRIPTIONS STARTED AT TODAY'S ER VISIT:  New Prescriptions    No medications on file        =================================================================  HPI    Patient information was obtained from: Patient, family members  Use of : N/A      Miesha Quarles is a 89 year old female with a past medical history of CHF, atrial flutter on warfarin, CKD3, DMII, HTN, HLD, who presents via walk-in for evaluation of shortness of breath.    Patient endorses shortness of breath and cough which has been ongoing for 2 months. Family also reports patient has gained 19 lbs during that time frame, and that her PCP sent patient to the ED for treatment and TCU placement.     Family also reports that 1.5 weeks ago,  patient slid from the recliner she was sitting in down to the floor, hitting her head on a table. Patient is on warfarin. Patient uses a walker at baseline. Family also reports that patient was off of her water pills for a long time, and only restarted 1 week ago.     Patient denies chest pain, abdominal pain, back pain, fever, vomiting, diarrhea, and all other complaints at this time.      REVIEW OF SYSTEMS   Review of Systems   Constitutional: Positive for unexpected weight change (+19 lbs). Negative for fever.   Respiratory: Positive for cough and shortness of breath.    Cardiovascular: Negative for chest pain.   Gastrointestinal: Negative for abdominal pain, diarrhea and vomiting.   Musculoskeletal: Negative for back pain.   All other systems reviewed and are negative.       PAST MEDICAL HISTORY:  Past Medical History:   Diagnosis Date     Disorder of bone and cartilage, unspecified     Created by Conversion      Essential hypertension, benign     Created by Conversion      Osteoarthrosis, unspecified whether generalized or localized, lower leg     Created by Conversion      Pure hypercholesterolemia     Created by Conversion      Type II or unspecified type diabetes mellitus without mention of complication, not stated as uncontrolled     Created by Conversion      Unspecified hypothyroidism     Created by Conversion      Unspecified vitamin D deficiency     Created by Conversion        PAST SURGICAL HISTORY:  Past Surgical History:   Procedure Laterality Date     HYSTERECTOMY TOTAL ABDOMINAL, BILATERAL SALPINGO-OOPHORECTOMY, COMBINED         CURRENT MEDICATIONS:      Current Facility-Administered Medications:      potassium chloride 10 mEq in 100 mL sterile water infusion, 10 mEq, Intravenous, Once, Keira Dean MD, Last Rate: 100 mL/hr at 01/31/23 1900, 10 mEq at 01/31/23 1900    Current Outpatient Medications:      acetaminophen (TYLENOL) 650 MG CR tablet, Take 650 mg by mouth every 8 hours as needed, Disp:  , Rfl:      amLODIPine (NORVASC) 2.5 MG tablet, TAKE 1 TABLET BY MOUTH DAILY, Disp: 90 tablet, Rfl: 0     ascorbic acid (VITAMIN C) 100 MG tablet, [ASCORBIC ACID (VITAMIN C) 100 MG TABLET] Take 100 mg by mouth daily., Disp: , Rfl:      furosemide (LASIX) 20 MG tablet, Take 20 mg by mouth daily, Disp: , Rfl:      levothyroxine (SYNTHROID/LEVOTHROID) 50 MCG tablet, TAKE 1 TABLET EVERY DAY, Disp: 90 tablet, Rfl: 0     loratadine (CLARITIN) 10 mg tablet, [LORATADINE (CLARITIN) 10 MG TABLET] Take 10 mg by mouth as needed. , Disp: , Rfl:      metoprolol succinate ER (TOPROL XL) 100 MG 24 hr tablet, TAKE 1 TABLET BY MOUTH EVERY DAY, Disp: 90 tablet, Rfl: 3     simvastatin (ZOCOR) 20 MG tablet, TAKE 1/2 TABLET BY MOUTH DAILY IN THE EVENING., Disp: 45 tablet, Rfl: 3     warfarin ANTICOAGULANT (COUMADIN) 5 MG tablet, New Dose - Take 1/2 tablet (2.5mg) by mouth daily, as directed.  Adjust dose based on INR results., Disp: 60 tablet, Rfl: 1     lisinopril-hydrochlorothiazide (ZESTORETIC) 20-12.5 MG tablet, TAKE 2 TABLETS EVERY DAY (Patient not taking: Reported on 2023), Disp: 180 tablet, Rfl: 2    ALLERGIES:  Allergies   Allergen Reactions     Amoxicillin Hives     Cephalexin Other (See Comments)     Reaction with intervenous administration.     Penicillins Hives     Latex Rash     Other Environmental Allergy Other (See Comments)     Pollen causes seasonal allergies.       FAMILY HISTORY:  Family History   Problem Relation Age of Onset     Coronary Artery Disease Brother        SOCIAL HISTORY:   Social History     Socioeconomic History     Marital status:    Tobacco Use     Smoking status: Former     Types: Cigarettes     Quit date: 1991     Years since quittin.1     Smokeless tobacco: Never   Substance and Sexual Activity     Alcohol use: Yes     Social Determinants of Health     Financial Resource Strain: Medium Risk     Difficulty of Paying Living Expenses: Somewhat hard   Food Insecurity: No Food  "Insecurity     Worried About Running Out of Food in the Last Year: Never true     Ran Out of Food in the Last Year: Never true   Transportation Needs: No Transportation Needs     Lack of Transportation (Medical): No     Lack of Transportation (Non-Medical): No   Housing Stability: Low Risk      Unable to Pay for Housing in the Last Year: No     Number of Places Lived in the Last Year: 1     Unstable Housing in the Last Year: No       PHYSICAL EXAM:    Vitals: /57   Pulse 79   Temp 97.6  F (36.4  C) (Temporal)   Resp 26   Ht 1.588 m (5' 2.5\")   Wt 85.7 kg (189 lb)   SpO2 90%   BMI 34.02 kg/m     Constitutional: Sleeping but will wake to voice  HENT: Normocephalic, atraumatic, mucous membranes moist. Neck-gross ROM intact.   Eyes: Pupils mid-range, sclera white, no discharge  Respiratory: Some increased work of breathing, some crackles at lung bases, no wheezing  Cardiovascular: Normal heart rate, regular rhythm. Pitting LE edema bilaterally  GI: Soft, not distended, not tender to palpation  Musculoskeletal: Moving all 4 extremities intentionally and without pain. No obvious deformity.  Skin: Warm, dry  Neurologic: sleeping but will wake and answer questions, oriented to self/place but not year, speech clear, moving all extremities spontaneously, no focal deficits noted  Back: no midline C, T, L spine tenderness, step offs or deformities     LAB:  All pertinent labs reviewed and interpreted.  Labs Ordered and Resulted from Time of ED Arrival to Time of ED Departure   LACTIC ACID WHOLE BLOOD - Abnormal       Result Value    Lactic Acid 3.3 (*)    BLOOD GAS VENOUS - Abnormal    pH Venous 7.40      pCO2 Venous 51 (*)     pO2 Venous 20 (*)     Bicarbonate Venous 31 (*)     Base Excess/Deficit (+/-) 6.4      Oxyhemoglobin Venous 22.1 (*)     O2 Sat, Venous 22.7 (*)    COMPREHENSIVE METABOLIC PANEL - Abnormal    Sodium 141      Potassium 2.6 (*)     Chloride 99      Carbon Dioxide (CO2) 27      Anion Gap 15   "    Urea Nitrogen 26.0 (*)     Creatinine 1.25 (*)     Calcium 8.4      Glucose 123 (*)     Alkaline Phosphatase 88      AST 20      ALT 11      Protein Total 7.9      Albumin 3.2 (*)     Bilirubin Total 2.3 (*)     GFR Estimate 40 (*)    INR - Abnormal    INR 4.51 (*)    NT PROBNP INPATIENT - Abnormal    N terminal Pro BNP Inpatient 6,483 (*)    PARTIAL THROMBOPLASTIN TIME - Abnormal    aPTT 47 (*)    TROPONIN T, HIGH SENSITIVITY - Abnormal    Troponin T, High Sensitivity 61 (*)    CBC WITH PLATELETS AND DIFFERENTIAL - Abnormal    WBC Count 6.1      RBC Count 3.37 (*)     Hemoglobin 10.5 (*)     Hematocrit 34.3 (*)      (*)     MCH 31.2      MCHC 30.6 (*)     RDW 17.4 (*)     Platelet Count 234      % Neutrophils 80      % Lymphocytes 9      % Monocytes 7      % Eosinophils 2      % Basophils 1      % Immature Granulocytes 1      NRBCs per 100 WBC 2 (*)     Absolute Neutrophils 5.0      Absolute Lymphocytes 0.5 (*)     Absolute Monocytes 0.4      Absolute Eosinophils 0.1      Absolute Basophils 0.1      Absolute Immature Granulocytes 0.1      Absolute NRBCs 0.1     LACTIC ACID WHOLE BLOOD - Abnormal    Lactic Acid 2.9 (*)    MAGNESIUM - Normal    Magnesium 1.8     ROUTINE UA WITH MICROSCOPIC REFLEX TO CULTURE   TROPONIN T, HIGH SENSITIVITY   TYPE AND SCREEN, ADULT    ABO/RH(D) O NEG      Antibody Screen Negative      SPECIMEN EXPIRATION DATE [id number]     ABO/RH TYPE AND SCREEN       RADIOLOGY:  Cervical spine CT w/o contrast   Final Result   IMPRESSION:   HEAD CT:   1.  No acute traumatic intracranial abnormality.    2.  Chronic intracranial findings as detailed.      CERVICAL SPINE CT:   1.  No convincing CT findings of an acute osseous abnormality. Osseous demineralization can limit this assessment.         Head CT w/o contrast   Final Result   IMPRESSION:   HEAD CT:   1.  No acute traumatic intracranial abnormality.    2.  Chronic intracranial findings as detailed.      CERVICAL SPINE CT:   1.  No  convincing CT findings of an acute osseous abnormality. Osseous demineralization can limit this assessment.         XR Chest Port 1 View   Final Result   IMPRESSION: Single AP view of the chest was obtained. Enlarged cardiac silhouette and mild pulmonary vascular congestion. Bibasilar pulmonary opacities, could be related to small bilateral pleural effusions and associated basilar    atelectasis/consolidation. No significant pneumothorax.          EKG:   Performed at: 1/31/23 1704  Impression: Atrial fibrillation with premature ventricular complexes, rightward axis, low voltage QRS, cannot rule out anterior infarct  Rate: 80 bpm  Rhythm: Atrial fibrillation  QRS Interval: 90 ms  QTc Interval: 459 ms  Comparison: When compared with ECG of 4/21/22, questionable change in QRS axis.  I have independently reviewed and interpreted the EKG(s) documented above.     PROCEDURES:   Procedures     I, Jorge Alberto Wetzel, am serving as a scribe to document services personally performed by Dr. Keira Dean based on my observation and the provider's statements to me. I, Keira Dean MD attest that Jorge Alberto Wetzel is acting in a scribe capacity, has observed my performance of the services and has documented them in accordance with my direction.    Keira Dean M.D.  Emergency Medicine  [facility] EMERGENCY DEPARTMENT  [facility] [facility] [address] [phone]  Dept: [phone]      Keira Dean MD  02/01/23 0014

## 2023-01-31 NOTE — ED NOTES
Expected Patient Referral to ED  2:46 PM    Referring Clinic/Provider:  [facility]    Reason for referral/Clinical facts:  H/o CHF, afib, DM, now with SOB after stopping her diuretics. pOx 90% on RA.     Recommendations provided:  Will see in the ER    Caller was informed that this institution does  possess the capabilities and/or resources to provide for patient and should be transferred to our institution.    Based on the information provided, discussed that this patient likely is nota good candidate for direct admission to this institution and that provider could proceed as such.  If however direct admit is sought and any hurdles encountered, this ED would be happy to see the patient and evaluate.    Informed caller that recommendations provided are recommendations based only on the facts provided and that they responsible to accept or reject the advice, or to seek a formal in person consultation as needed and that this ED will see/treat patient should they arrive.      Kaley Velazquez MD  Emergency Medicine  [facility] EMERGENCY DEPARTMENT  [address] [place] [id number]  [phone]          Kaley Velazquez MD  01/31/23 1447     (4) walks frequently

## 2023-01-31 NOTE — PROGRESS NOTES
"  1. Acute heart failure with preserved ejection fraction (HFpEF) (H)  Acute heart failure with hypoxia and anasarca.  She has gained at least 20 pounds since our last visit.  She has been off of her medications.  She needs admission to the hospital and management.  She is not going to be going to go home to a two-story home by herself anytime soon and I discussed that with him.  She will need to be tuned up in the hospital and then may need time in TCU as well.  I have spoken with the ER provider at [facility] who is expecting her arrival.  Offered to call ambulance but family is going to transport    2. Shortness of breath  As above.  Acute heart failure    3. Generalized muscle weakness  She is very weak and unable to walk.  She lives in a two-story home.  Will need therapy and likely time at a transitional care    Subjective   Miesha is a 89 year old accompanied by her nieces, presenting for the following health issues:  Follow Up (Discuss PCA/Homecare services through Medicare  ) and Referral (PT/OT sessions )      HPI       Miesha comes in today after an extensive absence.  She has multiple medical problems including a flutter, chronic heart failure, DJD and diabetes.  She is on warfarin.  She had some mechanical failures in her home and was staying with her nieces.  Unfortunately she did not have her medicines with her including her diuretics.  She is gained over 20 pounds.  She is having early satiety.  She is short of breath with even just talking less than a sentence.  She is having PND and orthopnea.  She looks unwell.    Review of Systems         Objective    /62 (BP Location: Left arm, Patient Position: Sitting, Cuff Size: Adult Regular)   Pulse 88   Temp 98  F (36.7  C)   Ht 1.575 m (5' 2\")   Wt 85.7 kg (189 lb)   SpO2 90%   BMI 34.57 kg/m    Body mass index is 34.57 kg/m .  Physical Exam       Tachypneic woman who looks unwell.  Somewhat jaundiced or pale in appearance.  Anasarca with " swelling up to the abdomen.

## 2023-01-31 NOTE — ED NOTES
"ED Provider In Triage Note  [facility]  Encounter Date: Jan 31, 2023    Chief Complaint   Patient presents with     Fall     Shortness of Breath     Trauma       Brief HPI:   Miesha Quarlse is a 89 year old female presenting to the Emergency Department with a chief complaint of shortness of breath with a fall today. She is on coumadin. Reportedly fell at [employer] today. Uncertain if hit head. Patient unable to provide much history. Reportedly short of breath with concern for fluid overload per clinic. Trauma alert called.     Brief Physical Exam:  /57   Pulse 79   Temp 97.6  F (36.4  C) (Temporal)   Resp 22   Ht 1.588 m (5' 2.5\")   Wt 85.7 kg (189 lb)   SpO2 91%   BMI 34.02 kg/m    General: ill appearing  HEENT: No obvious scalp hematoma  Resp: Crackles noted throughout, 90% RA  Abdomen: Non-peritoneal, non tender  Neuro: Alert, appears confused, no focal deficit, Psych: Behavior appropriate      Plan Initiated in Triage:  Orders Placed This Encounter     Head CT w/o contrast     Cervical spine CT w/o contrast     XR Chest Port 1 View     [place] Draw     Extra Blue Top Tube     Extra Red Top Tube     Extra Green Top (Lithium Heparin) Tube     Extra Purple Top Tube     Extra Green Top (Lithium Heparin) ON ICE     Comprehensive metabolic panel     INR     Partial thromboplastin time     Troponin T, High Sensitivity     Nt probnp inpatient (BNP)     UA with Microscopic reflex to Culture     Lactic acid whole blood     Blood gas venous       PIT Dispo:   Take directly back to main ED    Deepika Ho MD on 1/31/2023 at 4:45 PM    Patient was evaluated by the Physician in Triage due to a limitation of available rooms in the Emergency Department. A plan of care was discussed based on the information obtained on the initial evaluation and patient was consuled to return back to the Emergency Department lobby after this initial evalutaiton until results were obtained or a " room became available in the Emergency Department. Patient was counseled not to leave prior to receiving the results of their workup.     Deepika Ho MD  [facility] EMERGENCY DEPARTMENT  [address] [place] [id number] [phone]     Deepika Ho MD  01/31/23 4598

## 2023-01-31 NOTE — ED TRIAGE NOTES
Pt had a fall at her nieces house, pt on warfarin, unsure if she hit her head. Denies LOC.     Pt has had increasing shortness of breath over the past 2 months.   Audible wheezing in triage.  PCP recommended she be seen.     Denies pain from fall.

## 2023-02-01 NOTE — PLAN OF CARE
"  Problem: Plan of Care - These are the overarching goals to be used throughout the patient stay.    Goal: Plan of Care Review  Description: The Plan of Care Review/Shift note should be completed every shift.  The Outcome Evaluation is a brief statement about your assessment that the patient is improving, declining, or no change.  This information will be displayed automatically on your shift note.  Outcome: Progressing  Goal: Patient-Specific Goal (Individualized)  Description: You can add care plan individualizations to a care plan. Examples of Individualization might be:  \"Parent requests to be called daily at 9am for status\", \"I have a hard time hearing out of my right ear\", or \"Do not touch me to wake me up as it startles me\".  Outcome: Progressing  Goal: Absence of Hospital-Acquired Illness or Injury  Outcome: Progressing  Intervention: Identify and Manage Fall Risk  Recent Flowsheet Documentation  Taken 2/1/2023 0900 by Darius Vang RN  Safety Promotion/Fall Prevention: room near nurse's station  Intervention: Prevent Skin Injury  Recent Flowsheet Documentation  Taken 2/1/2023 0900 by Darius Vang RN  Body Position: position changed independently  Goal: Optimal Comfort and Wellbeing  Outcome: Progressing   Goal Outcome Evaluation:    Pt alert/oriented x 4, denied chest or back pain, VSS and participating in PT/OT activities. Pt lung sounds clear, diminished at the lower base and uses expiratory muscles. Pt eating and drinking well, no discomfort observed.            "

## 2023-02-01 NOTE — PHARMACY-ADMISSION MEDICATION HISTORY
Pharmacy Note - Admission Medication History    Pertinent Provider Information: N/A     ______________________________________________________________________    Prior To Admission (PTA) med list completed and updated in EMR.       PTA Med List   Medication Sig Last Dose     acetaminophen (TYLENOL) 650 MG CR tablet Take 650 mg by mouth every 8 hours as needed Unknown     amLODIPine (NORVASC) 2.5 MG tablet TAKE 1 TABLET BY MOUTH DAILY 1/31/2023 at AM     ascorbic acid (VITAMIN C) 100 MG tablet [ASCORBIC ACID (VITAMIN C) 100 MG TABLET] Take 100 mg by mouth daily. 1/31/2023 at AM     furosemide (LASIX) 20 MG tablet Take 20 mg by mouth daily 1/31/2023 at AM     levothyroxine (SYNTHROID/LEVOTHROID) 50 MCG tablet TAKE 1 TABLET EVERY DAY 1/31/2023 at AM     loratadine (CLARITIN) 10 mg tablet [LORATADINE (CLARITIN) 10 MG TABLET] Take 10 mg by mouth as needed.  Unknown     metoprolol succinate ER (TOPROL XL) 100 MG 24 hr tablet TAKE 1 TABLET BY MOUTH EVERY DAY 1/31/2023 at AM     simvastatin (ZOCOR) 20 MG tablet TAKE 1/2 TABLET BY MOUTH DAILY IN THE EVENING. 1/30/2023 at PM     warfarin ANTICOAGULANT (COUMADIN) 5 MG tablet New Dose - Take 1/2 tablet (2.5mg) by mouth daily, as directed.  Adjust dose based on INR results. 1/30/2023 at PM, 2.5 mg daily       Information source(s): Patient and CareEverywhere/[facility]  Method of interview communication: in-person    Summary of Changes to PTA Med List  New: N/A  Discontinued: vitamins, lisinopril-hctz  Changed: N/A    Patient was asked about OTC/herbal products specifically.  PTA med list reflects this.    In the past week, patient estimated taking medication this percent of the time:  greater than 90%.    Medication Affordability: No issues       Allergies were reviewed, assessed, and updated with the patient.      Patient does not use any multi-dose medications prior to admission.    The information provided in this note is only as accurate as the sources available at the  time of the update(s).    Thank you for the opportunity to participate in the care of this patient.    Ernesto Villalobos [facility]  1/31/2023 6:27 PM

## 2023-02-01 NOTE — PROGRESS NOTES
02/01/23 1100   Appointment Info   Signing Clinician's Name / Credentials (PT) Daniela Lopez, PT   Living Environment   People in Home alone  (but she did say she was at her niece's home and she was helping the pt.  She wants to return to her home per the pt.)   Current Living Arrangements house   Home Accessibility stairs to enter home;stairs within home   Number of Stairs, Main Entrance   (there are steps to get in her home and her nieces's home per the pt.  Not able to find out how many. Possibly 3 JESSEE with a rail.)   Number of Stairs, Within Home, Primary   (2 level home at her nieces's home with more than 10 steps to get up to the bedroom per the pt.)   Living Environment Comments Pt's hx may not be accurate due to the pt was really lethargic.   Self-Care   Usual Activity Tolerance good   Current Activity Tolerance fair   Equipment Currently Used at Home walker, rolling;cane, straight  (4WW)   Fall history within last six months yes   Number of times patient has fallen within last six months 1   Activity/Exercise/Self-Care Comment Pt said she does walk indep with the 4WW and/or the SEC at home indep. She said she does need assist with bed mobility and transfers. Assist with bathing.   .   General Information   Onset of Illness/Injury or Date of Surgery 01/31/23   Referring Physician Juan Acuna   Patient/Family Therapy Goals Statement (PT) Pt wants to go home.   Pertinent History of Current Problem (include personal factors and/or comorbidities that impact the POC) Per the chart.  hypertension, hyperlipidemia and hypothyroidism who was referred to our ED for admission from her PCP's clinic for evaluation of shortness of breath and weight gain.     Shortness of breath, weight gain, anasarca  Acute on chronic diastolic CHF   Existing Precautions/Restrictions fall   Weight-Bearing Status - LLE weight-bearing as tolerated   Weight-Bearing Status - RLE weight-bearing as tolerated   General Observations O2 at 4L  oxymask   Cognition   Orientation Status (Cognition) oriented to;person   Cognitive Status Comments Increased cues for all tasks.   Pain Assessment   Patient Currently in Pain   (Pt did some pain with rolling.)   Integumentary/Edema   Integumentary/Edema Comments .   Posture    Posture Comments Cues for posture.   Range of Motion (ROM)   Range of Motion ROM is WNL   ROM Comment LEs   Strength (Manual Muscle Testing)   Strength Comments Pt is able to WB for gait with FWW   Bed Mobility   Comment, (Bed Mobility) Supine>sit with max A x 1 and min A x1,  Sit>supine with mod A x 1.   Transfers   Comment, (Transfers) Sit<>stand with FWW with max Ax2   Gait/Stairs (Locomotion)   [place] Level (Gait) minimum assist (75% patient effort);2 person assist   Assistive Device (Gait) walker, front-wheeled   Distance in Feet 4 small steps with FWW   Pattern (Gait) step-to   Deviations/Abnormal Patterns (Gait) weight shifting decreased;susannah decreased   Balance   Balance Comments min A x2 with fWW   Sensory Examination   Sensory Perception WNL   Sensory Perception Comments LEs   Clinical Impression   Criteria for Skilled Therapeutic Intervention Yes, treatment indicated   PT Diagnosis (PT) impaired functiona mobility   Influenced by the following impairments weakness, dec bal, dec endurance, pt is below her PLOF,   Functional limitations due to impairments bed mobiliy, transfer, gait and steps.   Clinical Presentation (PT Evaluation Complexity) Stable/Uncomplicated   Clinical Presentation Rationale Pt present s medically diagnosed.   Clinical Decision Making (Complexity) moderate complexity   Planned Therapy Interventions (PT) balance training;bed mobility training;gait training;home exercise program;strengthening   Anticipated Equipment Needs at Discharge (PT) walker, rolling  (FWW)   Risk & Benefits of therapy have been explained evaluation/treatment results reviewed;care plan/treatment goals reviewed;risks/benefits  reviewed;patient;participants voiced agreement with care plan   PT Total Evaluation Time   PT Eval, Moderate Complexity Minutes (70555) 15   Physical Therapy Goals   PT Frequency Daily   PT Predicted Duration/Target Date for Goal Attainment 02/08/23   PT Goals Bed Mobility;Transfers;Gait;Stairs;PT Goal 1   PT: Bed Mobility Minimal assist;Supine to/from sit;Rolling   PT: Transfers Minimal assist;Sit to/from stand;Bed to/from chair;Assistive device   PT: Gait Minimal assist;Rolling walker;50 feet  (min A x 2)   PT: Stairs 3 stairs;Moderate assist;Rail on both sides   PT: Goal 1 Pt to jesus manuel bilat LE ex x 10 to 20 reps with cues for technique.   Interventions   Interventions Quick Adds Gait Training;Therapeutic Activity;Therapeutic Procedure   Therapeutic Procedure/Exercise   Ther. Procedure: strength, endurance, ROM, flexibillity Minutes (42155) 1   Treatment Detail/Skilled Intervention GS sets in standing with cues for technique.  (Min A with bal.)   Therapeutic Activity   Therapeutic Activities: dynamic activities to improve functional performance Minutes (54759) 15   Treatment Detail/Skilled Intervention Supine>sit with max A x 1 and min A  x1 with cues for hand placement.  Pt needed mod A to roll.  Sit<>stand x 2 reps with max Ax2 with the first stand and mod A x 2 with the 2nd stand.  Cues for hand placement and walker safety  (Pt did sit at the EOB for a few minutes to wake up more for activities,)   Gait Training   Gait Training Minutes (55519) 5   Symptoms Noted During/After Treatment (Gait Training) shortness of breath;fatigue   Treatment Detail/Skilled Intervention Pt walked 8 small steps to the HOB and  4 more with the 2nd stand and she did standing with the FWW as well.   [place] Level (Gait Training) minimum assist (75% patient effort)   Physical Assistance Level (Gait Training) 2 person assist;verbal cues   Weight Bearing (Gait Training) weight-bearing as tolerated   Assistive Device (Gait Training)  rolling walker  (FWW)   Gait Analysis Deviations decreased step length;decreased susannah;increased time in double stance   Impairments (Gait Analysis/Training) balance impaired;flexibility decreased;strength decreased   PT Discharge Planning   PT Plan gait w FWW w A x 2 w WC folow, transfers, bed mobility.   PT Discharge Recommendation (DC Rec) Transitional Care Facility   PT Rationale for DC Rec Pt does need assist of 2 with bed mobility and transfers at this time.  Pt does get tired with mobility.   PT Brief overview of current status PT eval, Supine<>sit with A x 2, transfers with FWW with max A x2 and mod A x2 with the 2nd stand.  Pt did take a few steps with FWW with min A x 2 x 2 reps.   Total Session Time   Timed Code Treatment Minutes 21   Total Session Time (sum of timed and untimed services) 36

## 2023-02-01 NOTE — ED NOTES
Bed: [facility]-30  Expected date:   Expected time:   Means of arrival:   Comments:  Sherri PT ED 3 P.W.

## 2023-02-01 NOTE — CONSULTS
Care Management Initial Consult    General Information  Assessment completed with: Family, jovi Mcgraw  Type of CM/SW Visit: Initial Assessment    Primary Care Provider verified and updated as needed: Yes   Readmission within the last 30 days:        Reason for Consult: discharge planning  Advance Care Planning:            Communication Assessment  Patient's communication style: spoken language (English or Bilingual)             Cognitive  Cognitive/Neuro/Behavioral: WDL                      Living Environment:   People in home: alone     Current living Arrangements: house      Able to return to prior arrangements: no       Family/Social Support:  Care provided by: self (nieces)  Provides care for: no one, no one, unable/limited ability to care for self  Marital Status:    ([place])          Description of Support System: Supportive    Support Assessment: Adequate family and caregiver support    Current Resources:   Patient receiving home care services: No     Community Resources: None  Equipment currently used at home: walker, standard, cane, straight  Supplies currently used at home:      Employment/Financial:  Employment Status: retired        Financial Concerns:     Referral to Financial Worker: No       Lifestyle & Psychosocial Needs:  Social Determinants of Health     Tobacco Use: Medium Risk     Smoking Tobacco Use: Former     Smokeless Tobacco Use: Never     Passive Exposure: Not on file   Alcohol Use: Not on file   Financial Resource Strain: Medium Risk     Difficulty of Paying Living Expenses: Somewhat hard   Food Insecurity: No Food Insecurity     Worried About Running Out of Food in the Last Year: Never true     Ran Out of Food in the Last Year: Never true   Transportation Needs: No Transportation Needs     Lack of Transportation (Medical): No     Lack of Transportation (Non-Medical): No   Physical Activity: Not on file   Stress: Not on file   Social Connections: Not on file   Intimate Partner  Violence: Not on file   Depression: Not at risk     PHQ-2 Score: 0   Housing Stability: Low Risk      Unable to Pay for Housing in the Last Year: No     Number of Places Lived in the Last Year: 1     Unstable Housing in the Last Year: No       Functional Status:  Prior to admission patient needed assistance:   Dependent ADLs:: Ambulation-walker, Ambulation-cane, Dressing, Bathing  Dependent IADLs:: Shopping, Transportation, Laundry, Cooking, Cleaning       Mental Health Status:  Mental Health Status: No Current Concerns       Chemical Dependency Status:  Chemical Dependency Status: No Current Concerns             Values/Beliefs:  Spiritual, Cultural Beliefs, Bahai Practices, Values that affect care:                 Additional Information:  Assessment completed initial assessment with patient's niece Mariluz.   Patient has been staying with her niece Belinda for the past 2 months, patient's home is currently being fixed for mechanical issues.   Niece Mariluz reports patient is using a walker at baseline and has been progressively getting weaker and needing assist with ADLs since not taking her medications.     SW did have discussion about future care planning with patient's niece. Per niece, patient is becoming more understanding of her care needs since residing at her niece Belinda's home. Niece is realistic that patient may need higher level of care and may not be able to return to her home. Niece states they are actively drafting a health care directive with an . Patient's niece is understanding of financial requirements of long term care/assisted living and the associated costs.   VENKAT discussed current recommendations for TCU. Patient has [facility], provided TCU choices that contract with [facility], niece will look into this.       Quynh Rebolledo, SAMANTHASW

## 2023-02-01 NOTE — PROGRESS NOTES
Occupational Therapy      02/01/23 1420   Appointment Info   Signing Clinician's Name / Credentials (OT) Ayesha Gann OTR/L   Living Environment   People in Home alone   Current Living Arrangements house   Home Accessibility stairs to enter home;stairs within home   Number of Stairs, Main Entrance 3;4   Stair Railings, Main Entrance railings safe and in good condition   Transportation Anticipated health plan transportation   Living Environment Comments W/I shower w/ GB and shower chair- unclear on accuracy of pt report at this time- pt reports neice assists w/ some cares/IADL tasks pt reports she cannot drive anymore   Self-Care   Usual Activity Tolerance good   Current Activity Tolerance moderate   Regular Exercise No   Equipment Currently Used at Home walker, standard;cane, straight   Fall history within last six months yes   Number of times patient has fallen within last six months 1   Activity/Exercise/Self-Care Comment States she is Ind. w/ self cares- family assists w/ most IADL tasks   General Information   Onset of Illness/Injury or Date of Surgery 01/31/23   Referring Physician Jaun Acuna MBBS   Additional Occupational Profile Info/Pertinent History of Current Problem 89 year old female with PMH significant for diastolic CHF, atrial flutter on coumadin, hypertension, hyperlipidemia and hypothyroidism who was referred to our ED for admission from her PCP's clinic for evaluation of shortness of breath and weight gain. Shortness of breath, weight gain, anasarca  Acute on chronic diastolic CHF   Existing Precautions/Restrictions fall;oxygen therapy device and L/min  (4L)   Cognitive Status Examination   Orientation Status person   Range of Motion Comprehensive   General Range of Motion no range of motion deficits identified   Bed Mobility   Bed Mobility supine-sit;sit-supine   Supine-Sit [place] (Bed Mobility) minimum assist (75% patient effort)   Sit-Supine [place] (Bed Mobility) minimum  assist (75% patient effort)   Assistive Device (Bed Mobility) draw sheet   Transfers   Transfers sit-stand transfer   Sit-Stand Transfer   Sit-Stand [place] (Transfers) contact guard;minimum assist (75% patient effort)   Balance   Balance Assessment standing balance: dynamic   Clinical Impression   Criteria for Skilled Therapeutic Interventions Met (OT) Yes, treatment indicated   OT Diagnosis decreased act. tolerance/ADL ind.   OT Problem List-Impairments impacting ADL problems related to;activity tolerance impaired;balance;strength;mobility   Assessment of Occupational Performance 1-3 Performance Deficits   Planned Therapy Interventions (OT) ADL retraining;balance training;strengthening;transfer training   Clinical Decision Making Complexity (OT) low complexity   Risk & Benefits of therapy have been explained evaluation/treatment results reviewed;patient   OT Total Evaluation Time   OT Eval, Low Complexity Minutes (92028) 8   OT Goals   Therapy Frequency (OT) Daily   OT Predicted Duration/Target Date for Goal Attainment 02/08/23   OT Goals Lower Body Dressing;Bed Mobility;Transfers;Toilet Transfer/Toileting;Cognition   OT: Lower Body Dressing Supervision/stand-by assist;using adaptive equipment   OT: Bed Mobility Supervision/stand-by assist;supine to/from sitting   OT: Transfer Supervision/stand-by assist;with assistive device   OT: Toilet Transfer/Toileting Supervision/stand-by assist;using adaptive equipment   OT: Cognitive Patient/caregiver will verbalize understanding of cognitive assessment results/recommendations as needed for safe discharge planning   Self-Care/Home Management   Self-Care/Home Mgmt/ADL, Compensatory, Meal Prep Minutes (78745) 15   Symptoms Noted During/After Treatment (Meal Preparation/Planning Training) fatigue   Treatment Detail/Skilled Intervention Pt additional STS Min.A w/ bed rail for assist- pt does have cane she uses from home but would benefit from fww for added  support/stability- pt able to take steps to HOB w/ Min.A and steps forward<>back w/ Min.A no LOB but very unsteady, Max.A to doff/don brief/hygiene standing, pt back up in bed at end of session. Pt on 4L via oxymask no c/o shortness of breath   OT Discharge Planning   OT Plan SLUMS, toileting, dressing, bed mobility   OT Discharge Recommendation (DC Rec) Transitional Care Facility   OT Rationale for DC Rec Min.A STS trnf/MOd.A for bed mobility- Pt on 4L via oxymask currently- pt lives alone at baseline and receives assist from neice for IADL tasks. OT recommending 24 hour supervision/assist upon d/c and potentially more supportive living environment pending progress.   OT Brief overview of current status Min.A sTS fww for support 4L oxymask   Total Session Time   Timed Code Treatment Minutes 15   Total Session Time (sum of timed and untimed services) 23

## 2023-02-01 NOTE — PLAN OF CARE
Problem: Heart Failure  Goal: Stable Heart Rate and Rhythm  Outcome: Progressing     Problem: Heart Failure  Goal: Fluid and Electrolyte Balance  Outcome: Progressing     Problem: Heart Failure  Goal: Effective Oxygenation and Ventilation  Outcome: Progressing     Problem: Heart Failure  Goal: Effective Breathing Pattern During Sleep  Outcome: Progressing   Goal Outcome Evaluation:  Pt remains of 4L oxygen through nasal canula. Afib on tele monitor. HR controlled. Incontinent of urine, unable to accurately measure output due to external catheter leak. Pt denied pain. Received x4 K bumps due to low potassium.

## 2023-02-01 NOTE — H&P
ADMISSION HISTORY & PHYSICAL    CODE STATUS:  No Order    Roc Mares, [phone]    Patient YOB: 1928  Admit date: 1/31/2023  Date of Service: 1/31/2023    ASSESSMENT AND PLAN:  89 year old female with PMH significant for diastolic CHF, atrial flutter on coumadin, hypertension, hyperlipidemia and hypothyroidism who was referred to our ED for admission from her PCP's clinic for evaluation of shortness of breath and weight gain.    Shortness of breath, weight gain, anasarca  Acute on chronic diastolic CHF  -- Patient admits 20lbs weight gain. Clincal exam and labs consistent with CHF exacerbation. Likely precipitated by non-compliance to meds and diet  -- Will initiate IV diuresis when potassium is replaced  -- Last echo on 5/18/22 showed EF 55-60%. Will repeat an echo in am  -- EKG shows afib, low voltage complexes. Echo as above to rule out effusion  -- Monitor on tele  -- Daily weight. Monitor I/O and renal function closely    Hypokalemia:  -- Potassium of 2.6 noted. Will replace potassium before aggressive diuresis  -- Check Mag    Lactic acidosis:  -- Likely related to problem #1. Unlikely sepsis. Has no leucocytosis. No fevers  -- Check Procal. Check UA. Re-check LA in am. CT chest shows mild pul vasc congestion, small effusions vs atelectasis vs consolidation. Hold off on antibiotics for now. If procal is elevated, will start antibiotics for CAP    MAXIM on CKD-3:  -- Likely associated with problem # 1  -- Trend    Chronic atrial flutter/fib  -- Rate is well controlled.   -- Cont metoprolol  -- INR is supratherapeutic. Hold coumadin for today. Daily INR.     Hypothyroidism:  -- Cont with synthroid  -- Check TSH    Fall:  -- CT head and C-spine- no acute issuees  -- PT/OT eval  -- May need placement      Disposition:  -Anticipated Length of Stay in midnights and medical necessity (including a midnight in the Emergency Department after triage if applicable): >2      CHIEF COMPLAINT:  Shortness  of breath, weight gain, fall    HISTORY OF PRESENTING ILLNESS:  89 year old female with PMH significant for diastolic CHF, atrial flutter on coumadin, hypertension, hyperlipidemia and hypothyroidism who was referred to our ED for admission from her PCP's clinic for evaluation of shortness of breath and weight gain.    Patient reports worsening shortness of breath at least for 2 months. Endorses having orthopnea and PND. Denies any chest pain. Endorses about 20 lb weight gain during that time frame. Patient uses a walker at baseline for ambulation. She had some mechanical issues in her house, and was living at her niece's place. Apparently forgot to bring her medications along. About 1.5 wks ago, family reports she slid from a recliner and hit he head on a table. Patient was seen at her PCP's office today, who was concerned about how she clinically looked as she was unable to talk in full sentences. Therefore, referred to our ED to admission.     PMH/PSH:  Patient Active Problem List   Diagnosis     Osteopenia     Plantar Fasciitis     Muscle Aches, Generalized (Myalgias)     Vitamin D Deficiency     Osteoarthritis Of The Knee     Hypothyroidism     Type 2 diabetes mellitus with complication (H)     Hypercholesterolemia     Benign Essential Hypertension     Edema     Hyperproteinemia     Atrial flutter (H)     CKD (chronic kidney disease) stage 3, GFR 30-59 ml/min (H)     Central retinal vein occlusion     Long term (current) use of anticoagulants     Chronic diastolic congestive heart failure (H)     Dyspnea, unspecified type       Past Medical History:   Diagnosis Date     Disorder of bone and cartilage, unspecified     Created by Conversion      Essential hypertension, benign     Created by Conversion      Osteoarthrosis, unspecified whether generalized or localized, lower leg     Created by Conversion      Pure hypercholesterolemia     Created by Conversion      Type II or unspecified type diabetes mellitus  without mention of complication, not stated as uncontrolled     Created by Conversion      Unspecified hypothyroidism     Created by Conversion      Unspecified vitamin D deficiency     Created by Conversion         Past Surgical History:   Procedure Laterality Date     HYSTERECTOMY TOTAL ABDOMINAL, BILATERAL SALPINGO-OOPHORECTOMY, COMBINED            ALLERGIES:  Allergies   Allergen Reactions     Amoxicillin Hives     Cephalexin Other (See Comments)     Reaction with intervenous administration.     Penicillins Hives     Latex Rash     Other Environmental Allergy Other (See Comments)     Pollen causes seasonal allergies.       MEDICATIONS:  Reviewed.  Current Facility-Administered Medications   Medication     potassium chloride 10 mEq in 100 mL sterile water infusion     Current Outpatient Medications   Medication     acetaminophen (TYLENOL) 650 MG CR tablet     amLODIPine (NORVASC) 2.5 MG tablet     ascorbic acid (VITAMIN C) 100 MG tablet     furosemide (LASIX) 20 MG tablet     levothyroxine (SYNTHROID/LEVOTHROID) 50 MCG tablet     loratadine (CLARITIN) 10 mg tablet     metoprolol succinate ER (TOPROL XL) 100 MG 24 hr tablet     simvastatin (ZOCOR) 20 MG tablet     warfarin ANTICOAGULANT (COUMADIN) 5 MG tablet     lisinopril-hydrochlorothiazide (ZESTORETIC) 20-12.5 MG tablet       Current Facility-Administered Medications:      potassium chloride 10 mEq in 100 mL sterile water infusion, 10 mEq, Intravenous, Once, Keira Dean MD, Last Rate: 100 mL/hr at 01/31/23 1900, 10 mEq at 01/31/23 1900    Current Outpatient Medications:      acetaminophen (TYLENOL) 650 MG CR tablet, Take 650 mg by mouth every 8 hours as needed, Disp: , Rfl:      amLODIPine (NORVASC) 2.5 MG tablet, TAKE 1 TABLET BY MOUTH DAILY, Disp: 90 tablet, Rfl: 0     ascorbic acid (VITAMIN C) 100 MG tablet, [ASCORBIC ACID (VITAMIN C) 100 MG TABLET] Take 100 mg by mouth daily., Disp: , Rfl:      furosemide (LASIX) 20 MG tablet, Take 20 mg by mouth daily,  Disp: , Rfl:      levothyroxine (SYNTHROID/LEVOTHROID) 50 MCG tablet, TAKE 1 TABLET EVERY DAY, Disp: 90 tablet, Rfl: 0     loratadine (CLARITIN) 10 mg tablet, [LORATADINE (CLARITIN) 10 MG TABLET] Take 10 mg by mouth as needed. , Disp: , Rfl:      metoprolol succinate ER (TOPROL XL) 100 MG 24 hr tablet, TAKE 1 TABLET BY MOUTH EVERY DAY, Disp: 90 tablet, Rfl: 3     simvastatin (ZOCOR) 20 MG tablet, TAKE 1/2 TABLET BY MOUTH DAILY IN THE EVENING., Disp: 45 tablet, Rfl: 3     warfarin ANTICOAGULANT (COUMADIN) 5 MG tablet, New Dose - Take 1/2 tablet (2.5mg) by mouth daily, as directed.  Adjust dose based on INR results., Disp: 60 tablet, Rfl: 1     lisinopril-hydrochlorothiazide (ZESTORETIC) 20-12.5 MG tablet, TAKE 2 TABLETS EVERY DAY (Patient not taking: Reported on 2023), Disp: 180 tablet, Rfl: 2   Scheduled Meds:    potassium chloride  10 mEq Intravenous Once     Continuous Infusions:  PRN Meds:.    SOCIAL HISTORY:  Social History     Socioeconomic History     Marital status:      Spouse name: Not on file     Number of children: Not on file     Years of education: Not on file     Highest education level: Not on file   Occupational History     Not on file   Tobacco Use     Smoking status: Former     Types: Cigarettes     Quit date: 1991     Years since quittin.1     Smokeless tobacco: Never   Substance and Sexual Activity     Alcohol use: Yes     Drug use: Not on file     Sexual activity: Not on file   Other Topics Concern     Not on file   Social History Narrative     Not on file     Social Determinants of Health     Financial Resource Strain: Medium Risk     Difficulty of Paying Living Expenses: Somewhat hard   Food Insecurity: No Food Insecurity     Worried About Running Out of Food in the Last Year: Never true     Ran Out of Food in the Last Year: Never true   Transportation Needs: No Transportation Needs     Lack of Transportation (Medical): No     Lack of Transportation (Non-Medical): No  "  Physical Activity: Not on file   Stress: Not on file   Social Connections: Not on file   Intimate Partner Violence: Not on file   Housing Stability: Low Risk      Unable to Pay for Housing in the Last Year: No     Number of Places Lived in the Last Year: 1     Unstable Housing in the Last Year: No       FAMILY HISTORY:  Family History   Problem Relation Age of Onset     Coronary Artery Disease Brother         ROS:  12 point review of systems reviewed and is negative except for what has already been mentioned in HPI.       PHYSICAL EXAM:  GENRL:  Not in acute distress   /57   Pulse 79   Temp 97.6  F (36.4  C) (Temporal)   Resp 26   Ht 1.588 m (5' 2.5\")   Wt 85.7 kg (189 lb)   SpO2 90%   BMI 34.02 kg/m    No intake/output data recorded.  No intake/output data recorded.  HEENT: NC/AT      Eyes-  Pupil round and reactive to light bilaterally       Neck- supple, no JVP elevation,       Sclera- anicteric      Oropharynx- moist and pink  CHEST: Mild crackles posteriorly  HEART: S1S2 normal, regular.   ABDMN: Soft. Non-tender, non-distended.  No guarding or rigidity. Bowel sounds- active  EXTRM: 2-3+ pedal edema  INTGM: No skin rash, no cyanosis or clubbing  MUSCULOSKELETAL: no joint tenderness or swelling on upper and lower extremities  NEURO: Alert and awake. Cranial nerves II-XII grossly intact. No focal neurological deficit.      DIAGNOSTIC DATA:    Recent Labs   Lab 01/31/23  1644   WBC 6.1   HGB 10.5*   HCT 34.3*          Recent Labs   Lab 01/31/23  1644      CO2 27   BUN 26.0*       Recent Labs   Lab 01/31/23  1644   INR 4.51*       Recent Labs   Lab 01/31/23  1644      CO2 27   BUN 26.0*   ALBUMIN 3.2*   ALKPHOS 88   ALT 11   AST 20       [unfilled]    XR Chest Port 1 View    Result Date: 1/31/2023  EXAM: XR CHEST PORT 1 VIEW LOCATION: [facility] DATE/TIME: 1/31/2023 6:13 PM INDICATION: Shortness of breath COMPARISON: Chest x-ray on 04/21/2022 "     IMPRESSION: Single AP view of the chest was obtained. Enlarged cardiac silhouette and mild pulmonary vascular congestion. Bibasilar pulmonary opacities, could be related to small bilateral pleural effusions and associated basilar atelectasis/consolidation. No significant pneumothorax.    Cervical spine CT w/o contrast    Result Date: 1/31/2023  EXAM: CT HEAD W/O CONTRAST, CT CERVICAL SPINE W/O CONTRAST LOCATION: [facility] DATE/TIME: 1/31/2023 6:16 PM INDICATION: Trauma. Fall. Anticoagulated. COMPARISON: CT head and cervical spine 10/09/2022. TECHNIQUE: Routine CT Head without IV contrast. Multiplanar reformats. Dose reduction techniques were used. FINDINGS: HEAD CT: INTRACRANIAL CONTENTS: Redemonstrated chronic lacunar infarct in the right basal nuclei. Remaining gray-white matter interfaces are intact. No hemorrhage or extraaxial collection. No mass effect. Subarachnoid cisterns are patent. Patchy and confluent white matter hypodensities are, while nonspecific, most compatible with chronic microvascular ischemic changes. Normal ventricles and sulci. VISUALIZED ORBITS/SINUSES/MASTOIDS: Bilateral lens replacements. Similar symmetric exophthalmus, which may be related to redundant intraorbital fat. Paranasal sinuses are clear. No middle ear or mastoid effusion. BONES/SOFT TISSUES: No acute abnormality. CERVICAL SPINE CT: VERTEBRA: Spine straightening may be positional and/or related to muscle spasm. Osseous structures appear diffusely demineralized. No displaced fracture or vertebral body compression. The dens, atlantoaxial joint, and facets are intact. CANAL/FORAMINA: Multilevel discogenic, uncovertebral, and facet degenerative changes. Moderate disc space loss C5-C6. No high-grade spinal canal or foraminal stenosis. PARASPINAL: No visible soft tissue abnormality. Partially visualized small left and trace right pleural effusions and associated atelectasis. Punctate calcifications in the  bilateral parotid glands are compatible with sialolithiasis. Scattered vascular calcifications indicate atherosclerosis.     IMPRESSION: HEAD CT: 1.  No acute traumatic intracranial abnormality. 2.  Chronic intracranial findings as detailed. CERVICAL SPINE CT: 1.  No convincing CT findings of an acute osseous abnormality. Osseous demineralization can limit this assessment.     Head CT w/o contrast    Result Date: 1/31/2023  EXAM: CT HEAD W/O CONTRAST, CT CERVICAL SPINE W/O CONTRAST LOCATION: [facility] DATE/TIME: 1/31/2023 6:16 PM INDICATION: Trauma. Fall. Anticoagulated. COMPARISON: CT head and cervical spine 10/09/2022. TECHNIQUE: Routine CT Head without IV contrast. Multiplanar reformats. Dose reduction techniques were used. FINDINGS: HEAD CT: INTRACRANIAL CONTENTS: Redemonstrated chronic lacunar infarct in the right basal nuclei. Remaining gray-white matter interfaces are intact. No hemorrhage or extraaxial collection. No mass effect. Subarachnoid cisterns are patent. Patchy and confluent white matter hypodensities are, while nonspecific, most compatible with chronic microvascular ischemic changes. Normal ventricles and sulci. VISUALIZED ORBITS/SINUSES/MASTOIDS: Bilateral lens replacements. Similar symmetric exophthalmus, which may be related to redundant intraorbital fat. Paranasal sinuses are clear. No middle ear or mastoid effusion. BONES/SOFT TISSUES: No acute abnormality. CERVICAL SPINE CT: VERTEBRA: Spine straightening may be positional and/or related to muscle spasm. Osseous structures appear diffusely demineralized. No displaced fracture or vertebral body compression. The dens, atlantoaxial joint, and facets are intact. CANAL/FORAMINA: Multilevel discogenic, uncovertebral, and facet degenerative changes. Moderate disc space loss C5-C6. No high-grade spinal canal or foraminal stenosis. PARASPINAL: No visible soft tissue abnormality. Partially visualized small left and trace right  pleural effusions and associated atelectasis. Punctate calcifications in the bilateral parotid glands are compatible with sialolithiasis. Scattered vascular calcifications indicate atherosclerosis.     IMPRESSION: HEAD CT: 1.  No acute traumatic intracranial abnormality. 2.  Chronic intracranial findings as detailed. CERVICAL SPINE CT: 1.  No convincing CT findings of an acute osseous abnormality. Osseous demineralization can limit this assessment.       Patient's new lab studies reviewed personally.  Patient's new radiology reports reviewed personally.  I personally viewed and personally interpreted patient's EKG: Afib with low voltage complexes.    Note created using dragon voice recognition software.  Errors in spelling or words which seems out of context are unintentional.  Sounds alike errors may have escaped editing.     01/31/2023  Juan Acuna MD  HOSPITALIST, [facility]  PAGER NO. [phone]

## 2023-02-01 NOTE — PROGRESS NOTES
Pt settled in  Bed in room ER 30. Iv potassium infusing. Pt denies pain. Vss. o2 on via mask at 4L, sats wnl's. Reminded pt to use call light for needs. Report given to Marleni RODRIGUEZ.

## 2023-02-02 NOTE — PROGRESS NOTES
"[facility] Hospitalist Progress Note  [facility]  Admission date: 1/31/2023    Summary:    89F with HFpEF, tricuspid regurgitation, atrial flutter on coumadin, HTN, who was referred to our ED for admission from her PCP's clinic for evaluation of shortness of breath and weight gain (reports 20+lbs).     Assessment/Plan    #Acute on chronic HFpEF and severe TR  #moderate pulm HTN  -IV lasix 40TID     #Hypoxic respiratory failure - due to CHF probably with OHS and atelectasis as well.  -diuresis, O2, IC    #Hypokalemia and Mg - replacement     #MAXIM on CKD-3: improving with diuresis c/w   -- Likely associated with problem # 1  -- stable     #Chronic atrial flutter/fib - toprol, coumadin (supratherapeutic)    #Hypothyroidism - synthroid     #Fall:  CT head and C-spine- no acute issuees    #anemia - no overt bleeding. Supratherapeutic INR.  Monitor.      Checklist:  Code Status: Full Code    Diet: Fluid restriction 1500 ML FLUID  Combination Diet Low Saturated Fat Na <2400mg Diet, No Caffeine Diet     DVT px:  Warfarin    Disposition and Discharge Planning  Auto-populated from discharge tab:    Expected Discharge Date: 02/02/2023      Destination: inpatient rehabilitation facility           System Identified Risk Variables  Auto-populated based on system request - if relevant they will be addressed above:  Clinically Significant Risk Factors        # Hypokalemia: Lowest K = 2.6 mmol/L in last 2 days, will replace as needed   # Hypocalcemia: Lowest Ca = 8 mg/dL in last 2 days, will monitor and replace as appropriate   # Hypomagnesemia: Lowest Mg = 1.6 mg/dL in last 2 days, will replace as needed   # Hypoalbuminemia: Lowest albumin = 3.2 g/dL at 1/31/2023  4:44 PM, will monitor as appropriate           # Obesity: Estimated body mass index is 32.78 kg/m  as calculated from the following:    Height as of this encounter: 1.588 m (5' 2.5\").    Weight as of this encounter: 82.6 kg (182 lb 1.6 oz)., PRESENT ON " ADMISSION             Interval Events/Subjective/Notable results:  oxymask 5L  Cr stable, K 3.1, Mg 1.6, Hb 9.0 (no bleeding)  -400cc with 6unmeasured urine.  Lasix 49q12  Still on 5L O2    Objective    Vital signs in last 24 hours  Temp:  [97.3  F (36.3  C)-98.3  F (36.8  C)] 97.9  F (36.6  C)  Pulse:  [75-94] 90  Resp:  [15-30] 20  BP: (116-161)/(57-81) 130/60  SpO2:  [92 %-96 %] 95 % O2 Device: Oxymask    Weight:   182 lbs 1.6 oz  Body mass index is 32.78 kg/m .    Intake/Output last 3 shifts  I/O last 3 completed shifts:  In: 500 [P.O.:500]  Out: 900 [Urine:900]    Physical Exam  General:  Alert, cooperative, no distress, frail    Neurologic:  oriented, facial symmetry preserved, fluent speech.   Psych: calm  HEENT:  Anicteric, MMM  CV: RRR no MRG, normal S1 and S2, +edema  Lungs:  Few basilar crackles  Abd: soft, NT  Skin: no rashes or jaundice noted on exposed skin.    [place]ara Catheter: Not present  Lines: None          Medical Decision Making               Gricelda Singleton MD, [facility]  Internal Medicine Hospitalist

## 2023-02-02 NOTE — PROGRESS NOTES
Clinic Care Coordination Contact  Ambulatory Care Coordination to Inpatient Care Management   Hand-In Communication    Date:  February 2, 2023  Name: Miesha Quarles is enrolled in Ambulatory Care Coordination program and I am the Lead Care Coordinator.  CC Contact Information: Epic InStylendasket + phone  Payor Source: Payor: [facility] / Plan: [employer] MEDICARE ADVANTAGE / Product Type: HMO /   Current services in place:     Please see the CC Snaphot and Care Management Flowsheets for specific  details of this Miesha Quarles care plan.   Additional details/specific concerns r/t this admission:    Home Safety getting stronger    I will follow this admission in Epic. Please feel free to contact me with questions or for further collaboration in discharge planning.

## 2023-02-02 NOTE — PHARMACY-ANTICOAGULATION SERVICE
Clinical Pharmacy - Warfarin Dosing Consult     Pharmacy has been consulted to manage this patient s warfarin therapy.  Indication: Atrial Fibrillation  Therapy Goal: INR 2-3  Provider/Team: Hospitalist  Warfarin Prior to Admission: Yes  Warfarin PTA Regimen: 2.5 mg daily  Significant drug interactions: simvastatin & acetaminophen (home meds) & melatonin- all increase bleeding risk  Recent documented change in oral intake/nutrition: Unknown  Dose Comments: Last dose per med rec = 1/30/23    INR   Date Value Ref Range Status   02/01/2023 4.13 (H) 0.85 - 1.15 Final   01/31/2023 4.51 (H) 0.85 - 1.15 Final       Recommend no warfarin today.  Pharmacy will monitor Miesha Quarles daily and order warfarin doses to achieve specified goal.      Please contact pharmacy as soon as possible if the warfarin needs to be held for a procedure or if the warfarin goals change.

## 2023-02-02 NOTE — PLAN OF CARE
Problem: Heart Failure  Goal: Optimal Coping  2/2/2023 0939 by Philip Mathews RN  Outcome: Progressing  2/2/2023 0938 by Philip Mathews RN  Outcome: Progressing  Goal: Optimal Cardiac Output  2/2/2023 0939 by Philip Mathews RN  Outcome: Progressing  2/2/2023 0938 by Philip Mathews RN  Outcome: Progressing  Goal: Stable Heart Rate and Rhythm  2/2/2023 0939 by Philip Mathews RN  Outcome: Progressing  2/2/2023 0938 by Philip Mathews RN  Outcome: Progressing  Goal: Optimal Functional Ability  2/2/2023 0939 by Philip Mathews RN  Outcome: Progressing  2/2/2023 0938 by Philip Mathews RN  Outcome: Progressing  Goal: Fluid and Electrolyte Balance  2/2/2023 0939 by Philip Mathews RN  Outcome: Progressing  2/2/2023 0938 by Philip Mathews RN  Outcome: Progressing  Goal: Improved Oral Intake  2/2/2023 0939 by Philip Mathews RN  Outcome: Progressing  2/2/2023 0938 by Philip Mathews RN  Outcome: Progressing  Goal: Effective Oxygenation and Ventilation  2/2/2023 0939 by Philip Mathews RN  Outcome: Progressing  2/2/2023 0938 by Philip Mathews RN  Outcome: Progressing  Goal: Effective Breathing Pattern During Sleep  2/2/2023 0939 by Philip Mathews RN  Outcome: Progressing  2/2/2023 0938 by Philip Mathews RN  Outcome: Progressing     Problem: Electrolyte Imbalance  Goal: Electrolyte Imbalance: Plan of Care  2/2/2023 0939 by Philip Mathews RN  Outcome: Progressing  2/2/2023 0938 by Philip Mathews RN  Outcome: Progressing     Problem: Gas Exchange Impaired  Goal: Optimal Gas Exchange  2/2/2023 0939 by Philip Mathews RN  Outcome: Progressing  2/2/2023 0938 by Philip Mathews RN  Outcome: Progressing   Goal Outcome Evaluation:       Pt is alert and oriented x3. Pt is lethargic but able to converse when asked. Denied SOB and chest pain. Pt refused to eat breakfast. Writer reinforced fluid restriction; pt verbalized understanding.    Pt was up on chair, A&Ox4. Denied SOB and chest pain. Pt  unconsciously removing oxymask, frequent placement/repositioning done. Pt refused to order dinner.

## 2023-02-02 NOTE — PROGRESS NOTES
Care Management Follow Up    Length of Stay (days): 2    Expected Discharge Date: 02/03/2023     Concerns to be Addressed:       Patient plan of care discussed at interdisciplinary rounds: Yes    Anticipated Discharge Disposition: Skilled Nursing Facility, Transitional Care     Anticipated Discharge Services:    Anticipated Discharge DME: Walker    Patient/family educated on Medicare website which has current facility and service quality ratings:  Yes  Education Provided on the Discharge Plan:  Yes  Patient/Family in Agreement with the Plan: Yes      Referrals Placed by CM/SW:    Private pay costs discussed: Not applicable    Additional Information:  Patient has been staying with her niece Belinda for the past 2 months, patient's home is currently being fixed for mechanical issues. Patient is using a walker at baseline and has been progressively getting weaker and needing assist with ADLs since not taking her medications.     Therapy recommends TCU, SW called patient's niece Mariluz asking if she has had a chance to look over the TCU list. She states that she plans to look it over shortly and will call SW back.       Marie Vela

## 2023-02-02 NOTE — CONSULTS
"HEART CARE NOTE        Thank you, Dr. Acuna, for asking the [facility] team to see Miesha Quarles to evaluate ADHF.      Assessment/Recommendations     1. HFpEF c/b ADHF  Assessment / Plan    Hypervolemic on physical exam; diuresing well on current regimen - no changes at this time    GDMT as detailed below; mainstay of treatment for HFpEF includes diuretics and adequate BP control (class I) and SGLT2-I (class 2a); additional medical therapy (ARNI, MRA, ARB) demonstrated less robust evidence for indication but may be considered per guideline recommendations (2b); no indication for BBlockers     Current Pharmacotherapy AHA Guideline-Directed Medical Therapy   Losartan - on hold while monitoring renal function ARNI/ARB   Spironolactone not started  MRA   SGLT2 inhibitor not started SGLT2-I    Furosemide IV  Loop diuretic      2. Atrial fibrillation  Assessment / Plan    Rate controlled; continue metoprolol     Warfarin per pharmacy management     3. MAXIM on CKD  Assessment / Plan    Continue to monitor renal function closely with diuresis    4. HLP  Assessment / Plan    Continue statin    5. HTN  Assessment / Plan    Adequately controlled on current regimen - no changes at this time    Clinically Significant Risk Factors Present on Admission            # Obesity: Estimated body mass index is 32.78 kg/m  as calculated from the following:    Height as of this encounter: 1.588 m (5' 2.5\").    Weight as of this encounter: 82.6 kg (182 lb 1.6 oz).    Cardiovascular: Cardiac Arrhythmia: Atrial fibrillation: Chronic    Fluid & Electrolyte Disorders: Fluid overload, unspecified, Other fluid overload and Other disorders of electrolyte and fluid balance, not elsewhere classified    Nephrology: Acute kidney failure, unspecified  CKD POA List: Stage 3b (GFR 30-44)        History of Present Illness/Subjective    Ms. Miesha Quarles is a 89 year old female with a PMHx significant for (per Dr. Acuna's note) " "diastolic CHF, atrial flutter on coumadin, hypertension, hyperlipidemia and hypothyroidism who was referred to our ED for admission from her PCP's clinic for evaluation of shortness of breath and weight gain.    Today, Mrs. Quarles endorsed progressive edema/weight gain and dyspnea over the last 2 months of about 20 lbs; she reports dietary and medication noncompliance over this period of time; she reports associated symptoms of orthopnea and PND; Management plan as detailed above    ECG: Personally reviewed. atrial fibrillation, rate controlled.    ECHO (personnaly Reviewed):  1.Left ventricular size, wall motion and function are normal. The ejection  fraction is 55-60%.  2.The right ventricle is moderately dilated. Mildly decreased right  ventricular systolic function.  3.Significant biatrial chamber enlargement.  4.There is moderately severe (3+) tricuspid regurgitation. ERO is 0.28 cmÂ   with a volume of 32 cc..  5.IVC diameter >2.1 cm collapsing <50% with sniff suggests a high RA pressure  estimated at 15 mmHg or greater.  6.Right ventricular systolic pressure is elevated, consistent with mild to  moderate pulmonary hypertension.  Compared to the prior study dated 5/18/2022, the RV is larger and the TR is  more.        Physical Examination Review of Systems   /65 (BP Location: Left arm)   Pulse 84   Temp 98.2  F (36.8  C) (Oral)   Resp 18   Ht 1.588 m (5' 2.5\")   Wt 82.6 kg (182 lb 1.6 oz)   SpO2 99%   BMI 32.78 kg/m    Body mass index is 32.78 kg/m .  Wt Readings from Last 3 Encounters:   02/02/23 82.6 kg (182 lb 1.6 oz)   01/31/23 85.7 kg (189 lb)   09/15/22 77.1 kg (170 lb)     General Appearance:   no distress   ENT/Mouth: membranes moist, no oral lesions or bleeding gums.      EYES:  no scleral icterus, normal conjunctivae   Neck: no carotid bruits or thyromegaly   Chest/Lungs:   lungs are clear to auscultation, no rales or wheezing, equal chest wall expansion    Cardiovascular:   Irregular. " Normal first and second heart sounds with + CYN; no rubs, or gallops; the carotid, radial and posterior tibial pulses are intact, + JVD and anasarca    Abdomen:  no organomegaly, masses, bruits, or tenderness; bowel sounds are present   Extremities: no cyanosis or clubbing   Skin: no xanthelasma, warm.    Neurologic: alert and calm     Psychiatric: alert and calm     A complete 10 systems ROS was reviewed  And is negative except what is listed in the HPI.          Medical History  Surgical History Family History Social History   Past Medical History:   Diagnosis Date     Disorder of bone and cartilage, unspecified     Created by Conversion      Essential hypertension, benign     Created by Conversion      Osteoarthrosis, unspecified whether generalized or localized, lower leg     Created by Conversion      Pure hypercholesterolemia     Created by Conversion      Type II or unspecified type diabetes mellitus without mention of complication, not stated as uncontrolled     Created by Conversion      Unspecified hypothyroidism     Created by Conversion      Unspecified vitamin D deficiency     Created by Conversion     Past Surgical History:   Procedure Laterality Date     HYSTERECTOMY TOTAL ABDOMINAL, BILATERAL SALPINGO-OOPHORECTOMY, COMBINED      no family history of premature coronary artery disease Social History     Socioeconomic History     Marital status:      Spouse name: Not on file     Number of children: Not on file     Years of education: Not on file     Highest education level: Not on file   Occupational History     Not on file   Tobacco Use     Smoking status: Former     Types: Cigarettes     Quit date: 1991     Years since quittin.1     Smokeless tobacco: Never   Substance and Sexual Activity     Alcohol use: Yes     Drug use: Not on file     Sexual activity: Not on file   Other Topics Concern     Not on file   Social History Narrative     Not on file     Social Determinants of Health      Financial Resource Strain: Medium Risk     Difficulty of Paying Living Expenses: Somewhat hard   Food Insecurity: No Food Insecurity     Worried About Running Out of Food in the Last Year: Never true     Ran Out of Food in the Last Year: Never true   Transportation Needs: No Transportation Needs     Lack of Transportation (Medical): No     Lack of Transportation (Non-Medical): No   Physical Activity: Not on file   Stress: Not on file   Social Connections: Not on file   Intimate Partner Violence: Not on file   Housing Stability: Low Risk      Unable to Pay for Housing in the Last Year: No     Number of Places Lived in the Last Year: 1     Unstable Housing in the Last Year: No           Lab Results    Chemistry/lipid CBC Cardiac Enzymes/BNP/TSH/INR   Lab Results   Component Value Date    CHOL 150 04/21/2022    HDL 45 (L) 04/21/2022    TRIG 94 04/21/2022    BUN 22.8 02/02/2023     02/02/2023    CO2 30 (H) 02/02/2023    Lab Results   Component Value Date    WBC 7.2 02/02/2023    HGB 9.0 (L) 02/02/2023    HCT 29.4 (L) 02/02/2023     (H) 02/02/2023     02/02/2023    Lab Results   Component Value Date    TSH 8.03 (H) 01/31/2023    INR 4.28 (H) 02/02/2023     No results found for: CKTOTAL, CKMB, TROPONINI       Weight:    Wt Readings from Last 3 Encounters:   02/02/23 82.6 kg (182 lb 1.6 oz)   01/31/23 85.7 kg (189 lb)   09/15/22 77.1 kg (170 lb)       Allergies  Allergies   Allergen Reactions     Amoxicillin Hives     Cephalexin Other (See Comments)     Reaction with intervenous administration.     Penicillins Hives     Latex Rash     Other Environmental Allergy Other (See Comments)     Pollen causes seasonal allergies.         Surgical History  Past Surgical History:   Procedure Laterality Date     HYSTERECTOMY TOTAL ABDOMINAL, BILATERAL SALPINGO-OOPHORECTOMY, COMBINED         Social History  Tobacco:   History   Smoking Status     Former     Types: Cigarettes     Quit date: 1/1/1991   Smokeless  Tobacco     Never    Alcohol:   Social History    Substance and Sexual Activity      Alcohol use: Yes   Illicit Drugs:   History   Drug Use Not on file       Family History  Family History   Problem Relation Age of Onset     Coronary Artery Disease Brother           Parth Hunter MD on 2/2/2023      cc: Roc Mares,

## 2023-02-02 NOTE — PROGRESS NOTES
[facility]    Medicine Progress Note - Hospitalist Service    Date of Admission:  1/31/2023    Assessment & Plan       89 year old female with PMH significant for diastolic CHF, atrial flutter on coumadin, hypertension, hyperlipidemia and hypothyroidism who was referred to our ED for admission from her PCP's clinic for evaluation of shortness of breath and weight gain.      2/1 :     On 4 liters of oxygen ( not on oxygen at home )  Sob improving  Continue iv diuresis : for acute CHF      Not medically ready for discharge at this time.  Will need placement        A/p :          Shortness of breath, weight gain, anasarca    Acute on chronic diastolic CHF  -- Patient admits 20lbs weight gain. Clincal exam and labs consistent with CHF exacerbation. Likely precipitated by non-compliance to meds and diet  -- Will initiate IV diuresis when potassium is replaced  -- Last echo on 5/18/22 showed EF 55-60%. Will repeat an echo in am : EF of 55-60%, moderately severe TR  -- EKG shows afib, low voltage complexes. Echo as above to rule out effusion  -- Monitor on tele  -- Daily weight. Monitor I/O and renal function closely       Hypokalemia:  -- Potassium of 2.6 noted. Will replace potassium before aggressive diuresis  -- Check Mag       Lactic acidosis:  -- Likely related to problem #1. Unlikely sepsis. Has no leucocytosis. No fevers  -- resolved       MAXIM on CKD-3:  -- Likely associated with problem # 1  -- stable       Chronic atrial flutter/fib  -- Rate is well controlled.   -- Cont metoprolol  -- INR is supratherapeutic. Hold coumadin for today. Daily INR.        Hypothyroidism:  -- Cont with synthroid  -- Check TSH       Fall:  -- CT head and C-spine- no acute issuees  -- PT/OT eval  -- May need placement              Diet: Fluid restriction 1500 ML FLUID  Combination Diet Low Saturated Fat Na <2400mg Diet, No Caffeine Diet    DVT Prophylaxis: Warfarin  Alcantara Catheter: Not present  Lines: None    "  Cardiac Monitoring: ACTIVE order. Indication: Acute decompensated heart failure (48 hours)  Code Status: Full Code      Clinically Significant Risk Factors        # Hypokalemia: Lowest K = 2.6 mmol/L in last 2 days, will replace as needed   # Hypocalcemia: Lowest Ca = 8 mg/dL in last 2 days, will monitor and replace as appropriate     # Hypoalbuminemia: Lowest albumin = 3.2 g/dL at 1/31/2023  4:44 PM, will monitor as appropriate           # Obesity: Estimated body mass index is 34.02 kg/m  as calculated from the following:    Height as of this encounter: 1.588 m (5' 2.5\").    Weight as of this encounter: 85.7 kg (189 lb)., PRESENT ON ADMISSION         Disposition Plan     Expected Discharge Date: 02/02/2023      Destination: inpatient rehabilitation facility            Nomi Sosa MD  Hospitalist Service  [facility]  Securely message with [employer] (more info)  Text page via [employer] Paging/Directory   ______________________________________________________________________        Physical Exam   Vital Signs: Temp: 98.3  F (36.8  C) Temp src: Oral BP: (!) 145/81 Pulse: 79   Resp: 15 SpO2: 95 % O2 Device: Oxymask Oxygen Delivery: 4 LPM  Weight: 189 lbs 0 oz       GENERAL: The patient is not in any acute distressed. Awake and alert.  HEENT: Nonicteric sclerae, PERRLA, EOMI. Oropharynx clear. Moist mucous membranes. Conjunctivae appear well perfused.  HEART: Regular rate and rhythm without murmurs.  LUNGS: Clear to auscultation bilaterally. No wheezing or crackles.  ABDOMEN: Soft, positive bowel sounds, nontender.  SKIN: No rash, no excessive bruising, petechiae, or purpura.  EXTREMITIES : no rashes, no swelling in legs.  NEUROLOGIC: conscious and oriented, follows commands, no obvious focal deficits.  ROS: All other systems negative       Medical Decision Making       60 MINUTES SPENT BY ME on the date of service doing chart review, history, exam, documentation & further activities per the " note.  MANAGEMENT DISCUSSED with the following over the past 24 hours: RN, patient       Data     I have personally reviewed the following data over the past 24 hrs:    6.1  \   9.7 (L)   / 203     140 102 26.6 (H) /  105 (H)   3.2 (L) 28 1.19 (H) \       TSH: N/A T4: N/A A1C: N/A       Procal: N/A CRP: N/A Lactic Acid: 1.9

## 2023-02-02 NOTE — PLAN OF CARE
Shift Summary 2030-0700 2030 patient transferred from ED. Orientated to room and call light system.    Patient AOx4, some confusion at times. VSS on 5L oxymask. Patient frequently removing oxygen; education provided t/o shift. Tele afib. Denies chest pain and n/v. Exp wheeze noted. IV lasix; good output. Incontinent, purewick in place. 1500 fluid restriction. K protocol. Fall precautions in place, bed alarm on. Encouraging call light use.    Pt did not get oob this shift. Reports she ambulates at home with fww.    Goal Outcome Evaluation:  Problem: Heart Failure  Goal: Effective Breathing Pattern During Sleep  Outcome: Not Progressing  Intervention: Monitor and Manage Obstructive Sleep Apnea  Recent Flowsheet Documentation  Taken 2/2/2023 0330 by Bernadine Gonsalez, RN  Medication Review/Management: medications reviewed  Taken 2/1/2023 2100 by Bernadine Gonsalez, RN  Medication Review/Management: medications reviewed  Problem: Gas Exchange Impaired  Goal: Optimal Gas Exchange  Outcome: Not Progressing  Intervention: Optimize Oxygenation and Ventilation  Recent Flowsheet Documentation  Taken 2/1/2023 2146 by Bernadine Gonsalez, RN  Head of Bed (HOB) Positioning: HOB at 20-30 degrees  Taken 2/1/2023 2050 by Bernadine Gonsalez, RN  Head of Bed (HOB) Positioning: HOB at 20-30 degrees

## 2023-02-03 NOTE — PROGRESS NOTES
[facility] Hospitalist Progress Note  [facility]  Admission date: 1/31/2023    Summary:    89F with HFpEF, tricuspid regurgitation, atrial flutter on coumadin, HTN, who was referred to our ED for admission from her PCP's clinic for evaluation of shortness of breath and weight gain (reports 20+lbs).     Assessment/Plan    #Acute on chronic HFpEF and severe TR  #moderate pulm HTN  -IV diuresis, noted infusion and albumin by Dr. Hunter.  Appreciate care.  -d/w catalina 2/2 who reports Ms. Quarles wasn't taking water pills prior to admission.     #mild hypernatremia - encourage free water intake. Not taking too much in so will give a little D5W as well.    #Hypoxic respiratory failure - due to CHF probably with OHS and atelectasis as well.  -diuresis, O2, IC    #Hypokalemia and Mg - replacement     #MAXIM on CKD-3: improving with diuresis c/w   -- Likely associated with problem # 1  -- stable     #Chronic atrial flutter/fib - toprol, coumadin (supratherapeutic)    #Hypothyroidism - synthroid     #Fall:  CT head and C-spine- no acute issuees    #anemia - Hb drifting down without overt bleeding.  Supratherapeutic INR 4s, will give very low dose vitamin K.  Monitor.      Checklist:  Code Status: Full Code    Diet: Fluid restriction 1500 ML FLUID  Combination Diet Low Saturated Fat Na <2400mg Diet, No Caffeine Diet     DVT px:  Warfarin    Disposition and Discharge Planning  Auto-populated from discharge tab:    Expected Discharge Date: 02/06/2023      Destination: inpatient rehabilitation facility  Discharge Comments: ? TCU         System Identified Risk Variables  Auto-populated based on system request - if relevant they will be addressed above:  Clinically Significant Risk Factors        # Hypokalemia: Lowest K = 3.1 mmol/L in last 2 days, will replace as needed     # Hypomagnesemia: Lowest Mg = 1.6 mg/dL in last 2 days, will replace as needed   # Hypoalbuminemia: Lowest albumin = 3.2 g/dL at 1/31/2023  4:44  "PM, will monitor as appropriate           # Obesity: Estimated body mass index is 32.34 kg/m  as calculated from the following:    Height as of this encounter: 1.588 m (5' 2.5\").    Weight as of this encounter: 81.5 kg (179 lb 10.8 oz)., PRESENT ON ADMISSION             Interval Events/Subjective/Notable results:  oxymask 5L  -1.8L, awaiting Hb and BMP  Weights declining  Cr stable, K 3.1, Mg 1.6, Hb 9.0 (no bleeding)  -400cc with 6unmeasured urine.  Lasix 49q12  Still on 5L O2    Objective    Vital signs in last 24 hours  Temp:  [97.5  F (36.4  C)-98.6  F (37  C)] 98.6  F (37  C)  Pulse:  [80-90] 86  Resp:  [18-22] 18  BP: (117-143)/(56-71) 120/58  SpO2:  [92 %-100 %] 92 % O2 Device: Oxymask    Weight:   179 lbs 10.8 oz  Body mass index is 32.34 kg/m .    Intake/Output last 3 shifts  I/O last 3 completed shifts:  In: 420 [P.O.:420]  Out: 2300 [Urine:2300]    Physical Exam  General:  Alert, cooperative, no distress, frail    Neurologic:  oriented, facial symmetry preserved, fluent speech.   Psych: calm  HEENT:  Anicteric, MMM  CV: RRR no MRG, normal S1 and S2, +edema  Lungs:  Few basilar crackles  Abd: soft, NT  Skin: no rashes or jaundice noted on exposed skin.    Alcantara Catheter: Not present  Lines: None          Medical Decision Making           Gricelda Singleton MD, [facility]  Internal Medicine Hospitalist  "

## 2023-02-03 NOTE — PROGRESS NOTES
HEART CARE NOTE          Assessment/Recommendations     1. HFpEF c/b ADHF  Assessment / Plan    Hypervolemic on physical exam; will transition to furosemide gtt after bolus and also give albumin bolus; Na trending up - may need to liberalize free water intake or consider D5W gtt - defer to primary team     GDMT as detailed below; mainstay of treatment for HFpEF includes diuretics and adequate BP control (class I) and SGLT2-I (class 2a); additional medical therapy (ARNI, MRA, ARB) demonstrated less robust evidence for indication but may be considered per guideline recommendations (2b); no indication for BBlockers      Current Pharmacotherapy AHA Guideline-Directed Medical Therapy   Losartan - on hold while monitoring renal function ARNI/ARB   Spironolactone not started  MRA   SGLT2 inhibitor not started SGLT2-I    Furosemide gtt Loop diuretic       2. Atrial fibrillation  Assessment / Plan    Rate controlled; continue metoprolol     Warfarin per pharmacy management      3. MAXIM on CKD  Assessment / Plan    Continue to monitor renal function closely with diuresis     4. HLP  Assessment / Plan    Continue statin     5. HTN  Assessment / Plan    Adequately controlled on current regimen - no changes at this time      History of Present Illness/Subjective      Ms. Miesha Quarles is a 89 year old female with a PMHx significant for (per Dr. Acuna's note) diastolic CHF, atrial flutter on coumadin, hypertension, hyperlipidemia and hypothyroidism who was referred to our ED for admission from her PCP's clinic for evaluation of shortness of breath and weight gain.     Today, Mrs. Quarles denies any acute cardiac events or complaints; Management plan as detailed above     ECG: Personally reviewed. atrial fibrillation, rate controlled.     ECHO (personnaly Reviewed):  1.Left ventricular size, wall motion and function are normal. The ejection  fraction is 55-60%.  2.The right ventricle is moderately dilated. Mildly decreased  "right  ventricular systolic function.  3.Significant biatrial chamber enlargement.  4.There is moderately severe (3+) tricuspid regurgitation. ERO is 0.28 cmÂ   with a volume of 32 cc..  5.IVC diameter >2.1 cm collapsing <50% with sniff suggests a high RA pressure  estimated at 15 mmHg or greater.  6.Right ventricular systolic pressure is elevated, consistent with mild to  moderate pulmonary hypertension.  Compared to the prior study dated 5/18/2022, the RV is larger and the TR is  more.          Physical Examination Review of Systems   /58 (BP Location: Left arm)   Pulse 84   Temp 98  F (36.7  C) (Axillary)   Resp 20   Ht 1.588 m (5' 2.5\")   Wt 81.5 kg (179 lb 10.8 oz)   SpO2 93%   BMI 32.34 kg/m    Body mass index is 32.34 kg/m .  Wt Readings from Last 3 Encounters:   02/03/23 81.5 kg (179 lb 10.8 oz)   01/31/23 85.7 kg (189 lb)   09/15/22 77.1 kg (170 lb)     General Appearance:   no distress, normal body habitus   ENT/Mouth: membranes moist, no oral lesions or bleeding gums.      EYES:  no scleral icterus, normal conjunctivae   Neck: no carotid bruits or thyromegaly   Chest/Lungs:   lungs are clear to auscultation, no rales or wheezing, equal chest wall expansion    Cardiovascular:   Irregular. Normal first and second heart sounds with no murmurs, rubs, or gallops; the carotid, radial and posterior tibial pulses are intact, + JVD and LE edema bilaterally    Abdomen:  no organomegaly, masses, bruits, or tenderness; bowel sounds are present   Extremities: no cyanosis or clubbing   Skin: no xanthelasma, warm.    Neurologic: alert and calm     Psychiatric: alert and calm     A complete 10 systems ROS was reviewed  And is negative except what is listed in the HPI.          Medical History  Surgical History Family History Social History   Past Medical History:   Diagnosis Date     Disorder of bone and cartilage, unspecified     Created by Conversion      Essential hypertension, benign     Created by " Conversion      Osteoarthrosis, unspecified whether generalized or localized, lower leg     Created by Conversion      Pure hypercholesterolemia     Created by Conversion      Type II or unspecified type diabetes mellitus without mention of complication, not stated as uncontrolled     Created by Conversion      Unspecified hypothyroidism     Created by Conversion      Unspecified vitamin D deficiency     Created by Conversion     Past Surgical History:   Procedure Laterality Date     HYSTERECTOMY TOTAL ABDOMINAL, BILATERAL SALPINGO-OOPHORECTOMY, COMBINED      no family history of premature coronary artery disease Social History     Socioeconomic History     Marital status:      Spouse name: Not on file     Number of children: Not on file     Years of education: Not on file     Highest education level: Not on file   Occupational History     Not on file   Tobacco Use     Smoking status: Former     Types: Cigarettes     Quit date: 1991     Years since quittin.1     Smokeless tobacco: Never   Substance and Sexual Activity     Alcohol use: Yes     Drug use: Not on file     Sexual activity: Not on file   Other Topics Concern     Not on file   Social History Narrative     Not on file     Social Determinants of Health     Financial Resource Strain: Medium Risk     Difficulty of Paying Living Expenses: Somewhat hard   Food Insecurity: No Food Insecurity     Worried About Running Out of Food in the Last Year: Never true     Ran Out of Food in the Last Year: Never true   Transportation Needs: No Transportation Needs     Lack of Transportation (Medical): No     Lack of Transportation (Non-Medical): No   Physical Activity: Not on file   Stress: Not on file   Social Connections: Not on file   Intimate Partner Violence: Not on file   Housing Stability: Low Risk      Unable to Pay for Housing in the Last Year: No     Number of Places Lived in the Last Year: 1     Unstable Housing in the Last Year: No           Lab  Results    Chemistry/lipid CBC Cardiac Enzymes/BNP/TSH/INR   Lab Results   Component Value Date    CHOL 150 04/21/2022    HDL 45 (L) 04/21/2022    TRIG 94 04/21/2022    BUN 22.8 02/02/2023     02/02/2023    CO2 30 (H) 02/02/2023    Lab Results   Component Value Date    WBC 7.2 02/02/2023    HGB 9.0 (L) 02/02/2023    HCT 29.4 (L) 02/02/2023     (H) 02/02/2023     02/02/2023    Lab Results   Component Value Date    TSH 8.03 (H) 01/31/2023    INR 4.28 (H) 02/02/2023     No results found for: CKTOTAL, CKMB, TROPONINI       Weight:    Wt Readings from Last 3 Encounters:   02/03/23 81.5 kg (179 lb 10.8 oz)   01/31/23 85.7 kg (189 lb)   09/15/22 77.1 kg (170 lb)       Allergies  Allergies   Allergen Reactions     Amoxicillin Hives     Cephalexin Other (See Comments)     Reaction with intervenous administration.     Penicillins Hives     Latex Rash     Other Environmental Allergy Other (See Comments)     Pollen causes seasonal allergies.         Surgical History  Past Surgical History:   Procedure Laterality Date     HYSTERECTOMY TOTAL ABDOMINAL, BILATERAL SALPINGO-OOPHORECTOMY, COMBINED         Social History  Tobacco:   History   Smoking Status     Former     Types: Cigarettes     Quit date: 1/1/1991   Smokeless Tobacco     Never    Alcohol:   Social History    Substance and Sexual Activity      Alcohol use: Yes   Illicit Drugs:   History   Drug Use Not on file       Family History  Family History   Problem Relation Age of Onset     Coronary Artery Disease Brother           Parth Hunter MD on 2/3/2023      cc: Roc Mares

## 2023-02-03 NOTE — PLAN OF CARE
Problem: Heart Failure  Goal: Stable Heart Rate and Rhythm  Outcome: Progressing     Problem: Heart Failure  Goal: Effective Oxygenation and Ventilation  Outcome: Progressing  Intervention: Promote Airway Secretion Clearance  Recent Flowsheet Documentation  Taken 2/3/2023 0420 by Jo Khan RN  Cough And Deep Breathing: done independently per patient  Activity Management: activity adjusted per tolerance  Taken 2/3/2023 0020 by Jo Khan RN  Cough And Deep Breathing: done independently per patient  Activity Management: activity adjusted per tolerance  Taken 2/2/2023 2020 by Jo Khan RN  Cough And Deep Breathing: done independently per patient  Activity Management: activity adjusted per tolerance  Intervention: Optimize Oxygenation and Ventilation  Recent Flowsheet Documentation  Taken 2/3/2023 0420 by Jo Khan RN  Head of Bed (HOB) Positioning: HOB at 20-30 degrees  Taken 2/3/2023 0020 by Jo Khan RN  Head of Bed (HOB) Positioning: HOB at 30-45 degrees  Taken 2/2/2023 2020 by Jo Khan RN  Head of Bed (HOB) Positioning: HOB at 30-45 degrees     Goal Outcome Evaluation:  Pt on 5L oxymask with O2 sats in the low 90s. Endorses some sob at rest. LS diminished. 1500 FR maintained. IV lasix given as scheduled with adequate urine output. Purewick in place overnight. A. Fib with HR in 80s. Denies pain. A/O. VSS. Will continue to monitor and notify MD of any changes.

## 2023-02-03 NOTE — PROGRESS NOTES
SW received TCU choices from patient's niece. First choice is [facility], second choice is [facility] then [facility] or [facility]. Does not want [facility] or [facility]. Referrals sent.     1:40 PM  [facility] can accept when medically ready.    Marie Vela

## 2023-02-04 NOTE — PLAN OF CARE
Goal Outcome Evaluation:      Plan of Care Reviewed With: patient    Patient denies pain. Dyspnea with activity. Attempting to wean off oxygen. Replaced oximeter probe x 2 already to get accurate recording. Utilizing purewick to get accurate I/O's.

## 2023-02-04 NOTE — PROGRESS NOTES
[facility] HEART [facility]   [address], [place] [address]   [email or web address]   OFFICE: [phone]            Impression and Plan     1.  Hypervolemia.  Miesha presents with fluid retention/hypervolemia felt in part related to impaired diastolic filling.  Echocardiogram this admission revealed normal ejection fraction of 55-60% (see Cardiac Diagnostics section below).  Miesha had 3.8 L urine output through the day yesterday and additional 1.3 L urine output thus far this morning.  Weight gradually decreasing since admission and as of today 5.7 kg down from presentation.  Creatinine stable at 1.03 mg/dL despite diuresis.    Will likely transition to enteral diuretic/furosemide tomorrow, 5 February 2023    2.  Atrial fibrillation.  Ventricular response controlled.  Patient has been maintained on warfarin.  INR today 2.78.    Continue metoprolol succinate 100 mg daily.    Continue warfarin.    3.  Hypertension.  Blood pressure most recently 144/76 mmHg.    4.  Dyslipidemia.    Continue statin therapy.    Findings and plan were discussed with patient's niece, Mariluz (and Mariluz's , Alexander).    Primary Cardiologist: Dr. Parth Hunter    Subjective     Miesha states that her breathing is more comfortable today.  Denies chest pain.  No subjective palpitations despite atrial fibrillation.      Cardiac Diagnostics   Telemetry (personally reviewed): Atrial fibrillation with heart rate in the 80s.    Echocardiogram 1 February 2023:  1. Normal left ventricular size and systolic performance with ejection fraction 55-60%.   2. Moderate-severe tricuspid insufficiency.  3. Moderate right ventricular enlargement with mildly reduced right ventricular systolic performance.  4. Moderate left atrial enlargement.  Moderate-severe left atrial enlargement.  5. Right ventricular systolic pressure relative to right atrial pressure is mildly increased.  The pulmonary artery  "pressure is estimated to be 40-45 mmHg plus right atrial pressure    Physical Examination       BP (!) 144/76 (BP Location: Left arm)   Pulse 85   Temp 97.4  F (36.3  C) (Oral)   Resp 18   Ht 1.588 m (5' 2.5\")   Wt 80 kg (176 lb 5.9 oz)   SpO2 93%   BMI 31.74 kg/m          Vitals:    01/31/23 1632 02/02/23 0502 02/03/23 0441 02/04/23 0337   Weight: 85.7 kg (189 lb) 82.6 kg (182 lb 1.6 oz) 81.5 kg (179 lb 10.8 oz) 80 kg (176 lb 5.9 oz)              Intake/Output Summary (Last 24 hours) at 2/4/2023 1605  Last data filed at 2/4/2023 1300  Gross per 24 hour   Intake 1120 ml   Output 2250 ml   Net -1130 ml       The patient is alert and oriented times three. Sclerae are anicteric. Mucosal membranes are moist.No significant adenopathy/thyromegally appreciated. Lungs are somewhat diminished with a few fine crackles at the bases.  The mid and apices are fairly clear.  On cardiovascular exam, the patient has irregular S1 and S2. Abdomen is soft and non-tender. Extremities reveal no clubbing, cyanosis.         Imaging     Chest radiograph 31 January 2023:  1. Single AP view of the chest was obtained.   2. Enlarged cardiac silhouette and mild pulmonary vascular congestion.   3. Bibasilar pulmonary opacities, could be related to small bilateral pleural effusions and associated basilar atelectasis/consolidation.   4. No significant pneumothorax.    Lab Results     Recent Labs   Lab 02/04/23  1206 02/04/23  0613 02/04/23  0016 02/03/23  0844 02/03/23  0455 02/02/23  1044 02/02/23  0651 02/02/23  0444 02/01/23  2152 02/01/23  0815 01/31/23  1644   WBC  --   --   --   --   --   --  7.2  --   --  6.1 6.1   HGB  --  8.7*  --   --  8.8*  --  9.0*  --   --  9.7* 10.5*   MCV  --   --   --   --   --   --  101*  --   --  103* 102*   PLT  --   --   --   --  181  --  207  --   --  203 234   INR  --  2.78*  --   --  4.03*  --   --  4.28*   < >  --  4.51*   NA  --  142  --  146*  --   --  145  --   --  140 141   POTASSIUM 3.5 3.3*  " 3.3* 3.1* 2.9*  --    < > 3.1* 3.2*   < > 3.2* 2.6*   CHLORIDE  --  94*  --  99  --   --  103  --   --  102 99   CO2  --  41*  --  35*  --   --  30*  --   --  28 27   BUN  --  17.4  --  18.5  --   --  22.8  --   --  26.6* 26.0*   CR  --  1.03*  --  1.03*  --   --  1.09*  --   --  1.19* 1.25*   ANIONGAP  --  7  --  12  --   --  12  --   --  10 15   JOSE CARLOS  --  8.4  --  8.3  --   --  8.1*  --   --  8.0* 8.4   GLC  --  112*  --  104*  --   --  102*  --   --  105* 123*   ALBUMIN  --   --   --   --   --   --   --   --   --   --  3.2*   PROTTOTAL  --   --   --   --   --   --   --   --   --   --  7.9   BILITOTAL  --   --   --   --   --   --   --   --   --   --  2.3*   ALKPHOS  --   --   --   --   --   --   --   --   --   --  88   ALT  --   --   --   --   --   --   --   --   --   --  11   AST  --   --   --   --   --   --   --   --   --   --  20    < > = values in this interval not displayed.     Recent Labs   Lab Test 01/31/23  1644   NTBNPI 6,483*       Lab Results   Component Value Date     02/04/2023    CO2 41 02/04/2023    CO2 24 06/15/2022    BUN 17.4 02/04/2023    BUN 27 06/15/2022     Lab Results   Component Value Date    WBC 7.2 02/02/2023    HGB 8.7 02/04/2023    HCT 29.4 02/02/2023     02/02/2023     02/03/2023     Lab Results   Component Value Date    CHOL 150 04/21/2022    TRIG 94 04/21/2022    HDL 45 04/21/2022     Lab Results   Component Value Date    INR 2.78 02/04/2023       Lab Results   Component Value Date    TSH 8.03 01/31/2023    TSH 4.23 11/22/2021           Current Inpatient Scheduled Medications   Scheduled Meds:    vitamin C  125 mg Oral Daily     levothyroxine  50 mcg Oral Daily     metoprolol succinate ER  100 mg Oral Daily     simvastatin  10 mg Oral At Bedtime     sodium chloride (PF)  3 mL Intracatheter Q8H     warfarin ANTICOAGULANT  0.5 mg Oral ONCE at 18:00     Warfarin Therapy Reminder  1 each Oral See Admin Instructions     Continuous Infusions:    furosemide (LASIX)  infusion ADULT STANDARD 15 mg/hr (02/04/23 1033)            Medications Prior to Admission   Prior to Admission medications    Medication Sig Start Date End Date Taking? Authorizing Provider   acetaminophen (TYLENOL) 650 MG CR tablet Take 650 mg by mouth every 8 hours as needed 11/5/14  Yes Provider, Historical   amLODIPine (NORVASC) 2.5 MG tablet TAKE 1 TABLET BY MOUTH DAILY 12/26/22  Yes Erwin Shay MD   ascorbic acid (VITAMIN C) 100 MG tablet [ASCORBIC ACID (VITAMIN C) 100 MG TABLET] Take 100 mg by mouth daily. 11/5/14  Yes Provider, Historical   furosemide (LASIX) 20 MG tablet Take 20 mg by mouth daily   Yes Reported, Patient   levothyroxine (SYNTHROID/LEVOTHROID) 50 MCG tablet TAKE 1 TABLET EVERY DAY 7/31/22  Yes Roc Mares MD   loratadine (CLARITIN) 10 mg tablet [LORATADINE (CLARITIN) 10 MG TABLET] Take 10 mg by mouth as needed.  11/5/14  Yes Provider, Historical   metoprolol succinate ER (TOPROL XL) 100 MG 24 hr tablet TAKE 1 TABLET BY MOUTH EVERY DAY 5/16/22  Yes Roc Mares MD   simvastatin (ZOCOR) 20 MG tablet TAKE 1/2 TABLET BY MOUTH DAILY IN THE EVENING. 5/16/22  Yes Roc Mares MD   warfarin ANTICOAGULANT (COUMADIN) 5 MG tablet New Dose - Take 1/2 tablet (2.5mg) by mouth daily, as directed.  Adjust dose based on INR results. 8/4/22  Yes Roc Mares MD   lisinopril-hydrochlorothiazide (ZESTORETIC) 20-12.5 MG tablet TAKE 2 TABLETS EVERY DAY  Patient not taking: Reported on 1/31/2023 8/1/22   Roc Mares MD

## 2023-02-04 NOTE — PLAN OF CARE
Problem: Pain Acute  Goal: Optimal Pain Control and Function  Outcome: Progressing  Intervention: Prevent or Manage Pain  Recent Flowsheet Documentation  Taken 2/4/2023 0000 by Karen Cohen, RN  Medication Review/Management: medications reviewed  Taken 2/3/2023 2000 by Karen Cohen, RN  Medication Review/Management: medications reviewed     Problem: Electrolyte Imbalance  Goal: Electrolyte Imbalance: Plan of Care  Outcome: Progressing   Goal Outcome Evaluation:     Complained of lower back pain- tylenol given.  K-3.1 and magnesium- 2.0 replaced per protocol.  A little forgetful- takes her oxymask off at times- reorientation done.  Afib- 80 to 90's with occasional PVCs

## 2023-02-04 NOTE — PLAN OF CARE
Goal Outcome Evaluation:      Plan of Care Reviewed With: patient    Patient alert and oriented, but forgetful. Denies pain. External catheter used to get accurate I/O's while lasix drip going. Good urine output. Replacing electrolytes per protocols. Bed alarm on for safety.

## 2023-02-04 NOTE — PROGRESS NOTES
[facility] Hospitalist Progress Note  [facility]  Admission date: 1/31/2023    Summary:    89F with HFpEF, tricuspid regurgitation, atrial flutter on coumadin, HTN, who was referred to our ED for admission from her PCP's clinic for evaluation of shortness of breath and weight gain (reports 20+lbs).     Assessment/Plan    #Acute on chronic HFpEF and severe TR  #moderate pulm HTN  -IV diuresis, noted infusion and albumin by Dr. Hunter.  Appreciate care.  -d/w catalina 2/2 who reports Ms. Quarles wasn't taking water pills prior to admission.    #Hypoxic respiratory failure - due to CHF probably with OHS and atelectasis as well.  -diuresis, O2 wean as able.  IC  -if still needing O2 despite diuresis consider repeat CXR and eval for pleural effusions that could be tapped.     #mild hypernatremia -   Not taking too much in gave a little D5W as well on 2/3.  Now better.  encourage free water intake.    #Hypokalemia and Mg - replacement     #MAXIM on CKD-3: improving with diuresis c/w   -- Likely associated with problem # 1  -- stable     #Chronic atrial flutter/fib - toprol, coumadin (supratherapeutic)    #Hypothyroidism - synthroid     #Fall:  CT head and C-spine- no acute issuees    #anemia - Hb drifting down without overt bleeding.  Supratherapeutic INR 4s, will give very low dose vitamin K.  Monitor.      Checklist:  Code Status: Full Code    Diet: Fluid restriction 1500 ML FLUID  Combination Diet Low Saturated Fat Na <2400mg Diet, No Caffeine Diet     DVT px:  Warfarin    Disposition and Discharge Planning  Auto-populated from discharge tab:    Expected Discharge Date: 02/06/2023    Discharge Delays: IV Medication - consider oral or Home Infusion  Oxygen Needs - Arrange Home O2  Placement - TCU  Destination: inpatient rehabilitation facility  Discharge Comments: Lasix gtt, TCU acceptance         System Identified Risk Variables  Auto-populated based on system request - if relevant they will be addressed  "above:  Clinically Significant Risk Factors        # Hypokalemia: Lowest K = 2.9 mmol/L in last 2 days, will replace as needed  # Hypernatremia: Highest Na = 146 mmol/L in last 2 days, will monitor as appropriate    # Hypomagnesemia: Lowest Mg = 1.5 mg/dL in last 2 days, will replace as needed   # Hypoalbuminemia: Lowest albumin = 3.2 g/dL at 1/31/2023  4:44 PM, will monitor as appropriate           # Obesity: Estimated body mass index is 31.74 kg/m  as calculated from the following:    Height as of this encounter: 1.588 m (5' 2.5\").    Weight as of this encounter: 80 kg (176 lb 5.9 oz)., PRESENT ON ADMISSION             Interval Events/Subjective/Notable results:    K3.3, Cr stable, INR 2.8, Hb stable 8.7, Na 142  -2.3  When I saw this AM oxymask 5-6L and 100%.  Turned down to 2LPM and asked to change to NC.    Objective    Vital signs in last 24 hours  Temp:  [97.1  F (36.2  C)-98.4  F (36.9  C)] 97.6  F (36.4  C)  Pulse:  [81-94] 90  Resp:  [18-22] 18  BP: (114-156)/(61-77) 132/69  SpO2:  [92 %-100 %] 92 % O2 Device: Oxymask    Weight:   176 lbs 5.89 oz  Body mass index is 31.74 kg/m .    Intake/Output last 3 shifts  I/O last 3 completed shifts:  In: 1554.5 [P.O.:1380; I.V.:174.5]  Out: 3900 [Urine:3900]    Physical Exam  General:  Alert, cooperative, no distress, frail    Neurologic:  oriented, facial symmetry preserved, fluent speech.   Psych: calm  HEENT:  Anicteric, MMM  CV: RRR no MRG, normal S1 and S2, +edema  Lungs:  Clear anteriorally  Abd: soft, NT  Skin: no rashes or jaundice noted on exposed skin.    [place] Catheter: Not present  Lines: None          Medical Decision Making           Gricelda Singleton MD, [facility]  Internal Medicine Hospitalist  "

## 2023-02-05 PROBLEM — I50.33 ACUTE ON CHRONIC HEART FAILURE WITH PRESERVED EJECTION FRACTION (HFPEF) (H): Status: ACTIVE | Noted: 2023-01-01

## 2023-02-05 NOTE — PLAN OF CARE
Problem: Pain Acute  Goal: Optimal Pain Control and Function  Outcome: Progressing  Intervention: Develop Pain Management Plan  Recent Flowsheet Documentation  Taken 2/4/2023 2045 by Karen Cohen RN  Pain Management Interventions: medication (see MAR)  Intervention: Prevent or Manage Pain  Recent Flowsheet Documentation  Taken 2/5/2023 0430 by Karen Cohen RN  Medication Review/Management: medications reviewed  Taken 2/5/2023 0000 by Karen Cohen RN  Medication Review/Management: medications reviewed  Taken 2/4/2023 2040 by Karen Cohen RN  Medication Review/Management: medications reviewed     Problem: Dysrhythmia  Goal: Normalized Cardiac Rhythm  Outcome: Progressing   Goal Outcome Evaluation:    Complained of back pain- resolved with tylenol.  Afib- 90's  Still has Dyspnea on exertion  On lasix drip at 15 mg/hr. - pt had a large incontinent urine, purewick was misplaced while pt was turning in bed. Incontinence care done. Only some urine were recorded accurately.   IV infiltrated- new one inserted.

## 2023-02-05 NOTE — PROGRESS NOTES
[facility]    Medicine Progress Note - Hospitalist Service    Date of Admission:  1/31/2023    Assessment & Plan   89 year-old female with HFpEF, tricuspid regurgitation, atrial flutter on coumadin, HTN, who was referred to the ED on 1/31/2023 for admission from her PCP's clinic for evaluation of shortness of breath and weight gain (reported 20+lbs).  Admitted with acute hypoxic respiratory failure secondary to acute on chronic started on IV diuresis on Lasix drip and also received albumin infusion.  Cardiology was also consulted for management of CHF.  TTE on 1/31 noted EF 55 to 60%, severe TR, biatrial enlargement, mild to moderate pulmonary hypertension.    -Wean O2 from 4 L to 1 L O2 this AM. Continue with IV diuresis. Discharge in 2-3 days. Plan to discharge to TCU.    Acute Hypoxic Respiratory Failure  Acute on Chronic HFpEF   Severe TR  Moderate Pulm HTN  Patient was not taking water pills PTA.  -Weaned O2 4L to 1L this AM, will further wean with goal sat > 90%  -Cardiology consulted, appreciate recs  -Will consult CORE  -Daily Weight, I/O  -Lasix gtt (home dose Lasix 20 mg Daily)   - May transition to oral in next 24 hours     CKD stage 3  MAXIM not present. Creatinine was not greater than 1.5x baseline given baseline CKD.  - Cr stable ~ 1-1.1, mild elevation to 1.25 on admission  - Monitor with diuresis     Fall  CT head and C-spine. no acute issuees  - Plan for TCU post discharge      Anemia   Hgb 10.5 on admission, baseline ~ 12.  - Gradually drifted down, no overt sign of bleeding  - Continue to monitor    Supratherapeutic INR   Chronic Atrial Flutter/fib  INR 4s on admission.  - Given supratherapeutic INR can consider switching to Eliquis on discharge, would be $47 a month  - Coumadin with pharmacy dosing, appreciate recs  - Continue Metoprolol  mg Daily      Chronic Problems  Hypothyroidism: Synthroid    HTN: Holding Norvasc 2.5 mg Daily, Lisinopril-hydrochlorothiazide  "40-25 mg Daily while diuresing    HLD: Continue Statin     Resolved Problems  Mild Hypernatremia: Not taking too much in gave a little D5W as well on 2/3.     Hypokalemia and Mg       Diet: Fluid restriction 1500 ML FLUID  Combination Diet Low Saturated Fat Na <2400mg Diet, No Caffeine Diet    DVT Prophylaxis: Warfarin  Alcantara Catheter: Not present  Lines: None     Cardiac Monitoring: None  Code Status: Full Code      Clinically Significant Risk Factors        # Hypokalemia: Lowest K = 3.1 mmol/L in last 2 days, will replace as needed     # Hypomagnesemia: Lowest Mg = 1.5 mg/dL in last 2 days, will replace as needed   # Hypoalbuminemia: Lowest albumin = 3.2 g/dL at 1/31/2023  4:44 PM, will monitor as appropriate           # Obesity: Estimated body mass index is 30.95 kg/m  as calculated from the following:    Height as of this encounter: 1.588 m (5' 2.5\").    Weight as of this encounter: 78 kg (171 lb 15.3 oz).          Disposition Plan     Expected Discharge Date: 02/06/2023    Discharge Delays: IV Medication - consider oral or Home Infusion  Oxygen Needs - Arrange Home O2  Placement - TCU  Destination: inpatient rehabilitation facility  Discharge Comments: Lasix gtt, TCU acceptance          Douglas Barrientos MD  Hospitalist Service  [facility]  Securely message with [employer] (more info)  Text page via [facility] Paging/Directory   ______________________________________________________________________    Interval History   No acute events overnight.     Patient seen and evaluated at bedside. Denies any chest pain or abdominal pain. No significant shortness of breath either.    Spoke to patient's niece with an update. Also asked whether could afford $47 per month for Eliquis copay.    Physical Exam   Vital Signs: Temp: 97.9  F (36.6  C) Temp src: Oral BP: 117/59 Pulse: 86   Resp: 20 SpO2: 97 % O2 Device: Nasal cannula Oxygen Delivery: 4 LPM  Weight: 171 lbs 15.34 oz    General: laying in bed, appears " comfortable  CV: +S1/S2, no m/r/g, no significant pitting edema  Respiratory: clear to auscultation bilaterally  GI: soft, NT, ND  Neuro: alert, cranial nerves grossly intact    Medical Decision Making       50 MINUTES SPENT BY ME on the date of service doing chart review, history, exam, documentation & further activities per the note.      Data     I have personally reviewed the following data over the past 24 hrs:    N/A  \   N/A   / N/A     143 95 (L) 18.3 /  104 (H)   3.6 40 (H) 1.02 (H) \       INR:  1.85 (H) PTT:  N/A   D-dimer:  N/A Fibrinogen:  N/A       Imaging results reviewed over the past 24 hrs:   No results found for this or any previous visit (from the past 24 hour(s)).

## 2023-02-05 NOTE — PLAN OF CARE
Goal Outcome Evaluation:      Plan of Care Reviewed With: patient, family    Sepsis Protocol fired. Lactic Acid 2.5. MD aware. No change in patient status. VSS. Denies pain. Attempted to wean off oxygen. Oxygen saturations in low 80's on RA.

## 2023-02-05 NOTE — PROGRESS NOTES
[facility]  Cardiology      Progress Note: Corey Blancas MD    Primary Care: Roc Nowak    Primary Cardiology: Dr. Coreen Hunter    Assessment:         HFpEF: Chronic problem for patient with most recent LVEF 55-60% by echo dated 2/1/2023.  The patient does not report significant shortness of breath at the time of my visit.  She has diuresed just a little over 1 L in the past 24 hours and just over 6 L over her entire hospitalization.  The patient's renal function remained stable on her furosemide infusion.  Would plan to continue infusion as she continues to manifest evidence of hypervolemia.    Longstanding persistent atrial fibrillation: EKGs demonstrate atrial fibrillation dating back at least 5 years.  The patient's ventricular response is reasonably well controlled on her current medical therapy.  The patient is on oral anticoagulant therapy however her INR is subtherapeutic today.    Essential hypertension: Chronic problem for the patient and her blood pressure over the past 2 days is at target.    Principal Problem:    Acute on chronic heart failure with preserved ejection fraction (HFpEF) (H)     LOS: 5 days       Recommendations:    Continue current furosemide infusion with reassessment tomorrow.    Warfarin dosing per protocol      Time spent: I have spent 35 minutes with the patient, and >50% of which was spent on coordination of care and counseling as outlined above.      Subjective:  Miesha Quarles (89 year old female) is new to me, chart reviewed, and patient examined.  The patient is quite sleepy at the time of my visit.  She does awaken appropriately and answers appropriately.  She reports no shortness of breath or chest discomfort.  She is not having any difficulty with her current medication.    No current outpatient medications on file.       Objective:   Vital signs in last 24 hours:  Temp:  [97.4  F (36.3  C)-98.2  F (36.8  C)] 97.8  F (36.6  C)  Pulse:  [84-96]  84  Resp:  [18-20] 20  BP: ()/(51-76) 103/54  SpO2:  [85 %-100 %] 98 %  Weight:   [unfilled]   Weight change: -2 kg (-4 lb 6.6 oz)      Physical Exam:  General: The patient is alert oriented to person place and situation.  The patient is in no acute distress at the time of my evaluation.  Eyes: Pupils are equal, round, and reactive to light.  Conjunctiva and sclera are clear.  ENT: Oral mucosa is moist and without redness. No evident nasal discharge.  Pulmonary: Lungs are clear bilaterally with no rales, rhonchi, or wheezes.    Cardiovascular exam: Rhythm is irregular.  S1 and S2 are normal. No apparent gennaro. No significant murmur is present. JVP is moderately elevated.  Lower extremities demonstrate no significant edema. Distal pulses are intact bilaterally.  Abdomen is soft, nontender, with no organomegaly. Bowel sounds are present.  Musculoskeletal: Spine is straight. Extremities without deformity.  Neuro: Gait is not observed as the patient is resting in a chair.     Skin is warm, dry, and otherwise intact.        Cardiographics:   Cardiac telemetry, personally reviewed demonstrates atrial fibrillation with controlled ventricular response.  No significant bradycardia.  No excessive tachycardia.  Echocardiogram dated 2/1/2023  1.Left ventricular size, wall motion and function are normal. The ejection  fraction is 55-60%.  2.The right ventricle is moderately dilated. Mildly decreased right  ventricular systolic function.  3.Significant biatrial chamber enlargement.  4.There is moderately severe (3+) tricuspid regurgitation. ERO is 0.28 cmÂ   with a volume of 32 cc..  5.IVC diameter >2.1 cm collapsing <50% with sniff suggests a high RA pressure  estimated at 15 mmHg or greater.  6.Right ventricular systolic pressure is elevated, consistent with mild to  moderate pulmonary hypertension.  Compared to the prior study dated 5/18/2022, the RV is larger and the TR is  More.      Radiology:      Lab Results:    Lab Results   Component Value Date     02/05/2023    CO2 40 02/05/2023    CO2 24 06/15/2022    BUN 18.3 02/05/2023    BUN 27 06/15/2022     Lab Results   Component Value Date    WBC 7.2 02/02/2023    HGB 8.7 02/04/2023    HCT 29.4 02/02/2023     02/02/2023     02/03/2023     Lab Results   Component Value Date    INR 1.85 02/05/2023       Outside record review:

## 2023-02-06 NOTE — PROGRESS NOTES
Care Management Follow Up    Length of Stay (days): 6    Expected Discharge Date: 02/07/2023     Concerns to be Addressed: discharge planning      Patient plan of care discussed at interdisciplinary rounds: Yes    Anticipated Discharge Disposition: Skilled Nursing Facility, Transitional Care     Anticipated Discharge Services:  TCU  Anticipated Discharge DME: [facility]    Patient/family educated on Medicare website which has current facility and service quality ratings: yes   Education Provided on the Discharge Plan: yes   Patient/Family in Agreement with the Plan: yes     Referrals Placed by CM/SW:    Private pay costs discussed: Not applicable    Additional Information:  11:39 AM  VENKAT spoke to Carrie at [facility] ([facility]) regarding Pt's status. VENKAT asked if CM could plan on Pt discharging tomorrow. Carrie stated Pt would be welcome to admit at any time after 12 PM tomorrow. VENKAT told Carrie CM will follow up once a transportation plan has been arranged.    2:17 PM  SW met and spoke with Pt to inform her of tentative discharge plans for tomorrow and discuss a plan for transport. Pt requested SW call her niece Mariluz to discuss transportation for tomorrow.    3:30 PM  VENKAT called and spoke to Mariluz to discuss transportation for discharge. Mariluz reported that she would be able to transport Pt tomorrow and will plan to pick Pt up for discharge around 1 PM. VENKAT stated that CM will connect with Mariluz in the morning to confirm all final discharge plans.     DESTINI Curtis

## 2023-02-06 NOTE — PLAN OF CARE
Problem: Electrolyte Imbalance  Goal: Electrolyte Imbalance: Plan of Care  Outcome: Progressing     Problem: Gas Exchange Impaired  Goal: Optimal Gas Exchange  Outcome: Progressing     Problem: Pain Acute  Goal: Optimal Pain Control and Function  Outcome: Progressing  Intervention: Prevent or Manage Pain  Recent Flowsheet Documentation  Taken 2/6/2023 0800 by Philip Mathews RN  Medication Review/Management: medications reviewed     Problem: Dysrhythmia  Goal: Normalized Cardiac Rhythm  Outcome: Progressing   Goal Outcome Evaluation:       Pt is alert and oriented x4. Stated this AM that she is just tired and did not get enough sleep. Denied SOB, chest pain and dizziness. Vitals WNL. Pt on O2 via NC at 1 lpm tolerating well, O2 sats >92%.    Pt up on chair and had bfast. Enforced 1500mL fluid restriction; pt verbalized understanding.    Pt complained of backpain; 4/10 aching; PRN tylenol 2 tabs PO given per order.

## 2023-02-06 NOTE — PLAN OF CARE
Problem: Dysrhythmia  Goal: Normalized Cardiac Rhythm  Outcome: Progressing   Goal Outcome Evaluation:    Afib- 90's  Still has Dyspnea on exertion  On lasix drip at 15 mg/hr. - discontinued.   To start po bumex this am

## 2023-02-06 NOTE — PROGRESS NOTES
[facility]    Medicine Progress Note - Hospitalist Service    Date of Admission:  1/31/2023    Assessment & Plan   89 year-old female with HFpEF, tricuspid regurgitation, atrial flutter on coumadin, HTN, who was referred to the ED on 1/31/2023 for admission from her PCP's clinic for evaluation of shortness of breath and weight gain (reported 20+lbs).  Admitted with acute hypoxic respiratory failure secondary to acute on chronic started on IV diuresis on Lasix drip and also received albumin infusion.  Cardiology was also consulted for management of CHF.  TTE on 1/31 noted EF 55 to 60%, severe TR, biatrial enlargement, mild to moderate pulmonary hypertension.  Was transition from Lasix drip to oral Bumex on 2/6, and was down to 1L O2 at that point.    -O2 requirements 1 L O2 this AM.  Switch to oral diuretics today and monitor weights/fluid status. Plan to discharge to TCU likely tomorrow.    Acute Hypoxic Respiratory Failure  Acute on Chronic HFpEF   Severe TR  Moderate Pulm HTN  Patient was not taking water pills PTA.  -O2 1L this AM, will further wean with goal sat > 90%  -Cardiology and CORE consulted, appreciate recs  -Daily Weight, I/O  -Switch Lasix drip to Bumex 2 mg Daily (home dose Lasix 20 mg Daily)  -If stable on this likely discharge tomorrow     CKD stage 3  MAXIM not present. Creatinine was not greater than 1.5x baseline given baseline CKD.  - Cr stable ~ 1-1.1  - Monitor with diuresis     Fall  CT head and C-spine. No acute issuees  - Plan for TCU post discharge      Anemia   Hgb 10.5 on admission, baseline ~ 12.  - Gradually drifted down, no overt sign of bleeding  - Continue to monitor    Supratherapeutic INR   Chronic Atrial Flutter/fib  INR 4s on admission.  - Given supratherapeutic INR can consider switching to Eliquis on discharge, would be $47 a month (spoke to patient's niece Mariluz who will talk with patient's insurance to see how much deductible left and if feasible to  "switch financially over to Eliquis, she should call back and let hospital staff know)  - Coumadin with pharmacy dosing, appreciate recs  - Continue Metoprolol  mg Daily      Chronic Problems  Hypothyroidism: Synthroid    HTN: Holding Norvasc 2.5 mg Daily, Lisinopril-hydrochlorothiazide 40-25 mg Daily while diuresing, BP stable off these meds.    HLD: Continue Statin     Resolved Problems  Mild Hypernatremia: Not taking too much in gave a little D5W as well on 2/3.     Hypokalemia and Mg       Diet: Fluid restriction 1500 ML FLUID  Combination Diet Low Saturated Fat Na <2400mg Diet, No Caffeine Diet    DVT Prophylaxis: Warfarin  Alcantara Catheter: Not present  Lines: None     Cardiac Monitoring: None  Code Status: Full Code      Clinically Significant Risk Factors        # Hypokalemia: Lowest K = 3.2 mmol/L in last 2 days, will replace as needed       # Hypoalbuminemia: Lowest albumin = 3.2 g/dL at 1/31/2023  4:44 PM, will monitor as appropriate           # Overweight: Estimated body mass index is 28.81 kg/m  as calculated from the following:    Height as of this encounter: 1.588 m (5' 2.5\").    Weight as of this encounter: 72.6 kg (160 lb 0.9 oz).          Disposition Plan      Expected Discharge Date: 02/07/2023    Discharge Delays: IV Medication - consider oral or Home Infusion  Oxygen Needs - Arrange Home O2  Destination: inpatient rehabilitation facility  Discharge Comments: Accepted at [facility] ([facility]) once medically cleared and a bed is available          Douglas Barrientos MD  Hospitalist Service  [facility]  Securely message with [employer] (more info)  Text page via [facility] Paging/Directory   ______________________________________________________________________    Interval History   No acute events overnight.     Patient seen and evaluated at bedside.  Feels better today, sitting in chair at time of examination.  Denies any significant shortness of breath.  No chest pain " or abdominal pain either.    Physical Exam   Vital Signs: Temp: 98.3  F (36.8  C) Temp src: Oral BP: 113/56 Pulse: 92   Resp: 20 SpO2: 100 % O2 Device: Nasal cannula Oxygen Delivery: 1 LPM  Weight: 160 lbs .86 oz    General: sitting in chair, NAD  CV: +S1/S2, no m/r/g, no significant pitting edema  Respiratory: CTA BL, no wheeze/crackles  GI: soft, NT, nondistended  Neuro: alert    Medical Decision Making       40 MINUTES SPENT BY ME on the date of service doing chart review, history, exam, documentation & further activities per the note.      Data     I have personally reviewed the following data over the past 24 hrs:    7.7  \   10.2 (L)   / 221     142 93 (L) 17.9 /  108 (H)   3.7 39 (H) 1.04 (H) \       Procal: N/A CRP: N/A Lactic Acid: 3.0 (H)       INR:  1.60 (H) PTT:  N/A   D-dimer:  N/A Fibrinogen:  N/A       Ferritin:  268 % Retic:  N/A LDH:  N/A       Imaging results reviewed over the past 24 hrs:   No results found for this or any previous visit (from the past 24 hour(s)).

## 2023-02-06 NOTE — TELEPHONE ENCOUNTER
Spoke with Mariluz (on consent to communicate). Relayed Dr. Mares's recommendation to her as well as recommended following up with the patient's care team in the hospital if she has further questions regarding the reasoning behind recommending the medication change.     Mariluz stated understanding and did not have any further questions at the time of this phone call.

## 2023-02-06 NOTE — PROGRESS NOTES
HEART CARE NOTE          Assessment/Recommendations     1. HFpEF c/b ADHF  Assessment / Plan    Nearing euvolemia - will transition to oral diuretic regimen and continue to monitor    GDMT as detailed below; mainstay of treatment for HFpEF includes diuretics and adequate BP control (class I) and SGLT2-I (class 2a); additional medical therapy (ARNI, MRA, ARB) demonstrated less robust evidence for indication but may be considered per guideline recommendations (2b); no indication for BBlockers      Current Pharmacotherapy AHA Guideline-Directed Medical Therapy   Losartan - on hold while monitoring renal function ARNI/ARB   Spironolactone not started  MRA   SGLT2 inhibitor not started SGLT2-I    Bumex 2 mg daily Loop diuretic       2. Atrial fibrillation  Assessment / Plan    Rate controlled; continue metoprolol     Warfarin per pharmacy management      3. MAXIM on CKD - stable  Assessment / Plan    Continue to monitor renal function closely with diuresis     4. HLP  Assessment / Plan    Continue statin     5. HTN  Assessment / Plan    Adequately controlled on current regimen - no changes at this time      History of Present Illness/Subjective      Ms. Miesha Quarles is a 89 year old female with a PMHx significant for (per Dr. Acuna's note) diastolic CHF, atrial flutter on coumadin, hypertension, hyperlipidemia and hypothyroidism who was referred to our ED for admission from her PCP's clinic for evaluation of shortness of breath and weight gain.     Today, Mrs. Quarles denies any acute cardiac events or complaints; Management plan as detailed above     ECG: Personally reviewed. atrial fibrillation, rate controlled.     ECHO (personnaly Reviewed):  1.Left ventricular size, wall motion and function are normal. The ejection  fraction is 55-60%.  2.The right ventricle is moderately dilated. Mildly decreased right  ventricular systolic function.  3.Significant biatrial chamber enlargement.  4.There is moderately severe  "(3+) tricuspid regurgitation. ERO is 0.28 cmÂ   with a volume of 32 cc..  5.IVC diameter >2.1 cm collapsing <50% with sniff suggests a high RA pressure  estimated at 15 mmHg or greater.  6.Right ventricular systolic pressure is elevated, consistent with mild to  moderate pulmonary hypertension.  Compared to the prior study dated 5/18/2022, the RV is larger and the TR is  more.          Physical Examination Review of Systems   /59 (BP Location: Left arm)   Pulse 88   Temp 98.7  F (37.1  C) (Oral)   Resp 20   Ht 1.588 m (5' 2.5\")   Wt 72.6 kg (160 lb 0.9 oz)   SpO2 94%   BMI 28.81 kg/m    Body mass index is 28.81 kg/m .  Wt Readings from Last 3 Encounters:   02/06/23 72.6 kg (160 lb 0.9 oz)   01/31/23 85.7 kg (189 lb)   09/15/22 77.1 kg (170 lb)     General Appearance:   no distress, normal body habitus   ENT/Mouth: membranes moist, no oral lesions or bleeding gums.      EYES:  no scleral icterus, normal conjunctivae   Neck: no carotid bruits or thyromegaly   Chest/Lungs:   lungs are clear to auscultation, no rales or wheezing, equal chest wall expansion    Cardiovascular:   Regular. Normal first and second heart sounds with no murmurs, rubs, or gallops; the carotid, radial and posterior tibial pulses are intact, no JVD and trace-mild LE edema bilaterally    Abdomen:  no organomegaly, masses, bruits, or tenderness; bowel sounds are present   Extremities: no cyanosis or clubbing   Skin: no xanthelasma, warm.    Neurologic: alert and calm     Psychiatric: alert and calm     A complete 10 systems ROS was reviewed  And is negative except what is listed in the HPI.          Medical History  Surgical History Family History Social History   Past Medical History:   Diagnosis Date     Disorder of bone and cartilage, unspecified     Created by Conversion      Essential hypertension, benign     Created by Conversion      Osteoarthrosis, unspecified whether generalized or localized, lower leg     Created by " Conversion      Pure hypercholesterolemia     Created by Conversion      Type II or unspecified type diabetes mellitus without mention of complication, not stated as uncontrolled     Created by Conversion      Unspecified hypothyroidism     Created by Conversion      Unspecified vitamin D deficiency     Created by Conversion     Past Surgical History:   Procedure Laterality Date     HYSTERECTOMY TOTAL ABDOMINAL, BILATERAL SALPINGO-OOPHORECTOMY, COMBINED      no family history of premature coronary artery disease Social History     Socioeconomic History     Marital status:      Spouse name: Not on file     Number of children: Not on file     Years of education: Not on file     Highest education level: Not on file   Occupational History     Not on file   Tobacco Use     Smoking status: Former     Types: Cigarettes     Quit date: 1991     Years since quittin.1     Smokeless tobacco: Never   Substance and Sexual Activity     Alcohol use: Yes     Drug use: Not on file     Sexual activity: Not on file   Other Topics Concern     Not on file   Social History Narrative     Not on file     Social Determinants of Health     Financial Resource Strain: Medium Risk     Difficulty of Paying Living Expenses: Somewhat hard   Food Insecurity: No Food Insecurity     Worried About Running Out of Food in the Last Year: Never true     Ran Out of Food in the Last Year: Never true   Transportation Needs: No Transportation Needs     Lack of Transportation (Medical): No     Lack of Transportation (Non-Medical): No   Physical Activity: Not on file   Stress: Not on file   Social Connections: Not on file   Intimate Partner Violence: Not on file   Housing Stability: Low Risk      Unable to Pay for Housing in the Last Year: No     Number of Places Lived in the Last Year: 1     Unstable Housing in the Last Year: No           Lab Results    Chemistry/lipid CBC Cardiac Enzymes/BNP/TSH/INR   Lab Results   Component Value Date    CHOL  150 04/21/2022    HDL 45 (L) 04/21/2022    TRIG 94 04/21/2022    BUN 20.4 02/05/2023     02/05/2023    CO2 38 (H) 02/05/2023    Lab Results   Component Value Date    WBC 7.8 02/05/2023    HGB 9.4 (L) 02/05/2023    HCT 31.0 (L) 02/05/2023     (H) 02/05/2023     02/05/2023    Lab Results   Component Value Date    TSH 8.03 (H) 01/31/2023    INR 1.85 (H) 02/05/2023     No results found for: CKTOTAL, CKMB, TROPONINI       Weight:    Wt Readings from Last 3 Encounters:   02/06/23 72.6 kg (160 lb 0.9 oz)   01/31/23 85.7 kg (189 lb)   09/15/22 77.1 kg (170 lb)       Allergies  Allergies   Allergen Reactions     Amoxicillin Hives     Cephalexin Other (See Comments)     Reaction with intervenous administration.     Penicillins Hives     Latex Rash     Other Environmental Allergy Other (See Comments)     Pollen causes seasonal allergies.         Surgical History  Past Surgical History:   Procedure Laterality Date     HYSTERECTOMY TOTAL ABDOMINAL, BILATERAL SALPINGO-OOPHORECTOMY, COMBINED         Social History  Tobacco:   History   Smoking Status     Former     Types: Cigarettes     Quit date: 1/1/1991   Smokeless Tobacco     Never    Alcohol:   Social History    Substance and Sexual Activity      Alcohol use: Yes   Illicit Drugs:   History   Drug Use Not on file       Family History  Family History   Problem Relation Age of Onset     Coronary Artery Disease Brother           Parth Hunter MD on 2/6/2023      cc: Roc Mares

## 2023-02-06 NOTE — TELEPHONE ENCOUNTER
General Call    Contacts       Type Contact Phone/Fax    02/06/2023 10:02 AM CST Phone (Incoming) Mariluz Sarmiento (Emergency Contact) [phone]        Reason for Call:  Spoke with Mariluz, pt Miesha is the hospital and they would like to switch her from Warfarin to Illequis, and Mariluz would like to know Dr. Mares's thought on this before approving the switch        Okay to leave a detailed message?: Mariluz at Other phone number:  [phone]

## 2023-02-06 NOTE — TELEPHONE ENCOUNTER
I think Eliquis is an excellent medication although for some people it is hard to remember to  take twice a day and it can be quite expensive.  Most insurances do cover it now.  It would also allow her to not have to go out every month for blood draws.

## 2023-02-07 NOTE — PLAN OF CARE
Occupational Therapy Discharge Summary    Reason for therapy discharge:    Discharged to transitional care facility.    Progress towards therapy goal(s). See goals on Care Plan in [facility] electronic health record for goal details.  Goals partially met.  Barriers to achieving goals:   discharge from facility.    Therapy recommendation(s):    Continued therapy is recommended.  Rationale/Recommendations:  recommend continued OT services at TCU to improve endurance for ADLs and functional mobility.

## 2023-02-07 NOTE — PLAN OF CARE
Goal Outcome Evaluation:       Pt sleeping well overnight, a bit lethargic but wakes when spoken to and remains alert and oriented. Remains on 1 L NC overnight and sats have been adequate on this,though pt does inadvertently take this off frequently while sleeping and does desat to mid to low 80's while sleeping on RA. Wearing purewick overnight but at times this hasn't been effective, had large incontinent void overnight. Pt denies any pain, discussed plan to discharge to TCU today.

## 2023-02-07 NOTE — PLAN OF CARE
Physical Therapy Discharge Summary    Reason for therapy discharge:    Discharged to TCU.    Progress towards therapy goal(s). See goals on Care Plan in [facility] electronic health record for goal details.  Goals partially met.  Barriers to achieving goals:   discharge from facility.    Therapy recommendation(s):    Further recommended therapy is related to documented deficits, and is necessary to maximize functional independence in order for patient to return to prior level of function.      Lily Delaney, JACOBYT 2/7/2023

## 2023-02-07 NOTE — PROGRESS NOTES
Care Management Discharge Note    Discharge Date: 02/07/2023       Discharge Disposition: [facility] (TCU)    Discharge Services: None    Discharge DME: Walker    Discharge Transportation: family or friend will provide    Private pay costs discussed: Not applicable    PAS Confirmation Code: [id number]   Patient/family educated on Medicare website which has current facility and service quality ratings: yes    Education Provided on the Discharge Plan: yes   Persons Notified of Discharge Plans: patient; family  Patient/Family in Agreement with the Plan: yes    Handoff Referral Completed: Yes    Additional Information:  8:23 AM  Plan is for Pt to discharge to [facility] (U) today with Pt's jovi Mcgraw transporting around 1 PM. SW reached out to Hospitalist to confirm if Pt is medically ready to discharge.     9:49 AM  Hospitalist confirmed with CM that Pt is ready for discharge and will place orders. VENKAT called and spoke to Carrie at [facility] to inform her that Pt plans to discharge at 1 PM today. VENKAT called Pt's jovi Mcgraw and Mariluz's spouse answered informing SW that Mariluz is aware of discharge today and will plan to be at the hospital at 1 PM. Orders sent to TCU.    10:47 AM  SW was notified Pt will need oxygen for transport. Pt does not use oxygen at baseline. VENKAT called Mariluz whose  answered and stated that he will have Mariluz call VENKAT back once out of the shower. VENKAT called and scheduled wheelchair transport for 1 PM.     11:32 AM  VENKAT received a phone call back from Mariluz and explained the changes with Pt's transportation due to oxygen needs. Mariluz reported understanding. Mariluz denied having further questions from CM at this time.     CM colleague assisted with completing PAS.    DESTINI Curtis

## 2023-02-07 NOTE — DISCHARGE SUMMARY
[facility] MEDICINE  DISCHARGE SUMMARY     Primary Care Physician: Roc Mares  Admission Date: 1/31/2023   Discharge Provider: Philip Soria MD Discharge Date: 2/7/2023   Diet:   Active Diet and Nourishment Order   Procedures     Fluid restriction 1500 ML FLUID     Combination Diet Low Saturated Fat Na <2400mg Diet, No Caffeine Diet     Diet       Code Status: Full Code   Activity: DCACTIVITY: Activity as tolerated        Condition at Discharge: Stable       PRINCIPAL & ACTIVE DISCHARGE DIAGNOSES     Principal Problem:    Acute on chronic heart failure with preserved ejection fraction (HFpEF) (H)      PENDING LABS     Unresulted Labs Ordered in the Past 30 Days of this Admission     No orders found from 1/1/2023 to 2/1/2023.            PROCEDURES ( this hospitalization only)          RECOMMENDATIONS TO OUTPATIENT PROVIDER FOR F/U VISIT     Follow-up Appointments     Follow Up and recommended labs and tests      Follow up with Nursing home physician.  Follow BMP   Follow up with primary care provider in 7  days.  The following labs/tests   are recommended: BMP.                 DISPOSITION     TCU    SUMMARY OF HOSPITAL COURSE:      Initial presentation (Copied from HPI)    89 year old female with PMH significant for diastolic CHF, atrial flutter on coumadin, hypertension, hyperlipidemia and hypothyroidism who was referred to our ED for admission from her PCP's clinic for evaluation of shortness of breath and weight gain.     Patient reports worsening shortness of breath at least for 2 months. Endorses having orthopnea and PND. Denies any chest pain. Endorses about 20 lb weight gain during that time frame. Patient uses a walker at baseline for ambulation. She had some mechanical issues in her house, and was living at her niece's place. Apparently forgot to bring her medications along. About 1.5 wks ago, family reports she slid from a recliner and hit he head on a table.  Patient was seen at her PCP's office today, who was concerned about how she clinically looked as she was unable to talk in full sentences. Therefore, referred to our ED to admission.     Problems addressed during hospital stay    Acute Hypoxic Respiratory Failure  Acute on Chronic HFpEF   Severe TR  Moderate Pulm HTN  -Patient was not taking diuretics prior to admission  -She continues to require supplemental oxygen 1 L/min.  Will wean as tolerated to maintain sats above 90%.  -Cardiology consulted, appreciate recs  -Daily Weight, I/O  -Continue Bumex 2 mg daily on discharge.  Lasix discontinued  -Stable to discharge to TCU today.     CKD stage 3  MAXIM not present. Creatinine was not greater than 1.5x baseline given baseline CKD.  - Cr stable ~ 1-1.1  - Monitor with diuresis   -Follow BMP outpatient     Fall  CT head and C-spine. No acute findings  - Plan for TCU post discharge      Anemia   Hgb 10.5 on admission, baseline ~ 12.  - Gradually drifted down, no overt sign of bleeding  - Continue to monitor     Supratherapeutic INR   Chronic Atrial Flutter/fib  INR 4s on admission.  - Given supratherapeutic INR can consider switching to Eliquis on discharge, would be $47 a month (spoke to patient's niece Mariluz who will talk with patient's insurance to see how much deductible left and if feasible to switch financially over to Eliquis, she should call back and let hospital staff know or can change outpatient with primary care physician)  - Coumadin with pharmacy dosing, appreciate recs  - Continue Metoprolol  mg Daily      Chronic Problems  Hypothyroidism: Continue Synthroid     HTN: Controlled presently.  Continue Bumex and metoprolol.  Amlodipine, lisinopril-hydrochlorothiazide held.  Can consider reincorporating outpatient if indicated.  Would avoid amlodipine given propensity for fluid retention    HLD: Continue Statin     Resolved Problems  Mild Hypernatremia:   Hypokalemia and Mg    Discharge Medications with  Med changes:     Current Discharge Medication List      START taking these medications    Details   bumetanide (BUMEX) 2 MG tablet Take 1 tablet (2 mg) by mouth daily for 30 days  Qty: 30 tablet, Refills: 0    Associated Diagnoses: Fall, initial encounter; Hypokalemia; Acute on chronic heart failure with preserved ejection fraction (HFpEF) (H); Chronic diastolic congestive heart failure (H); Stage 3a chronic kidney disease (H); Essential hypertension, benign         CONTINUE these medications which have NOT CHANGED    Details   levothyroxine (SYNTHROID/LEVOTHROID) 50 MCG tablet TAKE 1 TABLET EVERY DAY  Qty: 90 tablet, Refills: 0    Associated Diagnoses: Hypothyroid      metoprolol succinate ER (TOPROL XL) 100 MG 24 hr tablet TAKE 1 TABLET BY MOUTH EVERY DAY  Qty: 90 tablet, Refills: 3    Associated Diagnoses: Essential hypertension, benign      simvastatin (ZOCOR) 20 MG tablet TAKE 1/2 TABLET BY MOUTH DAILY IN THE EVENING.  Qty: 45 tablet, Refills: 3    Associated Diagnoses: Hypercholesteremia      warfarin ANTICOAGULANT (COUMADIN) 5 MG tablet New Dose - Take 1/2 tablet (2.5mg) by mouth daily, as directed.  Adjust dose based on INR results.  Qty: 60 tablet, Refills: 1    Associated Diagnoses: Atrial flutter (H); Long term (current) use of anticoagulants         STOP taking these medications       acetaminophen (TYLENOL) 650 MG CR tablet Comments:   Reason for Stopping:         amLODIPine (NORVASC) 2.5 MG tablet Comments:   Reason for Stopping:         ascorbic acid (VITAMIN C) 100 MG tablet Comments:   Reason for Stopping:         furosemide (LASIX) 20 MG tablet Comments:   Reason for Stopping:         lisinopril-hydrochlorothiazide (ZESTORETIC) 20-12.5 MG tablet Comments:   Reason for Stopping:         loratadine (CLARITIN) 10 mg tablet Comments:   Reason for Stopping:                     Rationale for medication changes:      Bumex added for continued diuretic and Lasix discontinued given worsening renal function.   Amlodipine discontinued due to propensity for fluid retention.  Patient using lisinopril held as blood pressure well controlled with diuretic and metoprolol.  Can consider resuming postdischarge.        Consults     CARE MANAGEMENT / SOCIAL WORK IP CONSULT  PHYSICAL THERAPY ADULT IP CONSULT  OCCUPATIONAL THERAPY ADULT IP CONSULT  PHARMACY TO DOSE WARFARIN  CARDIOLOGY IP CONSULT  PHARMACY TEST CLAIM IP CONSULT  CORE CLINIC EVALUATION IP CONSULT  PHYSICAL THERAPY ADULT IP CONSULT  OCCUPATIONAL THERAPY ADULT IP CONSULT    Immunizations given this encounter     Most Recent Immunizations   Administered Date(s) Administered     COVID-19 Vaccine 12+ (Pfizer) 11/22/2021     COVID-19 Vaccine 18+ (Moderna) 04/27/2022     COVID-19 Vaccine Bivalent Booster 12+ (Pfizer) 10/05/2022     FLU 6-35 months 09/21/2012     Flu, Unspecified 10/03/2011     Influenza (High Dose) 3 valent vaccine 10/29/2019     Influenza Vaccine 65+ (Fluzone HD) 09/15/2022     Influenza Vaccine, 6+MO IM (QUADRIVALENT W/PRESERVATIVES) 10/29/2013     Pneumo Conj 13-V (2010&after) 12/30/2014     Pneumococcal 23 valent 12/26/2007     Td (Adult), Adsorbed 05/28/2022     Td,adult,historic,unspecified 04/28/2008     Tdap (Adacel,Boostrix) 06/18/2014           Anticoagulation Information      Recent INR results:   Recent Labs   Lab 02/07/23  0453 02/06/23  0458 02/05/23  0502 02/04/23  0613 02/03/23  0455 02/02/23  0444 02/01/23  2152   INR 1.68* 1.60* 1.85* 2.78* 4.03* 4.28* 4.13*     Warfarin doses (if applicable) or name of other anticoagulant:       SIGNIFICANT IMAGING FINDINGS     Results for orders placed or performed during the hospital encounter of 01/31/23   Head CT w/o contrast    Impression    IMPRESSION:  HEAD CT:  1.  No acute traumatic intracranial abnormality.   2.  Chronic intracranial findings as detailed.    CERVICAL SPINE CT:  1.  No convincing CT findings of an acute osseous abnormality. Osseous demineralization can limit this assessment.      Cervical spine CT w/o contrast    Impression    IMPRESSION:  HEAD CT:  1.  No acute traumatic intracranial abnormality.   2.  Chronic intracranial findings as detailed.    CERVICAL SPINE CT:  1.  No convincing CT findings of an acute osseous abnormality. Osseous demineralization can limit this assessment.     XR Chest Port 1 View    Impression    IMPRESSION: Single AP view of the chest was obtained. Enlarged cardiac silhouette and mild pulmonary vascular congestion. Bibasilar pulmonary opacities, could be related to small bilateral pleural effusions and associated basilar   atelectasis/consolidation. No significant pneumothorax.   Echocardiogram Complete   Result Value Ref Range    LVEF  55-60%        SIGNIFICANT LABORATORY FINDINGS     Most Recent 3 BMP's:Recent Labs   Lab Test 02/07/23  0453 02/06/23  1526 02/06/23  1146 02/06/23  0458 02/05/23  1516     --   --  142 141   POTASSIUM 4.0 4.4 3.7 3.2* 3.7   CHLORIDE 96*  --   --  93* 94*   CO2 38*  --   --  39* 38*   BUN 20.2  --   --  17.9 20.4   CR 1.12*  --   --  1.04* 1.11*   ANIONGAP 7  --   --  10 9   JOSE CARLOS 8.5  --   --  8.7 8.3   *  --   --  108* 135*           Discharge Orders        General info for SNF    Length of Stay Estimate: Short Term Care: Estimated # of Days <30  Condition at Discharge: Improving  Level of care:skilled   Rehabilitation Potential: Good  Admission H&P remains valid and up-to-date: Yes  Recent Chemotherapy: N/A  Use Nursing Home Standing Orders: Yes     Mantoux instructions    Give two-step Mantoux (PPD) Per Facility Policy Yes     Follow Up and recommended labs and tests    Follow up with Nursing home physician.  Follow BMP   Follow up with primary care provider in 7  days.  The following labs/tests are recommended: BMP.     Reason for your hospital stay    Admitted with acute hypoxic respiratory failure secondary to acute on chronic started on IV diuresis on Lasix drip and also received albumin infusion.  Cardiology was  also consulted for management of CHF     Activity - Up with assistive device     Activity - Up with nursing assistance     Full Code     Physical Therapy Adult Consult    Evaluate and treat as clinically indicated.    Reason:  weakness and deconditioning     Occupational Therapy Adult Consult    Evaluate and treat as clinically indicated.    Reason:  Reason:  weakness and deconditioning     Oxygen (SNF/TCU) Discharge     Fall precautions     Diet    Follow this diet upon discharge: Orders Placed This Encounter      Fluid restriction 1500 ML FLUID      Combination Diet Low Saturated Fat Na <2400mg Diet, No Caffeine Diet       Examination   Physical Exam   Temp:  [98.1  F (36.7  C)-99.3  F (37.4  C)] 99  F (37.2  C)  Pulse:  [] 110  Resp:  [18-20] 19  BP: ()/(52-60) 118/56  SpO2:  [92 %-100 %] 94 %  Wt Readings from Last 1 Encounters:   02/07/23 69.8 kg (153 lb 14.1 oz)       Physical Exam  Cardiovascular:      Rate and Rhythm: Normal rate.   Pulmonary:      Effort: Pulmonary effort is normal.   Abdominal:      General: Abdomen is flat.   Musculoskeletal:      Cervical back: Normal range of motion.      Right lower leg: No edema.      Left lower leg: No edema.   Neurological:      Mental Status: She is alert. Mental status is at baseline.           Philip Soria MD  [facility]    CC:Roc Mares

## 2023-02-07 NOTE — PLAN OF CARE
Goal Outcome Evaluation:       Heart Failure Care Map  GOALS TO BE MET BEFORE DISCHARGE:    1. Decrease congestion and/or edema with diuretic therapy to achieve near optimal volume status.     Dyspnea improved: Yes, satisfactory for discharge.   Edema improved: Yes, satisfactory for discharge.        Last 24 hour I/O:   Intake/Output Summary (Last 24 hours) at 2/7/2023 1007  Last data filed at 2/7/2023 0308  Gross per 24 hour   Intake 140 ml   Output 800 ml   Net -660 ml           Net I/O and Weights since admission:   01/08 1500 - 02/07 1459  In: 5539.5 [P.O.:4640; I.V.:899.5]  Out: 31805 [Urine:56993]  Net: -8460.5     Vitals:    01/31/23 1632 02/02/23 0502 02/03/23 0441 02/04/23 0337   Weight: 85.7 kg (189 lb) 82.6 kg (182 lb 1.6 oz) 81.5 kg (179 lb 10.8 oz) 80 kg (176 lb 5.9 oz)    02/05/23 0432 02/06/23 0328 02/07/23 0306   Weight: 78 kg (171 lb 15.3 oz) 72.6 kg (160 lb 0.9 oz) 69.8 kg (153 lb 14.1 oz)       2.  O2 sats > 90% on room air, or at prior home O2 therapy level.      Able to wean O2 this shift to keep sats above 90%?: No, further care required to meet this goal. Please explain Pt will be discharging to TCU facility .   Does patient use Home O2? No          Current oxygenation status:   SpO2: 94 %     O2 Device: None (Room air), Oxygen Delivery: 2 LPM      Pt is alert and oriented x4. Denied SOB and chest pain. On O2 at 1 lpm via NC; sats maintaining >92%.    Pt discharged to a TCU facility via wheelchair transport.      Philip Mathews RN  2/7/2023

## 2023-02-07 NOTE — PROGRESS NOTES
ANTICOAGULATION  MANAGEMENT: Discharge Review    Miesha Quarles chart reviewed for anticoagulation continuity of care    Hospital Admission on 1/31 - 2/7/23 for worsening shortness of breath x2 months and WT gain of 20 lbs.. - Acute on chronic heart failure   - acute hypoxic respiratory failure   - severe TR   - moderate Pulmonary HTN      Discharge disposition: TCU    Results:    Recent labs: (last 7 days)     01/31/23  1644 02/01/23  2152 02/02/23  0444 02/03/23  0455 02/04/23  0613 02/05/23  0502 02/06/23  0458 02/07/23  0453   INR 4.51* 4.13* 4.28* 4.03* 2.78* 1.85* 1.60* 1.68*     Anticoagulation inpatient management:     held warfarin from 1/31 - 2/3 due to supra INR  and resumed on 2/4/23.    Anticoagulation discharge instructions:     Warfarin dosing: decrease dose to 2.5mfg daily.   Bridging: No   INR goal change: No      Medication changes affecting anticoagulation: No   New med:  Bumex 2mg daily     Niece will check Eliquis with insurance if financially feasable.     Lasix, lisinopril-hydrochlorothiazide, Vitamin-C, Amlodipine,  discontinued    Additional factors affecting anticoagulation: No     PLAN     Agree with discharge plan for follow up:    - Follow Up and recommended labs and tests      - Follow up with Nursing home physician.  Follow BMP    - Follow up with primary care provider in 7  days.  The following labs/tests - BMP.       ACC will resume monitoring upon discharge from TCU    No adjustment to Anticoagulation Calendar was required    Reina Cruz RN

## 2023-02-07 NOTE — PROGRESS NOTES
HEART CARE NOTE          Assessment/Recommendations     1. HFpEF c/b ADHF  Assessment / Plan    Tolerating oral diuretic regimen - no changes at this time    GDMT as detailed below; mainstay of treatment for HFpEF includes diuretics and adequate BP control (class I) and SGLT2-I (class 2a); additional medical therapy (ARNI, MRA, ARB) demonstrated less robust evidence for indication but may be considered per guideline recommendations (2b); no indication for BBlockers      Current Pharmacotherapy AHA Guideline-Directed Medical Therapy   Losartan - on hold while monitoring renal function ARNI/ARB   Spironolactone not started  MRA   SGLT2 inhibitor not started SGLT2-I    Bumex 2 mg daily Loop diuretic       2. Atrial fibrillation  Assessment / Plan    Rate controlled; continue metoprolol     Warfarin per pharmacy management      3. MAXIM on CKD - stable  Assessment / Plan    Continue to monitor renal function closely with diuresis     4. HLP  Assessment / Plan    Continue statin     5. HTN  Assessment / Plan    Adequately controlled on current regimen - no changes at this time    Cardiology team will sign-off for now. Please do not hesitate to consult us again if new questions or concerns arise. Follow-up appointment will be arranged by [facility] clinic.       History of Present Illness/Subjective      Ms. Miesha Quarles is a 89 year old female with a PMHx significant for (per Dr. Acuna's note) diastolic CHF, atrial flutter on coumadin, hypertension, hyperlipidemia and hypothyroidism who was referred to our ED for admission from her PCP's clinic for evaluation of shortness of breath and weight gain.     Today, Mrs. Qualres denies any acute cardiac events or complaints; Management plan as detailed above     ECG: Personally reviewed. atrial fibrillation, rate controlled.     ECHO (personnaly Reviewed):  1.Left ventricular size, wall motion and function are normal. The ejection  fraction is 55-60%.  2.The right ventricle  "is moderately dilated. Mildly decreased right  ventricular systolic function.  3.Significant biatrial chamber enlargement.  4.There is moderately severe (3+) tricuspid regurgitation. ERO is 0.28 cmÂ   with a volume of 32 cc..  5.IVC diameter >2.1 cm collapsing <50% with sniff suggests a high RA pressure  estimated at 15 mmHg or greater.  6.Right ventricular systolic pressure is elevated, consistent with mild to  moderate pulmonary hypertension.  Compared to the prior study dated 5/18/2022, the RV is larger and the TR is  more.          Physical Examination Review of Systems   /60 (BP Location: Left arm)   Pulse 101   Temp 98.1  F (36.7  C) (Oral)   Resp 18   Ht 1.588 m (5' 2.5\")   Wt 69.8 kg (153 lb 14.1 oz)   SpO2 98%   BMI 27.70 kg/m    Body mass index is 27.7 kg/m .  Wt Readings from Last 3 Encounters:   02/07/23 69.8 kg (153 lb 14.1 oz)   01/31/23 85.7 kg (189 lb)   09/15/22 77.1 kg (170 lb)     General Appearance:   no distress, normal body habitus   ENT/Mouth: membranes moist, no oral lesions or bleeding gums.      EYES:  no scleral icterus, normal conjunctivae   Neck: no carotid bruits or thyromegaly   Chest/Lungs:   lungs are clear to auscultation, no rales or wheezing, equal chest wall expansion    Cardiovascular:   Irregular. Normal first and second heart sounds with no murmurs, rubs, or gallops; the carotid, radial and posterior tibial pulses are intact, no JVD and trace LE edema bilaterally    Abdomen:  no organomegaly, masses, bruits, or tenderness; bowel sounds are present   Extremities: no cyanosis or clubbing   Skin: no xanthelasma, warm.    Neurologic: alert and calm     Psychiatric: alert and calm     A complete 10 systems ROS was reviewed  And is negative except what is listed in the HPI.          Medical History  Surgical History Family History Social History   Past Medical History:   Diagnosis Date     Disorder of bone and cartilage, unspecified     Created by Conversion      " Essential hypertension, benign     Created by Conversion      Osteoarthrosis, unspecified whether generalized or localized, lower leg     Created by Conversion      Pure hypercholesterolemia     Created by Conversion      Type II or unspecified type diabetes mellitus without mention of complication, not stated as uncontrolled     Created by Conversion      Unspecified hypothyroidism     Created by Conversion      Unspecified vitamin D deficiency     Created by Conversion     Past Surgical History:   Procedure Laterality Date     HYSTERECTOMY TOTAL ABDOMINAL, BILATERAL SALPINGO-OOPHORECTOMY, COMBINED      no family history of premature coronary artery disease Social History     Socioeconomic History     Marital status:      Spouse name: Not on file     Number of children: Not on file     Years of education: Not on file     Highest education level: Not on file   Occupational History     Not on file   Tobacco Use     Smoking status: Former     Types: Cigarettes     Quit date: 1991     Years since quittin.1     Smokeless tobacco: Never   Substance and Sexual Activity     Alcohol use: Yes     Drug use: Not on file     Sexual activity: Not on file   Other Topics Concern     Not on file   Social History Narrative     Not on file     Social Determinants of Health     Financial Resource Strain: Medium Risk     Difficulty of Paying Living Expenses: Somewhat hard   Food Insecurity: No Food Insecurity     Worried About Running Out of Food in the Last Year: Never true     Ran Out of Food in the Last Year: Never true   Transportation Needs: No Transportation Needs     Lack of Transportation (Medical): No     Lack of Transportation (Non-Medical): No   Physical Activity: Not on file   Stress: Not on file   Social Connections: Not on file   Intimate Partner Violence: Not on file   Housing Stability: Low Risk      Unable to Pay for Housing in the Last Year: No     Number of Places Lived in the Last Year: 1      Unstable Housing in the Last Year: No           Lab Results    Chemistry/lipid CBC Cardiac Enzymes/BNP/TSH/INR   Lab Results   Component Value Date    CHOL 150 04/21/2022    HDL 45 (L) 04/21/2022    TRIG 94 04/21/2022    BUN 20.2 02/07/2023     02/07/2023    CO2 38 (H) 02/07/2023    Lab Results   Component Value Date    WBC 7.7 02/06/2023    HGB 10.2 (L) 02/06/2023    HCT 32.9 (L) 02/06/2023     (H) 02/06/2023     02/07/2023    Lab Results   Component Value Date    TSH 8.03 (H) 01/31/2023    INR 1.68 (H) 02/07/2023     No results found for: CKTOTAL, CKMB, TROPONINI       Weight:    Wt Readings from Last 3 Encounters:   02/07/23 69.8 kg (153 lb 14.1 oz)   01/31/23 85.7 kg (189 lb)   09/15/22 77.1 kg (170 lb)       Allergies  Allergies   Allergen Reactions     Amoxicillin Hives     Cephalexin Other (See Comments)     Reaction with intervenous administration.     Penicillins Hives     Latex Rash     Other Environmental Allergy Other (See Comments)     Pollen causes seasonal allergies.         Surgical History  Past Surgical History:   Procedure Laterality Date     HYSTERECTOMY TOTAL ABDOMINAL, BILATERAL SALPINGO-OOPHORECTOMY, COMBINED         Social History  Tobacco:   History   Smoking Status     Former     Types: Cigarettes     Quit date: 1/1/1991   Smokeless Tobacco     Never    Alcohol:   Social History    Substance and Sexual Activity      Alcohol use: Yes   Illicit Drugs:   History   Drug Use Not on file       Family History  Family History   Problem Relation Age of Onset     Coronary Artery Disease Brother           Parth Hunter MD on 2/7/2023      cc: Roc Mares

## 2023-02-08 NOTE — PROGRESS NOTES
Clinic Care Coordination Contact  Care Coordination Transition Communication         Clinical Data: Patient was hospitalized at   [facility] MEDICINE  DISCHARGE SUMMARY      Primary Care Physician: Roc Mares                                                     Admission Date: 1/31/2023   Discharge Provider: Philip Soria MD Discharge Date: 2/7/2023           PRINCIPAL & ACTIVE DISCHARGE DIAGNOSES      Principal Problem:    Acute on chronic heart failure with preserved ejection fraction (HFpEF) (H)    Transition to Facility:              Facility Name: [facility]              Contact name and phone number/fax: [phone]    Plan: RN/SW Care Coordinator will await notification from facility staff informing RN/SW Care Coordinator of patient's discharge plans/needs. RN/SW Care Coordinator will review chart and outreach to facility staff every 4 weeks and as needed.     Ema Gaines,   [facility]  [phone]

## 2023-02-08 NOTE — PROGRESS NOTES
[facility] GERIATRIC SERVICES       Patient Miesha Quarles  MRN: [id number]        Reason for Visit     Chief Complaint   Patient presents with     Hospital F/U       Code Status     CPR/Full code     Assessment     Acute on chronic congestive heart failure with preserved ejection fraction  Acute hypoxic respiratory failure  Severe TR  Pulmonary hypertension moderate  History of a mechanical fall  Acute back pain related to her fall  Chronic anemia  Chronic A-fib on Coumadin INR was supratherapeutic on admission  CKD stage III  Electrolyte abnormalities with elevated sodium and low potassium and magnesium  Hypertension  Hyperlipidemia  Hypothyroidism  Generalized weakness    Plan     Pt is admitted to TCU for strengthening and rehab.  Admitted with CHF exacerbation with hypoxic respiratory failure  Cardiology consulted  Discharge on Bumex  Monitor weights  Recheck labs to check for kidney functions as well as electrolyte abnormalities  Wean off oxygen as tolerated  Scheduled Tylenol 1 g 3 times daily for now for acute back pain  Topical Voltaren gel twice daily  Consider imaging of back if no improvement  PT requested to try some topical diathermy for management  Recheck INR subtherapeutic will continue and monitor INRs again.  Apparently advised to switch from warfarin to DOAC in the hospital but has been discharged on warfarin  Continue with PT/OT-remains quite weak and debilitated    History     Patient is a very pleasant 89 year old female who is admitted to TCU  Patient was admitted after she fell.  Today complaining of severe back pain related to her fall.  Also had acute hypoxic respiratory failure requiring oxygen this was felt to be secondary to CHF exacerbation with patient noncompliant with diet Radix.  Currently discharged to the TCU for strengthening and rehab      Past Medical & Surgical History     PAST MEDICAL HISTORY:   Past Medical History:   Diagnosis Date     Disorder of bone and cartilage,  unspecified     Created by Conversion      Essential hypertension, benign     Created by Conversion      Osteoarthrosis, unspecified whether generalized or localized, lower leg     Created by Conversion      Pure hypercholesterolemia     Created by Conversion      Type II or unspecified type diabetes mellitus without mention of complication, not stated as uncontrolled     Created by Conversion      Unspecified hypothyroidism     Created by Conversion      Unspecified vitamin D deficiency     Created by Conversion       PAST SURGICAL HISTORY:   has a past surgical history that includes Hysterectomy total abdominal, bilateral salpingo-oophorectomy, combined.      Past Social History     Reviewed,  reports that she quit smoking about 32 years ago. Her smoking use included cigarettes. She has never used smokeless tobacco. She reports current alcohol use.    Family History     Reviewed, and family history includes Coronary Artery Disease in her brother.    Medication List   Post Discharge Medication Reconciliation Status: Post Discharge Medication Reconciliation Status: discharge medications reconciled, continue medications without change.  Current Outpatient Medications   Medication     acetaminophen (TYLENOL) 500 MG tablet     bumetanide (BUMEX) 2 MG tablet     levothyroxine (SYNTHROID/LEVOTHROID) 50 MCG tablet     metoprolol succinate ER (TOPROL XL) 100 MG 24 hr tablet     simvastatin (ZOCOR) 20 MG tablet     warfarin ANTICOAGULANT (COUMADIN) 5 MG tablet     No current facility-administered medications for this visit.          Allergies     Allergies   Allergen Reactions     Amoxicillin Hives     Cephalexin Other (See Comments)     Reaction with intervenous administration.     Penicillins Hives     Latex Rash     Other Environmental Allergy Other (See Comments)     Pollen causes seasonal allergies.       Review of Systems   A comprehensive review of 14 systems was done. Pertinent findings noted here and in history of  "present illness. All the rest negative.  Constitutional: Negative.  Negative for fever, chills, she has  activity change, appetite change and fatigue.   HENT: Negative for congestion and facial swelling.    Eyes: Negative for photophobia, redness and visual disturbance.   Respiratory: Negative for cough and chest tightness currently on oxygen.    Cardiovascular: Negative for chest pain, palpitations and has leg swelling.   Gastrointestinal: Negative for nausea, diarrhea, constipation, blood in stool and abdominal distention.   Genitourinary: Negative.    Musculoskeletal: Reporting severe back pain related to fall any movement is very painful for her  Skin: Negative.    Neurological: Negative for dizziness, tremors, syncope, weakness, light-headedness and headaches.   Hematological: Does not bruise/bleed easily.   Psychiatric/Behavioral: Negative.  Anxiety noted      Physical Exam   /72   Pulse 76   Temp 97.8  F (36.6  C)   Resp 16   Ht 1.575 m (5' 2\")   Wt 69.4 kg (153 lb)   SpO2 91%   BMI 27.98 kg/m     On 2 L  Constitutional: Oriented to person, place, and time and appears well-developed.   HEENT:  Normocephalic and atraumatic.  Eyes: Conjunctivae and EOM are normal. Pupils are equal, round, and reactive to light. No discharge.  No scleral icterus. Nose normal. Mouth/Throat: Oropharynx is clear and moist. No oropharyngeal exudate.    NECK: Normal range of motion. Neck supple. No JVD present. No tracheal deviation present. No thyromegaly present.   CARDIOVASCULAR: Normal rate, regular rhythm and intact distal pulses.  Exam reveals no gallop and no friction rub.  Systolic murmur present.  PULMONARY: Effort normal and breath sounds normal. No respiratory distress.No Wheezing or rales.  ABDOMEN: Soft. Bowel sounds are normal. No distension and no mass.  There is no tenderness. There is no rebound and no guarding. No HSM.  MUSCULOSKELETAL: Normal range of motion.  tenderness. Mild kyphosis,  Tenderness on " thoracic and lumbar spine  Any movement is painful.  LYMPH NODES: Has no cervical, supraclavicular, axillary and groin adenopathy.   NEUROLOGICAL: Alert and oriented to person, place, and time. No cranial nerve deficit.  Normal muscle tone. Coordination normal.   GENITOURINARY: Deferred exam.  SKIN: Skin is warm and dry. No rash noted. No erythema. No pallor.   EXTREMITIES: No cyanosis, no clubbing, LEG edema. No Deformity.  PSYCHIATRIC: Normal mood, affect and behavior.      Lab Results     Recent Results (from the past 240 hour(s))   Extra Blue Top Tube    Collection Time: 01/31/23  4:44 PM   Result Value Ref Range    Hold Specimen [facility]    Extra Red Top Tube    Collection Time: 01/31/23  4:44 PM   Result Value Ref Range    Hold Specimen [facility]    Extra Green Top (Lithium Heparin) Tube    Collection Time: 01/31/23  4:44 PM   Result Value Ref Range    Hold Specimen [facility]    Extra Purple Top Tube    Collection Time: 01/31/23  4:44 PM   Result Value Ref Range    Hold Specimen [facility]    Extra Green Top (Lithium Heparin) ON ICE    Collection Time: 01/31/23  4:44 PM   Result Value Ref Range    Hold Specimen [facility]    Lactic acid whole blood    Collection Time: 01/31/23  4:44 PM   Result Value Ref Range    Lactic Acid 3.3 (H) 0.7 - 2.0 mmol/L   Comprehensive metabolic panel    Collection Time: 01/31/23  4:44 PM   Result Value Ref Range    Sodium 141 136 - 145 mmol/L    Potassium 2.6 (LL) 3.4 - 5.3 mmol/L    Chloride 99 98 - 107 mmol/L    Carbon Dioxide (CO2) 27 22 - 29 mmol/L    Anion Gap 15 7 - 15 mmol/L    Urea Nitrogen 26.0 (H) 8.0 - 23.0 mg/dL    Creatinine 1.25 (H) 0.51 - 0.95 mg/dL    Calcium 8.4 8.2 - 9.6 mg/dL    Glucose 123 (H) 70 - 99 mg/dL    Alkaline Phosphatase 88 35 - 104 U/L    AST 20 10 - 35 U/L    ALT 11 10 - 35 U/L    Protein Total 7.9 6.4 - 8.3 g/dL    Albumin 3.2 (L) 3.5 - 5.2 g/dL    Bilirubin Total 2.3 (H) <=1.2 mg/dL    GFR Estimate 40 (L) >60 mL/min/1.73m2   INR    Collection Time: 01/31/23  4:44 PM   Result  Value Ref Range    INR 4.51 (H) 0.85 - 1.15   Nt probnp inpatient (BNP)    Collection Time: 01/31/23  4:44 PM   Result Value Ref Range    N terminal Pro BNP Inpatient 6,483 (H) 0 - 1,800 pg/mL   Partial thromboplastin time    Collection Time: 01/31/23  4:44 PM   Result Value Ref Range    aPTT 47 (H) 22 - 38 Seconds   Troponin T, High Sensitivity    Collection Time: 01/31/23  4:44 PM   Result Value Ref Range    Troponin T, High Sensitivity 61 (H) <=14 ng/L   CBC with platelets and differential    Collection Time: 01/31/23  4:44 PM   Result Value Ref Range    WBC Count 6.1 4.0 - 11.0 10e3/uL    RBC Count 3.37 (L) 3.80 - 5.20 10e6/uL    Hemoglobin 10.5 (L) 11.7 - 15.7 g/dL    Hematocrit 34.3 (L) 35.0 - 47.0 %     (H) 78 - 100 fL    MCH 31.2 26.5 - 33.0 pg    MCHC 30.6 (L) 31.5 - 36.5 g/dL    RDW 17.4 (H) 10.0 - 15.0 %    Platelet Count 234 150 - 450 10e3/uL    % Neutrophils 80 %    % Lymphocytes 9 %    % Monocytes 7 %    % Eosinophils 2 %    % Basophils 1 %    % Immature Granulocytes 1 %    NRBCs per 100 WBC 2 (H) <1 /100    Absolute Neutrophils 5.0 1.6 - 8.3 10e3/uL    Absolute Lymphocytes 0.5 (L) 0.8 - 5.3 10e3/uL    Absolute Monocytes 0.4 0.0 - 1.3 10e3/uL    Absolute Eosinophils 0.1 0.0 - 0.7 10e3/uL    Absolute Basophils 0.1 0.0 - 0.2 10e3/uL    Absolute Immature Granulocytes 0.1 <=0.4 10e3/uL    Absolute NRBCs 0.1 10e3/uL   Magnesium    Collection Time: 01/31/23  4:44 PM   Result Value Ref Range    Magnesium 1.8 1.7 - 2.3 mg/dL   Blood gas venous    Collection Time: 01/31/23  5:00 PM   Result Value Ref Range    pH Venous 7.40 7.35 - 7.45    pCO2 Venous 51 (H) 35 - 50 mm Hg    pO2 Venous 20 (L) 25 - 47 mm Hg    Bicarbonate Venous 31 (H) 24 - 30 mmol/L    Base Excess/Deficit (+/-) 6.4   mmol/L    Oxyhemoglobin Venous 22.1 (L) 70.0 - 75.0 %    O2 Sat, Venous 22.7 (L) 70.0 - 75.0 %   Adult Type and Screen    Collection Time: 01/31/23  5:00 PM   Result Value Ref Range    ABO/RH(D) O NEG     Antibody Screen  Negative Negative    SPECIMEN EXPIRATION DATE [id number]    Lactic acid whole blood    Collection Time: 01/31/23  5:05 PM   Result Value Ref Range    Lactic Acid 2.9 (H) 0.7 - 2.0 mmol/L   Magnesium    Collection Time: 01/31/23  6:57 PM   Result Value Ref Range    Magnesium 1.8 1.7 - 2.3 mg/dL   Troponin T, High Sensitivity (now)    Collection Time: 01/31/23  6:57 PM   Result Value Ref Range    Troponin T, High Sensitivity 56 (H) <=14 ng/L   TSH with free T4 reflex    Collection Time: 01/31/23  6:57 PM   Result Value Ref Range    TSH 8.03 (H) 0.30 - 4.20 uIU/mL   T4 free    Collection Time: 01/31/23  6:57 PM   Result Value Ref Range    Free T4 1.15 0.90 - 1.70 ng/dL   UA with Microscopic reflex to Culture    Collection Time: 01/31/23  9:53 PM    Specimen: Urine, Catheter   Result Value Ref Range    Color Urine Yellow Colorless, Straw, Light Yellow, Yellow    Appearance Urine Clear Clear    Glucose Urine Negative Negative mg/dL    Bilirubin Urine Negative Negative    Ketones Urine Negative Negative mg/dL    Specific Gravity Urine 1.013 1.001 - 1.030    Blood Urine Negative Negative    pH Urine 6.0 5.0 - 7.0    Protein Albumin Urine Negative Negative mg/dL    Urobilinogen Urine 2.0 (A) <2.0 mg/dL    Nitrite Urine Negative Negative    Leukocyte Esterase Urine 250 Marc/uL (A) Negative    Bacteria Urine Few (A) None Seen /HPF    RBC Urine 1 <=2 /HPF    WBC Urine 1 <=5 /HPF    Squamous Epithelials Urine <1 <=1 /HPF    Hyaline Casts Urine 4 (H) <=2 /LPF   Urine Culture    Collection Time: 01/31/23  9:53 PM    Specimen: Urine, Catheter   Result Value Ref Range    Culture <10,000 CFU/mL Mixture of urogenital breanna    Basic metabolic panel    Collection Time: 02/01/23  8:15 AM   Result Value Ref Range    Sodium 140 136 - 145 mmol/L    Potassium 3.2 (L) 3.4 - 5.3 mmol/L    Chloride 102 98 - 107 mmol/L    Carbon Dioxide (CO2) 28 22 - 29 mmol/L    Anion Gap 10 7 - 15 mmol/L    Urea Nitrogen 26.6 (H) 8.0 - 23.0 mg/dL     Creatinine 1.19 (H) 0.51 - 0.95 mg/dL    Calcium 8.0 (L) 8.2 - 9.6 mg/dL    Glucose 105 (H) 70 - 99 mg/dL    GFR Estimate 42 (L) >60 mL/min/1.73m2   CBC with platelets    Collection Time: 02/01/23  8:15 AM   Result Value Ref Range    WBC Count 6.1 4.0 - 11.0 10e3/uL    RBC Count 3.09 (L) 3.80 - 5.20 10e6/uL    Hemoglobin 9.7 (L) 11.7 - 15.7 g/dL    Hematocrit 31.9 (L) 35.0 - 47.0 %     (H) 78 - 100 fL    MCH 31.4 26.5 - 33.0 pg    MCHC 30.4 (L) 31.5 - 36.5 g/dL    RDW 17.6 (H) 10.0 - 15.0 %    Platelet Count 203 150 - 450 10e3/uL   Lactic acid whole blood    Collection Time: 02/01/23  8:15 AM   Result Value Ref Range    Lactic Acid 1.9 0.7 - 2.0 mmol/L   Echocardiogram Complete    Collection Time: 02/01/23  1:21 PM   Result Value Ref Range    LVEF  55-60%    Potassium    Collection Time: 02/01/23  9:52 PM   Result Value Ref Range    Potassium 3.4 3.4 - 5.3 mmol/L   INR    Collection Time: 02/01/23  9:52 PM   Result Value Ref Range    INR 4.13 (H) 0.85 - 1.15   Potassium    Collection Time: 02/02/23  4:44 AM   Result Value Ref Range    Potassium 3.2 (L) 3.4 - 5.3 mmol/L   INR    Collection Time: 02/02/23  4:44 AM   Result Value Ref Range    INR 4.28 (H) 0.85 - 1.15   Basic metabolic panel    Collection Time: 02/02/23  6:51 AM   Result Value Ref Range    Sodium 145 136 - 145 mmol/L    Potassium 3.1 (L) 3.4 - 5.3 mmol/L    Chloride 103 98 - 107 mmol/L    Carbon Dioxide (CO2) 30 (H) 22 - 29 mmol/L    Anion Gap 12 7 - 15 mmol/L    Urea Nitrogen 22.8 8.0 - 23.0 mg/dL    Creatinine 1.09 (H) 0.51 - 0.95 mg/dL    Calcium 8.1 (L) 8.2 - 9.6 mg/dL    Glucose 102 (H) 70 - 99 mg/dL    GFR Estimate 47 (L) >60 mL/min/1.73m2   Magnesium    Collection Time: 02/02/23  6:51 AM   Result Value Ref Range    Magnesium 1.6 (L) 1.7 - 2.3 mg/dL   CBC with platelets and differential    Collection Time: 02/02/23  6:51 AM   Result Value Ref Range    WBC Count 7.2 4.0 - 11.0 10e3/uL    RBC Count 2.91 (L) 3.80 - 5.20 10e6/uL    Hemoglobin  9.0 (L) 11.7 - 15.7 g/dL    Hematocrit 29.4 (L) 35.0 - 47.0 %     (H) 78 - 100 fL    MCH 30.9 26.5 - 33.0 pg    MCHC 30.6 (L) 31.5 - 36.5 g/dL    RDW 17.2 (H) 10.0 - 15.0 %    Platelet Count 207 150 - 450 10e3/uL    % Neutrophils 83 %    % Lymphocytes 6 %    % Monocytes 8 %    % Eosinophils 2 %    % Basophils 1 %    % Immature Granulocytes 0 %    NRBCs per 100 WBC 2 (H) <1 /100    Absolute Neutrophils 6.0 1.6 - 8.3 10e3/uL    Absolute Lymphocytes 0.5 (L) 0.8 - 5.3 10e3/uL    Absolute Monocytes 0.6 0.0 - 1.3 10e3/uL    Absolute Eosinophils 0.2 0.0 - 0.7 10e3/uL    Absolute Basophils 0.0 0.0 - 0.2 10e3/uL    Absolute Immature Granulocytes 0.0 <=0.4 10e3/uL    Absolute NRBCs 0.1 10e3/uL   Potassium    Collection Time: 02/02/23 10:44 AM   Result Value Ref Range    Potassium 3.6 3.4 - 5.3 mmol/L   Extra Purple Top Tube    Collection Time: 02/02/23 10:44 AM   Result Value Ref Range    Hold Specimen [facility]    INR    Collection Time: 02/03/23  4:55 AM   Result Value Ref Range    INR 4.03 (H) 0.85 - 1.15   Extra Purple Top Tube    Collection Time: 02/03/23  4:55 AM   Result Value Ref Range    Hold Specimen [facility]    Hemoglobin    Collection Time: 02/03/23  4:55 AM   Result Value Ref Range    Hemoglobin 8.8 (L) 11.7 - 15.7 g/dL   Platelet count    Collection Time: 02/03/23  4:55 AM   Result Value Ref Range    Platelet Count 181 150 - 450 10e3/uL   Basic metabolic panel    Collection Time: 02/03/23  8:44 AM   Result Value Ref Range    Sodium 146 (H) 136 - 145 mmol/L    Potassium 2.9 (L) 3.4 - 5.3 mmol/L    Chloride 99 98 - 107 mmol/L    Carbon Dioxide (CO2) 35 (H) 22 - 29 mmol/L    Anion Gap 12 7 - 15 mmol/L    Urea Nitrogen 18.5 8.0 - 23.0 mg/dL    Creatinine 1.03 (H) 0.51 - 0.95 mg/dL    Calcium 8.3 8.2 - 9.6 mg/dL    Glucose 104 (H) 70 - 99 mg/dL    GFR Estimate 50 (L) >60 mL/min/1.73m2   Magnesium Lab    Collection Time: 02/03/23  8:44 AM   Result Value Ref Range    Magnesium 1.5 (L) 1.7 - 2.3 mg/dL   Magnesium     Collection Time: 02/03/23  4:32 PM   Result Value Ref Range    Magnesium 1.5 (L) 1.7 - 2.3 mg/dL   Potassium    Collection Time: 02/04/23 12:16 AM   Result Value Ref Range    Potassium 3.1 (L) 3.4 - 5.3 mmol/L   Magnesium    Collection Time: 02/04/23 12:16 AM   Result Value Ref Range    Magnesium 2.0 1.7 - 2.3 mg/dL   INR    Collection Time: 02/04/23  6:13 AM   Result Value Ref Range    INR 2.78 (H) 0.85 - 1.15   Basic metabolic panel    Collection Time: 02/04/23  6:13 AM   Result Value Ref Range    Sodium 142 136 - 145 mmol/L    Potassium 3.3 (L) 3.4 - 5.3 mmol/L    Chloride 94 (L) 98 - 107 mmol/L    Carbon Dioxide (CO2) 41 (H) 22 - 29 mmol/L    Anion Gap 7 7 - 15 mmol/L    Urea Nitrogen 17.4 8.0 - 23.0 mg/dL    Creatinine 1.03 (H) 0.51 - 0.95 mg/dL    Calcium 8.4 8.2 - 9.6 mg/dL    Glucose 112 (H) 70 - 99 mg/dL    GFR Estimate 50 (L) >60 mL/min/1.73m2   Hemoglobin    Collection Time: 02/04/23  6:13 AM   Result Value Ref Range    Hemoglobin 8.7 (L) 11.7 - 15.7 g/dL   Potassium    Collection Time: 02/04/23  6:13 AM   Result Value Ref Range    Potassium 3.3 (L) 3.4 - 5.3 mmol/L   Potassium    Collection Time: 02/04/23 12:06 PM   Result Value Ref Range    Potassium 3.5 3.4 - 5.3 mmol/L   INR    Collection Time: 02/05/23  5:02 AM   Result Value Ref Range    INR 1.85 (H) 0.85 - 1.15   Basic metabolic panel    Collection Time: 02/05/23  5:02 AM   Result Value Ref Range    Sodium 143 136 - 145 mmol/L    Potassium 3.6 3.4 - 5.3 mmol/L    Chloride 95 (L) 98 - 107 mmol/L    Carbon Dioxide (CO2) 40 (H) 22 - 29 mmol/L    Anion Gap 8 7 - 15 mmol/L    Urea Nitrogen 18.3 8.0 - 23.0 mg/dL    Creatinine 1.02 (H) 0.51 - 0.95 mg/dL    Calcium 8.4 8.2 - 9.6 mg/dL    Glucose 104 (H) 70 - 99 mg/dL    GFR Estimate 51 (L) >60 mL/min/1.73m2   Magnesium    Collection Time: 02/05/23  5:02 AM   Result Value Ref Range    Magnesium 1.8 1.7 - 2.3 mg/dL   Extra Purple Top Tube    Collection Time: 02/05/23  5:02 AM   Result Value Ref Range    Hold  Specimen [facility]    Lactic Acid STAT    Collection Time: 02/05/23 11:41 AM   Result Value Ref Range    Lactic Acid 2.5 (H) 0.7 - 2.0 mmol/L   Lactic acid whole blood    Collection Time: 02/05/23  2:12 PM   Result Value Ref Range    Lactic Acid 3.0 (H) 0.7 - 2.0 mmol/L   CBC with platelets    Collection Time: 02/05/23  3:16 PM   Result Value Ref Range    WBC Count 7.8 4.0 - 11.0 10e3/uL    RBC Count 3.02 (L) 3.80 - 5.20 10e6/uL    Hemoglobin 9.4 (L) 11.7 - 15.7 g/dL    Hematocrit 31.0 (L) 35.0 - 47.0 %     (H) 78 - 100 fL    MCH 31.1 26.5 - 33.0 pg    MCHC 30.3 (L) 31.5 - 36.5 g/dL    RDW 17.2 (H) 10.0 - 15.0 %    Platelet Count 200 150 - 450 10e3/uL   Basic metabolic panel    Collection Time: 02/05/23  3:16 PM   Result Value Ref Range    Sodium 141 136 - 145 mmol/L    Potassium 3.7 3.4 - 5.3 mmol/L    Chloride 94 (L) 98 - 107 mmol/L    Carbon Dioxide (CO2) 38 (H) 22 - 29 mmol/L    Anion Gap 9 7 - 15 mmol/L    Urea Nitrogen 20.4 8.0 - 23.0 mg/dL    Creatinine 1.11 (H) 0.51 - 0.95 mg/dL    Calcium 8.3 8.2 - 9.6 mg/dL    Glucose 135 (H) 70 - 99 mg/dL    GFR Estimate 46 (L) >60 mL/min/1.73m2   INR    Collection Time: 02/06/23  4:58 AM   Result Value Ref Range    INR 1.60 (H) 0.85 - 1.15   Basic metabolic panel    Collection Time: 02/06/23  4:58 AM   Result Value Ref Range    Sodium 142 136 - 145 mmol/L    Potassium 3.2 (L) 3.4 - 5.3 mmol/L    Chloride 93 (L) 98 - 107 mmol/L    Carbon Dioxide (CO2) 39 (H) 22 - 29 mmol/L    Anion Gap 10 7 - 15 mmol/L    Urea Nitrogen 17.9 8.0 - 23.0 mg/dL    Creatinine 1.04 (H) 0.51 - 0.95 mg/dL    Calcium 8.7 8.2 - 9.6 mg/dL    Glucose 108 (H) 70 - 99 mg/dL    GFR Estimate 50 (L) >60 mL/min/1.73m2   Magnesium    Collection Time: 02/06/23  4:58 AM   Result Value Ref Range    Magnesium 1.8 1.7 - 2.3 mg/dL   CBC with platelets    Collection Time: 02/06/23  4:58 AM   Result Value Ref Range    WBC Count 7.7 4.0 - 11.0 10e3/uL    RBC Count 3.26 (L) 3.80 - 5.20 10e6/uL    Hemoglobin 10.2 (L)  11.7 - 15.7 g/dL    Hematocrit 32.9 (L) 35.0 - 47.0 %     (H) 78 - 100 fL    MCH 31.3 26.5 - 33.0 pg    MCHC 31.0 (L) 31.5 - 36.5 g/dL    RDW 17.0 (H) 10.0 - 15.0 %    Platelet Count 221 150 - 450 10e3/uL   Iron and iron binding capacity    Collection Time: 02/06/23  4:58 AM   Result Value Ref Range    Iron 49 37 - 145 ug/dL    Iron Binding Capacity 197 (L) 240 - 430 ug/dL    Iron Sat Index 25 15 - 46 %   Ferritin    Collection Time: 02/06/23  4:58 AM   Result Value Ref Range    Ferritin 268 11 - 328 ng/mL   Extra Red Top Tube    Collection Time: 02/06/23  4:58 AM   Result Value Ref Range    Hold Specimen [facility]    Potassium    Collection Time: 02/06/23 11:46 AM   Result Value Ref Range    Potassium 3.7 3.4 - 5.3 mmol/L   Potassium    Collection Time: 02/06/23  3:26 PM   Result Value Ref Range    Potassium 4.4 3.4 - 5.3 mmol/L   INR    Collection Time: 02/07/23  4:53 AM   Result Value Ref Range    INR 1.68 (H) 0.85 - 1.15   Platelet count    Collection Time: 02/07/23  4:53 AM   Result Value Ref Range    Platelet Count 236 150 - 450 10e3/uL   Magnesium    Collection Time: 02/07/23  4:53 AM   Result Value Ref Range    Magnesium 1.9 1.7 - 2.3 mg/dL   Basic metabolic panel    Collection Time: 02/07/23  4:53 AM   Result Value Ref Range    Sodium 141 136 - 145 mmol/L    Potassium 4.0 3.4 - 5.3 mmol/L    Chloride 96 (L) 98 - 107 mmol/L    Carbon Dioxide (CO2) 38 (H) 22 - 29 mmol/L    Anion Gap 7 7 - 15 mmol/L    Urea Nitrogen 20.2 8.0 - 23.0 mg/dL    Creatinine 1.12 (H) 0.51 - 0.95 mg/dL    Calcium 8.5 8.2 - 9.6 mg/dL    Glucose 110 (H) 70 - 99 mg/dL    GFR Estimate 45 (L) >60 mL/min/1.73m2   Phosphorus    Collection Time: 02/07/23  4:53 AM   Result Value Ref Range    Phosphorus 2.0 (L) 2.5 - 4.5 mg/dL   COVID-19 Virus (Coronavirus) by PCR Nasopharyngeal    Collection Time: 02/07/23 10:30 PM    Specimen: Nasopharyngeal; Swab   Result Value Ref Range    SARS CoV2 PCR Negative Negative   INR    Collection Time: 02/08/23   9:29 AM   Result Value Ref Range    INR 1.53 (H) 0.85 - 1.15             Imaging Results     Echocardiogram Complete    Result Date: 2023  [id number]^DORIS^ADRIENNE^[place] [address]  Name: WICHO ROMERO MRN: [id number] : 1928 Study Date: 2023 12:28 PM Age: 89 yrs Gender: Female Patient Location: [facility] Reason For Study: Cardiomyopathy Ordering Physician: ADRIENNE GEORGE Performed By: JOANIE  BSA: 1.9 m2 Height: 62 in Weight: 189 lb HR: 62 ______________________________________________________________________________ Procedure Complete Portable Echo Adult. Compared to the prior study dated 2022, there have been no changes. ______________________________________________________________________________ Interpretation Summary  1.Left ventricular size, wall motion and function are normal. The ejection fraction is 55-60%. 2.The right ventricle is moderately dilated. Mildly decreased right ventricular systolic function. 3.Significant biatrial chamber enlargement. 4.There is moderately severe (3+) tricuspid regurgitation. ERO is 0.28 cmÂ  with a volume of 32 cc.. 5.IVC diameter >2.1 cm collapsing <50% with sniff suggests a high RA pressure estimated at 15 mmHg or greater. 6.Right ventricular systolic pressure is elevated, consistent with mild to moderate pulmonary hypertension. Compared to the prior study dated 2022, the RV is larger and the TR is more. ______________________________________________________________________________ I      WMSI = 1.00     % Normal = 100  X - Cannot   0 -                      (2) - Mildly 2 -          Segments  Size Interpret    Hyperkinetic 1 - Normal  Hypokinetic  Hypokinetic  1-2     small                                                    7 -          3-5    moderate 3 - Akinetic 4 -          5 -         6 - Akinetic Dyskinetic   6-14    large              Dyskinetic   Aneurysmal  w/scar        w/scar       15-16   diffuse  Left Ventricle Left ventricular size, wall motion and function are normal. The ejection fraction is 55-60%. There is borderline concentric left ventricular hypertrophy. Left ventricular diastolic function is not assessable. No regional wall motion abnormalities noted.  Right Ventricle The right ventricle is moderately dilated. Mildly decreased right ventricular systolic function. TAPSE is abnormal, which is consistent with abnormal right ventricular systolic function.  Atria The left atrium is moderately dilated. The right atrium is moderate to severely dilated. There is no color Doppler evidence of an atrial shunt.  Mitral Valve Mitral valve leaflets appear normal. There is no evidence of mitral stenosis or clinically significant mitral regurgitation. There is trace mitral regurgitation. There is no mitral valve stenosis.  Tricuspid Valve The tricuspid valve is not well visualized, but is grossly normal. Right ventricle systolic pressure estimate normal. There is moderately severe (3+) tricuspid regurgitation. ERO is 0.28 cmÂ  with a volume of 32 cc. Right ventricular systolic pressure is elevated, consistent with mild to moderate pulmonary hypertension. There is no tricuspid stenosis.  Aortic Valve Aortic valve leaflets appear normal. There is no evidence of aortic stenosis or clinically significant aortic regurgitation. The aortic valve is trileaflet. No aortic regurgitation is present. No aortic stenosis is present.  Pulmonic Valve The pulmonic valve is not well seen, but is grossly normal. This degree of valvular regurgitation is within normal limits. There is no pulmonic valvular stenosis.  Vessels The aorta root is normal. Normal size ascending aorta. IVC diameter >2.1 cm collapsing <50% with sniff suggests a high RA pressure estimated at 15 mmHg or greater.  Pericardium There is no pericardial effusion.  Rhythm The rhythm was atrial fibrillation.   ______________________________________________________________________________ MMode/2D Measurements & Calculations Ao root diam: 2.5 cm asc Aorta Diam: 3.1 cm  LVOT diam: 1.8 cm LVOT area: 2.5 cm2 LA Volume Indexed (AL/bp): 54.3 ml/m2  Doppler Measurements & Calculations MV E max daniele: 112.7 cm/sec MV max P.2 mmHg MV mean P.4 mmHg MV V2 VTI: 27.8 cm MVA(VTI): 1.4 cm2 MV dec slope: 662.6 cm/sec2 MV dec time: 0.17 sec Ao V2 max: 125.1 cm/sec Ao max P.0 mmHg Ao V2 mean: 91.2 cm/sec Ao mean PG: 3.7 mmHg Ao V2 VTI: 27.3 cm WILBERTO(I,D): 1.4 cm2 WILBERTO(V,D): 1.7 cm2 LV V1 max P.7 mmHg LV V1 max: 81.8 cm/sec LV V1 VTI: 15.1 cm SV(LVOT): 38.0 ml SI(LVOT): 20.3 ml/m2  PA acc time: 0.08 sec TR max daniele: 333.9 cm/sec TR max P.6 mmHg AV Daniele Ratio (DI): 0.65 WILBERTO Index (cm2/m2): 0.75 E/E' avg: 15.8 Lateral E/e': 12.5 Medial E/e': 19.2  ______________________________________________________________________________ Report approved by: Martha Epstein 2023 01:34 PM       XR Chest Port 1 View    Result Date: 2023  EXAM: XR CHEST PORT 1 VIEW LOCATION: [facility] DATE/TIME: 2023 6:13 PM INDICATION: Shortness of breath COMPARISON: Chest x-ray on 2022     IMPRESSION: Single AP view of the chest was obtained. Enlarged cardiac silhouette and mild pulmonary vascular congestion. Bibasilar pulmonary opacities, could be related to small bilateral pleural effusions and associated basilar atelectasis/consolidation. No significant pneumothorax.    Cervical spine CT w/o contrast    Result Date: 2023  EXAM: CT HEAD W/O CONTRAST, CT CERVICAL SPINE W/O CONTRAST LOCATION: [facility] DATE/TIME: 2023 6:16 PM INDICATION: Trauma. Fall. Anticoagulated. COMPARISON: CT head and cervical spine 10/09/2022. TECHNIQUE: Routine CT Head without IV contrast. Multiplanar reformats. Dose reduction techniques were used. FINDINGS: HEAD CT: INTRACRANIAL CONTENTS:  Redemonstrated chronic lacunar infarct in the right basal nuclei. Remaining gray-white matter interfaces are intact. No hemorrhage or extraaxial collection. No mass effect. Subarachnoid cisterns are patent. Patchy and confluent white matter hypodensities are, while nonspecific, most compatible with chronic microvascular ischemic changes. Normal ventricles and sulci. VISUALIZED ORBITS/SINUSES/MASTOIDS: Bilateral lens replacements. Similar symmetric exophthalmus, which may be related to redundant intraorbital fat. Paranasal sinuses are clear. No middle ear or mastoid effusion. BONES/SOFT TISSUES: No acute abnormality. CERVICAL SPINE CT: VERTEBRA: Spine straightening may be positional and/or related to muscle spasm. Osseous structures appear diffusely demineralized. No displaced fracture or vertebral body compression. The dens, atlantoaxial joint, and facets are intact. CANAL/FORAMINA: Multilevel discogenic, uncovertebral, and facet degenerative changes. Moderate disc space loss C5-C6. No high-grade spinal canal or foraminal stenosis. PARASPINAL: No visible soft tissue abnormality. Partially visualized small left and trace right pleural effusions and associated atelectasis. Punctate calcifications in the bilateral parotid glands are compatible with sialolithiasis. Scattered vascular calcifications indicate atherosclerosis.     IMPRESSION: HEAD CT: 1.  No acute traumatic intracranial abnormality. 2.  Chronic intracranial findings as detailed. CERVICAL SPINE CT: 1.  No convincing CT findings of an acute osseous abnormality. Osseous demineralization can limit this assessment.     Head CT w/o contrast    Result Date: 1/31/2023  EXAM: CT HEAD W/O CONTRAST, CT CERVICAL SPINE W/O CONTRAST LOCATION: [facility] DATE/TIME: 1/31/2023 6:16 PM INDICATION: Trauma. Fall. Anticoagulated. COMPARISON: CT head and cervical spine 10/09/2022. TECHNIQUE: Routine CT Head without IV contrast. Multiplanar reformats.  Dose reduction techniques were used. FINDINGS: HEAD CT: INTRACRANIAL CONTENTS: Redemonstrated chronic lacunar infarct in the right basal nuclei. Remaining gray-white matter interfaces are intact. No hemorrhage or extraaxial collection. No mass effect. Subarachnoid cisterns are patent. Patchy and confluent white matter hypodensities are, while nonspecific, most compatible with chronic microvascular ischemic changes. Normal ventricles and sulci. VISUALIZED ORBITS/SINUSES/MASTOIDS: Bilateral lens replacements. Similar symmetric exophthalmus, which may be related to redundant intraorbital fat. Paranasal sinuses are clear. No middle ear or mastoid effusion. BONES/SOFT TISSUES: No acute abnormality. CERVICAL SPINE CT: VERTEBRA: Spine straightening may be positional and/or related to muscle spasm. Osseous structures appear diffusely demineralized. No displaced fracture or vertebral body compression. The dens, atlantoaxial joint, and facets are intact. CANAL/FORAMINA: Multilevel discogenic, uncovertebral, and facet degenerative changes. Moderate disc space loss C5-C6. No high-grade spinal canal or foraminal stenosis. PARASPINAL: No visible soft tissue abnormality. Partially visualized small left and trace right pleural effusions and associated atelectasis. Punctate calcifications in the bilateral parotid glands are compatible with sialolithiasis. Scattered vascular calcifications indicate atherosclerosis.     IMPRESSION: HEAD CT: 1.  No acute traumatic intracranial abnormality. 2.  Chronic intracranial findings as detailed. CERVICAL SPINE CT: 1.  No convincing CT findings of an acute osseous abnormality. Osseous demineralization can limit this assessment.           Electronically signed by    Melinda Caballero MD

## 2023-02-08 NOTE — LETTER
[facility]       To:  [facility]            Please give to facility      From:   Ema Gaines [facility]  Care Coordinator   [facility]   P: [phone]  [email or web address]        Patient Name:  Miesha Quarles     YOB: 1928   Admit date:   2-7-2023        Information Needed:  Please contact me when the patient will discharge (or if they will move to long term care)- include the discharge date, disposition, and main diagnosis       - If the patient is discharged with home care services, please provide the name of the agency    Also- Please inform me if a care conference is being held.     Phone or Email with information                              Thank you

## 2023-02-08 NOTE — LETTER
2/8/2023        RE: Miesha Quarles  [address]  [place] [address]        [facility] GERIATRIC SERVICES       Patient Miesha Quarles  MRN: [id number]        Reason for Visit     Chief Complaint   Patient presents with     Hospital F/U       Code Status     CPR/Full code     Assessment     Acute on chronic congestive heart failure with preserved ejection fraction  Acute hypoxic respiratory failure  Severe TR  Pulmonary hypertension moderate  History of a mechanical fall  Acute back pain related to her fall  Chronic anemia  Chronic A-fib on Coumadin INR was supratherapeutic on admission  CKD stage III  Electrolyte abnormalities with elevated sodium and low potassium and magnesium  Hypertension  Hyperlipidemia  Hypothyroidism  Generalized weakness    Plan     Pt is admitted to TCU for strengthening and rehab.  Admitted with CHF exacerbation with hypoxic respiratory failure  Cardiology consulted  Discharge on Bumex  Monitor weights  Recheck labs to check for kidney functions as well as electrolyte abnormalities  Wean off oxygen as tolerated  Scheduled Tylenol 1 g 3 times daily for now for acute back pain  Topical Voltaren gel twice daily  Consider imaging of back if no improvement  PT requested to try some topical diathermy for management  Recheck INR subtherapeutic will continue and monitor INRs again.  Apparently advised to switch from warfarin to DOAC in the hospital but has been discharged on warfarin  Continue with PT/OT-remains quite weak and debilitated    History     Patient is a very pleasant 89 year old female who is admitted to TCU  Patient was admitted after she fell.  Today complaining of severe back pain related to her fall.  Also had acute hypoxic respiratory failure requiring oxygen this was felt to be secondary to CHF exacerbation with patient noncompliant with diet Radix.  Currently discharged to the TCU for strengthening and rehab      Past Medical & Surgical History     PAST MEDICAL  HISTORY:   Past Medical History:   Diagnosis Date     Disorder of bone and cartilage, unspecified     Created by Conversion      Essential hypertension, benign     Created by Conversion      Osteoarthrosis, unspecified whether generalized or localized, lower leg     Created by Conversion      Pure hypercholesterolemia     Created by Conversion      Type II or unspecified type diabetes mellitus without mention of complication, not stated as uncontrolled     Created by Conversion      Unspecified hypothyroidism     Created by Conversion      Unspecified vitamin D deficiency     Created by Conversion       PAST SURGICAL HISTORY:   has a past surgical history that includes Hysterectomy total abdominal, bilateral salpingo-oophorectomy, combined.      Past Social History     Reviewed,  reports that she quit smoking about 32 years ago. Her smoking use included cigarettes. She has never used smokeless tobacco. She reports current alcohol use.    Family History     Reviewed, and family history includes Coronary Artery Disease in her brother.    Medication List   Post Discharge Medication Reconciliation Status: Post Discharge Medication Reconciliation Status: discharge medications reconciled, continue medications without change.  Current Outpatient Medications   Medication     acetaminophen (TYLENOL) 500 MG tablet     bumetanide (BUMEX) 2 MG tablet     levothyroxine (SYNTHROID/LEVOTHROID) 50 MCG tablet     metoprolol succinate ER (TOPROL XL) 100 MG 24 hr tablet     simvastatin (ZOCOR) 20 MG tablet     warfarin ANTICOAGULANT (COUMADIN) 5 MG tablet     No current facility-administered medications for this visit.          Allergies     Allergies   Allergen Reactions     Amoxicillin Hives     Cephalexin Other (See Comments)     Reaction with intervenous administration.     Penicillins Hives     Latex Rash     Other Environmental Allergy Other (See Comments)     Pollen causes seasonal allergies.       Review of Systems   A  "comprehensive review of 14 systems was done. Pertinent findings noted here and in history of present illness. All the rest negative.  Constitutional: Negative.  Negative for fever, chills, she has  activity change, appetite change and fatigue.   HENT: Negative for congestion and facial swelling.    Eyes: Negative for photophobia, redness and visual disturbance.   Respiratory: Negative for cough and chest tightness currently on oxygen.    Cardiovascular: Negative for chest pain, palpitations and has leg swelling.   Gastrointestinal: Negative for nausea, diarrhea, constipation, blood in stool and abdominal distention.   Genitourinary: Negative.    Musculoskeletal: Reporting severe back pain related to fall any movement is very painful for her  Skin: Negative.    Neurological: Negative for dizziness, tremors, syncope, weakness, light-headedness and headaches.   Hematological: Does not bruise/bleed easily.   Psychiatric/Behavioral: Negative.  Anxiety noted      Physical Exam   /72   Pulse 76   Temp 97.8  F (36.6  C)   Resp 16   Ht 1.575 m (5' 2\")   Wt 69.4 kg (153 lb)   SpO2 91%   BMI 27.98 kg/m     On 2 L  Constitutional: Oriented to person, place, and time and appears well-developed.   HEENT:  Normocephalic and atraumatic.  Eyes: Conjunctivae and EOM are normal. Pupils are equal, round, and reactive to light. No discharge.  No scleral icterus. Nose normal. Mouth/Throat: Oropharynx is clear and moist. No oropharyngeal exudate.    NECK: Normal range of motion. Neck supple. No JVD present. No tracheal deviation present. No thyromegaly present.   CARDIOVASCULAR: Normal rate, regular rhythm and intact distal pulses.  Exam reveals no gallop and no friction rub.  Systolic murmur present.  PULMONARY: Effort normal and breath sounds normal. No respiratory distress.No Wheezing or rales.  ABDOMEN: Soft. Bowel sounds are normal. No distension and no mass.  There is no tenderness. There is no rebound and no guarding. " No HSM.  MUSCULOSKELETAL: Normal range of motion.  tenderness. Mild kyphosis,  Tenderness on thoracic and lumbar spine  Any movement is painful.  LYMPH NODES: Has no cervical, supraclavicular, axillary and groin adenopathy.   NEUROLOGICAL: Alert and oriented to person, place, and time. No cranial nerve deficit.  Normal muscle tone. Coordination normal.   GENITOURINARY: Deferred exam.  SKIN: Skin is warm and dry. No rash noted. No erythema. No pallor.   EXTREMITIES: No cyanosis, no clubbing, LEG edema. No Deformity.  PSYCHIATRIC: Normal mood, affect and behavior.      Lab Results     Recent Results (from the past 240 hour(s))   Extra Blue Top Tube    Collection Time: 01/31/23  4:44 PM   Result Value Ref Range    Hold Specimen JIC    Extra Red Top Tube    Collection Time: 01/31/23  4:44 PM   Result Value Ref Range    Hold Specimen JIC    Extra Green Top (Lithium Heparin) Tube    Collection Time: 01/31/23  4:44 PM   Result Value Ref Range    Hold Specimen JIC    Extra Purple Top Tube    Collection Time: 01/31/23  4:44 PM   Result Value Ref Range    Hold Specimen JIC    Extra Green Top (Lithium Heparin) ON ICE    Collection Time: 01/31/23  4:44 PM   Result Value Ref Range    Hold Specimen JIC    Lactic acid whole blood    Collection Time: 01/31/23  4:44 PM   Result Value Ref Range    Lactic Acid 3.3 (H) 0.7 - 2.0 mmol/L   Comprehensive metabolic panel    Collection Time: 01/31/23  4:44 PM   Result Value Ref Range    Sodium 141 136 - 145 mmol/L    Potassium 2.6 (LL) 3.4 - 5.3 mmol/L    Chloride 99 98 - 107 mmol/L    Carbon Dioxide (CO2) 27 22 - 29 mmol/L    Anion Gap 15 7 - 15 mmol/L    Urea Nitrogen 26.0 (H) 8.0 - 23.0 mg/dL    Creatinine 1.25 (H) 0.51 - 0.95 mg/dL    Calcium 8.4 8.2 - 9.6 mg/dL    Glucose 123 (H) 70 - 99 mg/dL    Alkaline Phosphatase 88 35 - 104 U/L    AST 20 10 - 35 U/L    ALT 11 10 - 35 U/L    Protein Total 7.9 6.4 - 8.3 g/dL    Albumin 3.2 (L) 3.5 - 5.2 g/dL    Bilirubin Total 2.3 (H) <=1.2 mg/dL     GFR Estimate 40 (L) >60 mL/min/1.73m2   INR    Collection Time: 01/31/23  4:44 PM   Result Value Ref Range    INR 4.51 (H) 0.85 - 1.15   Nt probnp inpatient (BNP)    Collection Time: 01/31/23  4:44 PM   Result Value Ref Range    N terminal Pro BNP Inpatient 6,483 (H) 0 - 1,800 pg/mL   Partial thromboplastin time    Collection Time: 01/31/23  4:44 PM   Result Value Ref Range    aPTT 47 (H) 22 - 38 Seconds   Troponin T, High Sensitivity    Collection Time: 01/31/23  4:44 PM   Result Value Ref Range    Troponin T, High Sensitivity 61 (H) <=14 ng/L   CBC with platelets and differential    Collection Time: 01/31/23  4:44 PM   Result Value Ref Range    WBC Count 6.1 4.0 - 11.0 10e3/uL    RBC Count 3.37 (L) 3.80 - 5.20 10e6/uL    Hemoglobin 10.5 (L) 11.7 - 15.7 g/dL    Hematocrit 34.3 (L) 35.0 - 47.0 %     (H) 78 - 100 fL    MCH 31.2 26.5 - 33.0 pg    MCHC 30.6 (L) 31.5 - 36.5 g/dL    RDW 17.4 (H) 10.0 - 15.0 %    Platelet Count 234 150 - 450 10e3/uL    % Neutrophils 80 %    % Lymphocytes 9 %    % Monocytes 7 %    % Eosinophils 2 %    % Basophils 1 %    % Immature Granulocytes 1 %    NRBCs per 100 WBC 2 (H) <1 /100    Absolute Neutrophils 5.0 1.6 - 8.3 10e3/uL    Absolute Lymphocytes 0.5 (L) 0.8 - 5.3 10e3/uL    Absolute Monocytes 0.4 0.0 - 1.3 10e3/uL    Absolute Eosinophils 0.1 0.0 - 0.7 10e3/uL    Absolute Basophils 0.1 0.0 - 0.2 10e3/uL    Absolute Immature Granulocytes 0.1 <=0.4 10e3/uL    Absolute NRBCs 0.1 10e3/uL   Magnesium    Collection Time: 01/31/23  4:44 PM   Result Value Ref Range    Magnesium 1.8 1.7 - 2.3 mg/dL   Blood gas venous    Collection Time: 01/31/23  5:00 PM   Result Value Ref Range    pH Venous 7.40 7.35 - 7.45    pCO2 Venous 51 (H) 35 - 50 mm Hg    pO2 Venous 20 (L) 25 - 47 mm Hg    Bicarbonate Venous 31 (H) 24 - 30 mmol/L    Base Excess/Deficit (+/-) 6.4   mmol/L    Oxyhemoglobin Venous 22.1 (L) 70.0 - 75.0 %    O2 Sat, Venous 22.7 (L) 70.0 - 75.0 %   Adult Type and Screen    Collection  Time: 01/31/23  5:00 PM   Result Value Ref Range    ABO/RH(D) O NEG     Antibody Screen Negative Negative    SPECIMEN EXPIRATION DATE [id number]    Lactic acid whole blood    Collection Time: 01/31/23  5:05 PM   Result Value Ref Range    Lactic Acid 2.9 (H) 0.7 - 2.0 mmol/L   Magnesium    Collection Time: 01/31/23  6:57 PM   Result Value Ref Range    Magnesium 1.8 1.7 - 2.3 mg/dL   Troponin T, High Sensitivity (now)    Collection Time: 01/31/23  6:57 PM   Result Value Ref Range    Troponin T, High Sensitivity 56 (H) <=14 ng/L   TSH with free T4 reflex    Collection Time: 01/31/23  6:57 PM   Result Value Ref Range    TSH 8.03 (H) 0.30 - 4.20 uIU/mL   T4 free    Collection Time: 01/31/23  6:57 PM   Result Value Ref Range    Free T4 1.15 0.90 - 1.70 ng/dL   UA with Microscopic reflex to Culture    Collection Time: 01/31/23  9:53 PM    Specimen: Urine, Catheter   Result Value Ref Range    Color Urine Yellow Colorless, Straw, Light Yellow, Yellow    Appearance Urine Clear Clear    Glucose Urine Negative Negative mg/dL    Bilirubin Urine Negative Negative    Ketones Urine Negative Negative mg/dL    Specific Gravity Urine 1.013 1.001 - 1.030    Blood Urine Negative Negative    pH Urine 6.0 5.0 - 7.0    Protein Albumin Urine Negative Negative mg/dL    Urobilinogen Urine 2.0 (A) <2.0 mg/dL    Nitrite Urine Negative Negative    Leukocyte Esterase Urine 250 Marc/uL (A) Negative    Bacteria Urine Few (A) None Seen /HPF    RBC Urine 1 <=2 /HPF    WBC Urine 1 <=5 /HPF    Squamous Epithelials Urine <1 <=1 /HPF    Hyaline Casts Urine 4 (H) <=2 /LPF   Urine Culture    Collection Time: 01/31/23  9:53 PM    Specimen: Urine, Catheter   Result Value Ref Range    Culture <10,000 CFU/mL Mixture of urogenital breanna    Basic metabolic panel    Collection Time: 02/01/23  8:15 AM   Result Value Ref Range    Sodium 140 136 - 145 mmol/L    Potassium 3.2 (L) 3.4 - 5.3 mmol/L    Chloride 102 98 - 107 mmol/L    Carbon Dioxide (CO2) 28 22 - 29  mmol/L    Anion Gap 10 7 - 15 mmol/L    Urea Nitrogen 26.6 (H) 8.0 - 23.0 mg/dL    Creatinine 1.19 (H) 0.51 - 0.95 mg/dL    Calcium 8.0 (L) 8.2 - 9.6 mg/dL    Glucose 105 (H) 70 - 99 mg/dL    GFR Estimate 42 (L) >60 mL/min/1.73m2   CBC with platelets    Collection Time: 02/01/23  8:15 AM   Result Value Ref Range    WBC Count 6.1 4.0 - 11.0 10e3/uL    RBC Count 3.09 (L) 3.80 - 5.20 10e6/uL    Hemoglobin 9.7 (L) 11.7 - 15.7 g/dL    Hematocrit 31.9 (L) 35.0 - 47.0 %     (H) 78 - 100 fL    MCH 31.4 26.5 - 33.0 pg    MCHC 30.4 (L) 31.5 - 36.5 g/dL    RDW 17.6 (H) 10.0 - 15.0 %    Platelet Count 203 150 - 450 10e3/uL   Lactic acid whole blood    Collection Time: 02/01/23  8:15 AM   Result Value Ref Range    Lactic Acid 1.9 0.7 - 2.0 mmol/L   Echocardiogram Complete    Collection Time: 02/01/23  1:21 PM   Result Value Ref Range    LVEF  55-60%    Potassium    Collection Time: 02/01/23  9:52 PM   Result Value Ref Range    Potassium 3.4 3.4 - 5.3 mmol/L   INR    Collection Time: 02/01/23  9:52 PM   Result Value Ref Range    INR 4.13 (H) 0.85 - 1.15   Potassium    Collection Time: 02/02/23  4:44 AM   Result Value Ref Range    Potassium 3.2 (L) 3.4 - 5.3 mmol/L   INR    Collection Time: 02/02/23  4:44 AM   Result Value Ref Range    INR 4.28 (H) 0.85 - 1.15   Basic metabolic panel    Collection Time: 02/02/23  6:51 AM   Result Value Ref Range    Sodium 145 136 - 145 mmol/L    Potassium 3.1 (L) 3.4 - 5.3 mmol/L    Chloride 103 98 - 107 mmol/L    Carbon Dioxide (CO2) 30 (H) 22 - 29 mmol/L    Anion Gap 12 7 - 15 mmol/L    Urea Nitrogen 22.8 8.0 - 23.0 mg/dL    Creatinine 1.09 (H) 0.51 - 0.95 mg/dL    Calcium 8.1 (L) 8.2 - 9.6 mg/dL    Glucose 102 (H) 70 - 99 mg/dL    GFR Estimate 47 (L) >60 mL/min/1.73m2   Magnesium    Collection Time: 02/02/23  6:51 AM   Result Value Ref Range    Magnesium 1.6 (L) 1.7 - 2.3 mg/dL   CBC with platelets and differential    Collection Time: 02/02/23  6:51 AM   Result Value Ref Range    WBC  Count 7.2 4.0 - 11.0 10e3/uL    RBC Count 2.91 (L) 3.80 - 5.20 10e6/uL    Hemoglobin 9.0 (L) 11.7 - 15.7 g/dL    Hematocrit 29.4 (L) 35.0 - 47.0 %     (H) 78 - 100 fL    MCH 30.9 26.5 - 33.0 pg    MCHC 30.6 (L) 31.5 - 36.5 g/dL    RDW 17.2 (H) 10.0 - 15.0 %    Platelet Count 207 150 - 450 10e3/uL    % Neutrophils 83 %    % Lymphocytes 6 %    % Monocytes 8 %    % Eosinophils 2 %    % Basophils 1 %    % Immature Granulocytes 0 %    NRBCs per 100 WBC 2 (H) <1 /100    Absolute Neutrophils 6.0 1.6 - 8.3 10e3/uL    Absolute Lymphocytes 0.5 (L) 0.8 - 5.3 10e3/uL    Absolute Monocytes 0.6 0.0 - 1.3 10e3/uL    Absolute Eosinophils 0.2 0.0 - 0.7 10e3/uL    Absolute Basophils 0.0 0.0 - 0.2 10e3/uL    Absolute Immature Granulocytes 0.0 <=0.4 10e3/uL    Absolute NRBCs 0.1 10e3/uL   Potassium    Collection Time: 02/02/23 10:44 AM   Result Value Ref Range    Potassium 3.6 3.4 - 5.3 mmol/L   Extra Purple Top Tube    Collection Time: 02/02/23 10:44 AM   Result Value Ref Range    Hold Specimen [facility]    INR    Collection Time: 02/03/23  4:55 AM   Result Value Ref Range    INR 4.03 (H) 0.85 - 1.15   Extra Purple Top Tube    Collection Time: 02/03/23  4:55 AM   Result Value Ref Range    Hold Specimen JI    Hemoglobin    Collection Time: 02/03/23  4:55 AM   Result Value Ref Range    Hemoglobin 8.8 (L) 11.7 - 15.7 g/dL   Platelet count    Collection Time: 02/03/23  4:55 AM   Result Value Ref Range    Platelet Count 181 150 - 450 10e3/uL   Basic metabolic panel    Collection Time: 02/03/23  8:44 AM   Result Value Ref Range    Sodium 146 (H) 136 - 145 mmol/L    Potassium 2.9 (L) 3.4 - 5.3 mmol/L    Chloride 99 98 - 107 mmol/L    Carbon Dioxide (CO2) 35 (H) 22 - 29 mmol/L    Anion Gap 12 7 - 15 mmol/L    Urea Nitrogen 18.5 8.0 - 23.0 mg/dL    Creatinine 1.03 (H) 0.51 - 0.95 mg/dL    Calcium 8.3 8.2 - 9.6 mg/dL    Glucose 104 (H) 70 - 99 mg/dL    GFR Estimate 50 (L) >60 mL/min/1.73m2   Magnesium Lab    Collection Time: 02/03/23  8:44 AM    Result Value Ref Range    Magnesium 1.5 (L) 1.7 - 2.3 mg/dL   Magnesium    Collection Time: 02/03/23  4:32 PM   Result Value Ref Range    Magnesium 1.5 (L) 1.7 - 2.3 mg/dL   Potassium    Collection Time: 02/04/23 12:16 AM   Result Value Ref Range    Potassium 3.1 (L) 3.4 - 5.3 mmol/L   Magnesium    Collection Time: 02/04/23 12:16 AM   Result Value Ref Range    Magnesium 2.0 1.7 - 2.3 mg/dL   INR    Collection Time: 02/04/23  6:13 AM   Result Value Ref Range    INR 2.78 (H) 0.85 - 1.15   Basic metabolic panel    Collection Time: 02/04/23  6:13 AM   Result Value Ref Range    Sodium 142 136 - 145 mmol/L    Potassium 3.3 (L) 3.4 - 5.3 mmol/L    Chloride 94 (L) 98 - 107 mmol/L    Carbon Dioxide (CO2) 41 (H) 22 - 29 mmol/L    Anion Gap 7 7 - 15 mmol/L    Urea Nitrogen 17.4 8.0 - 23.0 mg/dL    Creatinine 1.03 (H) 0.51 - 0.95 mg/dL    Calcium 8.4 8.2 - 9.6 mg/dL    Glucose 112 (H) 70 - 99 mg/dL    GFR Estimate 50 (L) >60 mL/min/1.73m2   Hemoglobin    Collection Time: 02/04/23  6:13 AM   Result Value Ref Range    Hemoglobin 8.7 (L) 11.7 - 15.7 g/dL   Potassium    Collection Time: 02/04/23  6:13 AM   Result Value Ref Range    Potassium 3.3 (L) 3.4 - 5.3 mmol/L   Potassium    Collection Time: 02/04/23 12:06 PM   Result Value Ref Range    Potassium 3.5 3.4 - 5.3 mmol/L   INR    Collection Time: 02/05/23  5:02 AM   Result Value Ref Range    INR 1.85 (H) 0.85 - 1.15   Basic metabolic panel    Collection Time: 02/05/23  5:02 AM   Result Value Ref Range    Sodium 143 136 - 145 mmol/L    Potassium 3.6 3.4 - 5.3 mmol/L    Chloride 95 (L) 98 - 107 mmol/L    Carbon Dioxide (CO2) 40 (H) 22 - 29 mmol/L    Anion Gap 8 7 - 15 mmol/L    Urea Nitrogen 18.3 8.0 - 23.0 mg/dL    Creatinine 1.02 (H) 0.51 - 0.95 mg/dL    Calcium 8.4 8.2 - 9.6 mg/dL    Glucose 104 (H) 70 - 99 mg/dL    GFR Estimate 51 (L) >60 mL/min/1.73m2   Magnesium    Collection Time: 02/05/23  5:02 AM   Result Value Ref Range    Magnesium 1.8 1.7 - 2.3 mg/dL   Extra Purple  Top Tube    Collection Time: 02/05/23  5:02 AM   Result Value Ref Range    Hold Specimen JIC    Lactic Acid STAT    Collection Time: 02/05/23 11:41 AM   Result Value Ref Range    Lactic Acid 2.5 (H) 0.7 - 2.0 mmol/L   Lactic acid whole blood    Collection Time: 02/05/23  2:12 PM   Result Value Ref Range    Lactic Acid 3.0 (H) 0.7 - 2.0 mmol/L   CBC with platelets    Collection Time: 02/05/23  3:16 PM   Result Value Ref Range    WBC Count 7.8 4.0 - 11.0 10e3/uL    RBC Count 3.02 (L) 3.80 - 5.20 10e6/uL    Hemoglobin 9.4 (L) 11.7 - 15.7 g/dL    Hematocrit 31.0 (L) 35.0 - 47.0 %     (H) 78 - 100 fL    MCH 31.1 26.5 - 33.0 pg    MCHC 30.3 (L) 31.5 - 36.5 g/dL    RDW 17.2 (H) 10.0 - 15.0 %    Platelet Count 200 150 - 450 10e3/uL   Basic metabolic panel    Collection Time: 02/05/23  3:16 PM   Result Value Ref Range    Sodium 141 136 - 145 mmol/L    Potassium 3.7 3.4 - 5.3 mmol/L    Chloride 94 (L) 98 - 107 mmol/L    Carbon Dioxide (CO2) 38 (H) 22 - 29 mmol/L    Anion Gap 9 7 - 15 mmol/L    Urea Nitrogen 20.4 8.0 - 23.0 mg/dL    Creatinine 1.11 (H) 0.51 - 0.95 mg/dL    Calcium 8.3 8.2 - 9.6 mg/dL    Glucose 135 (H) 70 - 99 mg/dL    GFR Estimate 46 (L) >60 mL/min/1.73m2   INR    Collection Time: 02/06/23  4:58 AM   Result Value Ref Range    INR 1.60 (H) 0.85 - 1.15   Basic metabolic panel    Collection Time: 02/06/23  4:58 AM   Result Value Ref Range    Sodium 142 136 - 145 mmol/L    Potassium 3.2 (L) 3.4 - 5.3 mmol/L    Chloride 93 (L) 98 - 107 mmol/L    Carbon Dioxide (CO2) 39 (H) 22 - 29 mmol/L    Anion Gap 10 7 - 15 mmol/L    Urea Nitrogen 17.9 8.0 - 23.0 mg/dL    Creatinine 1.04 (H) 0.51 - 0.95 mg/dL    Calcium 8.7 8.2 - 9.6 mg/dL    Glucose 108 (H) 70 - 99 mg/dL    GFR Estimate 50 (L) >60 mL/min/1.73m2   Magnesium    Collection Time: 02/06/23  4:58 AM   Result Value Ref Range    Magnesium 1.8 1.7 - 2.3 mg/dL   CBC with platelets    Collection Time: 02/06/23  4:58 AM   Result Value Ref Range    WBC Count 7.7 4.0  - 11.0 10e3/uL    RBC Count 3.26 (L) 3.80 - 5.20 10e6/uL    Hemoglobin 10.2 (L) 11.7 - 15.7 g/dL    Hematocrit 32.9 (L) 35.0 - 47.0 %     (H) 78 - 100 fL    MCH 31.3 26.5 - 33.0 pg    MCHC 31.0 (L) 31.5 - 36.5 g/dL    RDW 17.0 (H) 10.0 - 15.0 %    Platelet Count 221 150 - 450 10e3/uL   Iron and iron binding capacity    Collection Time: 02/06/23  4:58 AM   Result Value Ref Range    Iron 49 37 - 145 ug/dL    Iron Binding Capacity 197 (L) 240 - 430 ug/dL    Iron Sat Index 25 15 - 46 %   Ferritin    Collection Time: 02/06/23  4:58 AM   Result Value Ref Range    Ferritin 268 11 - 328 ng/mL   Extra Red Top Tube    Collection Time: 02/06/23  4:58 AM   Result Value Ref Range    Hold Specimen JIC    Potassium    Collection Time: 02/06/23 11:46 AM   Result Value Ref Range    Potassium 3.7 3.4 - 5.3 mmol/L   Potassium    Collection Time: 02/06/23  3:26 PM   Result Value Ref Range    Potassium 4.4 3.4 - 5.3 mmol/L   INR    Collection Time: 02/07/23  4:53 AM   Result Value Ref Range    INR 1.68 (H) 0.85 - 1.15   Platelet count    Collection Time: 02/07/23  4:53 AM   Result Value Ref Range    Platelet Count 236 150 - 450 10e3/uL   Magnesium    Collection Time: 02/07/23  4:53 AM   Result Value Ref Range    Magnesium 1.9 1.7 - 2.3 mg/dL   Basic metabolic panel    Collection Time: 02/07/23  4:53 AM   Result Value Ref Range    Sodium 141 136 - 145 mmol/L    Potassium 4.0 3.4 - 5.3 mmol/L    Chloride 96 (L) 98 - 107 mmol/L    Carbon Dioxide (CO2) 38 (H) 22 - 29 mmol/L    Anion Gap 7 7 - 15 mmol/L    Urea Nitrogen 20.2 8.0 - 23.0 mg/dL    Creatinine 1.12 (H) 0.51 - 0.95 mg/dL    Calcium 8.5 8.2 - 9.6 mg/dL    Glucose 110 (H) 70 - 99 mg/dL    GFR Estimate 45 (L) >60 mL/min/1.73m2   Phosphorus    Collection Time: 02/07/23  4:53 AM   Result Value Ref Range    Phosphorus 2.0 (L) 2.5 - 4.5 mg/dL   COVID-19 Virus (Coronavirus) by PCR Nasopharyngeal    Collection Time: 02/07/23 10:30 PM    Specimen: Nasopharyngeal; Swab   Result Value  Ref Range    SARS CoV2 PCR Negative Negative   INR    Collection Time: 23  9:29 AM   Result Value Ref Range    INR 1.53 (H) 0.85 - 1.15             Imaging Results     Echocardiogram Complete    Result Date: 2023  [id number]^DORIS^ADRIEN[place] [address]  Name: WICHO ROMERO MRN: [id number] : 1928 Study Date: 2023 12:28 PM Age: 89 yrs Gender: Female Patient Location: [facility] Reason For Study: Cardiomyopathy Ordering Physician: ADRIENNE GEORGE Performed By: JOANIE  BSA: 1.9 m2 Height: 62 in Weight: 189 lb HR: 62 ______________________________________________________________________________ Procedure Complete Portable Echo Adult. Compared to the prior study dated 2022, there have been no changes. ______________________________________________________________________________ Interpretation Summary  1.Left ventricular size, wall motion and function are normal. The ejection fraction is 55-60%. 2.The right ventricle is moderately dilated. Mildly decreased right ventricular systolic function. 3.Significant biatrial chamber enlargement. 4.There is moderately severe (3+) tricuspid regurgitation. ERO is 0.28 cmÂ  with a volume of 32 cc.. 5.IVC diameter >2.1 cm collapsing <50% with sniff suggests a high RA pressure estimated at 15 mmHg or greater. 6.Right ventricular systolic pressure is elevated, consistent with mild to moderate pulmonary hypertension. Compared to the prior study dated 2022, the RV is larger and the TR is more. ______________________________________________________________________________ I      WMSI = 1.00     % Normal = 100  X - Cannot   0 -                      (2) - Mildly 2 -          Segments  Size Interpret    Hyperkinetic 1 - Normal  Hypokinetic  Hypokinetic  1-2     small                                                    7 -          3-5    moderate 3 - Akinetic 4 -          5 -         6 -  Akinetic Dyskinetic   6-14    large              Dyskinetic   Aneurysmal  w/scar       w/scar       15-16   diffuse  Left Ventricle Left ventricular size, wall motion and function are normal. The ejection fraction is 55-60%. There is borderline concentric left ventricular hypertrophy. Left ventricular diastolic function is not assessable. No regional wall motion abnormalities noted.  Right Ventricle The right ventricle is moderately dilated. Mildly decreased right ventricular systolic function. TAPSE is abnormal, which is consistent with abnormal right ventricular systolic function.  Atria The left atrium is moderately dilated. The right atrium is moderate to severely dilated. There is no color Doppler evidence of an atrial shunt.  Mitral Valve Mitral valve leaflets appear normal. There is no evidence of mitral stenosis or clinically significant mitral regurgitation. There is trace mitral regurgitation. There is no mitral valve stenosis.  Tricuspid Valve The tricuspid valve is not well visualized, but is grossly normal. Right ventricle systolic pressure estimate normal. There is moderately severe (3+) tricuspid regurgitation. ERO is 0.28 cmÂ  with a volume of 32 cc. Right ventricular systolic pressure is elevated, consistent with mild to moderate pulmonary hypertension. There is no tricuspid stenosis.  Aortic Valve Aortic valve leaflets appear normal. There is no evidence of aortic stenosis or clinically significant aortic regurgitation. The aortic valve is trileaflet. No aortic regurgitation is present. No aortic stenosis is present.  Pulmonic Valve The pulmonic valve is not well seen, but is grossly normal. This degree of valvular regurgitation is within normal limits. There is no pulmonic valvular stenosis.  Vessels The aorta root is normal. Normal size ascending aorta. IVC diameter >2.1 cm collapsing <50% with sniff suggests a high RA pressure estimated at 15 mmHg or greater.  Pericardium There is no pericardial  effusion.  Rhythm The rhythm was atrial fibrillation.  ______________________________________________________________________________ MMode/2D Measurements & Calculations Ao root diam: 2.5 cm asc Aorta Diam: 3.1 cm  LVOT diam: 1.8 cm LVOT area: 2.5 cm2 LA Volume Indexed (AL/bp): 54.3 ml/m2  Doppler Measurements & Calculations MV E max daniele: 112.7 cm/sec MV max P.2 mmHg MV mean P.4 mmHg MV V2 VTI: 27.8 cm MVA(VTI): 1.4 cm2 MV dec slope: 662.6 cm/sec2 MV dec time: 0.17 sec Ao V2 max: 125.1 cm/sec Ao max P.0 mmHg Ao V2 mean: 91.2 cm/sec Ao mean PG: 3.7 mmHg Ao V2 VTI: 27.3 cm WILBERTO(I,D): 1.4 cm2 WILBERTO(V,D): 1.7 cm2 LV V1 max P.7 mmHg LV V1 max: 81.8 cm/sec LV V1 VTI: 15.1 cm SV(LVOT): 38.0 ml SI(LVOT): 20.3 ml/m2  PA acc time: 0.08 sec TR max daniele: 333.9 cm/sec TR max P.6 mmHg AV Daniele Ratio (DI): 0.65 WILBERTO Index (cm2/m2): 0.75 E/E' avg: 15.8 Lateral E/e': 12.5 Medial E/e': 19.2  ______________________________________________________________________________ Report approved by: Martha Epstein 2023 01:34 PM       XR Chest Port 1 View    Result Date: 2023  EXAM: XR CHEST PORT 1 VIEW LOCATION: [facility] DATE/TIME: 2023 6:13 PM INDICATION: Shortness of breath COMPARISON: Chest x-ray on 2022     IMPRESSION: Single AP view of the chest was obtained. Enlarged cardiac silhouette and mild pulmonary vascular congestion. Bibasilar pulmonary opacities, could be related to small bilateral pleural effusions and associated basilar atelectasis/consolidation. No significant pneumothorax.    Cervical spine CT w/o contrast    Result Date: 2023  EXAM: CT HEAD W/O CONTRAST, CT CERVICAL SPINE W/O CONTRAST LOCATION: [facility] DATE/TIME: 2023 6:16 PM INDICATION: Trauma. Fall. Anticoagulated. COMPARISON: CT head and cervical spine 10/09/2022. TECHNIQUE: Routine CT Head without IV contrast. Multiplanar reformats. Dose reduction techniques were  used. FINDINGS: HEAD CT: INTRACRANIAL CONTENTS: Redemonstrated chronic lacunar infarct in the right basal nuclei. Remaining gray-white matter interfaces are intact. No hemorrhage or extraaxial collection. No mass effect. Subarachnoid cisterns are patent. Patchy and confluent white matter hypodensities are, while nonspecific, most compatible with chronic microvascular ischemic changes. Normal ventricles and sulci. VISUALIZED ORBITS/SINUSES/MASTOIDS: Bilateral lens replacements. Similar symmetric exophthalmus, which may be related to redundant intraorbital fat. Paranasal sinuses are clear. No middle ear or mastoid effusion. BONES/SOFT TISSUES: No acute abnormality. CERVICAL SPINE CT: VERTEBRA: Spine straightening may be positional and/or related to muscle spasm. Osseous structures appear diffusely demineralized. No displaced fracture or vertebral body compression. The dens, atlantoaxial joint, and facets are intact. CANAL/FORAMINA: Multilevel discogenic, uncovertebral, and facet degenerative changes. Moderate disc space loss C5-C6. No high-grade spinal canal or foraminal stenosis. PARASPINAL: No visible soft tissue abnormality. Partially visualized small left and trace right pleural effusions and associated atelectasis. Punctate calcifications in the bilateral parotid glands are compatible with sialolithiasis. Scattered vascular calcifications indicate atherosclerosis.     IMPRESSION: HEAD CT: 1.  No acute traumatic intracranial abnormality. 2.  Chronic intracranial findings as detailed. CERVICAL SPINE CT: 1.  No convincing CT findings of an acute osseous abnormality. Osseous demineralization can limit this assessment.     Head CT w/o contrast    Result Date: 1/31/2023  EXAM: CT HEAD W/O CONTRAST, CT CERVICAL SPINE W/O CONTRAST LOCATION: [facility] DATE/TIME: 1/31/2023 6:16 PM INDICATION: Trauma. Fall. Anticoagulated. COMPARISON: CT head and cervical spine 10/09/2022. TECHNIQUE: Routine CT  Head without IV contrast. Multiplanar reformats. Dose reduction techniques were used. FINDINGS: HEAD CT: INTRACRANIAL CONTENTS: Redemonstrated chronic lacunar infarct in the right basal nuclei. Remaining gray-white matter interfaces are intact. No hemorrhage or extraaxial collection. No mass effect. Subarachnoid cisterns are patent. Patchy and confluent white matter hypodensities are, while nonspecific, most compatible with chronic microvascular ischemic changes. Normal ventricles and sulci. VISUALIZED ORBITS/SINUSES/MASTOIDS: Bilateral lens replacements. Similar symmetric exophthalmus, which may be related to redundant intraorbital fat. Paranasal sinuses are clear. No middle ear or mastoid effusion. BONES/SOFT TISSUES: No acute abnormality. CERVICAL SPINE CT: VERTEBRA: Spine straightening may be positional and/or related to muscle spasm. Osseous structures appear diffusely demineralized. No displaced fracture or vertebral body compression. The dens, atlantoaxial joint, and facets are intact. CANAL/FORAMINA: Multilevel discogenic, uncovertebral, and facet degenerative changes. Moderate disc space loss C5-C6. No high-grade spinal canal or foraminal stenosis. PARASPINAL: No visible soft tissue abnormality. Partially visualized small left and trace right pleural effusions and associated atelectasis. Punctate calcifications in the bilateral parotid glands are compatible with sialolithiasis. Scattered vascular calcifications indicate atherosclerosis.     IMPRESSION: HEAD CT: 1.  No acute traumatic intracranial abnormality. 2.  Chronic intracranial findings as detailed. CERVICAL SPINE CT: 1.  No convincing CT findings of an acute osseous abnormality. Osseous demineralization can limit this assessment.           Electronically signed by    Melinda Caballero MD                             Sincerely,        BELINDA Bueno

## 2023-02-08 NOTE — TELEPHONE ENCOUNTER
[facility] Triage Nurse INR     Provider: ELDA Harman  Facility: [facility]  Facility Type:  TCU    Caller: Adrien  Call Back Number: [phone]  Reason for call: INR  Diagnosis/Goal: A. Fib    Todays INR: 1.53  Last INR 2/7 1.68(2.5mg).  Home dose:  2.5mg daily    Heparin/Lovenox:  No  Currently on ABX?: No  Other interacting medication:  None  Missed or refused doses: No    Verbal Order/Direction given by Provider: Warfarin 2.5mg daily.  Next INR 2/13/23.      Provider Giving Order:  Melinda Caballero MD    Verbal Order given to: Adrien Wynne RN

## 2023-02-10 NOTE — LETTER
2/10/2023        RE: Miesha Quarles  [address]  [place] [address]        Code Status:  DNR/DNI  Visit Type: RECHECK (INITIAL)     Facility:   [facility] ([facility]) [[id number]]        History of Present Illness:   Hospital Admission Date: 1/31/2023     Hospital Discharge Date: 2/7/2023      Miesha Quarles is a 89 year old female with past medical history for HTN, HLD, DM2, hypothyroidism, aflutter, CHF and CKD.  She was recently hospitalized for acute HFpEF.  She had been staying at her Niece's after a fall in which she hit her head, for a few daysand was not taking her medications.  She was found to have 20lb weight gain and orthopnea.  She was treated with diuretics and O2.  CT of head and cpsine showed no acute findings.  She did have hgb drift down from 12 to 10.5 with no overt signs of bleeding.  INR was >4 on admission. Family is trying to see if insurance will pay for DOAC like Eliquis.     Today, she reports feeling like her breathing is better and her typical weight is 150lbs weight in facility is 151lbs. She was wearing O2 last night but green not need it this am.  She complains of not sleeping well.     Past Medical History:   Diagnosis Date     Disorder of bone and cartilage, unspecified     Created by Conversion      Essential hypertension, benign     Created by Conversion      Osteoarthrosis, unspecified whether generalized or localized, lower leg     Created by Conversion      Pure hypercholesterolemia     Created by Conversion      Type II or unspecified type diabetes mellitus without mention of complication, not stated as uncontrolled     Created by Conversion      Unspecified hypothyroidism     Created by Conversion      Unspecified vitamin D deficiency     Created by Conversion      Past Surgical History:   Procedure Laterality Date     HYSTERECTOMY TOTAL ABDOMINAL, BILATERAL SALPINGO-OOPHORECTOMY, COMBINED       Family History   Problem Relation Age of Onset     Coronary  Artery Disease Brother      Social History     Socioeconomic History     Marital status:      Spouse name: Not on file     Number of children: Not on file     Years of education: Not on file     Highest education level: Not on file   Occupational History     Not on file   Tobacco Use     Smoking status: Former     Types: Cigarettes     Quit date: 1991     Years since quittin.1     Smokeless tobacco: Never   Substance and Sexual Activity     Alcohol use: Yes     Drug use: Not on file     Sexual activity: Not on file   Other Topics Concern     Not on file   Social History Narrative     Not on file     Social Determinants of Health     Financial Resource Strain: Medium Risk     Difficulty of Paying Living Expenses: Somewhat hard   Food Insecurity: No Food Insecurity     Worried About Running Out of Food in the Last Year: Never true     Ran Out of Food in the Last Year: Never true   Transportation Needs: No Transportation Needs     Lack of Transportation (Medical): No     Lack of Transportation (Non-Medical): No   Physical Activity: Not on file   Stress: Not on file   Social Connections: Not on file   Intimate Partner Violence: Not on file   Housing Stability: Low Risk      Unable to Pay for Housing in the Last Year: No     Number of Places Lived in the Last Year: 1     Unstable Housing in the Last Year: No       Current Outpatient Medications   Medication Sig Dispense Refill     acetaminophen (TYLENOL) 500 MG tablet Take 1,000 mg by mouth 3 times daily       bumetanide (BUMEX) 2 MG tablet Take 1 tablet (2 mg) by mouth daily for 30 days 30 tablet 0     diclofenac (VOLTAREN) 1 % topical gel Apply topically 2 times daily       levothyroxine (SYNTHROID/LEVOTHROID) 50 MCG tablet TAKE 1 TABLET EVERY DAY 90 tablet 0     metoprolol succinate ER (TOPROL XL) 100 MG 24 hr tablet TAKE 1 TABLET BY MOUTH EVERY DAY 90 tablet 3     simvastatin (ZOCOR) 20 MG tablet TAKE 1/2 TABLET BY MOUTH DAILY IN THE EVENING. 45  "tablet 3     WARFARIN SODIUM PO 2/8/23 INR 1.53  Take 2.5mg daily.  Next INR 2/13/23.       Allergies   Allergen Reactions     Amoxicillin Hives     Cephalexin Other (See Comments)     Reaction with intervenous administration.     Penicillins Hives     Latex Rash     Other Environmental Allergy Other (See Comments)     Pollen causes seasonal allergies.     Immunization History   Administered Date(s) Administered     COVID-19 Vaccine 12+ (Pfizer) 02/26/2021, 03/19/2021, 11/22/2021     COVID-19 Vaccine 18+ (Moderna) 04/27/2022     COVID-19 Vaccine Bivalent Booster 12+ (Pfizer) 10/05/2022     FLU 6-35 months 09/21/2012     Flu, Unspecified 12/28/2009, 11/17/2010, 10/03/2011     Influenza (High Dose) 3 valent vaccine 09/16/2014, 09/17/2015, 09/26/2016, 10/26/2017, 09/11/2018, 10/29/2019     Influenza Vaccine 65+ (Fluzone HD) 11/09/2020, 11/22/2021, 09/15/2022     Influenza Vaccine, 6+MO IM (QUADRIVALENT W/PRESERVATIVES) 10/29/2013     Pneumo Conj 13-V (2010&after) 12/30/2014     Pneumococcal 23 valent 12/26/2007     Td (Adult), Adsorbed 05/28/2022     Td,adult,historic,unspecified 04/28/2008     Tdap (Adacel,Boostrix) 06/18/2014         Post Discharge Medication Reconciliation Status: discharge medications reconciled and changed, per note/orders.    Medications list and allergies in the facility chart have been reviewed.  Please see facility EMR for most up to date list.         Review of Systems   Patient denies fever, chills, headache, lightheadedness, dizziness, rhinorrhea, cough, congestion, shortness of breath, chest pain, palpitations, abdominal pain, n/v, diarrhea, constipation, change in appetite, dysuria, frequency, burning or pain with urination.  Other than stated in HPI all other review of systems is negative.     Physical Exam  Vital signs:/75   Pulse 95   Temp 97.6  F (36.4  C)   Resp 16   Ht 1.575 m (5' 2\")   Wt 68.5 kg (151 lb)   SpO2 93%   BMI 27.62 kg/m     GENERAL APPEARANCE: Well " developed, elderly female, in no acute distress.  HEENT: normocephalic, atraumatic   sclerae anicteric, conjunctivae clear and moist, EOM intact  LUNGS: Lung sounds CTA, no adventitious sounds, respiratory effort normal.  CARD: RRR, S1, S2, without murmurs, gallops, rubs  ABD: Soft, nondistended and nontender with normal bowel sounds.   MSK: Muscle strength and tone were equal bilaterally. Moves all extremities easily and intentionally.   EXTREMITIES: No cyanosis, clubbing or edema.  NEURO: Alert and oriented x 3.Face is symmetric.  SKIN: Inspection of the skin reveals no rashes, ulcerations or petechiae.  PSYCH: euthymic          Labs:    Last Comprehensive Metabolic Panel:  Sodium   Date Value Ref Range Status   02/07/2023 141 136 - 145 mmol/L Final     Potassium   Date Value Ref Range Status   02/07/2023 4.0 3.4 - 5.3 mmol/L Final   06/15/2022 3.7 3.5 - 5.0 mmol/L Final     Chloride   Date Value Ref Range Status   02/07/2023 96 (L) 98 - 107 mmol/L Final   06/15/2022 103 98 - 107 mmol/L Final     Carbon Dioxide (CO2)   Date Value Ref Range Status   02/07/2023 38 (H) 22 - 29 mmol/L Final   06/15/2022 24 22 - 31 mmol/L Final     Anion Gap   Date Value Ref Range Status   02/07/2023 7 7 - 15 mmol/L Final   06/15/2022 14 5 - 18 mmol/L Final     Glucose   Date Value Ref Range Status   02/07/2023 110 (H) 70 - 99 mg/dL Final   06/15/2022 120 70 - 125 mg/dL Final     Urea Nitrogen   Date Value Ref Range Status   02/07/2023 20.2 8.0 - 23.0 mg/dL Final   06/15/2022 27 8 - 28 mg/dL Final     Creatinine   Date Value Ref Range Status   02/07/2023 1.12 (H) 0.51 - 0.95 mg/dL Final     GFR Estimate   Date Value Ref Range Status   02/07/2023 45 (L) >60 mL/min/1.73m2 Final     Comment:     eGFR calculated using 2021 CKD-EPI equation.   02/25/2021 45 (L) >60 mL/min/1.73m2 Final     Calcium   Date Value Ref Range Status   02/07/2023 8.5 8.2 - 9.6 mg/dL Final     Lab Results   Component Value Date    WBC 7.7 02/06/2023     Lab Results    Component Value Date    RBC 3.26 02/06/2023     Lab Results   Component Value Date    HGB 10.2 02/06/2023     Lab Results   Component Value Date    HCT 32.9 02/06/2023     Lab Results   Component Value Date     02/06/2023     Lab Results   Component Value Date    MCH 31.3 02/06/2023     Lab Results   Component Value Date    MCHC 31.0 02/06/2023     Lab Results   Component Value Date    RDW 17.0 02/06/2023     Lab Results   Component Value Date     02/07/2023           Assessment/plan:   Chronic diastolic congestive heart failure (H)  Compensated, wean O2 as tolerated, check weights MWF, continue with Bumex and metoprolol.  PT/OT eval and treat.      Typical atrial flutter (H)  Rate controlled, continue with home metoprolol. On Warfarin.  Family to work with PCP if she is able to switch to a DOAC for convenience.  INRs are slow to increase.     Hypothyroidism, unspecified type  Last TSH 1.15, continue with home levothyroxine.     Type 2 diabetes mellitus with complication (H)  Diet controlled and does not check BS. A1c 4 months ago 6.7 well within goal for age.     Benign Essential Hypertension  Controlled, continue with home metoprolol.     Stage 3a chronic kidney disease (H)  Last GFR, 45 which is baseline.  Monitor and avoid nephrotoxins     Hypercholesterolemia  continue with home simvastatin    Primary insomnia  Add melatonin daily          Electronically signed by: Gilma Kraft NP          Sincerely,        Gilma Kraft NP

## 2023-02-10 NOTE — PROGRESS NOTES
Code Status:  DNR/DNI  Visit Type: RECHECK (INITIAL)     Facility:   [facility] ([facility]) [[id number]]        History of Present Illness:   Hospital Admission Date: 1/31/2023     Hospital Discharge Date: 2/7/2023      Miesha Quarles is a 89 year old female with past medical history for HTN, HLD, DM2, hypothyroidism, aflutter, CHF and CKD.  She was recently hospitalized for acute HFpEF.  She had been staying at her Niece's after a fall in which she hit her head, for a few daysand was not taking her medications.  She was found to have 20lb weight gain and orthopnea.  She was treated with diuretics and O2.  CT of head and cpsine showed no acute findings.  She did have hgb drift down from 12 to 10.5 with no overt signs of bleeding.  INR was >4 on admission. Family is trying to see if insurance will pay for DOAC like Eliquis.     Today, she reports feeling like her breathing is better and her typical weight is 150lbs weight in facility is 151lbs. She was wearing O2 last night but green not need it this am.  She complains of not sleeping well.     Past Medical History:   Diagnosis Date     Disorder of bone and cartilage, unspecified     Created by Conversion      Essential hypertension, benign     Created by Conversion      Osteoarthrosis, unspecified whether generalized or localized, lower leg     Created by Conversion      Pure hypercholesterolemia     Created by Conversion      Type II or unspecified type diabetes mellitus without mention of complication, not stated as uncontrolled     Created by Conversion      Unspecified hypothyroidism     Created by Conversion      Unspecified vitamin D deficiency     Created by Conversion      Past Surgical History:   Procedure Laterality Date     HYSTERECTOMY TOTAL ABDOMINAL, BILATERAL SALPINGO-OOPHORECTOMY, COMBINED       Family History   Problem Relation Age of Onset     Coronary Artery Disease Brother      Social History     Socioeconomic History     Marital status:       Spouse name: Not on file     Number of children: Not on file     Years of education: Not on file     Highest education level: Not on file   Occupational History     Not on file   Tobacco Use     Smoking status: Former     Types: Cigarettes     Quit date: 1991     Years since quittin.1     Smokeless tobacco: Never   Substance and Sexual Activity     Alcohol use: Yes     Drug use: Not on file     Sexual activity: Not on file   Other Topics Concern     Not on file   Social History Narrative     Not on file     Social Determinants of Health     Financial Resource Strain: Medium Risk     Difficulty of Paying Living Expenses: Somewhat hard   Food Insecurity: No Food Insecurity     Worried About Running Out of Food in the Last Year: Never true     Ran Out of Food in the Last Year: Never true   Transportation Needs: No Transportation Needs     Lack of Transportation (Medical): No     Lack of Transportation (Non-Medical): No   Physical Activity: Not on file   Stress: Not on file   Social Connections: Not on file   Intimate Partner Violence: Not on file   Housing Stability: Low Risk      Unable to Pay for Housing in the Last Year: No     Number of Places Lived in the Last Year: 1     Unstable Housing in the Last Year: No       Current Outpatient Medications   Medication Sig Dispense Refill     acetaminophen (TYLENOL) 500 MG tablet Take 1,000 mg by mouth 3 times daily       bumetanide (BUMEX) 2 MG tablet Take 1 tablet (2 mg) by mouth daily for 30 days 30 tablet 0     diclofenac (VOLTAREN) 1 % topical gel Apply topically 2 times daily       levothyroxine (SYNTHROID/LEVOTHROID) 50 MCG tablet TAKE 1 TABLET EVERY DAY 90 tablet 0     metoprolol succinate ER (TOPROL XL) 100 MG 24 hr tablet TAKE 1 TABLET BY MOUTH EVERY DAY 90 tablet 3     simvastatin (ZOCOR) 20 MG tablet TAKE 1/2 TABLET BY MOUTH DAILY IN THE EVENING. 45 tablet 3     WARFARIN SODIUM PO 23 INR 1.53  Take 2.5mg daily.  Next INR 23.    "    Allergies   Allergen Reactions     Amoxicillin Hives     Cephalexin Other (See Comments)     Reaction with intervenous administration.     Penicillins Hives     Latex Rash     Other Environmental Allergy Other (See Comments)     Pollen causes seasonal allergies.     Immunization History   Administered Date(s) Administered     COVID-19 Vaccine 12+ (Pfizer) 02/26/2021, 03/19/2021, 11/22/2021     COVID-19 Vaccine 18+ (Moderna) 04/27/2022     COVID-19 Vaccine Bivalent Booster 12+ (Pfizer) 10/05/2022     FLU 6-35 months 09/21/2012     Flu, Unspecified 12/28/2009, 11/17/2010, 10/03/2011     Influenza (High Dose) 3 valent vaccine 09/16/2014, 09/17/2015, 09/26/2016, 10/26/2017, 09/11/2018, 10/29/2019     Influenza Vaccine 65+ (Fluzone HD) 11/09/2020, 11/22/2021, 09/15/2022     Influenza Vaccine, 6+MO IM (QUADRIVALENT W/PRESERVATIVES) 10/29/2013     Pneumo Conj 13-V (2010&after) 12/30/2014     Pneumococcal 23 valent 12/26/2007     Td (Adult), Adsorbed 05/28/2022     Td,adult,historic,unspecified 04/28/2008     Tdap (Adacel,Boostrix) 06/18/2014         Post Discharge Medication Reconciliation Status: discharge medications reconciled and changed, per note/orders.    Medications list and allergies in the facility chart have been reviewed.  Please see facility EMR for most up to date list.         Review of Systems   Patient denies fever, chills, headache, lightheadedness, dizziness, rhinorrhea, cough, congestion, shortness of breath, chest pain, palpitations, abdominal pain, n/v, diarrhea, constipation, change in appetite, dysuria, frequency, burning or pain with urination.  Other than stated in HPI all other review of systems is negative.     Physical Exam  Vital signs:/75   Pulse 95   Temp 97.6  F (36.4  C)   Resp 16   Ht 1.575 m (5' 2\")   Wt 68.5 kg (151 lb)   SpO2 93%   BMI 27.62 kg/m     GENERAL APPEARANCE: Well developed, elderly female, in no acute distress.  HEENT: normocephalic, atraumatic   sclerae " anicteric, conjunctivae clear and moist, EOM intact  LUNGS: Lung sounds CTA, no adventitious sounds, respiratory effort normal.  CARD: RRR, S1, S2, without murmurs, gallops, rubs  ABD: Soft, nondistended and nontender with normal bowel sounds.   MSK: Muscle strength and tone were equal bilaterally. Moves all extremities easily and intentionally.   EXTREMITIES: No cyanosis, clubbing or edema.  NEURO: Alert and oriented x 3.Face is symmetric.  SKIN: Inspection of the skin reveals no rashes, ulcerations or petechiae.  PSYCH: euthymic          Labs:    Last Comprehensive Metabolic Panel:  Sodium   Date Value Ref Range Status   02/07/2023 141 136 - 145 mmol/L Final     Potassium   Date Value Ref Range Status   02/07/2023 4.0 3.4 - 5.3 mmol/L Final   06/15/2022 3.7 3.5 - 5.0 mmol/L Final     Chloride   Date Value Ref Range Status   02/07/2023 96 (L) 98 - 107 mmol/L Final   06/15/2022 103 98 - 107 mmol/L Final     Carbon Dioxide (CO2)   Date Value Ref Range Status   02/07/2023 38 (H) 22 - 29 mmol/L Final   06/15/2022 24 22 - 31 mmol/L Final     Anion Gap   Date Value Ref Range Status   02/07/2023 7 7 - 15 mmol/L Final   06/15/2022 14 5 - 18 mmol/L Final     Glucose   Date Value Ref Range Status   02/07/2023 110 (H) 70 - 99 mg/dL Final   06/15/2022 120 70 - 125 mg/dL Final     Urea Nitrogen   Date Value Ref Range Status   02/07/2023 20.2 8.0 - 23.0 mg/dL Final   06/15/2022 27 8 - 28 mg/dL Final     Creatinine   Date Value Ref Range Status   02/07/2023 1.12 (H) 0.51 - 0.95 mg/dL Final     GFR Estimate   Date Value Ref Range Status   02/07/2023 45 (L) >60 mL/min/1.73m2 Final     Comment:     eGFR calculated using 2021 CKD-EPI equation.   02/25/2021 45 (L) >60 mL/min/1.73m2 Final     Calcium   Date Value Ref Range Status   02/07/2023 8.5 8.2 - 9.6 mg/dL Final     Lab Results   Component Value Date    WBC 7.7 02/06/2023     Lab Results   Component Value Date    RBC 3.26 02/06/2023     Lab Results   Component Value Date    HGB  10.2 02/06/2023     Lab Results   Component Value Date    HCT 32.9 02/06/2023     Lab Results   Component Value Date     02/06/2023     Lab Results   Component Value Date    MCH 31.3 02/06/2023     Lab Results   Component Value Date    MCHC 31.0 02/06/2023     Lab Results   Component Value Date    RDW 17.0 02/06/2023     Lab Results   Component Value Date     02/07/2023           Assessment/plan:   Chronic diastolic congestive heart failure (H)  Compensated, wean O2 as tolerated, check weights MWF, continue with Bumex and metoprolol.  PT/OT eval and treat.      Typical atrial flutter (H)  Rate controlled, continue with home metoprolol. On Warfarin.  Family to work with PCP if she is able to switch to a DOAC for convenience.  INRs are slow to increase.     Hypothyroidism, unspecified type  Last TSH 1.15, continue with home levothyroxine.     Type 2 diabetes mellitus with complication (H)  Diet controlled and does not check BS. A1c 4 months ago 6.7 well within goal for age.     Benign Essential Hypertension  Controlled, continue with home metoprolol.     Stage 3a chronic kidney disease (H)  Last GFR, 45 which is baseline.  Monitor and avoid nephrotoxins     Hypercholesterolemia  continue with home simvastatin    Primary insomnia  Add melatonin daily          Electronically signed by: Gilma Kraft NP

## 2023-02-13 NOTE — TELEPHONE ENCOUNTER
[facility] Geriatrics Triage Nurse INR     Provider: ELDA Harman  Facility: [facility]  Facility Type:  TCU    Caller: Elizabeth  Call Back Number: [phone]  Reason for call: INR  Diagnosis/Goal: A. Fib    Todays INR: 2.14  Last INR 2/8 1.53(2.5mg daily), 2/7 1.68(2.5mg).  Home dose:  2.5mg daily    Heparin/Lovenox:  No  Currently on ABX?: No  Other interacting medication:  None  Missed or refused doses: No    Verbal Order/Direction given by Provider: Continue Warfarin 2.5mg daily.  Next INR 2/21/23    Provider Giving Order:  ELDA Hamran    Verbal Order given to: Adrien Wynne RN

## 2023-02-14 NOTE — LETTER
2/14/2023        RE: Miesha Quarles  [address]  [place] [address]        Code Status:  DNR/DNI  Visit Type: RECHECK     Facility:   [facility] ([facility]) [[id number]]        History of Present Illness:   Hospital Admission Date: 1/31/2023     Hospital Discharge Date: 2/7/2023      Miesha Quarles is a 89 year old female with past medical history for HTN, HLD, DM2, hypothyroidism, aflutter, CHF and CKD.  She was recently hospitalized for acute HFpEF.  She had been staying at her Niece's after a fall in which she hit her head, for a few daysand was not taking her medications.  She was found to have 20lb weight gain and orthopnea.  She was treated with diuretics and O2.  CT of head and cpsine showed no acute findings.  She did have hgb drift down from 12 to 10.5 with no overt signs of bleeding.  INR was >4 on admission. Family is trying to see if insurance will pay for DOAC like Eliquis.     Today, She reports sleeping well.  She denies any issues and Bps are acceptable.  Weight stable at 149.8lbs.     INRs have been therapeutic on her home dose of 2.5mg daily.       Current Outpatient Medications   Medication Sig Dispense Refill     acetaminophen (TYLENOL) 500 MG tablet Take 1,000 mg by mouth 3 times daily       bumetanide (BUMEX) 2 MG tablet Take 1 tablet (2 mg) by mouth daily for 30 days 30 tablet 0     diclofenac (VOLTAREN) 1 % topical gel Apply topically 2 times daily       levothyroxine (SYNTHROID/LEVOTHROID) 50 MCG tablet TAKE 1 TABLET EVERY DAY 90 tablet 0     melatonin 3 MG tablet Take 3 mg by mouth At Bedtime       metoprolol succinate ER (TOPROL XL) 100 MG 24 hr tablet TAKE 1 TABLET BY MOUTH EVERY DAY 90 tablet 3     simvastatin (ZOCOR) 20 MG tablet TAKE 1/2 TABLET BY MOUTH DAILY IN THE EVENING. 45 tablet 3     WARFARIN SODIUM PO 2/13/23 INR 2.14  Cont 2.5mg daily.  Next INR 2/21/23.   2/8/23 INR 1.53  Take 2.5mg daily.  Next INR 2/13/23.         Allergies   Allergen Reactions      "Amoxicillin Hives     Cephalexin Other (See Comments)     Reaction with intervenous administration.     Penicillins Hives     Latex Rash     Other Environmental Allergy Other (See Comments)     Pollen causes seasonal allergies.     Immunization History   Administered Date(s) Administered     COVID-19 Vaccine 12+ (Pfizer) 02/26/2021, 03/19/2021, 11/22/2021     COVID-19 Vaccine 18+ (Moderna) 04/27/2022     COVID-19 Vaccine Bivalent Booster 12+ (Pfizer) 10/05/2022     FLU 6-35 months 09/21/2012     Flu, Unspecified 12/28/2009, 11/17/2010, 10/03/2011     Influenza (High Dose) 3 valent vaccine 09/16/2014, 09/17/2015, 09/26/2016, 10/26/2017, 09/11/2018, 10/29/2019     Influenza Vaccine 65+ (Fluzone HD) 11/09/2020, 11/22/2021, 09/15/2022     Influenza Vaccine, 6+MO IM (QUADRIVALENT W/PRESERVATIVES) 10/29/2013     Pneumo Conj 13-V (2010&after) 12/30/2014     Pneumococcal 23 valent 12/26/2007     Td (Adult), Adsorbed 05/28/2022     Td,adult,historic,unspecified 04/28/2008     Tdap (Adacel,Boostrix) 06/18/2014         Review of Systems   Patient denies fever, chills, headache, lightheadedness, dizziness, rhinorrhea, cough, congestion, shortness of breath, chest pain, palpitations, abdominal pain, n/v, diarrhea, constipation, change in appetite, change in sleep pattern, dysuria, frequency, burning or pain with urination.  Other than stated in HPI all other review of systems is negative.         Physical Exam  Vital signs:/75   Pulse 95   Temp 97.6  F (36.4  C)   Resp 16   Ht 1.6 m (5' 3\")   Wt 67.6 kg (149 lb)   SpO2 92%   BMI 26.39 kg/m     GENERAL APPEARANCE: Well developed, well nourished, in no acute distress.  HEENT: normocephalic, atraumatic   sclerae anicteric, conjunctivae clear and moist, EOM intact  LUNGS: Lung sounds CTA, no adventitious sounds, respiratory effort normal.  CARD: RRR, S1, S2, without murmurs, gallops, rubs  MSK: Muscle strength and tone were equal bilaterally. Moves all extremities " easily and intentionally.   EXTREMITIES: No cyanosis, clubbing or edema.  NEURO: Alert and oriented x 3.Face is symmetric.  SKIN: Inspection of the skin reveals no rashes, ulcerations or petechiae.  PSYCH: euthymic                Labs:    Last Comprehensive Metabolic Panel:  Sodium   Date Value Ref Range Status   02/07/2023 141 136 - 145 mmol/L Final     Potassium   Date Value Ref Range Status   02/07/2023 4.0 3.4 - 5.3 mmol/L Final   06/15/2022 3.7 3.5 - 5.0 mmol/L Final     Chloride   Date Value Ref Range Status   02/07/2023 96 (L) 98 - 107 mmol/L Final   06/15/2022 103 98 - 107 mmol/L Final     Carbon Dioxide (CO2)   Date Value Ref Range Status   02/07/2023 38 (H) 22 - 29 mmol/L Final   06/15/2022 24 22 - 31 mmol/L Final     Anion Gap   Date Value Ref Range Status   02/07/2023 7 7 - 15 mmol/L Final   06/15/2022 14 5 - 18 mmol/L Final     Glucose   Date Value Ref Range Status   02/07/2023 110 (H) 70 - 99 mg/dL Final   06/15/2022 120 70 - 125 mg/dL Final     Urea Nitrogen   Date Value Ref Range Status   02/07/2023 20.2 8.0 - 23.0 mg/dL Final   06/15/2022 27 8 - 28 mg/dL Final     Creatinine   Date Value Ref Range Status   02/07/2023 1.12 (H) 0.51 - 0.95 mg/dL Final     GFR Estimate   Date Value Ref Range Status   02/07/2023 45 (L) >60 mL/min/1.73m2 Final     Comment:     eGFR calculated using 2021 CKD-EPI equation.   02/25/2021 45 (L) >60 mL/min/1.73m2 Final     Calcium   Date Value Ref Range Status   02/07/2023 8.5 8.2 - 9.6 mg/dL Final     Lab Results   Component Value Date    WBC 7.7 02/06/2023     Lab Results   Component Value Date    RBC 3.26 02/06/2023     Lab Results   Component Value Date    HGB 10.2 02/06/2023     Lab Results   Component Value Date    HCT 32.9 02/06/2023     Lab Results   Component Value Date     02/06/2023     Lab Results   Component Value Date    MCH 31.3 02/06/2023     Lab Results   Component Value Date    MCHC 31.0 02/06/2023     Lab Results   Component Value Date    RDW 17.0  02/06/2023     Lab Results   Component Value Date     02/07/2023           Assessment/plan:   Chronic diastolic congestive heart failure (H)  Compensated, weights good.  No longer on O2.  Continue with meotprolol and bumex.     Typical atrial flutter (H)  Rate controlled, continue on metoprolol.  AC is warfarin and INR s have been therapeutic.  Family will need to work with PCP is she is able to switch to DOAC.  Delayed due to cost.      Type 2 diabetes mellitus with complication (H)  Last A1c 6.7 withint goal for age.  Diet controlled only.      Benign Essential Hypertension  Controlled, continue with metoprolol    Stage 3a chronic kidney disease (H)  Last GFR 45 which is baseline.  Avoid nephrotoxins.     Hypothyroidism, unspecified type  Continue on home levothyroxine. Will need recheck of TSH in 2 months.     Hypercholesterolemia  continue with home simvastatin    Primary insomnia  Improved after adding melatonin daily          Electronically signed by: Gilma Kraft NP          Sincerely,        Gilma Kraft NP

## 2023-02-14 NOTE — PROGRESS NOTES
Code Status:  DNR/DNI  Visit Type: RECHECK     Facility:   [facility] ([facility]) [[id number]]        History of Present Illness:   Hospital Admission Date: 1/31/2023     Hospital Discharge Date: 2/7/2023      Miesha Quarles is a 89 year old female with past medical history for HTN, HLD, DM2, hypothyroidism, aflutter, CHF and CKD.  She was recently hospitalized for acute HFpEF.  She had been staying at her Niece's after a fall in which she hit her head, for a few daysand was not taking her medications.  She was found to have 20lb weight gain and orthopnea.  She was treated with diuretics and O2.  CT of head and cpsine showed no acute findings.  She did have hgb drift down from 12 to 10.5 with no overt signs of bleeding.  INR was >4 on admission. Family is trying to see if insurance will pay for DOAC like Eliquis.     Today, She reports sleeping well.  She denies any issues and Bps are acceptable.  Weight stable at 149.8lbs.     INRs have been therapeutic on her home dose of 2.5mg daily.       Current Outpatient Medications   Medication Sig Dispense Refill     acetaminophen (TYLENOL) 500 MG tablet Take 1,000 mg by mouth 3 times daily       bumetanide (BUMEX) 2 MG tablet Take 1 tablet (2 mg) by mouth daily for 30 days 30 tablet 0     diclofenac (VOLTAREN) 1 % topical gel Apply topically 2 times daily       levothyroxine (SYNTHROID/LEVOTHROID) 50 MCG tablet TAKE 1 TABLET EVERY DAY 90 tablet 0     melatonin 3 MG tablet Take 3 mg by mouth At Bedtime       metoprolol succinate ER (TOPROL XL) 100 MG 24 hr tablet TAKE 1 TABLET BY MOUTH EVERY DAY 90 tablet 3     simvastatin (ZOCOR) 20 MG tablet TAKE 1/2 TABLET BY MOUTH DAILY IN THE EVENING. 45 tablet 3     WARFARIN SODIUM PO 2/13/23 INR 2.14  Cont 2.5mg daily.  Next INR 2/21/23.   2/8/23 INR 1.53  Take 2.5mg daily.  Next INR 2/13/23.         Allergies   Allergen Reactions     Amoxicillin Hives     Cephalexin Other (See Comments)     Reaction with intervenous  "administration.     Penicillins Hives     Latex Rash     Other Environmental Allergy Other (See Comments)     Pollen causes seasonal allergies.     Immunization History   Administered Date(s) Administered     COVID-19 Vaccine 12+ (Pfizer) 02/26/2021, 03/19/2021, 11/22/2021     COVID-19 Vaccine 18+ (Moderna) 04/27/2022     COVID-19 Vaccine Bivalent Booster 12+ (Pfizer) 10/05/2022     FLU 6-35 months 09/21/2012     Flu, Unspecified 12/28/2009, 11/17/2010, 10/03/2011     Influenza (High Dose) 3 valent vaccine 09/16/2014, 09/17/2015, 09/26/2016, 10/26/2017, 09/11/2018, 10/29/2019     Influenza Vaccine 65+ (Fluzone HD) 11/09/2020, 11/22/2021, 09/15/2022     Influenza Vaccine, 6+MO IM (QUADRIVALENT W/PRESERVATIVES) 10/29/2013     Pneumo Conj 13-V (2010&after) 12/30/2014     Pneumococcal 23 valent 12/26/2007     Td (Adult), Adsorbed 05/28/2022     Td,adult,historic,unspecified 04/28/2008     Tdap (Adacel,Boostrix) 06/18/2014         Review of Systems   Patient denies fever, chills, headache, lightheadedness, dizziness, rhinorrhea, cough, congestion, shortness of breath, chest pain, palpitations, abdominal pain, n/v, diarrhea, constipation, change in appetite, change in sleep pattern, dysuria, frequency, burning or pain with urination.  Other than stated in HPI all other review of systems is negative.         Physical Exam  Vital signs:/75   Pulse 95   Temp 97.6  F (36.4  C)   Resp 16   Ht 1.6 m (5' 3\")   Wt 67.6 kg (149 lb)   SpO2 92%   BMI 26.39 kg/m     GENERAL APPEARANCE: Well developed, well nourished, in no acute distress.  HEENT: normocephalic, atraumatic   sclerae anicteric, conjunctivae clear and moist, EOM intact  LUNGS: Lung sounds CTA, no adventitious sounds, respiratory effort normal.  CARD: RRR, S1, S2, without murmurs, gallops, rubs  MSK: Muscle strength and tone were equal bilaterally. Moves all extremities easily and intentionally.   EXTREMITIES: No cyanosis, clubbing or edema.  NEURO: Alert " and oriented x 3.Face is symmetric.  SKIN: Inspection of the skin reveals no rashes, ulcerations or petechiae.  PSYCH: euthymic                Labs:    Last Comprehensive Metabolic Panel:  Sodium   Date Value Ref Range Status   02/07/2023 141 136 - 145 mmol/L Final     Potassium   Date Value Ref Range Status   02/07/2023 4.0 3.4 - 5.3 mmol/L Final   06/15/2022 3.7 3.5 - 5.0 mmol/L Final     Chloride   Date Value Ref Range Status   02/07/2023 96 (L) 98 - 107 mmol/L Final   06/15/2022 103 98 - 107 mmol/L Final     Carbon Dioxide (CO2)   Date Value Ref Range Status   02/07/2023 38 (H) 22 - 29 mmol/L Final   06/15/2022 24 22 - 31 mmol/L Final     Anion Gap   Date Value Ref Range Status   02/07/2023 7 7 - 15 mmol/L Final   06/15/2022 14 5 - 18 mmol/L Final     Glucose   Date Value Ref Range Status   02/07/2023 110 (H) 70 - 99 mg/dL Final   06/15/2022 120 70 - 125 mg/dL Final     Urea Nitrogen   Date Value Ref Range Status   02/07/2023 20.2 8.0 - 23.0 mg/dL Final   06/15/2022 27 8 - 28 mg/dL Final     Creatinine   Date Value Ref Range Status   02/07/2023 1.12 (H) 0.51 - 0.95 mg/dL Final     GFR Estimate   Date Value Ref Range Status   02/07/2023 45 (L) >60 mL/min/1.73m2 Final     Comment:     eGFR calculated using 2021 CKD-EPI equation.   02/25/2021 45 (L) >60 mL/min/1.73m2 Final     Calcium   Date Value Ref Range Status   02/07/2023 8.5 8.2 - 9.6 mg/dL Final     Lab Results   Component Value Date    WBC 7.7 02/06/2023     Lab Results   Component Value Date    RBC 3.26 02/06/2023     Lab Results   Component Value Date    HGB 10.2 02/06/2023     Lab Results   Component Value Date    HCT 32.9 02/06/2023     Lab Results   Component Value Date     02/06/2023     Lab Results   Component Value Date    MCH 31.3 02/06/2023     Lab Results   Component Value Date    MCHC 31.0 02/06/2023     Lab Results   Component Value Date    RDW 17.0 02/06/2023     Lab Results   Component Value Date     02/07/2023            Assessment/plan:   Chronic diastolic congestive heart failure (H)  Compensated, weights good.  No longer on O2.  Continue with meotprolol and bumex.     Typical atrial flutter (H)  Rate controlled, continue on metoprolol.  AC is warfarin and INR s have been therapeutic.  Family will need to work with PCP is she is able to switch to DOAC.  Delayed due to cost.      Type 2 diabetes mellitus with complication (H)  Last A1c 6.7 withint goal for age.  Diet controlled only.      Benign Essential Hypertension  Controlled, continue with metoprolol    Stage 3a chronic kidney disease (H)  Last GFR 45 which is baseline.  Avoid nephrotoxins.     Hypothyroidism, unspecified type  Continue on home levothyroxine. Will need recheck of TSH in 2 months.     Hypercholesterolemia  continue with home simvastatin    Primary insomnia  Improved after adding melatonin daily     Patient has a mobility limitation that significantly impairs her  ability to participate in mobility-related ADLS. Patient has CHF, weakness.  This mobility limitation cannot be sufficiently and safely resolved by use of a cane, walker or crutches, as patient cannot walk independently.  Patient has the mental and functional capacity to safely use a manual wheelchair.  Her home has the space to maneuver a wheelchair.  Patient requires a manual wheelchair, bilateral footrests, height adjustable arm rests, seat cushion and anti-tippers.  Patient would also greatly benefit from a wheelchair to assist in transportation to medical appointments.   I certify that, based on my findings, a manual wheelchair is medically necessary for this patient.  Length of need is wheelchair.            Electronically signed by: Gilma Kraft NP

## 2023-02-15 NOTE — TELEPHONE ENCOUNTER
[facility] Geriatrics Lab Note     Provider: ELDA Harman  Facility: [facility]) Facility Type:  TCU    Allergies   Allergen Reactions     Amoxicillin Hives     Cephalexin Other (See Comments)     Reaction with intervenous administration.     Penicillins Hives     Latex Rash     Other Environmental Allergy Other (See Comments)     Pollen causes seasonal allergies.       Labs Reviewed by provider: Heme 2, BMP, Mg     Verbal Order/Direction given by Provider: Potassium 20meq daily.  Check BMP on 2/21/23.      Provider giving Order:  ELDA Harman    Verbal Order given to: Jesús([phone])    Sebastian Wynne RN

## 2023-02-17 NOTE — ED TRIAGE NOTES
Patient arrives via EMS from LT facility in [facility]. Patient went to program and fell asleep for about an hour, patient was unresponsive to sternal rub per facility staff. Hx of narcolepsy. Upon EMS arrival patient was alert. Niece who is patient's POA asked that patient be evaluated in ED. Patient is alert and reports feeling sleepy but otherwise fine.

## 2023-02-17 NOTE — TELEPHONE ENCOUNTER
[facility] Geriatrics Triage Nurse Telephone Encounter    Provider: ELDA Harman  Facility: [facility] Facility Type:  TCU    Caller: Elizabeth  Call Back Number:     Allergies:    Allergies   Allergen Reactions     Amoxicillin Hives     Cephalexin Other (See Comments)     Reaction with intervenous administration.     Penicillins Hives     Latex Rash     Other Environmental Allergy Other (See Comments)     Pollen causes seasonal allergies.        Reason for call: Nurse called to report that patient is unresponsive.  Vitals are stable but patient is not responding to verbal stimuli.  When sternal rub used patient will open eyes but then they roll in the back of her head.  Nurse wants to call paramedics and have them do an assessment for possible ER eval.       Verbal Order/Direction given by Provider: Okay to call paramedics.      Provider giving Order:  ELDA Harman    Verbal Order given to: Elizabeth Teran RN

## 2023-02-17 NOTE — ED PROVIDER NOTES
EMERGENCY DEPARTMENT ENCOUNTER      NAME: Miesha Quarles  AGE: 89 year old female  YOB: 1928  MRN: [id number]  EVALUATION DATE & TIME: 2/17/2023 12:24 PM    PCP: Roc Mares    ED PROVIDER: Gary Pathka M.D.    Chief Complaint   Patient presents with     unresponsive episode       FINAL IMPRESSION:  1. Unresponsive episode        ED COURSE & MEDICAL DECISION MAKING:    Pertinent Labs & Imaging studies independently interpreted by me. (See chart for details)  12:28 PM  Patient seen and examined, review of chart shows history of atrial flutter, heart failure, anticoagulated on Coumadin, most recent INR on February 13 2.14.  Also reviewed telephone note from earlier today, it sounds like patient would open eyes to sternal rub.  Differential diagnosis includes but not limited to intracranial hemorrhage, CVA, overdose, medication reaction, alcohol intoxication, electrolyte disturbance, pneumonia, urinary tract infection, sepsis, meningitis, encephalitis, hepatic encephalopathy.  Patient presents today with an episode of unresponsiveness.  Staff at her care center was unable to wake her up, by the time EMS arrived patient was awake and alert.  She denies chest pain, palpitations, or any other symptoms today.  EKG is reassuring.  No abnormal movements noted in nurse call to provider, seizure possible but less likely.  Considered dysrhythmia, also patient has a history of narcolepsy.  Labs and EKG ordered.  If these are reassuring, patient can be discharged with continued outpatient follow-up.  3:44 PM signout pending repeat troponin.  If this is negative stable, patient can be discharged.    At the conclusion of the encounter I discussed the results of all of the tests and the disposition. The questions were answered. The patient or family acknowledged understanding and was agreeable with the care plan.     Medical Decision Making    History:    Supplemental history from: EMS    External  Record(s) reviewed: Documented in chart, if applicable.    Work Up:    Chart documentation includes differential considered and any EKGs or imaging independently interpreted by provider, where specified.    In additional to work up documented, I considered the following work up: Documented in chart, if applicable.    External consultation:    Discussion of management with another provider: Documented in chart, if applicable    Complicating factors:    Care impacted by chronic illness: Chronic Kidney Disease and Diabetes    Care affected by social determinants of health: N/A    Disposition considerations: Admission considered. Patient was signed out to the oncoming physician, disposition pending.        PROCEDURES:       MEDICATIONS GIVEN IN THE EMERGENCY:  Medications - No data to display    NEW PRESCRIPTIONS STARTED AT TODAY'S ER VISIT  New Prescriptions    No medications on file       =================================================================    HPI    Patient information was obtained from: Patient       Miesha Quarles is a 89 year old female with a pertinent history of HTN, DM2, narcolepsy, CKD stage 3, and chronic diastolic CHF who presents to this ED by EMS for evaluation of unresponsive episode.    Patient went to program and fell asleep for about an hour, patient was unresponsive to sternal rub per facility staff. Upon EMS arrival patient was alert. Patient says that she currently feels fine. Patient denies chest pain, shortness of breath, seizures, palpitations, lightheadedness, and dizziness.       REVIEW OF SYSTEMS   Review of Systems   Respiratory: Negative for shortness of breath.    Cardiovascular: Negative for chest pain and palpitations.   Neurological: Negative for dizziness, seizures and light-headedness.        Positive for unresponsive episode    All other systems reviewed and are negative.     All other systems reviewed and negative    PAST MEDICAL HISTORY:  Past Medical History:    Diagnosis Date     Disorder of bone and cartilage, unspecified     Created by Conversion      Essential hypertension, benign     Created by Conversion      Osteoarthrosis, unspecified whether generalized or localized, lower leg     Created by Conversion      Pure hypercholesterolemia     Created by Conversion      Type II or unspecified type diabetes mellitus without mention of complication, not stated as uncontrolled     Created by Conversion      Unspecified hypothyroidism     Created by Conversion      Unspecified vitamin D deficiency     Created by Conversion        PAST SURGICAL HISTORY:  Past Surgical History:   Procedure Laterality Date     HYSTERECTOMY TOTAL ABDOMINAL, BILATERAL SALPINGO-OOPHORECTOMY, COMBINED         CURRENT MEDICATIONS:    No current facility-administered medications for this encounter.     Current Outpatient Medications   Medication     acetaminophen (TYLENOL) 500 MG tablet     bumetanide (BUMEX) 2 MG tablet     diclofenac (VOLTAREN) 1 % topical gel     levothyroxine (SYNTHROID/LEVOTHROID) 50 MCG tablet     melatonin 3 MG tablet     metoprolol succinate ER (TOPROL XL) 100 MG 24 hr tablet     potassium chloride ER (KLOR-CON M) 20 MEQ CR tablet     simvastatin (ZOCOR) 20 MG tablet     WARFARIN SODIUM PO       ALLERGIES:  Allergies   Allergen Reactions     Amoxicillin Hives     Cephalexin Other (See Comments)     Reaction with intervenous administration.     Penicillins Hives     Latex Rash     Other Environmental Allergy Other (See Comments)     Pollen causes seasonal allergies.       FAMILY HISTORY:  Family History   Problem Relation Age of Onset     Coronary Artery Disease Brother        SOCIAL HISTORY:   Social History     Socioeconomic History     Marital status:    Tobacco Use     Smoking status: Former     Types: Cigarettes     Quit date: 1991     Years since quittin.1     Smokeless tobacco: Never   Substance and Sexual Activity     Alcohol use: Yes     Social  Determinants of Health     Financial Resource Strain: Medium Risk     Difficulty of Paying Living Expenses: Somewhat hard   Food Insecurity: No Food Insecurity     Worried About Running Out of Food in the Last Year: Never true     Ran Out of Food in the Last Year: Never true   Transportation Needs: No Transportation Needs     Lack of Transportation (Medical): No     Lack of Transportation (Non-Medical): No   Housing Stability: Low Risk      Unable to Pay for Housing in the Last Year: No     Number of Places Lived in the Last Year: 1     Unstable Housing in the Last Year: No       VITALS:  BP (!) 143/82   Pulse 68   Temp 97.6  F (36.4  C) (Oral)   Resp 20   SpO2 97%     PHYSICAL EXAM:  Physical Exam  Vitals and nursing note reviewed.   Constitutional:       Appearance: Normal appearance.   HENT:      Head: Normocephalic and atraumatic.      Right Ear: External ear normal.      Left Ear: External ear normal.      Nose: Nose normal.      Mouth/Throat:      Mouth: Mucous membranes are moist.   Eyes:      Extraocular Movements: Extraocular movements intact.      Conjunctiva/sclera: Conjunctivae normal.      Pupils: Pupils are equal, round, and reactive to light.   Cardiovascular:      Rate and Rhythm: Normal rate and regular rhythm.   Pulmonary:      Effort: Pulmonary effort is normal.      Breath sounds: Normal breath sounds. No wheezing or rales.   Abdominal:      General: Abdomen is flat. There is no distension.      Palpations: Abdomen is soft.      Tenderness: There is no abdominal tenderness. There is no guarding.   Musculoskeletal:         General: Normal range of motion.      Cervical back: Normal range of motion and neck supple.      Right lower leg: No edema.      Left lower leg: No edema.   Lymphadenopathy:      Cervical: No cervical adenopathy.   Skin:     General: Skin is warm and dry.   Neurological:      General: No focal deficit present.      Mental Status: She is alert and oriented to person, place,  and time. Mental status is at baseline.      Comments: No gross focal neurologic deficits   Psychiatric:         Mood and Affect: Mood normal.         Behavior: Behavior normal.         Thought Content: Thought content normal.          LAB:  All pertinent labs reviewed and interpreted.  Results for orders placed or performed during the hospital encounter of 02/17/23   CT Head w/o Contrast    Impression    IMPRESSION:  1.  No acute intracranial finding.   2.  Chronic changes, as above   Basic metabolic panel   Result Value Ref Range    Sodium 139 136 - 145 mmol/L    Potassium 4.0 3.4 - 5.3 mmol/L    Chloride 97 (L) 98 - 107 mmol/L    Carbon Dioxide (CO2) 34 (H) 22 - 29 mmol/L    Anion Gap 8 7 - 15 mmol/L    Urea Nitrogen 23.9 (H) 8.0 - 23.0 mg/dL    Creatinine 1.01 (H) 0.51 - 0.95 mg/dL    Calcium 8.5 8.2 - 9.6 mg/dL    Glucose 96 70 - 99 mg/dL    GFR Estimate 51 (L) >60 mL/min/1.73m2   Result Value Ref Range    Troponin T, High Sensitivity 54 (H) <=14 ng/L   Result Value Ref Range    Magnesium 2.1 1.7 - 2.3 mg/dL   UA with Microscopic reflex to Culture    Specimen: Urine, Midstream   Result Value Ref Range    Color Urine Light Yellow Colorless, Straw, Light Yellow, Yellow    Appearance Urine Clear Clear    Glucose Urine Negative Negative mg/dL    Bilirubin Urine Negative Negative    Ketones Urine Negative Negative mg/dL    Specific Gravity Urine 1.013 1.001 - 1.030    Blood Urine Negative Negative    pH Urine 7.0 5.0 - 7.0    Protein Albumin Urine Negative Negative mg/dL    Urobilinogen Urine <2.0 <2.0 mg/dL    Nitrite Urine Negative Negative    Leukocyte Esterase Urine Negative Negative    Mucus Urine Present (A) None Seen /LPF    RBC Urine <1 <=2 /HPF    WBC Urine <1 <=5 /HPF    Squamous Epithelials Urine <1 <=1 /HPF    Hyaline Casts Urine 1 <=2 /LPF   Blood gas venous   Result Value Ref Range    pH Venous 7.39 7.35 - 7.45    pCO2 Venous 61 (H) 35 - 50 mm Hg    pO2 Venous 19 (L) 25 - 47 mm Hg    Bicarbonate Venous  37 (H) 24 - 30 mmol/L    Base Excess/Deficit (+/-) 11.9   mmol/L    Oxyhemoglobin Venous 22.7 (L) 70.0 - 75.0 %    O2 Sat, Venous 23.2 (L) 70.0 - 75.0 %   CBC with platelets and differential   Result Value Ref Range    WBC Count 6.4 4.0 - 11.0 10e3/uL    RBC Count 4.51 3.80 - 5.20 10e6/uL    Hemoglobin 14.1 11.7 - 15.7 g/dL    Hematocrit 44.7 35.0 - 47.0 %    MCV 99 78 - 100 fL    MCH 31.3 26.5 - 33.0 pg    MCHC 31.5 31.5 - 36.5 g/dL    RDW 15.9 (H) 10.0 - 15.0 %    Platelet Count 377 150 - 450 10e3/uL    % Neutrophils 63 %    % Lymphocytes 19 %    % Monocytes 12 %    % Eosinophils 4 %    % Basophils 1 %    % Immature Granulocytes 1 %    NRBCs per 100 WBC 0 <1 /100    Absolute Neutrophils 4.1 1.6 - 8.3 10e3/uL    Absolute Lymphocytes 1.2 0.8 - 5.3 10e3/uL    Absolute Monocytes 0.8 0.0 - 1.3 10e3/uL    Absolute Eosinophils 0.3 0.0 - 0.7 10e3/uL    Absolute Basophils 0.1 0.0 - 0.2 10e3/uL    Absolute Immature Granulocytes 0.0 <=0.4 10e3/uL    Absolute NRBCs 0.0 10e3/uL   Result Value Ref Range    INR 2.34 (H) 0.85 - 1.15       RADIOLOGY:  Reviewed all pertinent imaging. Please see official radiology report.  CT Head w/o Contrast   Final Result   IMPRESSION:   1.  No acute intracranial finding.    2.  Chronic changes, as above          EKG:    Performed at: 1:31 PM  Impression: Atrial fibrillation with nonspecific T wave changes, no acute ischemic change  Rate: 60  Rhythm: Atrial fibrillation  Axis: Normal  IA Interval: Atrial fibrillation  QRS Interval: 96  QTc Interval: 440  ST Changes: No acute ischemic changes  Comparison: January 31, nonspecific T wave changes worse in the anterior leads, improved in the lateral leads, no acute ischemic changes    I have independently reviewed and interpreted the EKG(s) documented above.    I, Adrián Wing, am serving as a scribe to document services personally performed by Dr. Pathak based on my observation and the provider's statements to me. I, Gary Pathak MD  attest that Adrián Wing is acting in a scribe capacity, has observed my performance of the services and has documented them in accordance with my direction.    Gary Pathak M.D.  Emergency Medicine  [facility] EMERGENCY DEPARTMENT  [address] [place] [id number]  [phone]  Dept: [phone]       Gary Pathak MD  02/17/23 1548

## 2023-02-17 NOTE — ED NOTES
Bed: [facility]  Expected date:   Expected time:   Means of arrival: Ambulance  Comments:  CTG GRV: unresponsive episode; hx of narcolepsy

## 2023-02-17 NOTE — DISCHARGE INSTRUCTIONS
Continue all of your previously-prescribed medications.    Drink plenty of fluids to stay hydrated.    Follow up with your Primary Care provider in 3 days for a recheck.    Return to the Emergency Department for any difficulty breathing, persistent vomiting, new or worsening symptoms, or any other concerns.

## 2023-02-17 NOTE — ED NOTES
3:17 PM - Patient signed out to me by Dr. Pathak at routine shift change. 89 year old female with history of narcolepsy who staff at nursing home had difficulty waking up today. Patient now awake with no complaints. Workup to this point reassuring. Plan is follow up repeat high-sensitivity troponin and discharge if not rising. Please see Dr. Pathak's note for details.    4:16 PM - Repeat high-sensitivity troponin decreasing from initial; reassuring against ongoing ACS. Ok for outpatient management. Patient with no symptoms and tolerating PO without difficulty. Patient comfortable with discharge at this time. Return precautions and need for PCP follow up discussed and understood. No further questions at the time of discharge.    --------------------------------------------------------------------------------   --------------------------------------------------------------------------------     IMPRESSION  1. Unresponsive episode        PLAN  - close PCP follow up  - discharge to nursing home        Manuel Minaya MD  02/17/23  Emergency Medicine  [facility] EMERGENCY DEPARTMENT  [address] [place] [id number] [phone]  Dept: [phone]     Manuel Minaya MD  02/17/23 9629     Faxed to [facility]

## 2023-02-20 NOTE — LETTER
2/20/2023        RE: Miesha Quarles  [address]  [place] [address]        Code Status:  DNR/DNI  Visit Type: RECHECK     Facility:   [facility] ([facility]) [[id number]]        History of Present Illness:   Hospital Admission Date: 1/31/2023     Hospital Discharge Date: 2/7/2023      Miesha Quarles is a 89 year old female with past medical history for HTN, HLD, DM2, hypothyroidism, aflutter, CHF and CKD.  She was recently hospitalized for acute HFpEF.  She had been staying at her Niece's after a fall in which she hit her head, for a few daysand was not taking her medications.  She was found to have 20lb weight gain and orthopnea.  She was treated with diuretics and O2.  CT of head and cpsine showed no acute findings.  She did have hgb drift down from 12 to 10.5 with no overt signs of bleeding.  INR was >4 on admission. Family is trying to see if insurance will pay for DOAC like Eliquis.     Today, I follow up with patient after she had a trip to the ER on 2/17 after an unresponsive episode workup ruled out arrhythmia, UTI, PNA, CVA, hemorrhage and sepsis.  She was found to have elevated troponin of 54 but decreased to 49 at discharge.  EKG showed afib and no ischemia.  She had a mild bump in her cr. She does have a history of narcolepsy. She denies any pain or repeat unresponsive events.      Unfortunately, her insurance company has cut off her therapy and she will need long term plan of care.     Current Outpatient Medications   Medication Sig Dispense Refill     acetaminophen (TYLENOL) 500 MG tablet Take 1,000 mg by mouth 3 times daily       bumetanide (BUMEX) 2 MG tablet Take 1 tablet (2 mg) by mouth daily for 30 days 30 tablet 0     diclofenac (VOLTAREN) 1 % topical gel Apply topically 2 times daily       levothyroxine (SYNTHROID/LEVOTHROID) 50 MCG tablet TAKE 1 TABLET EVERY DAY 90 tablet 0     melatonin 3 MG tablet Take 3 mg by mouth At Bedtime       metoprolol succinate ER (TOPROL XL)  100 MG 24 hr tablet TAKE 1 TABLET BY MOUTH EVERY DAY 90 tablet 3     potassium chloride ER (KLOR-CON M) 20 MEQ CR tablet Take 20 mEq by mouth daily       simvastatin (ZOCOR) 20 MG tablet TAKE 1/2 TABLET BY MOUTH DAILY IN THE EVENING. 45 tablet 3     WARFARIN SODIUM PO 2/13/23 INR 2.14  Cont 2.5mg daily.  Next INR 2/21/23.   2/8/23 INR 1.53  Take 2.5mg daily.  Next INR 2/13/23.         Allergies   Allergen Reactions     Amoxicillin Hives     Cephalexin Other (See Comments)     Reaction with intervenous administration.     Penicillins Hives     Latex Rash     Other Environmental Allergy Other (See Comments)     Pollen causes seasonal allergies.     Immunization History   Administered Date(s) Administered     COVID-19 Vaccine 12+ (Pfizer) 02/26/2021, 03/19/2021, 11/22/2021     COVID-19 Vaccine 18+ (Moderna) 04/27/2022     COVID-19 Vaccine Bivalent Booster 12+ (Pfizer) 10/05/2022     FLU 6-35 months 09/21/2012     Flu, Unspecified 12/28/2009, 11/17/2010, 10/03/2011     Influenza (High Dose) 3 valent vaccine 09/16/2014, 09/17/2015, 09/26/2016, 10/26/2017, 09/11/2018, 10/29/2019     Influenza Vaccine 65+ (Fluzone HD) 11/09/2020, 11/22/2021, 09/15/2022     Influenza Vaccine, 6+MO IM (QUADRIVALENT W/PRESERVATIVES) 10/29/2013     Pneumo Conj 13-V (2010&after) 12/30/2014     Pneumococcal 23 valent 12/26/2007     Td (Adult), Adsorbed 05/28/2022     Td,adult,historic,unspecified 04/28/2008     Tdap (Adacel,Boostrix) 06/18/2014         Review of Systems   Patient denies fever, chills, headache, lightheadedness, dizziness, rhinorrhea, cough, congestion, shortness of breath, chest pain, palpitations, abdominal pain, n/v, diarrhea, constipation, change in appetite, change in sleep pattern, dysuria, frequency, burning or pain with urination.  Other than stated in HPI all other review of systems is negative.         Physical Exam  Vital signs:/69   Pulse 75   Temp 97.1  F (36.2  C)   Resp 16   Wt 69.4 kg (153 lb)   SpO2  99%   BMI 27.10 kg/m     GENERAL APPEARANCE: Well developed, well nourished, in no acute distress.  HEENT: normocephalic, atraumatic   sclerae anicteric, conjunctivae clear and moist, EOM intact  LUNGS: Lung sounds CTA, no adventitious sounds, respiratory effort normal.  CARD: RRR, S1, S2, without murmurs, gallops, rubs   ABD: Soft, nondistended and nontender with normal bowel sounds.   MSK: Muscle strength and tone were equal bilaterally. Moves all extremities easily and intentionally.   EXTREMITIES: No cyanosis, clubbing or edema.  NEURO: Alert and oriented x 3.Face is symmetric.  SKIN: Inspection of the skin reveals no rashes, ulcerations or petechiae.  PSYCH: euthymic      Labs:    Last Comprehensive Metabolic Panel:  Sodium   Date Value Ref Range Status   02/17/2023 139 136 - 145 mmol/L Final     Potassium   Date Value Ref Range Status   02/17/2023 4.0 3.4 - 5.3 mmol/L Final   06/15/2022 3.7 3.5 - 5.0 mmol/L Final     Chloride   Date Value Ref Range Status   02/17/2023 97 (L) 98 - 107 mmol/L Final   06/15/2022 103 98 - 107 mmol/L Final     Carbon Dioxide (CO2)   Date Value Ref Range Status   02/17/2023 34 (H) 22 - 29 mmol/L Final   06/15/2022 24 22 - 31 mmol/L Final     Anion Gap   Date Value Ref Range Status   02/17/2023 8 7 - 15 mmol/L Final   06/15/2022 14 5 - 18 mmol/L Final     Glucose   Date Value Ref Range Status   02/17/2023 96 70 - 99 mg/dL Final   06/15/2022 120 70 - 125 mg/dL Final     Urea Nitrogen   Date Value Ref Range Status   02/17/2023 23.9 (H) 8.0 - 23.0 mg/dL Final   06/15/2022 27 8 - 28 mg/dL Final     Creatinine   Date Value Ref Range Status   02/17/2023 1.01 (H) 0.51 - 0.95 mg/dL Final     GFR Estimate   Date Value Ref Range Status   02/17/2023 51 (L) >60 mL/min/1.73m2 Final     Comment:     eGFR calculated using 2021 CKD-EPI equation.   02/25/2021 45 (L) >60 mL/min/1.73m2 Final     Calcium   Date Value Ref Range Status   02/17/2023 8.5 8.2 - 9.6 mg/dL Final     Lab Results   Component  Value Date    WBC 7.7 02/06/2023     Lab Results   Component Value Date    RBC 3.26 02/06/2023     Lab Results   Component Value Date    HGB 10.2 02/06/2023     Lab Results   Component Value Date    HCT 32.9 02/06/2023     Lab Results   Component Value Date     02/06/2023     Lab Results   Component Value Date    MCH 31.3 02/06/2023     Lab Results   Component Value Date    MCHC 31.0 02/06/2023     Lab Results   Component Value Date    RDW 17.0 02/06/2023     Lab Results   Component Value Date     02/07/2023           Assessment/plan:   Chronic diastolic congestive heart failure (H)  Compensated, weights stable, continue with metoprolol and bumex    Type 2 diabetes mellitus with complication (H)  Last A1c 6.7 which is goal.  Diet controlled     Benign Essential Hypertension  Controlled, continue with metoprolol,     Hypothyroidism, unspecified type  Continue on home levothyroxine, check TSH in the next 6 weeks.     Stage 3a chronic kidney disease (H)  Recheck BMP this week, avoid nephrotoxins    General weakness  PT/OT ending, will need ongoing support for mobility.  Mostly in wheelchair    Primary narcolepsy with cataplexy  Recheck Troponin and monitor for any other unresponsive episodes.      Typical atrial flutter (H)  Rate controlled, continue on metoprolol.  AC is warfarin and INR s have been therapeutic.  Family will need to work with PCP is she is able to switch to DOAC.  Delayed due to cost.      Hypercholesterolemia  continue with home simvastatin    Primary insomnia  Improved with melatonin daily                Electronically signed by: Gilma Kraft NP          Sincerely,        Gilma Kraft NP

## 2023-02-20 NOTE — PROGRESS NOTES
Code Status:  DNR/DNI  Visit Type: RECHECK     Facility:   [facility] ([facility]) [[id number]]        History of Present Illness:   Hospital Admission Date: 1/31/2023     Hospital Discharge Date: 2/7/2023      Miesha Quarles is a 89 year old female with past medical history for HTN, HLD, DM2, hypothyroidism, aflutter, CHF and CKD.  She was recently hospitalized for acute HFpEF.  She had been staying at her Niece's after a fall in which she hit her head, for a few daysand was not taking her medications.  She was found to have 20lb weight gain and orthopnea.  She was treated with diuretics and O2.  CT of head and cpsine showed no acute findings.  She did have hgb drift down from 12 to 10.5 with no overt signs of bleeding.  INR was >4 on admission. Family is trying to see if insurance will pay for DOAC like Eliquis.     Today, I follow up with patient after she had a trip to the ER on 2/17 after an unresponsive episode workup ruled out arrhythmia, UTI, PNA, CVA, hemorrhage and sepsis.  She was found to have elevated troponin of 54 but decreased to 49 at discharge.  EKG showed afib and no ischemia.  She had a mild bump in her cr. She does have a history of narcolepsy. She denies any pain or repeat unresponsive events.      Unfortunately, her insurance company has cut off her therapy and she will need long term plan of care.     Current Outpatient Medications   Medication Sig Dispense Refill     acetaminophen (TYLENOL) 500 MG tablet Take 1,000 mg by mouth 3 times daily       bumetanide (BUMEX) 2 MG tablet Take 1 tablet (2 mg) by mouth daily for 30 days 30 tablet 0     diclofenac (VOLTAREN) 1 % topical gel Apply topically 2 times daily       levothyroxine (SYNTHROID/LEVOTHROID) 50 MCG tablet TAKE 1 TABLET EVERY DAY 90 tablet 0     melatonin 3 MG tablet Take 3 mg by mouth At Bedtime       metoprolol succinate ER (TOPROL XL) 100 MG 24 hr tablet TAKE 1 TABLET BY MOUTH EVERY DAY 90 tablet 3     potassium chloride ER  (KLOR-CON M) 20 MEQ CR tablet Take 20 mEq by mouth daily       simvastatin (ZOCOR) 20 MG tablet TAKE 1/2 TABLET BY MOUTH DAILY IN THE EVENING. 45 tablet 3     WARFARIN SODIUM PO 2/13/23 INR 2.14  Cont 2.5mg daily.  Next INR 2/21/23.   2/8/23 INR 1.53  Take 2.5mg daily.  Next INR 2/13/23.         Allergies   Allergen Reactions     Amoxicillin Hives     Cephalexin Other (See Comments)     Reaction with intervenous administration.     Penicillins Hives     Latex Rash     Other Environmental Allergy Other (See Comments)     Pollen causes seasonal allergies.     Immunization History   Administered Date(s) Administered     COVID-19 Vaccine 12+ (Pfizer) 02/26/2021, 03/19/2021, 11/22/2021     COVID-19 Vaccine 18+ (Moderna) 04/27/2022     COVID-19 Vaccine Bivalent Booster 12+ (Pfizer) 10/05/2022     FLU 6-35 months 09/21/2012     Flu, Unspecified 12/28/2009, 11/17/2010, 10/03/2011     Influenza (High Dose) 3 valent vaccine 09/16/2014, 09/17/2015, 09/26/2016, 10/26/2017, 09/11/2018, 10/29/2019     Influenza Vaccine 65+ (Fluzone HD) 11/09/2020, 11/22/2021, 09/15/2022     Influenza Vaccine, 6+MO IM (QUADRIVALENT W/PRESERVATIVES) 10/29/2013     Pneumo Conj 13-V (2010&after) 12/30/2014     Pneumococcal 23 valent 12/26/2007     Td (Adult), Adsorbed 05/28/2022     Td,adult,historic,unspecified 04/28/2008     Tdap (Adacel,Boostrix) 06/18/2014         Review of Systems   Patient denies fever, chills, headache, lightheadedness, dizziness, rhinorrhea, cough, congestion, shortness of breath, chest pain, palpitations, abdominal pain, n/v, diarrhea, constipation, change in appetite, change in sleep pattern, dysuria, frequency, burning or pain with urination.  Other than stated in HPI all other review of systems is negative.         Physical Exam  Vital signs:/69   Pulse 75   Temp 97.1  F (36.2  C)   Resp 16   Wt 69.4 kg (153 lb)   SpO2 99%   BMI 27.10 kg/m     GENERAL APPEARANCE: Well developed, well nourished, in no acute  distress.  HEENT: normocephalic, atraumatic   sclerae anicteric, conjunctivae clear and moist, EOM intact  LUNGS: Lung sounds CTA, no adventitious sounds, respiratory effort normal.  CARD: RRR, S1, S2, without murmurs, gallops, rubs   ABD: Soft, nondistended and nontender with normal bowel sounds.   MSK: Muscle strength and tone were equal bilaterally. Moves all extremities easily and intentionally.   EXTREMITIES: No cyanosis, clubbing or edema.  NEURO: Alert and oriented x 3.Face is symmetric.  SKIN: Inspection of the skin reveals no rashes, ulcerations or petechiae.  PSYCH: euthymic      Labs:    Last Comprehensive Metabolic Panel:  Sodium   Date Value Ref Range Status   02/17/2023 139 136 - 145 mmol/L Final     Potassium   Date Value Ref Range Status   02/17/2023 4.0 3.4 - 5.3 mmol/L Final   06/15/2022 3.7 3.5 - 5.0 mmol/L Final     Chloride   Date Value Ref Range Status   02/17/2023 97 (L) 98 - 107 mmol/L Final   06/15/2022 103 98 - 107 mmol/L Final     Carbon Dioxide (CO2)   Date Value Ref Range Status   02/17/2023 34 (H) 22 - 29 mmol/L Final   06/15/2022 24 22 - 31 mmol/L Final     Anion Gap   Date Value Ref Range Status   02/17/2023 8 7 - 15 mmol/L Final   06/15/2022 14 5 - 18 mmol/L Final     Glucose   Date Value Ref Range Status   02/17/2023 96 70 - 99 mg/dL Final   06/15/2022 120 70 - 125 mg/dL Final     Urea Nitrogen   Date Value Ref Range Status   02/17/2023 23.9 (H) 8.0 - 23.0 mg/dL Final   06/15/2022 27 8 - 28 mg/dL Final     Creatinine   Date Value Ref Range Status   02/17/2023 1.01 (H) 0.51 - 0.95 mg/dL Final     GFR Estimate   Date Value Ref Range Status   02/17/2023 51 (L) >60 mL/min/1.73m2 Final     Comment:     eGFR calculated using 2021 CKD-EPI equation.   02/25/2021 45 (L) >60 mL/min/1.73m2 Final     Calcium   Date Value Ref Range Status   02/17/2023 8.5 8.2 - 9.6 mg/dL Final     Lab Results   Component Value Date    WBC 7.7 02/06/2023     Lab Results   Component Value Date    RBC 3.26  02/06/2023     Lab Results   Component Value Date    HGB 10.2 02/06/2023     Lab Results   Component Value Date    HCT 32.9 02/06/2023     Lab Results   Component Value Date     02/06/2023     Lab Results   Component Value Date    MCH 31.3 02/06/2023     Lab Results   Component Value Date    MCHC 31.0 02/06/2023     Lab Results   Component Value Date    RDW 17.0 02/06/2023     Lab Results   Component Value Date     02/07/2023           Assessment/plan:   Chronic diastolic congestive heart failure (H)  Compensated, weights stable, continue with metoprolol and bumex    Type 2 diabetes mellitus with complication (H)  Last A1c 6.7 which is goal.  Diet controlled     Benign Essential Hypertension  Controlled, continue with metoprolol,     Hypothyroidism, unspecified type  Continue on home levothyroxine, check TSH in the next 6 weeks.     Stage 3a chronic kidney disease (H)  Recheck BMP this week, avoid nephrotoxins    General weakness  PT/OT ending, will need ongoing support for mobility.  Mostly in wheelchair    Primary narcolepsy with cataplexy  Recheck Troponin and monitor for any other unresponsive episodes.      Typical atrial flutter (H)  Rate controlled, continue on metoprolol.  AC is warfarin and INR s have been therapeutic.  Family will need to work with PCP is she is able to switch to DOAC.  Delayed due to cost.      Hypercholesterolemia  continue with home simvastatin    Primary insomnia  Improved with melatonin daily                Electronically signed by: Gilma Kraft NP

## 2023-02-21 NOTE — TELEPHONE ENCOUNTER
[facility] Geriatrics Triage Nurse INR     Provider: ELDA Harman  Facility: [facility]  Facility Type:  TCU    Caller: Ivo  Call Back Number: [phone]  Reason for call: INR  Diagnosis/Goal: A. Fib    Todays INR: 3.06  Last INR 2/13 2.14(2.5mg daily), 2/8 1.53(2.5mg daily), 2/7 1.68(2.5mg).  Home dose:  2.5mg daily    Heparin/Lovenox:  No  Currently on ABX?: No  Other interacting medication:  None  Missed or refused doses: No       Nurse is also reporting BMP and Troponin levels.        Verbal Order/Direction given by Provider: Warfarin 1mg on 2/21, then take 2.5mg daily thereafter.  Next INR 2/28/23.      Provider Giving Order:  ELDA Harman    Verbal Order given to: Elizabeth Wynne RN

## 2023-02-21 NOTE — PROGRESS NOTES
ANTICOAGULATION  MANAGEMENT: Discharge Review    Miesha Quarles chart reviewed for anticoagulation continuity of care    Emergency room visit on 2/17/23 for unresponsive episode.   - Staff at her care center was unable to wake her up, by the time EMS arrived patient was awake and alert.  She denies chest pain, palpitations, or any other symptoms today.  EKG is reassuring.  No abnormal movements noted in nurse call to provider, seizure possible but less likely.  Considered dysrhythmia, also patient has a history of narcolepsy.  Labs and EKG ordered.   - CT of head no acute intracranial findings.    Discharge disposition: TCU    Results:    Recent labs: (last 7 days)     02/17/23  1344   INR 2.34*     Anticoagulation inpatient management:     not applicable     Anticoagulation discharge instructions:     Warfarin dosing: home regimen continued   Bridging: No   INR goal change: No      Medication changes affecting anticoagulation: No    Additional factors affecting anticoagulation: No     PLAN     No adjustment to anticoagulation plan needed    ACC will resume monitoring upon discharge from TCU    No adjustment to Anticoagulation Calendar was required    Reina Cruz RN

## 2023-02-23 NOTE — TELEPHONE ENCOUNTER
[facility] Geriatrics Lab Note     Provider: ELDA Harman  Facility: [facility]) Facility Type:  TCU    Allergies   Allergen Reactions     Amoxicillin Hives     Cephalexin Other (See Comments)     Reaction with intervenous administration.     Penicillins Hives     Latex Rash     Other Environmental Allergy Other (See Comments)     Pollen causes seasonal allergies.       Labs Reviewed by provider: RICKY     Verbal Order/Direction given by Provider: No new orders.      Provider giving Order:  ELDA Harman    Verbal Order given to: Ara([phone])    Sebastian Wynne RN

## 2023-02-27 NOTE — LETTER
2/27/2023        RE: Miesha Quarles  [address] [place] [address]          This encounter was opened in error. Please disregard.        Sincerely,        BELINDA Bueno

## 2023-02-28 NOTE — TELEPHONE ENCOUNTER
[facility] Geriatrics Triage Nurse INR     Provider: ELDA Harman  Facility: [facility]  Facility Type:  TCU    Caller: Yamilka  Call Back Number: [phone]  Reason for call: INR  Diagnosis/Goal: A. Fib    Todays INR: 2.86  Last INR 2/21 3.06(1mg on 2/21, then 2.5mg daily thereafter), 2/13 2.14(2.5mg daily), 2/8 1.53(2.5mg daily), 2/7 1.68(2.5mg).  Home dose:  2.5mg daily    Heparin/Lovenox:  No  Currently on ABX?: No  Other interacting medication:  None  Missed or refused doses: No    Verbal Order/Direction given by Provider: Warfarin 1mg Q Tuesday and 2.5mg all other days.  Next INR 3/8/23.      Provider Giving Order:  ELDA Harman    Verbal Order given to: Yamilka Wynne RN

## 2023-02-28 NOTE — LETTER
2/28/2023        RE: Miesha Quarles  [address]  [place] [address]        Code Status:  DNR/DNI  Visit Type: Discharge Summary Nursing Home     Facility:   [facility] ([facility]) [[id number]]  PCP:  Roc Mares  [phone]       Admission Date to our Facility: 2/7/2023    Discharge Date from our Facility: 2/28/2023    Discharge Diagnosis:    Chronic diastolic congestive heart failure (H)  Type 2 diabetes mellitus with complication (H)  Benign Essential Hypertension  Hypothyroidism, unspecified type  Stage 3a chronic kidney disease (H)  Primary narcolepsy with cataplexy  Typical atrial flutter (H)  Primary insomnia  Hypercholesterolemia        History of Present Illness: Miesha Quarles is a 89 year old female with past medical history for HTN, HLD, DM2, hypothyroidism, aflutter, CHF and CKD.  She was recently hospitalized for acute HFpEF.  She had been staying at her Niece's after a fall in which she hit her head, for a few daysand was not taking her medications.  She was found to have 20lb weight gain and orthopnea.  She was treated with diuretics and O2.  CT of head and cpsine showed no acute findings.  She did have hgb drift down from 12 to 10.5 with no overt signs of bleeding.  INR was >4 on admission. Family is trying to see if insurance will pay for DOAC like Eliquis.     Skilled Nursing Facility Course:  While at the TCU, she did not progress with therapy to ambulating.  She is requiring SBA for transfers.  She has poor endurance.  On O2 2L continuously.  It is recommended that she discharge to LTC due to falls risk and requiring assistance with all ADLs.     Chronic diastolic congestive heart failure (H)  Compensated, weights stable, continue with metoprolol and bumex.  Follow up with in heart failure clinic on 3/7.       Type 2 diabetes mellitus with complication (H)  Last A1c 6.7 which is goal.  Diet controlled      Benign Essential Hypertension  Controlled, continue with  metoprolol,      Hypothyroidism, unspecified type  Continue on home levothyroxine, check TSH in the next 6 weeks.      Stage 3a chronic kidney disease (H)  GFR at baseline 47, continue to monitor and avoid nephrotoxins.      Primary narcolepsy with cataplexy  During TCU stay, she did have an unresponsive episode and went to ER.  Workup unrevealing however elevated troponin which declined to 49 on recheck in the facility.  Sent back to the facility and recommended follow up.  Family requested treatment for narcolepsy.  Ritalin started and referred to Sleep clinic.  continue with nighttime melatonin      Typical atrial flutter (H)  Rate controlled, continue on metoprolol.  AC is Warfarin.  INR 2.86 today, Will continue with warfarin 1mg tuesdays and 2.5mg AOD and recheck INR on 3/8.    Family will need to work with PCP is she is able to switch to DOAC.  Delayed due to cost.       Hypercholesterolemia  continue with home simvastatin     Discharge Plan:  Stable to discharge to LTC facility.  Recommend follow up with sleep specialist for narcolepsy.  Follow up with pcp and heart care clinic.     Discharge Medications:   Current Outpatient Medications   Medication Sig Dispense Refill     acetaminophen (TYLENOL) 500 MG tablet Take 1,000 mg by mouth 3 times daily       bumetanide (BUMEX) 2 MG tablet Take 1 tablet (2 mg) by mouth daily for 30 days 30 tablet 0     diclofenac (VOLTAREN) 1 % topical gel Apply topically 2 times daily       levothyroxine (SYNTHROID/LEVOTHROID) 50 MCG tablet TAKE 1 TABLET EVERY DAY 90 tablet 0     melatonin 3 MG tablet Take 3 mg by mouth At Bedtime       methylphenidate (METHYLIN) 2.5 MG CHEW chewable tablet Take 2.5 mg by mouth daily       metoprolol succinate ER (TOPROL XL) 100 MG 24 hr tablet TAKE 1 TABLET BY MOUTH EVERY DAY 90 tablet 3     potassium chloride ER (KLOR-CON M) 20 MEQ CR tablet Take 20 mEq by mouth daily       simvastatin (ZOCOR) 20 MG tablet TAKE 1/2 TABLET BY MOUTH DAILY IN THE  "EVENING. 45 tablet 3     WARFARIN SODIUM PO 2/28/23 INR 2.86  Take 1mg Q Tues and 2.5mg AOD.  Next INR 3/8/23.   2/21/23 INR 3.06  Take 1mg on 2/21, then 2.5mg daily thereafter.  Next INR 2/28/23.   2/13/23 INR 2.14  Cont 2.5mg daily.  Next INR 2/21/23.   2/8/23 INR 1.53  Take 2.5mg daily.  Next INR 2/13/23.           Review of Systems   Patient denies fever, chills, headache, lightheadedness, dizziness, rhinorrhea, cough, congestion, shortness of breath, chest pain, palpitations, abdominal pain, n/v, diarrhea, constipation, change in appetite, change in sleep pattern, dysuria, frequency, burning or pain with urination.  Other than stated in HPI all other review of systems is negative.         Physical Exam  Vital signs:/68   Pulse 78   Temp 97.8  F (36.6  C)   Resp 18   Ht 1.6 m (5' 3\")   Wt 69.4 kg (153 lb)   SpO2 95%   BMI 27.10 kg/m     GENERAL APPEARANCE: Well developed, well nourished, in no acute distress.  HEENT: normocephalic, atraumatic   sclerae anicteric, conjunctivae clear and moist, EOM intact  LUNGS: Lung sounds CTA, no adventitious sounds, respiratory effort normal.  CARD: RRR, S1, S2, without murmurs, gallops, rubs  ABD: Soft, nondistended and nontender with normal bowel sounds.   MSK: Muscle strength and tone were equal bilaterally. Moves all extremities easily and intentionally.   EXTREMITIES: No cyanosis, clubbing or edema.  NEURO: Alert with cognitive impairment,  Face is symmetric.  SKIN: Inspection of the skin reveals no rashes, ulcerations or petechiae.  PSYCH: euthymic          Labs:    Last Comprehensive Metabolic Panel:  Lab Results   Component Value Date     (L) 02/23/2023    POTASSIUM 4.7 02/23/2023    CHLORIDE 97 (L) 02/23/2023    CO2 25 02/23/2023    ANIONGAP 13 02/23/2023     (H) 02/23/2023    BUN 24.1 (H) 02/23/2023    CR 1.09 (H) 02/23/2023    GFRESTIMATED 47 (L) 02/23/2023    JOSE CARLOS 8.3 02/23/2023       Lab Results   Component Value Date    WBC 6.4 02/17/2023 "     Lab Results   Component Value Date    RBC 4.51 02/17/2023     Lab Results   Component Value Date    HGB 14.1 02/17/2023     Lab Results   Component Value Date    HCT 44.7 02/17/2023     Lab Results   Component Value Date    MCV 99 02/17/2023     Lab Results   Component Value Date    MCH 31.3 02/17/2023     Lab Results   Component Value Date    MCHC 31.5 02/17/2023     Lab Results   Component Value Date    RDW 15.9 02/17/2023     Lab Results   Component Value Date     02/17/2023           MEDICAL EQUIPMENT NEEDS:  NA       35 total minutes spent with 20 minutes spent with patient and SW in coordinating her discharge plan of care.     Electronically signed by: Gilma Kraft NP          Sincerely,        Gilma Kraft NP

## 2023-02-28 NOTE — PROGRESS NOTES
Code Status:  DNR/DNI  Visit Type: Discharge Summary Nursing Home     Facility:   [facility] ([facility]) [[id number]]  PCP:  Roc Mares  [phone]       Admission Date to our Facility: 2/7/2023    Discharge Date from our Facility: 2/28/2023    Discharge Diagnosis:    Chronic diastolic congestive heart failure (H)  Type 2 diabetes mellitus with complication (H)  Benign Essential Hypertension  Hypothyroidism, unspecified type  Stage 3a chronic kidney disease (H)  Primary narcolepsy with cataplexy  Typical atrial flutter (H)  Primary insomnia  Hypercholesterolemia        History of Present Illness: Miesha Quarles is a 89 year old female with past medical history for HTN, HLD, DM2, hypothyroidism, aflutter, CHF and CKD.  She was recently hospitalized for acute HFpEF.  She had been staying at her Niece's after a fall in which she hit her head, for a few daysand was not taking her medications.  She was found to have 20lb weight gain and orthopnea.  She was treated with diuretics and O2.  CT of head and cpsine showed no acute findings.  She did have hgb drift down from 12 to 10.5 with no overt signs of bleeding.  INR was >4 on admission. Family is trying to see if insurance will pay for DOAC like Eliquis.     Skilled Nursing Facility Course:  While at the TCU, she did not progress with therapy to ambulating.  She is requiring SBA for transfers.  She has poor endurance.  On O2 2L continuously.  It is recommended that she discharge to LTC due to falls risk and requiring assistance with all ADLs.     Chronic diastolic congestive heart failure (H)  Compensated, weights stable, continue with metoprolol and bumex.  Follow up with in heart failure clinic on 3/7.       Type 2 diabetes mellitus with complication (H)  Last A1c 6.7 which is goal.  Diet controlled      Benign Essential Hypertension  Controlled, continue with metoprolol,      Hypothyroidism, unspecified type  Continue on home levothyroxine, check TSH in  the next 6 weeks.      Stage 3a chronic kidney disease (H)  GFR at baseline 47, continue to monitor and avoid nephrotoxins.      Primary narcolepsy with cataplexy  During TCU stay, she did have an unresponsive episode and went to ER.  Workup unrevealing however elevated troponin which declined to 49 on recheck in the facility.  Sent back to the facility and recommended follow up.  Family requested treatment for narcolepsy.  Ritalin started and referred to Sleep clinic.  continue with nighttime melatonin      Typical atrial flutter (H)  Rate controlled, continue on metoprolol.  AC is Warfarin.  INR 2.86 today, Will continue with warfarin 1mg tuesdays and 2.5mg AOD and recheck INR on 3/8.    Family will need to work with PCP is she is able to switch to DOAC.  Delayed due to cost.       Hypercholesterolemia  continue with home simvastatin     Discharge Plan:  Stable to discharge to LTC facility.  Recommend follow up with sleep specialist for narcolepsy.  Follow up with pcp and heart care clinic.     Discharge Medications:   Current Outpatient Medications   Medication Sig Dispense Refill     acetaminophen (TYLENOL) 500 MG tablet Take 1,000 mg by mouth 3 times daily       bumetanide (BUMEX) 2 MG tablet Take 1 tablet (2 mg) by mouth daily for 30 days 30 tablet 0     diclofenac (VOLTAREN) 1 % topical gel Apply topically 2 times daily       levothyroxine (SYNTHROID/LEVOTHROID) 50 MCG tablet TAKE 1 TABLET EVERY DAY 90 tablet 0     melatonin 3 MG tablet Take 3 mg by mouth At Bedtime       methylphenidate (METHYLIN) 2.5 MG CHEW chewable tablet Take 2.5 mg by mouth daily       metoprolol succinate ER (TOPROL XL) 100 MG 24 hr tablet TAKE 1 TABLET BY MOUTH EVERY DAY 90 tablet 3     potassium chloride ER (KLOR-CON M) 20 MEQ CR tablet Take 20 mEq by mouth daily       simvastatin (ZOCOR) 20 MG tablet TAKE 1/2 TABLET BY MOUTH DAILY IN THE EVENING. 45 tablet 3     WARFARIN SODIUM PO 2/28/23 INR 2.86  Take 1mg Q Tues and 2.5mg AOD.   "Next INR 3/8/23.   2/21/23 INR 3.06  Take 1mg on 2/21, then 2.5mg daily thereafter.  Next INR 2/28/23.   2/13/23 INR 2.14  Cont 2.5mg daily.  Next INR 2/21/23.   2/8/23 INR 1.53  Take 2.5mg daily.  Next INR 2/13/23.           Review of Systems   Patient denies fever, chills, headache, lightheadedness, dizziness, rhinorrhea, cough, congestion, shortness of breath, chest pain, palpitations, abdominal pain, n/v, diarrhea, constipation, change in appetite, change in sleep pattern, dysuria, frequency, burning or pain with urination.  Other than stated in HPI all other review of systems is negative.         Physical Exam  Vital signs:/68   Pulse 78   Temp 97.8  F (36.6  C)   Resp 18   Ht 1.6 m (5' 3\")   Wt 69.4 kg (153 lb)   SpO2 95%   BMI 27.10 kg/m     GENERAL APPEARANCE: Well developed, well nourished, in no acute distress.  HEENT: normocephalic, atraumatic   sclerae anicteric, conjunctivae clear and moist, EOM intact  LUNGS: Lung sounds CTA, no adventitious sounds, respiratory effort normal.  CARD: RRR, S1, S2, without murmurs, gallops, rubs  ABD: Soft, nondistended and nontender with normal bowel sounds.   MSK: Muscle strength and tone were equal bilaterally. Moves all extremities easily and intentionally.   EXTREMITIES: No cyanosis, clubbing or edema.  NEURO: Alert with cognitive impairment,  Face is symmetric.  SKIN: Inspection of the skin reveals no rashes, ulcerations or petechiae.  PSYCH: euthymic          Labs:    Last Comprehensive Metabolic Panel:  Lab Results   Component Value Date     (L) 02/23/2023    POTASSIUM 4.7 02/23/2023    CHLORIDE 97 (L) 02/23/2023    CO2 25 02/23/2023    ANIONGAP 13 02/23/2023     (H) 02/23/2023    BUN 24.1 (H) 02/23/2023    CR 1.09 (H) 02/23/2023    GFRESTIMATED 47 (L) 02/23/2023    JOSE CARLOS 8.3 02/23/2023       Lab Results   Component Value Date    WBC 6.4 02/17/2023     Lab Results   Component Value Date    RBC 4.51 02/17/2023     Lab Results   Component " Value Date    HGB 14.1 02/17/2023     Lab Results   Component Value Date    HCT 44.7 02/17/2023     Lab Results   Component Value Date    MCV 99 02/17/2023     Lab Results   Component Value Date    MCH 31.3 02/17/2023     Lab Results   Component Value Date    MCHC 31.5 02/17/2023     Lab Results   Component Value Date    RDW 15.9 02/17/2023     Lab Results   Component Value Date     02/17/2023           MEDICAL EQUIPMENT NEEDS:  NA       35 total minutes spent with 20 minutes spent with patient and SW in coordinating her discharge plan of care.     Electronically signed by: Gilma Kraft NP

## 2023-03-02 NOTE — PROGRESS NOTES
Contact   Chart Review     Situation: Patient chart reviewed by .    Background: Was enrolled in care coordination and was receiving care in TCU.    Assessment: Patient has discharged to long term care setting.      Plan/Recommendations: No further outreach.     Ema Gaines,   [facility]  [phone]

## 2023-03-07 PROBLEM — I48.19 PERSISTENT ATRIAL FIBRILLATION (H): Status: ACTIVE | Noted: 2023-01-01

## 2023-03-07 PROBLEM — I50.32 CHRONIC HEART FAILURE WITH PRESERVED EJECTION FRACTION (H): Status: ACTIVE | Noted: 2023-01-01

## 2023-03-07 PROBLEM — I50.32 CHRONIC DIASTOLIC CONGESTIVE HEART FAILURE (H): Status: RESOLVED | Noted: 2022-06-15 | Resolved: 2023-01-01

## 2023-03-07 NOTE — PATIENT INSTRUCTIONS
Miesha Quarles,    It was a pleasure to see you today at [facility].     My recommendations after this visit include:  - Please follow up with Dr Hunter in 8 weeks   - Continue current medications    Ramandeep Ramos CNP

## 2023-03-07 NOTE — PROGRESS NOTES
[facility] GERIATRIC SERVICES    Facility:   [facility] Code Status: DNR/DNI      HPI:   Miesha is a 89 year old female who now is residing long-term care room 251 who came over from [facility].  She was hospitalized February 17, 2023 secondary to an unresponsive episode.  And prior to that was hospitalized January 31, 2023 through February 7, 2023 secondary to acute on chronic heart failure with preserved ejection fraction.  She was referred to the emergency department secondary to shortness of breath and weight gain.  She had shortness of breath for about 2 months also did endorse orthopnea and PND.  She does have a history of CKD stage III creatinine remained stable.  They did do diuresis.  The CT of the head and C-spine did not show any acute fractures and her hemoglobin on admission was 10.5.  She does have a history of chronic atrial flutter who currently is on warfarin.  They did talk about Eliquis on discharge but it would cost extra money so they continue to use the warfarin.  Other issues addressed was her history of hypothyroidism hypertension and dyslipidemia.  She did spend some time at [facility].  She was on oxygen per nasal cannula.  Currently no longer on oxygen.  Other issues addressed at [facility] did include her diabetes her A1c was at 6.7 currently not on any medications and is diet controlled also hypertension hypothyroidism and her history of CKD.  She is now currently in long-term care which we did talk about a care.  Also talk to charge nurse.  Since her admission her systolic blood pressures ranging 106-142 she has been afebrile and also on room air with a weight of 152 pounds and no lower extremity edema.  According to nursing notes or been no behaviors.  Went over her laboratory studies from February 23 and looking through her chart she is due for TSH level last in January 31 was 8.03 and also has been no recent lipid panel.  Hemoglobin February 17  was at 14.  Otherwise she states her appetite to be good.  Denies any bowel or bladder problems.  We also talked about her nice bouquet of flowers that she got from her niece.  [facility] also [facility] [employer] and did play basketball.    Past Medical History:  Past Medical History:   Diagnosis Date     Disorder of bone and cartilage, unspecified     Created by Conversion      Essential hypertension, benign     Created by Conversion      Osteoarthrosis, unspecified whether generalized or localized, lower leg     Created by Conversion      Pure hypercholesterolemia     Created by Conversion      Type II or unspecified type diabetes mellitus without mention of complication, not stated as uncontrolled     Created by Conversion      Unspecified hypothyroidism     Created by Conversion      Unspecified vitamin D deficiency     Created by Conversion            Surgical History:  Past Surgical History:   Procedure Laterality Date     HYSTERECTOMY TOTAL ABDOMINAL, BILATERAL SALPINGO-OOPHORECTOMY, COMBINED         Family History:   Family History   Problem Relation Age of Onset     Coronary Artery Disease Brother        Social History:    Social History     Socioeconomic History     Marital status:    Tobacco Use     Smoking status: Former     Types: Cigarettes     Quit date: 1991     Years since quittin.2     Smokeless tobacco: Never   Substance and Sexual Activity     Alcohol use: Yes     Social Determinants of Health     Financial Resource Strain: Medium Risk     Difficulty of Paying Living Expenses: Somewhat hard   Food Insecurity: No Food Insecurity     Worried About Running Out of Food in the Last Year: Never true     Ran Out of Food in the Last Year: Never true   Transportation Needs: No Transportation Needs     Lack of Transportation (Medical): No     Lack of Transportation (Non-Medical): No   Housing Stability: Low Risk      Unable to Pay for Housing in the Last Year: No     Number of  Places Lived in the Last Year: 1     Unstable Housing in the Last Year: No        REVIEW OF SYSTEM:  Currently she denies any new symptoms of fever cough or cold sore throat postnasal drip flulike symptoms headache shortness of breath dyspnea wheezing chest pain dizziness vertigo nausea vomiting diarrhea dysuria frequency urgency or unusual myalgias or arthralgias.    PHYSICAL EXAM:   .  Pleasant female in no acute distress.  Head is normocephalic.  Conjunctiva is pink and sclera is clear.  Lung sounds are clear throughout.  Cardiovascular S1-S2 and no lower extremity edema.  Gastrointestinal is protuberant nontender nondistended.  Musculoskeletal able to turn herself over in bed and denies any pain.  Psychiatric: Pleasant affect.    Vitals:    03/07/23 0926   BP: 120/74   Pulse: 98   Resp: 20   Temp: (!) 96  F (35.6  C)   SpO2: 91%   Weight: 68.9 kg (152 lb)         Medication List:  Current Outpatient Medications   Medication Sig     acetaminophen (TYLENOL) 500 MG tablet Take 1,000 mg by mouth 3 times daily     bumetanide (BUMEX) 2 MG tablet Take 1 tablet (2 mg) by mouth daily for 30 days     diclofenac (VOLTAREN) 1 % topical gel Apply topically 2 times daily     levothyroxine (SYNTHROID/LEVOTHROID) 50 MCG tablet TAKE 1 TABLET EVERY DAY     melatonin 3 MG tablet Take 3 mg by mouth At Bedtime     methylphenidate (METHYLIN) 2.5 MG CHEW chewable tablet Take 2.5 mg by mouth daily     metoprolol succinate ER (TOPROL XL) 100 MG 24 hr tablet TAKE 1 TABLET BY MOUTH EVERY DAY     potassium chloride ER (KLOR-CON M) 20 MEQ CR tablet Take 20 mEq by mouth daily     simvastatin (ZOCOR) 20 MG tablet TAKE 1/2 TABLET BY MOUTH DAILY IN THE EVENING.     WARFARIN SODIUM PO 2/28/23 INR 2.86  Take 1mg Q Tues and 2.5mg AOD.  Next INR 3/8/23.   2/21/23 INR 3.06  Take 1mg on 2/21, then 2.5mg daily thereafter.  Next INR 2/28/23.   2/13/23 INR 2.14  Cont 2.5mg daily.  Next INR 2/21/23.   2/8/23 INR 1.53  Take 2.5mg daily.  Next INR 2/13/23.        No current facility-administered medications for this visit.        Labs:  Last Comprehensive Metabolic Panel:  Lab Results   Component Value Date     (L) 02/23/2023    POTASSIUM 4.7 02/23/2023    CHLORIDE 97 (L) 02/23/2023    CO2 25 02/23/2023    ANIONGAP 13 02/23/2023     (H) 02/23/2023    BUN 24.1 (H) 02/23/2023    CR 1.09 (H) 02/23/2023    GFRESTIMATED 47 (L) 02/23/2023    JOSE CARLOS 8.3 02/23/2023       TSH   Date Value Ref Range Status   01/31/2023 8.03 (H) 0.30 - 4.20 uIU/mL Final   11/22/2021 4.23 0.30 - 5.00 uIU/mL Final     Free T4   Date Value Ref Range Status   01/31/2023 1.15 0.90 - 1.70 ng/dL Final     Recent Labs   Lab Test 04/21/22  1538 11/22/21  1336   CHOL 150 154   HDL 45* 51   LDL 86 90   TRIG 94 66         Assessment:   (E03.9) Hypothyroidism, unspecified type  (primary encounter diagnosis)  Comment: Her last TSH was 8.03 on January 31  Plan: We will obtain another TSH next week on the 15th    (E78.00) Hypercholesterolemia  Comment: She is due for lipid panel  Plan: Obtain a lipid panel on March 15    (I10) Benign Essential Hypertension  Comment: Stable  Plan: Continue to manage and follow    (I50.32) Chronic diastolic congestive heart failure (H)  Comment: Her Bumex will run out on March 10.  She was put on 2 mg for a month.  We will see how things go if we can put her on a lower dose  look at her overall symptoms.:     PLAN:    March 15 obtaining a lipid panel and TSH.  Her appetite is good.  She is also on warfarin currently at 2.5 mg rechecking a pro time again on March 8.  She is comfortable with the care.  She did not have any new questions problems or other concerns with her admission to the long-term care facility.        Electronically signed by: Festus Glez NP

## 2023-03-07 NOTE — LETTER
3/7/2023    Festus Glez, NP  [address] [facility] [address]  [place] [address]    RE: Miesha Quarles       Dear Colleague,     I had the pleasure of seeing Miesha Quarles in the [facility].        Assessment/Recommendations   Assessment:    1.  Heart failure with preserved ejection fraction, NYHA class II: Compensated.  She has mild fatigue and dyspnea on exertion.  Her weight has been stable at her assisted living.  She is receiving therapy.  She is on a low-sodium diet.  2.  Hypertension: Controlled.  Blood pressure 117/73  3.  Persistent atrial fibrillation: Rate controlled.  She continues warfarin for anticoagulation and metoprolol for rate control    Plan:  1.  Continue current medications  2.  Continue monitoring weights and following a low-salt diet  3.  Continue therapy    Miesha Quarles will follow up Dr. uHnter in 2 months.     History of Present Illness/Subjective    Ms. Miesha Quarles is a 89 year old female seen at [facility] heart failure clinic today for continued follow-up.  She follows up for heart failure with preserved ejection fraction.  She was hospitalized January 31 to February 7 with acute heart failure with symptoms of shortness of breath and orthopnea.  She had an echocardiogram which showed ejection fraction of 55 to 60%, RV moderately dilated and moderate to severe tricuspid regurgitation.  She was diuresed and symptoms improved.  She has a history significant for hypertension, atrial flutter, atrial fibrillation, chronic kidney disease stage III, diabetes.    Today she is feeling well.  She has fatigue and dyspnea exertion.  She denies lightheadedness, shortness of breath, orthopnea, PND, palpitations, chest pain, abdominal fullness/bloating and lower extremity edema.      She is monitoring home weights which are stable around 152 pounds.  She is following a low sodium diet.  She lives in assisted living    ECHOCARDIOGRAM: 2/1/2023  Interpretation  "Summary     1.Left ventricular size, wall motion and function are normal. The ejection  fraction is 55-60%.  2.The right ventricle is moderately dilated. Mildly decreased right  ventricular systolic function.  3.Significant biatrial chamber enlargement.  4.There is moderately severe (3+) tricuspid regurgitation. ERO is 0.28 cmÂ   with a volume of 32 cc..  5.IVC diameter >2.1 cm collapsing <50% with sniff suggests a high RA pressure  estimated at 15 mmHg or greater.  6.Right ventricular systolic pressure is elevated, consistent with mild to  moderate pulmonary hypertension.  Compared to the prior study dated 5/18/2022, the RV is larger and the TR is  more.     Physical Examination Review of Systems   /73 (BP Location: Right arm, Patient Position: Sitting, Cuff Size: Adult Regular)   Pulse 106   Resp 14   Ht 1.575 m (5' 2\")   Wt 70.3 kg (155 lb)   BMI 28.35 kg/m    Body mass index is 28.35 kg/m .  Wt Readings from Last 3 Encounters:   03/07/23 70.3 kg (155 lb)   02/28/23 69.4 kg (153 lb)   02/27/23 69.4 kg (153 lb)       General Appearance:   no acute distress   ENT/Mouth: Wearing mask   EYES:  no scleral icterus, normal conjunctivae   Neck: no thyromegaly   Chest/Lungs:   lungs are clear to auscultation, no rales or wheezing, equal chest wall expansion    Cardiovascular:    Irregularly irregular. Normal first and second heart sounds with no murmurs, rubs, or gallops, no edema bilaterally    Abdomen:  bowel sounds are present   Extremities: no cyanosis or clubbing   Skin: no xanthelasma, warm.    Neurologic: normal  bilateral, no tremors     Psychiatric: alert and oriented x3    Enc Vitals  BP: 117/73  Pulse: 106  Resp: 14  Weight: 70.3 kg (155 lb)  Height: 157.5 cm (5' 2\")                                         Medical History  Surgical History Family History Social History   Past Medical History:   Diagnosis Date     Atrial flutter (H)      Chronic kidney disease      Congestive heart failure (H)  "     Disorder of bone and cartilage, unspecified     Created by Conversion      Essential hypertension, benign     Created by Conversion      Osteoarthrosis, unspecified whether generalized or localized, lower leg     Created by Conversion      Pure hypercholesterolemia     Created by Conversion      Type II or unspecified type diabetes mellitus without mention of complication, not stated as uncontrolled     Created by Conversion      Unspecified hypothyroidism     Created by Conversion      Unspecified vitamin D deficiency     Created by Conversion     Past Surgical History:   Procedure Laterality Date     HYSTERECTOMY TOTAL ABDOMINAL, BILATERAL SALPINGO-OOPHORECTOMY, COMBINED      Family History   Problem Relation Age of Onset     Coronary Artery Disease Brother     Social History     Socioeconomic History     Marital status:      Spouse name: Not on file     Number of children: Not on file     Years of education: Not on file     Highest education level: Not on file   Occupational History     Not on file   Tobacco Use     Smoking status: Former     Types: Cigarettes     Quit date: 1991     Years since quittin.2     Smokeless tobacco: Never   Substance and Sexual Activity     Alcohol use: Yes     Drug use: Not on file     Sexual activity: Not on file   Other Topics Concern     Not on file   Social History Narrative     Not on file     Social Determinants of Health     Financial Resource Strain: Medium Risk     Difficulty of Paying Living Expenses: Somewhat hard   Food Insecurity: No Food Insecurity     Worried About Running Out of Food in the Last Year: Never true     Ran Out of Food in the Last Year: Never true   Transportation Needs: No Transportation Needs     Lack of Transportation (Medical): No     Lack of Transportation (Non-Medical): No   Physical Activity: Not on file   Stress: Not on file   Social Connections: Not on file   Intimate Partner Violence: Not on file   Housing Stability: Low  Risk      Unable to Pay for Housing in the Last Year: No     Number of Places Lived in the Last Year: 1     Unstable Housing in the Last Year: No          Medications  Allergies   Current Outpatient Medications   Medication Sig Dispense Refill     acetaminophen (TYLENOL) 500 MG tablet Take 1,000 mg by mouth 3 times daily       bumetanide (BUMEX) 2 MG tablet Take 1 tablet (2 mg) by mouth daily for 30 days 30 tablet 0     diclofenac (VOLTAREN) 1 % topical gel Apply topically 2 times daily       levothyroxine (SYNTHROID/LEVOTHROID) 50 MCG tablet TAKE 1 TABLET EVERY DAY 90 tablet 0     melatonin 3 MG tablet Take 3 mg by mouth At Bedtime       methylphenidate (METHYLIN) 2.5 MG CHEW chewable tablet Take 2.5 mg by mouth daily       metoprolol succinate ER (TOPROL XL) 100 MG 24 hr tablet TAKE 1 TABLET BY MOUTH EVERY DAY 90 tablet 3     potassium chloride ER (KLOR-CON M) 20 MEQ CR tablet Take 20 mEq by mouth daily       simvastatin (ZOCOR) 20 MG tablet TAKE 1/2 TABLET BY MOUTH DAILY IN THE EVENING. 45 tablet 3     WARFARIN SODIUM PO 2/28/23 INR 2.86  Take 1mg Q Tues and 2.5mg AOD.  Next INR 3/8/23.   2/21/23 INR 3.06  Take 1mg on 2/21, then 2.5mg daily thereafter.  Next INR 2/28/23.   2/13/23 INR 2.14  Cont 2.5mg daily.  Next INR 2/21/23.   2/8/23 INR 1.53  Take 2.5mg daily.  Next INR 2/13/23.        Allergies   Allergen Reactions     Amoxicillin Hives     Cephalexin Other (See Comments)     Reaction with intervenous administration.     Penicillins Hives     Latex Rash     Other Environmental Allergy Other (See Comments)     Pollen causes seasonal allergies.         Lab Results    Chemistry/lipid CBC Cardiac Enzymes/BNP/TSH/INR   Lab Results   Component Value Date    CHOL 150 04/21/2022    HDL 45 (L) 04/21/2022    TRIG 94 04/21/2022    BUN 24.1 (H) 02/23/2023     (L) 02/23/2023    CO2 25 02/23/2023    Lab Results   Component Value Date    WBC 6.4 02/17/2023    HGB 14.1 02/17/2023    HCT 44.7 02/17/2023    MCV 99  02/17/2023     02/17/2023    Lab Results   Component Value Date    TSH 8.03 (H) 01/31/2023    INR 2.61 (H) 03/04/2023             This note has been dictated using voice recognition software. Any grammatical, typographical, or context distortions are unintentional and inherent to the software    30 minutes spent on the date of encounter doing chart review, review of outside records, review of test results, interpretation with above tests, patient visit and documentation.                  Thank you for allowing me to participate in the care of your patient.      Sincerely,     ANDREW Stewart [facility]  cc:   Parth Hunter MD  [address]  [place] [phone]

## 2023-03-07 NOTE — PROGRESS NOTES
Assessment/Recommendations   Assessment:    1.  Heart failure with preserved ejection fraction, NYHA class II: Compensated.  She has mild fatigue and dyspnea on exertion.  Her weight has been stable at her assisted living.  She is receiving therapy.  She is on a low-sodium diet.  2.  Hypertension: Controlled.  Blood pressure 117/73  3.  Persistent atrial fibrillation: Rate controlled.  She continues warfarin for anticoagulation and metoprolol for rate control    Plan:  1.  Continue current medications  2.  Continue monitoring weights and following a low-salt diet  3.  Continue therapy    Miesha Quarles will follow up Dr. Hunter in 2 months.     History of Present Illness/Subjective    Ms. Miesha Quarles is a 89 year old female seen at [facility] heart failure clinic today for continued follow-up.  She follows up for heart failure with preserved ejection fraction.  She was hospitalized January 31 to February 7 with acute heart failure with symptoms of shortness of breath and orthopnea.  She had an echocardiogram which showed ejection fraction of 55 to 60%, RV moderately dilated and moderate to severe tricuspid regurgitation.  She was diuresed and symptoms improved.  She has a history significant for hypertension, atrial flutter, atrial fibrillation, chronic kidney disease stage III, diabetes.    Today she is feeling well.  She has fatigue and dyspnea exertion.  She denies lightheadedness, shortness of breath, orthopnea, PND, palpitations, chest pain, abdominal fullness/bloating and lower extremity edema.      She is monitoring home weights which are stable around 152 pounds.  She is following a low sodium diet.  She lives in assisted living    ECHOCARDIOGRAM: 2/1/2023  Interpretation Summary     1.Left ventricular size, wall motion and function are normal. The ejection  fraction is 55-60%.  2.The right ventricle is moderately dilated. Mildly decreased right  ventricular systolic  "function.  3.Significant biatrial chamber enlargement.  4.There is moderately severe (3+) tricuspid regurgitation. ERO is 0.28 cmÂ   with a volume of 32 cc..  5.IVC diameter >2.1 cm collapsing <50% with sniff suggests a high RA pressure  estimated at 15 mmHg or greater.  6.Right ventricular systolic pressure is elevated, consistent with mild to  moderate pulmonary hypertension.  Compared to the prior study dated 5/18/2022, the RV is larger and the TR is  more.     Physical Examination Review of Systems   /73 (BP Location: Right arm, Patient Position: Sitting, Cuff Size: Adult Regular)   Pulse 106   Resp 14   Ht 1.575 m (5' 2\")   Wt 70.3 kg (155 lb)   BMI 28.35 kg/m    Body mass index is 28.35 kg/m .  Wt Readings from Last 3 Encounters:   03/07/23 70.3 kg (155 lb)   02/28/23 69.4 kg (153 lb)   02/27/23 69.4 kg (153 lb)       General Appearance:   no acute distress   ENT/Mouth: Wearing mask   EYES:  no scleral icterus, normal conjunctivae   Neck: no thyromegaly   Chest/Lungs:   lungs are clear to auscultation, no rales or wheezing, equal chest wall expansion    Cardiovascular:    Irregularly irregular. Normal first and second heart sounds with no murmurs, rubs, or gallops, no edema bilaterally    Abdomen:  bowel sounds are present   Extremities: no cyanosis or clubbing   Skin: no xanthelasma, warm.    Neurologic: normal  bilateral, no tremors     Psychiatric: alert and oriented x3    Enc Vitals  BP: 117/73  Pulse: 106  Resp: 14  Weight: 70.3 kg (155 lb)  Height: 157.5 cm (5' 2\")                                         Medical History  Surgical History Family History Social History   Past Medical History:   Diagnosis Date     Atrial flutter (H)      Chronic kidney disease      Congestive heart failure (H)      Disorder of bone and cartilage, unspecified     Created by Conversion      Essential hypertension, benign     Created by Conversion      Osteoarthrosis, unspecified whether generalized or " localized, lower leg     Created by Conversion      Pure hypercholesterolemia     Created by Conversion      Type II or unspecified type diabetes mellitus without mention of complication, not stated as uncontrolled     Created by Conversion      Unspecified hypothyroidism     Created by Conversion      Unspecified vitamin D deficiency     Created by Conversion     Past Surgical History:   Procedure Laterality Date     HYSTERECTOMY TOTAL ABDOMINAL, BILATERAL SALPINGO-OOPHORECTOMY, COMBINED      Family History   Problem Relation Age of Onset     Coronary Artery Disease Brother     Social History     Socioeconomic History     Marital status:      Spouse name: Not on file     Number of children: Not on file     Years of education: Not on file     Highest education level: Not on file   Occupational History     Not on file   Tobacco Use     Smoking status: Former     Types: Cigarettes     Quit date: 1991     Years since quittin.2     Smokeless tobacco: Never   Substance and Sexual Activity     Alcohol use: Yes     Drug use: Not on file     Sexual activity: Not on file   Other Topics Concern     Not on file   Social History Narrative     Not on file     Social Determinants of Health     Financial Resource Strain: Medium Risk     Difficulty of Paying Living Expenses: Somewhat hard   Food Insecurity: No Food Insecurity     Worried About Running Out of Food in the Last Year: Never true     Ran Out of Food in the Last Year: Never true   Transportation Needs: No Transportation Needs     Lack of Transportation (Medical): No     Lack of Transportation (Non-Medical): No   Physical Activity: Not on file   Stress: Not on file   Social Connections: Not on file   Intimate Partner Violence: Not on file   Housing Stability: Low Risk      Unable to Pay for Housing in the Last Year: No     Number of Places Lived in the Last Year: 1     Unstable Housing in the Last Year: No          Medications  Allergies   Current  Outpatient Medications   Medication Sig Dispense Refill     acetaminophen (TYLENOL) 500 MG tablet Take 1,000 mg by mouth 3 times daily       bumetanide (BUMEX) 2 MG tablet Take 1 tablet (2 mg) by mouth daily for 30 days 30 tablet 0     diclofenac (VOLTAREN) 1 % topical gel Apply topically 2 times daily       levothyroxine (SYNTHROID/LEVOTHROID) 50 MCG tablet TAKE 1 TABLET EVERY DAY 90 tablet 0     melatonin 3 MG tablet Take 3 mg by mouth At Bedtime       methylphenidate (METHYLIN) 2.5 MG CHEW chewable tablet Take 2.5 mg by mouth daily       metoprolol succinate ER (TOPROL XL) 100 MG 24 hr tablet TAKE 1 TABLET BY MOUTH EVERY DAY 90 tablet 3     potassium chloride ER (KLOR-CON M) 20 MEQ CR tablet Take 20 mEq by mouth daily       simvastatin (ZOCOR) 20 MG tablet TAKE 1/2 TABLET BY MOUTH DAILY IN THE EVENING. 45 tablet 3     WARFARIN SODIUM PO 2/28/23 INR 2.86  Take 1mg Q Tues and 2.5mg AOD.  Next INR 3/8/23.   2/21/23 INR 3.06  Take 1mg on 2/21, then 2.5mg daily thereafter.  Next INR 2/28/23.   2/13/23 INR 2.14  Cont 2.5mg daily.  Next INR 2/21/23.   2/8/23 INR 1.53  Take 2.5mg daily.  Next INR 2/13/23.        Allergies   Allergen Reactions     Amoxicillin Hives     Cephalexin Other (See Comments)     Reaction with intervenous administration.     Penicillins Hives     Latex Rash     Other Environmental Allergy Other (See Comments)     Pollen causes seasonal allergies.         Lab Results    Chemistry/lipid CBC Cardiac Enzymes/BNP/TSH/INR   Lab Results   Component Value Date    CHOL 150 04/21/2022    HDL 45 (L) 04/21/2022    TRIG 94 04/21/2022    BUN 24.1 (H) 02/23/2023     (L) 02/23/2023    CO2 25 02/23/2023    Lab Results   Component Value Date    WBC 6.4 02/17/2023    HGB 14.1 02/17/2023    HCT 44.7 02/17/2023    MCV 99 02/17/2023     02/17/2023    Lab Results   Component Value Date    TSH 8.03 (H) 01/31/2023    INR 2.61 (H) 03/04/2023             This note has been dictated using voice recognition  software. Any grammatical, typographical, or context distortions are unintentional and inherent to the software    30 minutes spent on the date of encounter doing chart review, review of outside records, review of test results, interpretation with above tests, patient visit and documentation.

## 2023-03-08 NOTE — TELEPHONE ENCOUNTER
[facility] Triage Nurse INR     Provider: LARRY Melvin  Facility: [facility]   Facility Type:  [facility]    Caller: Dina  Call Back Number: [phone]  Reason for call: INR  Diagnosis/Goal: A. Fib    Todays INR: 3.33  Last INR   2.86 on 2/28 - 1 mg Tue and 2.5 mg AOD    Heparin/Lovenox:  No  Currently on ABX?: No  Other interacting medication:  None  Missed or refused doses: No    Verbal Order/Direction given by Provider: Coumadin 2.5 mg PO M/TH and 1 mg PO AOD. Recheck INR on 3/13/23.    Provider Giving Order:  LARRY Melvin    Verbal Order given to: Dina Mae RN

## 2023-03-13 NOTE — TELEPHONE ENCOUNTER
[facility] Triage Nurse INR     Provider: LARRY Melvin  Facility: [facility]   Facility Type:  [facility]    Caller: Dina  Call Back Number: [phone]  Reason for call: INR  Diagnosis/Goal: A. Fib    Todays INR: 2.65  Last INR 3/8 3.33(2.5mg Mon and Thurs and 1mg AOD)    Heparin/Lovenox:  No  Currently on ABX?: No  Other interacting medication:  None  Missed or refused doses: No    Verbal Order/Direction given by Provider: Continue Warfarin 2.5mg on Mondays and Thursdays and 1mg all other days.  Next INR 3/23/23.      Provider Giving Order:  LARRY Melvin    Verbal Order given to: Dina Wynne RN

## 2023-03-16 PROBLEM — E55.9 VITAMIN D DEFICIENCY: Status: ACTIVE | Noted: 2022-01-01

## 2023-03-16 PROBLEM — E88.09 HYPERPROTEINEMIA: Status: ACTIVE | Noted: 2022-01-01

## 2023-03-16 PROBLEM — E11.8 TYPE 2 DIABETES MELLITUS WITH COMPLICATION (H): Status: ACTIVE | Noted: 2022-01-01

## 2023-03-16 PROBLEM — M89.9 DISORDER OF BONE AND CARTILAGE: Status: ACTIVE | Noted: 2022-01-01

## 2023-03-16 PROBLEM — M72.2 PLANTAR FASCIITIS: Status: ACTIVE | Noted: 2022-01-01

## 2023-03-16 PROBLEM — M17.10 UNILATERAL PRIMARY OSTEOARTHRITIS, UNSPECIFIED KNEE: Status: ACTIVE | Noted: 2022-01-01

## 2023-03-16 PROBLEM — E78.00 HYPERCHOLESTEROLEMIA: Status: ACTIVE | Noted: 2017-10-16

## 2023-03-16 PROBLEM — Z91.81 HISTORY OF FALLING: Status: ACTIVE | Noted: 2023-01-01

## 2023-03-16 PROBLEM — M94.9 DISORDER OF BONE AND CARTILAGE: Status: ACTIVE | Noted: 2022-01-01

## 2023-03-16 PROBLEM — N18.30 CHRONIC KIDNEY DISEASE, STAGE 3 UNSPECIFIED (H): Status: ACTIVE | Noted: 2019-07-02

## 2023-03-16 PROBLEM — I48.92 UNSPECIFIED ATRIAL FLUTTER (H): Status: ACTIVE | Noted: 2017-09-08

## 2023-03-16 PROBLEM — R60.9 EDEMA: Status: ACTIVE | Noted: 2022-01-01

## 2023-03-16 NOTE — LETTER
3/16/2023        RE: Miesha Quarles  [address] [place] [address]        [facility] GERIATRIC SERVICES    Facility:   [facility] () [[id number]]   Code Status: DNR/DNI      CHIEF COMPLAINT/REASON FOR VISIT:  Chief Complaint   Patient presents with     RECHECK       HISTORY:      HPI: Miesha is a 89 year old female who does reside in long-term care room [facility] and who I was asked to visit with today secondary to review her recent laboratory studies.  She does have a history of hypothyroidism currently on Synthroid 50 mcg daily her TSH did come back at 16.101 now increase his Synthroid to 75 mcg daily rechecking TSH again on April 26.  Her total cholesterol did come back at 156 with an LDL of 82 is on simvastatin 10 mg we will keep that the same.  For history of chronic pain is on scheduled Tylenol 3 times daily to 1000 mg as well as Voltaren gel 3 times daily.  Her systolic blood pressures over the past week have been ranging 110-145.  She otherwise is asymptomatic.  Recently the Bumex was discontinued was on for 30 days she had not had any Bumex now since March 11 and her weight 151 pounds as of March 13 back on March 7 she weighed 151 pounds and currently does not have any edema and her lungs have remained clear so we will keep a close eye on that.  Her Bim's did come back at 4/15 with a slums of 10/30 which does show that she does have dementia.  We also had a wonderful visit today talking about some current events as well as family but also the flowers in the plants in her room.    Past Medical History:   Diagnosis Date     Atrial flutter (H)      Chronic kidney disease      Congestive heart failure (H)      Disorder of bone and cartilage, unspecified     Created by Conversion      Essential hypertension, benign     Created by Conversion      Osteoarthrosis, unspecified whether generalized or localized, lower leg     Created by Conversion      Pure hypercholesterolemia     Created by  Discharge Instructions  Flexible Laryngoscopy or Rigid Nasal Endoscopy      If you experience mild discomfort, you may take an over-the-counter pain reliever  that DOES NOT contain aspirin like Acetaminophen.  Take as directed.    Do not eat or drink for 30 minutes after your test.  Try a small sips of water first to make sure that the numbness is gone and your gag reflex has returned as normal.    Notify your doctor if any of the following occur:     · Trouble swallowing  · Increase in discomfort     If you have any concerns or questions please call Dr. Julian Garcia at  [phone].      Thank you for choosing the [facility], Julian Garcia MD, and our team as your Otolaryngology Specialists.  We actively use feedback to constantly improve and deliver the best care possible. To provide the best experience we are collecting feedback from you on how we performed.  You may receive a survey in the mail to evaluate how we did. Please take a moment and share your thoughts.      If for any reason you feel that we did not meet your expectations or you want to share a positive experience, please give us a call. Your feedback helps us know how we are doing and what we can be doing better.    Office hours: 8:00 am to 4:30 pm, Monday - Friday  Phone: [phone]   Conversion      Type II or unspecified type diabetes mellitus without mention of complication, not stated as uncontrolled     Created by Conversion      Unspecified hypothyroidism     Created by Conversion      Unspecified vitamin D deficiency     Created by Conversion             Family History   Problem Relation Age of Onset     Coronary Artery Disease Brother       Social History     Socioeconomic History     Marital status:    Tobacco Use     Smoking status: Former     Types: Cigarettes     Quit date: 1991     Years since quittin.2     Smokeless tobacco: Never   Substance and Sexual Activity     Alcohol use: Yes     Social Determinants of Health     Financial Resource Strain: Medium Risk     Difficulty of Paying Living Expenses: Somewhat hard   Food Insecurity: No Food Insecurity     Worried About Running Out of Food in the Last Year: Never true     Ran Out of Food in the Last Year: Never true   Transportation Needs: No Transportation Needs     Lack of Transportation (Medical): No     Lack of Transportation (Non-Medical): No   Housing Stability: Low Risk      Unable to Pay for Housing in the Last Year: No     Number of Places Lived in the Last Year: 1     Unstable Housing in the Last Year: No      No new changes to the review of systems or physical exam since our last visit on   REVIEW OF SYSTEM:  Currently she denies any new symptoms of fever cough or cold sore throat postnasal drip flulike symptoms headache shortness of breath dyspnea wheezing chest pain dizziness vertigo nausea vomiting diarrhea dysuria frequency urgency or unusual myalgias or arthralgias    PHYSICAL EXAM:    Pleasant female in no acute distress.  Head is normocephalic.  Conjunctiva is pink and sclera is clear.  Lung sounds are clear throughout.  Cardiovascular S1-S2 and no lower extremity edema.  Gastrointestinal is protuberant nontender nondistended.  Musculoskeletal able to turn herself over in bed and denies any  "pain.  Psychiatric: Pleasant affect.       Current Outpatient Medications:      levothyroxine (SYNTHROID/LEVOTHROID) 75 MCG tablet, Take 75 mcg by mouth daily, Disp: , Rfl:      acetaminophen (TYLENOL) 500 MG tablet, Take 1,000 mg by mouth 3 times daily, Disp: , Rfl:      diclofenac (VOLTAREN) 1 % topical gel, Apply topically 2 times daily, Disp: , Rfl:      melatonin 3 MG tablet, Take 3 mg by mouth At Bedtime, Disp: , Rfl:      methylphenidate (RITALIN) 5 MG tablet, Take 0.5 tablets (2.5 mg) by mouth daily, Disp: 30 tablet, Rfl: 0     metoprolol succinate ER (TOPROL XL) 100 MG 24 hr tablet, TAKE 1 TABLET BY MOUTH EVERY DAY, Disp: 90 tablet, Rfl: 3     potassium chloride ER (KLOR-CON M) 20 MEQ CR tablet, Take 20 mEq by mouth daily, Disp: , Rfl:      simvastatin (ZOCOR) 20 MG tablet, TAKE 1/2 TABLET BY MOUTH DAILY IN THE EVENING., Disp: 45 tablet, Rfl: 3     WARFARIN SODIUM PO, 3/13/23 INR 2.65  Cont 2.5mg Mon and Thurs and 1mg AOD.  Next INR 3/23/23.  , Disp: , Rfl:        /70   Pulse 75   Temp 98.4  F (36.9  C)   Resp 20   Ht 1.575 m (5' 2\")   Wt 68.7 kg (151 lb 6.4 oz)   SpO2 95%   BMI 27.69 kg/m      LABS:   Recent Labs   Lab Test 03/15/23  0712 04/21/22  1538   CHOL 156 150   HDL 53 45*   LDL 82 86   TRIG 104 94     Last Comprehensive Metabolic Panel:  Lab Results   Component Value Date     (L) 02/23/2023    POTASSIUM 4.7 02/23/2023    CHLORIDE 97 (L) 02/23/2023    CO2 25 02/23/2023    ANIONGAP 13 02/23/2023     (H) 02/23/2023    BUN 24.1 (H) 02/23/2023    CR 1.09 (H) 02/23/2023    GFRESTIMATED 47 (L) 02/23/2023    JOSE CARLOS 8.3 02/23/2023       TSH   Date Value Ref Range Status   03/15/2023 16.10 (H) 0.30 - 4.20 uIU/mL Final   11/22/2021 4.23 0.30 - 5.00 uIU/mL Final     Free T4   Date Value Ref Range Status   01/31/2023 1.15 0.90 - 1.70 ng/dL Final           ASSESSMENT:    Encounter Diagnoses   Name Primary?     Hypothyroidism, unspecified type Yes     Benign essential hypertension      " Stage 3a chronic kidney disease (H)      Hypercholesterolemia        PLAN:    Changing up the Synthroid 75 mcg daily rechecking TSH again in April 26.  Blood pressures look good.  No increase in edema.  Lungs are clear no increase in cough or other respiratory issues.  The Bumex was discontinued last on March 11.  Also continue with the simvastatin 10 mg with a total cholesterol 156.  Her pain is also well managed.  She did not have any other questions.        Electronically signed by: Festus Glez NP          Sincerely,        Festus Glez NP

## 2023-03-16 NOTE — PROGRESS NOTES
[facility] GERIATRIC SERVICES    Facility:   [facility] () [[id number]]   Code Status: DNR/DNI      CHIEF COMPLAINT/REASON FOR VISIT:  Chief Complaint   Patient presents with     RECHECK       HISTORY:      HPI: Miesha is a 89 year old female who does reside in long-term care room [facility] and who I was asked to visit with today secondary to review her recent laboratory studies.  She does have a history of hypothyroidism currently on Synthroid 50 mcg daily her TSH did come back at 16.101 now increase his Synthroid to 75 mcg daily rechecking TSH again on April 26.  Her total cholesterol did come back at 156 with an LDL of 82 is on simvastatin 10 mg we will keep that the same.  For history of chronic pain is on scheduled Tylenol 3 times daily to 1000 mg as well as Voltaren gel 3 times daily.  Her systolic blood pressures over the past week have been ranging 110-145.  She otherwise is asymptomatic.  Recently the Bumex was discontinued was on for 30 days she had not had any Bumex now since March 11 and her weight 151 pounds as of March 13 back on March 7 she weighed 151 pounds and currently does not have any edema and her lungs have remained clear so we will keep a close eye on that.  Her Bim's did come back at 4/15 with a slums of 10/30 which does show that she does have dementia.  We also had a wonderful visit today talking about some current events as well as family but also the flowers in the plants in her room.    Past Medical History:   Diagnosis Date     Atrial flutter (H)      Chronic kidney disease      Congestive heart failure (H)      Disorder of bone and cartilage, unspecified     Created by Conversion      Essential hypertension, benign     Created by Conversion      Osteoarthrosis, unspecified whether generalized or localized, lower leg     Created by Conversion      Pure hypercholesterolemia     Created by Conversion      Type II or unspecified type diabetes mellitus without mention of complication,  not stated as uncontrolled     Created by Conversion      Unspecified hypothyroidism     Created by Conversion      Unspecified vitamin D deficiency     Created by Conversion             Family History   Problem Relation Age of Onset     Coronary Artery Disease Brother       Social History     Socioeconomic History     Marital status:    Tobacco Use     Smoking status: Former     Types: Cigarettes     Quit date: 1991     Years since quittin.2     Smokeless tobacco: Never   Substance and Sexual Activity     Alcohol use: Yes     Social Determinants of Health     Financial Resource Strain: Medium Risk     Difficulty of Paying Living Expenses: Somewhat hard   Food Insecurity: No Food Insecurity     Worried About Running Out of Food in the Last Year: Never true     Ran Out of Food in the Last Year: Never true   Transportation Needs: No Transportation Needs     Lack of Transportation (Medical): No     Lack of Transportation (Non-Medical): No   Housing Stability: Low Risk      Unable to Pay for Housing in the Last Year: No     Number of Places Lived in the Last Year: 1     Unstable Housing in the Last Year: No      No new changes to the review of systems or physical exam since our last visit on   REVIEW OF SYSTEM:  Currently she denies any new symptoms of fever cough or cold sore throat postnasal drip flulike symptoms headache shortness of breath dyspnea wheezing chest pain dizziness vertigo nausea vomiting diarrhea dysuria frequency urgency or unusual myalgias or arthralgias    PHYSICAL EXAM:    Pleasant female in no acute distress.  Head is normocephalic.  Conjunctiva is pink and sclera is clear.  Lung sounds are clear throughout.  Cardiovascular S1-S2 and no lower extremity edema.  Gastrointestinal is protuberant nontender nondistended.  Musculoskeletal able to turn herself over in bed and denies any pain.  Psychiatric: Pleasant affect.       Current Outpatient Medications:      levothyroxine  "(SYNTHROID/LEVOTHROID) 75 MCG tablet, Take 75 mcg by mouth daily, Disp: , Rfl:      acetaminophen (TYLENOL) 500 MG tablet, Take 1,000 mg by mouth 3 times daily, Disp: , Rfl:      diclofenac (VOLTAREN) 1 % topical gel, Apply topically 2 times daily, Disp: , Rfl:      melatonin 3 MG tablet, Take 3 mg by mouth At Bedtime, Disp: , Rfl:      methylphenidate (RITALIN) 5 MG tablet, Take 0.5 tablets (2.5 mg) by mouth daily, Disp: 30 tablet, Rfl: 0     metoprolol succinate ER (TOPROL XL) 100 MG 24 hr tablet, TAKE 1 TABLET BY MOUTH EVERY DAY, Disp: 90 tablet, Rfl: 3     potassium chloride ER (KLOR-CON M) 20 MEQ CR tablet, Take 20 mEq by mouth daily, Disp: , Rfl:      simvastatin (ZOCOR) 20 MG tablet, TAKE 1/2 TABLET BY MOUTH DAILY IN THE EVENING., Disp: 45 tablet, Rfl: 3     WARFARIN SODIUM PO, 3/13/23 INR 2.65  Cont 2.5mg Mon and Thurs and 1mg AOD.  Next INR 3/23/23.  , Disp: , Rfl:        /70   Pulse 75   Temp 98.4  F (36.9  C)   Resp 20   Ht 1.575 m (5' 2\")   Wt 68.7 kg (151 lb 6.4 oz)   SpO2 95%   BMI 27.69 kg/m      LABS:   Recent Labs   Lab Test 03/15/23  0712 04/21/22  1538   CHOL 156 150   HDL 53 45*   LDL 82 86   TRIG 104 94     Last Comprehensive Metabolic Panel:  Lab Results   Component Value Date     (L) 02/23/2023    POTASSIUM 4.7 02/23/2023    CHLORIDE 97 (L) 02/23/2023    CO2 25 02/23/2023    ANIONGAP 13 02/23/2023     (H) 02/23/2023    BUN 24.1 (H) 02/23/2023    CR 1.09 (H) 02/23/2023    GFRESTIMATED 47 (L) 02/23/2023    JOSE CARLOS 8.3 02/23/2023       TSH   Date Value Ref Range Status   03/15/2023 16.10 (H) 0.30 - 4.20 uIU/mL Final   11/22/2021 4.23 0.30 - 5.00 uIU/mL Final     Free T4   Date Value Ref Range Status   01/31/2023 1.15 0.90 - 1.70 ng/dL Final           ASSESSMENT:    Encounter Diagnoses   Name Primary?     Hypothyroidism, unspecified type Yes     Benign essential hypertension      Stage 3a chronic kidney disease (H)      Hypercholesterolemia        PLAN:    Changing up the " Synthroid 75 mcg daily rechecking TSH again in April 26.  Blood pressures look good.  No increase in edema.  Lungs are clear no increase in cough or other respiratory issues.  The Bumex was discontinued last on March 11.  Also continue with the simvastatin 10 mg with a total cholesterol 156.  Her pain is also well managed.  She did not have any other questions.        Electronically signed by: Festus Glez NP

## 2023-03-23 NOTE — TELEPHONE ENCOUNTER
[facility] Geriatrics Triage Nurse INR     Provider: LARRY Melvin  Facility: [facility] Type:  LT    Caller: Kayden  Call Back Number:   Reason for call: INR  Diagnosis/Goal: A. Fib    Todays INR: 1.7  Last INR 2.65 (3/13), 2.5 mg po on M,TH and 1 mg po AOD.     Heparin/Lovenox:  No  Currently on ABX?: No  Other interacting medication:  None  Missed or refused doses: No    Verbal Order/Direction given by Provider: Coumadin 2.5 mg po M,W,F and 1 mg po AOD.  Recheck INR on 3/30.    Provider Giving Order:  LARRY Melvin    Verbal Order given to: Kayden Teran RN

## 2023-03-24 NOTE — TELEPHONE ENCOUNTER
Patient Returning Call    Reason for call:  Pt jovi has return called from yesterday and would like a call back today   Information relayed to patient:  Will send message and Have INR nurse to  call back     Patient has additional questions:  Yes    What are your questions/concerns:  Would like to talk about meds and next steps     Okay to leave a detailed message?: Yes at Cell number on file:    Telephone Information:   Mobile [phone]

## 2023-03-24 NOTE — TELEPHONE ENCOUNTER
Called and spoke with jovi Trevino     - re:  Aunt - Miesha Quarles.     - [facility]  [phone]      - contact nurse who cares for Miesha: JENNIFER Arroyo    - direct telephone number: [phone]    - INR on 3/23/23 was low at 1.7  (INR target goal is 2.0 - 3.0)    - new warfarin dose - 2.5mg Mon/Wed/Fri and 1mg all other days.    - INR recheck scheduled on 3/30/23.

## 2023-03-30 NOTE — TELEPHONE ENCOUNTER
[facility] Geriatrics Triage Nurse INR     Provider: LARRY Melvin  Facility: [facility]   Facility Type:  [facility]    Caller: Dina  Call Back Number: [phone]  Reason for call: INR  Diagnosis/Goal: A. Fib    Todays INR: 1.86  Last INR 3/23 1.70(2.5mg M-W-F and 1mg AOD), 3/13 2.65(2.5mg Mon and Thurs and 1mg AOD), 3/8 3.33(2.5mg Mon and Thurs and 1mg AOD)    Heparin/Lovenox:  No  Currently on ABX?: No  Other interacting medication:  None  Missed or refused doses: No       Nurse is also reporting that patient slid from the w/c to floor while trying to self-transfer.  No injury noted.        Verbal Order/Direction given by Provider: Continue Warfarin 2.5mg Mon-Wed-Fri and 1mg all other days.  Next INR 4/5/23.  Continue to monitor per protocol and update with changes.      Provider Giving Order:  LARRY Melvin    Verbal Order given to: Dina Wynne RN

## 2023-04-05 NOTE — TELEPHONE ENCOUNTER
[facility] Geriatrics Triage Nurse INR     Provider: LARRY Melvin  Facility: [facility]   Facility Type:  [facility]    Caller: Dina   Call Back Number: [phone]  Reason for call: INR  Diagnosis/Goal: A. Fib    Todays INR: 2.0   Last INR 3/30 1.86  2.5mg M, W, F and 1mg AOD.    Heparin/Lovenox:  No  Currently on ABX?: No  Other interacting medication:  None  Missed or refused doses: No    Verbal Order/Direction given by Provider:   Cont 2.5mg M, W, F and 1mg AOD Next INR 4/24    Provider Giving Order:  LARRY Melvin    Verbal Order given to: Dina Marie RN

## 2023-04-10 NOTE — TELEPHONE ENCOUNTER
[facility] Geriatrics Triage Nurse Telephone Encounter    Provider: LRARY Melvin  Facility: [facility]  Facility Type:  LT    Caller: Marleni  Call Back Number: [phone]    Allergies:    Allergies   Allergen Reactions     Amoxicillin Hives     Cephalexin Other (See Comments)     Reaction with intervenous administration.     Penicillins Hives     Latex Rash     Other Environmental Allergy Other (See Comments)     Pollen causes seasonal allergies.        Reason for call: Pt has dry cough and fatigue. Rub heard in right lower lobe. Vitals: /85, T 98.2, P 95, O2 94% RA, R 18. COVID negative.    Verbal Order/Direction given by Provider:   1) 2 view CXR  2) If result comes in after-hours, call results in the AM     Provider giving Order:  LARRY Melvin    Verbal Order given to: Marleni Mae RN

## 2023-04-11 NOTE — LETTER
4/11/2023        RE: Miesha Quarles  [address] [place] [address]        M [facility] SERVICES    Facility:   [facility] () [[id number]]   Code Status: DNR/DNI      CHIEF COMPLAINT/REASON FOR VISIT:  Chief Complaint   Patient presents with     RECHECK       HISTORY:      HPI: Miesha is a 89 year old female Who I was asked to visit with secondary to a cough as well as a recent chest x-ray.  She had a cough the other day we did a chest x-ray which was negative.  Also curious that she is in a congestive failure and she is.  No increase in lung sounds shortness of breath or dyspnea there is no edema.  She is feeling better today.  Once again the chest x-ray was negative.  He is on warfarin secondary to A-fib.  Her blood pressure yesterday was 172/79 and was not rechecked however most of her blood pressures in the past month have been 130-140 she has been afebrile and also on room air with a weight on April.  152 pounds in comparison to March 27 at 160 pounds.  I also a chance to talk to the charge nurse she is on midodrine 2.5 mg I do not feel therapeutic at this time that the charge nurse will eventually talk to family to see if we can discontinue that medication.  She is sleeping and its really not a high enough dose to use the medication for narcolepsy.  For sleep is on melatonin 3 mg.  Otherwise very delightful lady.  She did not have any other questions and she feels relieved from our visit today.    Past Medical History:   Diagnosis Date     Atrial flutter (H)      Chronic kidney disease      Congestive heart failure (H)      Disorder of bone and cartilage, unspecified     Created by Conversion      Essential hypertension, benign     Created by Conversion      Osteoarthrosis, unspecified whether generalized or localized, lower leg     Created by Conversion      Pure hypercholesterolemia     Created by Conversion      Type II or unspecified type diabetes mellitus without mention of  complication, not stated as uncontrolled     Created by Conversion      Unspecified hypothyroidism     Created by Conversion      Unspecified vitamin D deficiency     Created by Conversion             Family History   Problem Relation Age of Onset     Coronary Artery Disease Brother       Social History     Socioeconomic History     Marital status:    Tobacco Use     Smoking status: Former     Types: Cigarettes     Quit date: 1991     Years since quittin.2     Smokeless tobacco: Never   Substance and Sexual Activity     Alcohol use: Yes     Social Determinants of Health     Financial Resource Strain: Medium Risk (2022)    Overall Financial Resource Strain (CARDIA)      Difficulty of Paying Living Expenses: Somewhat hard   Food Insecurity: No Food Insecurity (2022)    Hunger Vital Sign      Worried About Running Out of Food in the Last Year: Never true      Ran Out of Food in the Last Year: Never true   Transportation Needs: No Transportation Needs (2022)    PRAPARE - Transportation      Lack of Transportation (Medical): No      Lack of Transportation (Non-Medical): No   Housing Stability: Low Risk  (2022)    Housing Stability Vital Sign      Unable to Pay for Housing in the Last Year: No      Number of Places Lived in the Last Year: 1      Unstable Housing in the Last Year: No      No new changes to the review of systems or physical exam since our last visit on  however did have a cough and a negative chest x-ray  REVIEW OF SYSTEM:  Currently she denies any new symptoms of fever cough or cold sore throat postnasal drip flulike symptoms headache shortness of breath dyspnea wheezing chest pain dizziness vertigo nausea vomiting diarrhea dysuria frequency urgency or unusual myalgias or arthralgias    PHYSICAL EXAM:   Pleasant female in no acute distress.  Head is normocephalic Lung sounds are clear throughout.  Cardiovascular S1-S2 and no lower extremity edema.  " Gastrointestinal is protuberant nontender nondistended.  Musculoskeletal able to turn herself over in bed and denies any pain.  Psychiatric: Pleasant affect.       Current Outpatient Medications:      acetaminophen (TYLENOL) 500 MG tablet, Take 1,000 mg by mouth 3 times daily, Disp: , Rfl:      diclofenac (VOLTAREN) 1 % topical gel, Apply topically 2 times daily, Disp: , Rfl:      levothyroxine (SYNTHROID/LEVOTHROID) 75 MCG tablet, Take 75 mcg by mouth daily, Disp: , Rfl:      melatonin 3 MG tablet, Take 3 mg by mouth At Bedtime, Disp: , Rfl:      methylphenidate (RITALIN) 5 MG tablet, Take 0.5 tablets (2.5 mg) by mouth daily, Disp: 30 tablet, Rfl: 0     metoprolol succinate ER (TOPROL XL) 100 MG 24 hr tablet, TAKE 1 TABLET BY MOUTH EVERY DAY, Disp: 90 tablet, Rfl: 3     potassium chloride ER (KLOR-CON M) 20 MEQ CR tablet, Take 20 mEq by mouth daily, Disp: , Rfl:      simvastatin (ZOCOR) 20 MG tablet, TAKE 1/2 TABLET BY MOUTH DAILY IN THE EVENING., Disp: 45 tablet, Rfl: 3     WARFARIN SODIUM PO, 3/30/23 INR 1.86  Cont 2.5mg M-W-F and 1mg AOD.  Next INR 4/5/23.   3/23/23 INR 1.70  Take 2.5mg M-W-F and 1mg AOD.  Next INR 3/30/23. 3/13/23 INR 2.65  Cont 2.5mg Mon and Thurs and 1mg AOD.  Next INR 3/23/23.  , Disp: , Rfl:        /77   Pulse (!) 136   Temp 97.8  F (36.6  C)   Resp 20   Ht 1.575 m (5' 2\")   Wt 73.6 kg (162 lb 3.2 oz)   SpO2 96%   BMI 29.67 kg/m      LABS:   Last Comprehensive Metabolic Panel:  Lab Results   Component Value Date     (L) 02/23/2023    POTASSIUM 4.7 02/23/2023    CHLORIDE 97 (L) 02/23/2023    CO2 25 02/23/2023    ANIONGAP 13 02/23/2023     (H) 02/23/2023    BUN 24.1 (H) 02/23/2023    CR 1.09 (H) 02/23/2023    GFRESTIMATED 47 (L) 02/23/2023    JOSE CARLOS 8.3 02/23/2023       TSH   Date Value Ref Range Status   03/15/2023 16.10 (H) 0.30 - 4.20 uIU/mL Final   11/22/2021 4.23 0.30 - 5.00 uIU/mL Final     Free T4   Date Value Ref Range Status   01/31/2023 1.15 0.90 - 1.70 ng/dL " Final           ASSESSMENT:    Encounter Diagnoses   Name Primary?     Cough, unspecified type Yes     Benign essential hypertension      Persistent atrial fibrillation (H)      Edema, unspecified type        PLAN:    Her cough is improved.  Chest x-ray was negative.  She is feeling much better today.  At any rate we talked about her medications as well as her overall.  She is very delightful and is relieved that we have a good conversation and she is doing well overall.  She did not have any other questions        Electronically signed by: Festus Glez NP          Sincerely,        Festus Glez NP

## 2023-04-11 NOTE — PROGRESS NOTES
[facility] GERIATRIC SERVICES    Facility:   [facility] () [[id number]]   Code Status: DNR/DNI      CHIEF COMPLAINT/REASON FOR VISIT:  Chief Complaint   Patient presents with     RECHECK       HISTORY:      HPI: Miesha is a 89 year old female Who I was asked to visit with secondary to a cough as well as a recent chest x-ray.  She had a cough the other day we did a chest x-ray which was negative.  Also curious that she is in a congestive failure and she is.  No increase in lung sounds shortness of breath or dyspnea there is no edema.  She is feeling better today.  Once again the chest x-ray was negative.  He is on warfarin secondary to A-fib.  Her blood pressure yesterday was 172/79 and was not rechecked however most of her blood pressures in the past month have been 130-140 she has been afebrile and also on room air with a weight on April.  152 pounds in comparison to March 27 at 160 pounds.  I also a chance to talk to the charge nurse she is on midodrine 2.5 mg I do not feel therapeutic at this time that the charge nurse will eventually talk to family to see if we can discontinue that medication.  She is sleeping and its really not a high enough dose to use the medication for narcolepsy.  For sleep is on melatonin 3 mg.  Otherwise very delightful lady.  She did not have any other questions and she feels relieved from our visit today.    Past Medical History:   Diagnosis Date     Atrial flutter (H)      Chronic kidney disease      Congestive heart failure (H)      Disorder of bone and cartilage, unspecified     Created by Conversion      Essential hypertension, benign     Created by Conversion      Osteoarthrosis, unspecified whether generalized or localized, lower leg     Created by Conversion      Pure hypercholesterolemia     Created by Conversion      Type II or unspecified type diabetes mellitus without mention of complication, not stated as uncontrolled     Created by Conversion      Unspecified  hypothyroidism     Created by Conversion      Unspecified vitamin D deficiency     Created by Conversion             Family History   Problem Relation Age of Onset     Coronary Artery Disease Brother       Social History     Socioeconomic History     Marital status:    Tobacco Use     Smoking status: Former     Types: Cigarettes     Quit date: 1991     Years since quittin.2     Smokeless tobacco: Never   Substance and Sexual Activity     Alcohol use: Yes     Social Determinants of Health     Financial Resource Strain: Medium Risk (2022)    Overall Financial Resource Strain (CARDIA)      Difficulty of Paying Living Expenses: Somewhat hard   Food Insecurity: No Food Insecurity (2022)    Hunger Vital Sign      Worried About Running Out of Food in the Last Year: Never true      Ran Out of Food in the Last Year: Never true   Transportation Needs: No Transportation Needs (2022)    PRAPARE - Transportation      Lack of Transportation (Medical): No      Lack of Transportation (Non-Medical): No   Housing Stability: Low Risk  (2022)    Housing Stability Vital Sign      Unable to Pay for Housing in the Last Year: No      Number of Places Lived in the Last Year: 1      Unstable Housing in the Last Year: No      No new changes to the review of systems or physical exam since our last visit on  however did have a cough and a negative chest x-ray  REVIEW OF SYSTEM:  Currently she denies any new symptoms of fever cough or cold sore throat postnasal drip flulike symptoms headache shortness of breath dyspnea wheezing chest pain dizziness vertigo nausea vomiting diarrhea dysuria frequency urgency or unusual myalgias or arthralgias    PHYSICAL EXAM:   Pleasant female in no acute distress.  Head is normocephalic Lung sounds are clear throughout.  Cardiovascular S1-S2 and no lower extremity edema.  Gastrointestinal is protuberant nontender nondistended.  Musculoskeletal able to turn herself  "over in bed and denies any pain.  Psychiatric: Pleasant affect.       Current Outpatient Medications:      acetaminophen (TYLENOL) 500 MG tablet, Take 1,000 mg by mouth 3 times daily, Disp: , Rfl:      diclofenac (VOLTAREN) 1 % topical gel, Apply topically 2 times daily, Disp: , Rfl:      levothyroxine (SYNTHROID/LEVOTHROID) 75 MCG tablet, Take 75 mcg by mouth daily, Disp: , Rfl:      melatonin 3 MG tablet, Take 3 mg by mouth At Bedtime, Disp: , Rfl:      methylphenidate (RITALIN) 5 MG tablet, Take 0.5 tablets (2.5 mg) by mouth daily, Disp: 30 tablet, Rfl: 0     metoprolol succinate ER (TOPROL XL) 100 MG 24 hr tablet, TAKE 1 TABLET BY MOUTH EVERY DAY, Disp: 90 tablet, Rfl: 3     potassium chloride ER (KLOR-CON M) 20 MEQ CR tablet, Take 20 mEq by mouth daily, Disp: , Rfl:      simvastatin (ZOCOR) 20 MG tablet, TAKE 1/2 TABLET BY MOUTH DAILY IN THE EVENING., Disp: 45 tablet, Rfl: 3     WARFARIN SODIUM PO, 3/30/23 INR 1.86  Cont 2.5mg M-W-F and 1mg AOD.  Next INR 4/5/23.   3/23/23 INR 1.70  Take 2.5mg M-W-F and 1mg AOD.  Next INR 3/30/23. 3/13/23 INR 2.65  Cont 2.5mg Mon and Thurs and 1mg AOD.  Next INR 3/23/23.  , Disp: , Rfl:        /77   Pulse (!) 136   Temp 97.8  F (36.6  C)   Resp 20   Ht 1.575 m (5' 2\")   Wt 73.6 kg (162 lb 3.2 oz)   SpO2 96%   BMI 29.67 kg/m      LABS:   Last Comprehensive Metabolic Panel:  Lab Results   Component Value Date     (L) 02/23/2023    POTASSIUM 4.7 02/23/2023    CHLORIDE 97 (L) 02/23/2023    CO2 25 02/23/2023    ANIONGAP 13 02/23/2023     (H) 02/23/2023    BUN 24.1 (H) 02/23/2023    CR 1.09 (H) 02/23/2023    GFRESTIMATED 47 (L) 02/23/2023    JOSE CARLOS 8.3 02/23/2023       TSH   Date Value Ref Range Status   03/15/2023 16.10 (H) 0.30 - 4.20 uIU/mL Final   11/22/2021 4.23 0.30 - 5.00 uIU/mL Final     Free T4   Date Value Ref Range Status   01/31/2023 1.15 0.90 - 1.70 ng/dL Final           ASSESSMENT:    Encounter Diagnoses   Name Primary?     Cough, unspecified type " Yes     Benign essential hypertension      Persistent atrial fibrillation (H)      Edema, unspecified type        PLAN:    Her cough is improved.  Chest x-ray was negative.  She is feeling much better today.  At any rate we talked about her medications as well as her overall.  She is very delightful and is relieved that we have a good conversation and she is doing well overall.  She did not have any other questions        Electronically signed by: Festus Glez NP

## 2023-04-12 NOTE — TELEPHONE ENCOUNTER
Pt started on antibiotics today and currently on warfarin. Current order to check INR on 4/24, pharmacy wants to know if we would like to check her INR sooner.     Per Festus Glez NP  Check INR on 4/17/23    Order given to- Dina Marie RN

## 2023-04-12 NOTE — TELEPHONE ENCOUNTER
[facility] Triage Nurse Telephone Encounter    Provider: LARRY Melvin  Facility: [facility]  Facility Type:  [facility]    Caller: Marleni  Call Back Number: [phone]    Allergies:    Allergies   Allergen Reactions     Amoxicillin Hives     Cephalexin Other (See Comments)     Reaction with intervenous administration.     Penicillins Hives     Latex Rash     Other Environmental Allergy Other (See Comments)     Pollen causes seasonal allergies.        Reason for call: Nurse called to report that patient has what appears to be blisters filled with pus on the lateral side of her left foot near her pinky toe.  These blisters are flat, painful to the touch, and warm.  This was not noted yesterday by staff.          Verbal Order/Direction given by Provider: Start Doxycycline 100 mg po BID x 10 days for possible cellulitis.  Continue to monitor area.     Provider giving Order:  LARRY Melvin    Verbal Order given to: Marleni Teran RN

## 2023-04-13 NOTE — PROGRESS NOTES
[facility] GERIATRIC SERVICES    Facility:   [facility] () [[id number]]   Code Status: DNR/DNI      CHIEF COMPLAINT/REASON FOR VISIT:  Chief Complaint   Patient presents with     RECHECK       HISTORY:      HPI: Miesha is a 89 year old female Who I was asked to revisit with again today secondary to new finding of the left foot lateral proximal metatarsal pain as well as a blister unsure etiology.  She has had some difficulty to that area for about a day or so.  The charge nurse did call her triage took a picture and does have a blister appearance and looks like a little cellulitis.  The remainder of her foot and ankle area do have some dry skin they can put some lotion on that.  The blister itself is  to palpation otherwise there is no obvious major cellulitis to the area other than the tenderness to that blister area.  The lower leg is also unremarkable the right foot and lower leg are also unremarkable.  We also did change up rechecking her pro time again on Monday due to being on warfarin.  Did put her on doxycycline since she has a sensitivity to penicillin and cephalosporins.  Otherwise I did see her the other day secondary to a cough chest x-ray was negative she is feeling much better with that.  Her appetite is good.  She otherwise is in pretty good spirits today.    Past Medical History:   Diagnosis Date     Atrial flutter (H)      Chronic kidney disease      Congestive heart failure (H)      Disorder of bone and cartilage, unspecified     Created by Conversion      Essential hypertension, benign     Created by Conversion      Osteoarthrosis, unspecified whether generalized or localized, lower leg     Created by Conversion      Pure hypercholesterolemia     Created by Conversion      Type II or unspecified type diabetes mellitus without mention of complication, not stated as uncontrolled     Created by Conversion      Unspecified hypothyroidism     Created by Conversion      Unspecified  vitamin D deficiency     Created by Conversion             Family History   Problem Relation Age of Onset     Coronary Artery Disease Brother       Social History     Socioeconomic History     Marital status:    Tobacco Use     Smoking status: Former     Types: Cigarettes     Quit date: 1991     Years since quittin.3     Smokeless tobacco: Never   Substance and Sexual Activity     Alcohol use: Yes     Social Determinants of Health     Financial Resource Strain: Medium Risk (2022)    Overall Financial Resource Strain (CARDIA)      Difficulty of Paying Living Expenses: Somewhat hard   Food Insecurity: No Food Insecurity (2022)    Hunger Vital Sign      Worried About Running Out of Food in the Last Year: Never true      Ran Out of Food in the Last Year: Never true   Transportation Needs: No Transportation Needs (2022)    PRAPARE - Transportation      Lack of Transportation (Medical): No      Lack of Transportation (Non-Medical): No   Housing Stability: Low Risk  (2022)    Housing Stability Vital Sign      Unable to Pay for Housing in the Last Year: No      Number of Places Lived in the Last Year: 1      Unstable Housing in the Last Year: No      No new changes to the review of systems or physical exam since our last visit on 811 however now has left lateral foot pain with a blister  REVIEW OF SYSTEM:  Currently she denies any new symptoms of fever cough or cold sore throat postnasal drip flulike symptoms headache shortness of breath dyspnea wheezing chest pain dizziness vertigo nausea vomiting diarrhea dysuria frequency urgency or unusual myalgias or arthralgias    PHYSICAL EXAM:   Pleasant female in no acute distress.  Head is normocephalic Lung sounds are clear throughout.  Cardiovascular S1-S2 and no lower extremity edema.  Gastrointestinal is protuberant nontender nondistended.  Musculoskeletal -independent..  Psychiatric: Pleasant affect.  Left lateral foot fifth metatarsal  does have a blister to the lateral side as well as on the plantar side.  It is tender to palpation.  There is no other redness or inflammation or significant cellulitis other than the blister and the tenderness to that area.  Remainder the foot and ankle and lower leg are unremarkable.    Current Outpatient Medications:      acetaminophen (TYLENOL) 500 MG tablet, Take 1,000 mg by mouth 3 times daily, Disp: , Rfl:      diclofenac (VOLTAREN) 1 % topical gel, Apply topically 2 times daily, Disp: , Rfl:      levothyroxine (SYNTHROID/LEVOTHROID) 75 MCG tablet, Take 75 mcg by mouth daily, Disp: , Rfl:      melatonin 3 MG tablet, Take 3 mg by mouth At Bedtime, Disp: , Rfl:      methylphenidate (RITALIN) 5 MG tablet, Take 0.5 tablets (2.5 mg) by mouth daily, Disp: 30 tablet, Rfl: 0     metoprolol succinate ER (TOPROL XL) 100 MG 24 hr tablet, TAKE 1 TABLET BY MOUTH EVERY DAY, Disp: 90 tablet, Rfl: 3     potassium chloride ER (KLOR-CON M) 20 MEQ CR tablet, Take 20 mEq by mouth daily, Disp: , Rfl:      simvastatin (ZOCOR) 20 MG tablet, TAKE 1/2 TABLET BY MOUTH DAILY IN THE EVENING., Disp: 45 tablet, Rfl: 3     WARFARIN SODIUM PO, 3/30/23 INR 1.86  Cont 2.5mg M-W-F and 1mg AOD.  Next INR 4/5/23.   3/23/23 INR 1.70  Take 2.5mg M-W-F and 1mg AOD.  Next INR 3/30/23. 3/13/23 INR 2.65  Cont 2.5mg Mon and Thurs and 1mg AOD.  Next INR 3/23/23.  , Disp: , Rfl:     /75   Pulse 87   Temp 97.8  F (36.6  C)   Resp 20   Wt 73.6 kg (162 lb 3.2 oz)   SpO2 96%   BMI 29.67 kg/m      LABS:   Last Comprehensive Metabolic Panel:  Lab Results   Component Value Date     (L) 02/23/2023    POTASSIUM 4.7 02/23/2023    CHLORIDE 97 (L) 02/23/2023    CO2 25 02/23/2023    ANIONGAP 13 02/23/2023     (H) 02/23/2023    BUN 24.1 (H) 02/23/2023    CR 1.09 (H) 02/23/2023    GFRESTIMATED 47 (L) 02/23/2023    JOSE CARLOS 8.3 02/23/2023             ASSESSMENT:    Encounter Diagnoses   Name Primary?     Blister of left foot, subsequent encounter Yes      Cellulitis of other specified site      Left foot pain      Persistent atrial fibrillation (H)        PLAN:    She does have dry skin to the area did talk to the charge nurse about lotion on an area but I did put her on doxycycline due to the sensitivity of penicillin and cephalosporins.  Hope is that it does seem to resolve itself.  Staff will keep me updated for any problems or other concerns and if it does continue on certainly can do an x-ray of the area.  She is been afebrile otherwise.  Appetite is good.  She did appreciate the visit.        Electronically signed by: Festus Glez NP

## 2023-04-13 NOTE — LETTER
4/13/2023        RE: Miesha Quarles  [address] [place] [address]        M [facility] SERVICES    Facility:   [facility] () [[id number]]   Code Status: DNR/DNI      CHIEF COMPLAINT/REASON FOR VISIT:  Chief Complaint   Patient presents with     RECHECK       HISTORY:      HPI: Miesha is a 89 year old female Who I was asked to revisit with again today secondary to new finding of the left foot lateral proximal metatarsal pain as well as a blister unsure etiology.  She has had some difficulty to that area for about a day or so.  The charge nurse did call her triage took a picture and does have a blister appearance and looks like a little cellulitis.  The remainder of her foot and ankle area do have some dry skin they can put some lotion on that.  The blister itself is  to palpation otherwise there is no obvious major cellulitis to the area other than the tenderness to that blister area.  The lower leg is also unremarkable the right foot and lower leg are also unremarkable.  We also did change up rechecking her pro time again on Monday due to being on warfarin.  Did put her on doxycycline since she has a sensitivity to penicillin and cephalosporins.  Otherwise I did see her the other day secondary to a cough chest x-ray was negative she is feeling much better with that.  Her appetite is good.  She otherwise is in pretty good spirits today.    Past Medical History:   Diagnosis Date     Atrial flutter (H)      Chronic kidney disease      Congestive heart failure (H)      Disorder of bone and cartilage, unspecified     Created by Conversion      Essential hypertension, benign     Created by Conversion      Osteoarthrosis, unspecified whether generalized or localized, lower leg     Created by Conversion      Pure hypercholesterolemia     Created by Conversion      Type II or unspecified type diabetes mellitus without mention of complication, not stated as uncontrolled     Created  by Conversion      Unspecified hypothyroidism     Created by Conversion      Unspecified vitamin D deficiency     Created by Conversion             Family History   Problem Relation Age of Onset     Coronary Artery Disease Brother       Social History     Socioeconomic History     Marital status:    Tobacco Use     Smoking status: Former     Types: Cigarettes     Quit date: 1991     Years since quittin.3     Smokeless tobacco: Never   Substance and Sexual Activity     Alcohol use: Yes     Social Determinants of Health     Financial Resource Strain: Medium Risk (2022)    Overall Financial Resource Strain (CARDIA)      Difficulty of Paying Living Expenses: Somewhat hard   Food Insecurity: No Food Insecurity (2022)    Hunger Vital Sign      Worried About Running Out of Food in the Last Year: Never true      Ran Out of Food in the Last Year: Never true   Transportation Needs: No Transportation Needs (2022)    PRAPARE - Transportation      Lack of Transportation (Medical): No      Lack of Transportation (Non-Medical): No   Housing Stability: Low Risk  (2022)    Housing Stability Vital Sign      Unable to Pay for Housing in the Last Year: No      Number of Places Lived in the Last Year: 1      Unstable Housing in the Last Year: No      No new changes to the review of systems or physical exam since our last visit on 811 however now has left lateral foot pain with a blister  REVIEW OF SYSTEM:  Currently she denies any new symptoms of fever cough or cold sore throat postnasal drip flulike symptoms headache shortness of breath dyspnea wheezing chest pain dizziness vertigo nausea vomiting diarrhea dysuria frequency urgency or unusual myalgias or arthralgias    PHYSICAL EXAM:   Pleasant female in no acute distress.  Head is normocephalic Lung sounds are clear throughout.  Cardiovascular S1-S2 and no lower extremity edema.  Gastrointestinal is protuberant nontender nondistended.   Musculoskeletal -independent..  Psychiatric: Pleasant affect.  Left lateral foot fifth metatarsal does have a blister to the lateral side as well as on the plantar side.  It is tender to palpation.  There is no other redness or inflammation or significant cellulitis other than the blister and the tenderness to that area.  Remainder the foot and ankle and lower leg are unremarkable.    Current Outpatient Medications:      acetaminophen (TYLENOL) 500 MG tablet, Take 1,000 mg by mouth 3 times daily, Disp: , Rfl:      diclofenac (VOLTAREN) 1 % topical gel, Apply topically 2 times daily, Disp: , Rfl:      levothyroxine (SYNTHROID/LEVOTHROID) 75 MCG tablet, Take 75 mcg by mouth daily, Disp: , Rfl:      melatonin 3 MG tablet, Take 3 mg by mouth At Bedtime, Disp: , Rfl:      methylphenidate (RITALIN) 5 MG tablet, Take 0.5 tablets (2.5 mg) by mouth daily, Disp: 30 tablet, Rfl: 0     metoprolol succinate ER (TOPROL XL) 100 MG 24 hr tablet, TAKE 1 TABLET BY MOUTH EVERY DAY, Disp: 90 tablet, Rfl: 3     potassium chloride ER (KLOR-CON M) 20 MEQ CR tablet, Take 20 mEq by mouth daily, Disp: , Rfl:      simvastatin (ZOCOR) 20 MG tablet, TAKE 1/2 TABLET BY MOUTH DAILY IN THE EVENING., Disp: 45 tablet, Rfl: 3     WARFARIN SODIUM PO, 3/30/23 INR 1.86  Cont 2.5mg M-W-F and 1mg AOD.  Next INR 4/5/23.   3/23/23 INR 1.70  Take 2.5mg M-W-F and 1mg AOD.  Next INR 3/30/23. 3/13/23 INR 2.65  Cont 2.5mg Mon and Thurs and 1mg AOD.  Next INR 3/23/23.  , Disp: , Rfl:     /75   Pulse 87   Temp 97.8  F (36.6  C)   Resp 20   Wt 73.6 kg (162 lb 3.2 oz)   SpO2 96%   BMI 29.67 kg/m      LABS:   Last Comprehensive Metabolic Panel:  Lab Results   Component Value Date     (L) 02/23/2023    POTASSIUM 4.7 02/23/2023    CHLORIDE 97 (L) 02/23/2023    CO2 25 02/23/2023    ANIONGAP 13 02/23/2023     (H) 02/23/2023    BUN 24.1 (H) 02/23/2023    CR 1.09 (H) 02/23/2023    GFRESTIMATED 47 (L) 02/23/2023    JOSE CARLOS 8.3 02/23/2023              ASSESSMENT:    Encounter Diagnoses   Name Primary?     Blister of left foot, subsequent encounter Yes     Cellulitis of other specified site      Left foot pain      Persistent atrial fibrillation (H)        PLAN:    She does have dry skin to the area did talk to the charge nurse about lotion on an area but I did put her on doxycycline due to the sensitivity of penicillin and cephalosporins.  Hope is that it does seem to resolve itself.  Staff will keep me updated for any problems or other concerns and if it does continue on certainly can do an x-ray of the area.  She is been afebrile otherwise.  Appetite is good.  She did appreciate the visit.        Electronically signed by: Festus Glez NP          Sincerely,        Festus Glez NP

## 2023-04-17 NOTE — TELEPHONE ENCOUNTER
[facility] Geriatrics Triage Nurse INR     Provider: LARRY Melvin  Facility: [facility]   Facility Type:  LT    Caller: Dina  Call Back Number:   Reason for call: INR  Diagnosis/Goal: A. Fib    Todays INR: 2.74  Last INR 2.0 (4/5), 2.5 mg po M,W,F and 1 mg po AOD.     Heparin/Lovenox:  No  Currently on ABX?: Yes; please explain: Doxycycline  Other interacting medication:  None  Missed or refused doses: No    Verbal Order/Direction given by Provider: Coumadin 2 mg po M,W,F and 1 mg po AOD.  Recheck INR on 4/27.    Provider Giving Order:  Savanah Mitchell MD    Verbal Order given to: Dina Teran RN

## 2023-04-18 NOTE — PROGRESS NOTES
"[facility] GERIATRICS  INITIAL VISIT NOTE  April 18, 2023    PRIMARY CARE PROVIDER AND CLINIC:  Festus Glez [address] [place] [address]    [facility] Medical Record Number:  [id number]  Place of Service where encounter took place:  [facility] () [[id number]]    Chief Complaint   Patient presents with     [facility] Care       HPI:    Miesha Quarles is a 89 year old  (12/26/1928) female seen today at [facility]. Medical history is notable for HFpEF, diet controlled DM, a-flutter and narcolepsy. She was hospitalized at [facility] in January with decompensated CHF. She had a TCU stay from 2/7/23 to 2/28/23 and then transferred to this facility for LTC. During TCU stay she had an ER visit for an unresponsive episode and she was started on low dose methylphenidate. She was admitted to this facility for  medical management and nursing care.      History obtained from: facility chart records, facility staff, patient report and [facility] chart review.      Today, Ms. Quarles is seen in her room sitting in a recliner.  She was started on doxycycline on 4/12/23 due to concern for cellulitis surrounding a blister on her lateral left foot.  Today, the blister is evident and f filled with clear fluid; however, there is no surrounding erythema or warmth.  She was eating yogurt and lamented that she does not like the taste of yogurt.  Her stomach was doing okay, but last night started to \"turn upside down\".  No aches or pains.  Talked with nursing, and no concerns today.  She is settling in.    CODE STATUS: DNR    ALLERGIES:  Allergies   Allergen Reactions     Amoxicillin Hives     Cephalexin Other (See Comments)     Reaction with intervenous administration.     Penicillins Hives     Latex Rash     Other Environmental Allergy Other (See Comments)     Pollen causes seasonal allergies.       PAST MEDICAL HISTORY:   Past Medical History:   Diagnosis Date     Atrial " flutter (H)      Chronic kidney disease      Congestive heart failure (H)      Disorder of bone and cartilage, unspecified     Created by Conversion      Essential hypertension, benign     Created by Conversion      Osteoarthrosis, unspecified whether generalized or localized, lower leg     Created by Conversion      Pure hypercholesterolemia     Created by Conversion      Type II or unspecified type diabetes mellitus without mention of complication, not stated as uncontrolled     Created by Conversion      Unspecified hypothyroidism     Created by Conversion      Unspecified vitamin D deficiency     Created by Conversion        PAST SURGICAL HISTORY:   Past Surgical History:   Procedure Laterality Date     HYSTERECTOMY TOTAL ABDOMINAL, BILATERAL SALPINGO-OOPHORECTOMY, COMBINED         FAMILY HISTORY:   Family History   Problem Relation Age of Onset     Coronary Artery Disease Brother      SOCIAL HISTORY:   Patient's living condition: lives in a skilled nursing facility    MEDICATIONS:  Post Discharge Medication Reconciliation Status: discharge medications reconciled and changed, per note/orders.  Current Outpatient Medications   Medication Sig Dispense Refill     acetaminophen (TYLENOL) 500 MG tablet Take 1,000 mg by mouth 3 times daily       diclofenac (VOLTAREN) 1 % topical gel Apply 4 g topically 2 times daily To low back       doxycycline hyclate (VIBRAMYCIN) 100 MG capsule Take 100 mg by mouth 2 times daily Starting 4/12 for 10 days       guaiFENesin (ROBITUSSIN) 20 mg/mL liquid Take 10 mLs by mouth every 4 hours as needed for cough       levothyroxine (SYNTHROID/LEVOTHROID) 75 MCG tablet Take 75 mcg by mouth daily       melatonin 3 MG tablet Take 3 mg by mouth At Bedtime       methylphenidate (RITALIN) 5 MG tablet Take 0.5 tablets (2.5 mg) by mouth daily 30 tablet 0     metoprolol succinate ER (TOPROL XL) 100 MG 24 hr tablet TAKE 1 TABLET BY MOUTH EVERY DAY 90 tablet 3     potassium chloride ER (KLOR-CON M)  "20 MEQ CR tablet Take 20 mEq by mouth daily       simvastatin (ZOCOR) 20 MG tablet TAKE 1/2 TABLET BY MOUTH DAILY IN THE EVENING. 45 tablet 3     WARFARIN SODIUM PO 3/30/23 INR 1.86  Cont 2.5mg M-W-F and 1mg AOD.  Next INR 4/5/23.   3/23/23 INR 1.70  Take 2.5mg M-W-F and 1mg AOD.  Next INR 3/30/23. 3/13/23 INR 2.65  Cont 2.5mg Mon and Thurs and 1mg AOD.  Next INR 3/23/23.           ROS:  4 point ROS neg other than the symptoms noted above in the HPI. BIMS 12/15.   PHYSICAL EXAM:  BP (!) 139/90   Pulse 120   Temp 97.8  F (36.6  C)   Resp 20   Ht 1.575 m (5' 2\")   Wt 73.2 kg (161 lb 6.4 oz)   SpO2 96%   BMI 29.52 kg/m    Gen: sitting in recliner, alert, cooperative and in no acute distress  Resp: breathing non labored, no tachypnea   Ext: no LE edema  Neuro: CX II-XII grossly in tact; ROM in all four extremities grossly in tact  Skin: 1 cm wide x 3 cm long clear fluid filled blister along lateral aspect of the left fifth metatarsal without surrounding erythema or warmth     LABORATORY/IMAGING DATA:  Reviewed as per Epic and/or CareEverywhere    ASSESSMENT/PLAN:    Left Foot Cellulitis, Resolved  Left Foot Blister  Her foot looks quite good today, no erythema or warmth around what is clearly a blister.  -- decrease doxycycline 100 mg BID from 10 days (first day 4/12/23) to 7 days given rapid improvement  -- continue to follow clinically     Narcolepsy/Cataplexy  Unresponsive episode landed her in ER in Feb. New on methylphenidate at TCU prior to LTC  -- methylphenidate 2.5 mg daily  -- has appt with sleep medicine on 6/22/23    DM, Type II  Hgb A1c 6.7. Diet controlled  -- annual A1c per goals of care; goal <9 given age    Cognitive Impairment  BIMS 12/15  -- ongoing 24/7 nursing and supportive cares    HFpEF (EF 55-60%), HTN, HLD  SBPs 120s-130s. Weight 151 --> 161 lbs and stable for past 3 weeks. No edema.   -- metoprolol  mg daily and simvastatin 10 mg daily   -- follow BPs, weights, clinical volume " status   -- has cardiology appt on 5/8/23     Chronic Atrial Fibrillation  HR 80s-110s  -- metoprolol  mg daily  -- warfarin, goal INR 2-3     Chronic Low Back Pain  -- APAP 1000 mg TID, diclofenac gel BID    Hypothyroidisim  TSH 16.1 in March and Ft4 normal in Jan. Levothyroxine increased from 50 mcg in Feb   -- levothyroxine 75 mcg daily   -- recheck TSH next month    CKD, Stage III  Baseline Cr around 1.   -- avoid nephrotoxic meds  -- periodic BMP per goals of care     Electronically signed by:  Savanah Lowry MD

## 2023-04-18 NOTE — LETTER
"    4/18/2023        RE: Miesha Quarles  [address] [place] [address]        M [facility] GERIATRICS  INITIAL VISIT NOTE  April 18, 2023    PRIMARY CARE PROVIDER AND CLINIC:  Festus Glez [address] [place] [address]    M [facility] Medical Record Number:  [id number]  Place of Service where encounter took place:  [facility] () [[id number]]    Chief Complaint   Patient presents with     [facility] Care       HPI:    Miesha Quarles is a 89 year old  (12/26/1928) female seen today at [facility]. Medical history is notable for HFpEF, diet controlled DM, a-flutter and narcolepsy. She was hospitalized at [facility] in January with decompensated CHF. She had a TCU stay from 2/7/23 to 2/28/23 and then transferred to this facility for LTC. During TCU stay she had an ER visit for an unresponsive episode and she was started on low dose methylphenidate. She was admitted to this facility for  medical management and nursing care.      History obtained from: facility chart records, facility staff, patient report and [facility] chart review.      Today, Ms. Quarles is seen in her room sitting in a recliner.  She was started on doxycycline on 4/12/23 due to concern for cellulitis surrounding a blister on her lateral left foot.  Today, the blister is evident and f filled with clear fluid; however, there is no surrounding erythema or warmth.  She was eating yogurt and lamented that she does not like the taste of yogurt.  Her stomach was doing okay, but last night started to \"turn upside down\".  No aches or pains.  Talked with nursing, and no concerns today.  She is settling in.    CODE STATUS: DNR    ALLERGIES:  Allergies   Allergen Reactions     Amoxicillin Hives     Cephalexin Other (See Comments)     Reaction with intervenous administration.     Penicillins Hives     Latex Rash     Other Environmental Allergy Other (See Comments)     Pollen causes seasonal " allergies.       PAST MEDICAL HISTORY:   Past Medical History:   Diagnosis Date     Atrial flutter (H)      Chronic kidney disease      Congestive heart failure (H)      Disorder of bone and cartilage, unspecified     Created by Conversion      Essential hypertension, benign     Created by Conversion      Osteoarthrosis, unspecified whether generalized or localized, lower leg     Created by Conversion      Pure hypercholesterolemia     Created by Conversion      Type II or unspecified type diabetes mellitus without mention of complication, not stated as uncontrolled     Created by Conversion      Unspecified hypothyroidism     Created by Conversion      Unspecified vitamin D deficiency     Created by Conversion        PAST SURGICAL HISTORY:   Past Surgical History:   Procedure Laterality Date     HYSTERECTOMY TOTAL ABDOMINAL, BILATERAL SALPINGO-OOPHORECTOMY, COMBINED         FAMILY HISTORY:   Family History   Problem Relation Age of Onset     Coronary Artery Disease Brother      SOCIAL HISTORY:   Patient's living condition: lives in a skilled nursing facility    MEDICATIONS:  Post Discharge Medication Reconciliation Status: discharge medications reconciled and changed, per note/orders.  Current Outpatient Medications   Medication Sig Dispense Refill     acetaminophen (TYLENOL) 500 MG tablet Take 1,000 mg by mouth 3 times daily       diclofenac (VOLTAREN) 1 % topical gel Apply 4 g topically 2 times daily To low back       doxycycline hyclate (VIBRAMYCIN) 100 MG capsule Take 100 mg by mouth 2 times daily Starting 4/12 for 10 days       guaiFENesin (ROBITUSSIN) 20 mg/mL liquid Take 10 mLs by mouth every 4 hours as needed for cough       levothyroxine (SYNTHROID/LEVOTHROID) 75 MCG tablet Take 75 mcg by mouth daily       melatonin 3 MG tablet Take 3 mg by mouth At Bedtime       methylphenidate (RITALIN) 5 MG tablet Take 0.5 tablets (2.5 mg) by mouth daily 30 tablet 0     metoprolol succinate ER (TOPROL XL) 100 MG 24 hr  "tablet TAKE 1 TABLET BY MOUTH EVERY DAY 90 tablet 3     potassium chloride ER (KLOR-CON M) 20 MEQ CR tablet Take 20 mEq by mouth daily       simvastatin (ZOCOR) 20 MG tablet TAKE 1/2 TABLET BY MOUTH DAILY IN THE EVENING. 45 tablet 3     WARFARIN SODIUM PO 3/30/23 INR 1.86  Cont 2.5mg M-W-F and 1mg AOD.  Next INR 4/5/23.   3/23/23 INR 1.70  Take 2.5mg M-W-F and 1mg AOD.  Next INR 3/30/23. 3/13/23 INR 2.65  Cont 2.5mg Mon and Thurs and 1mg AOD.  Next INR 3/23/23.           ROS:  4 point ROS neg other than the symptoms noted above in the HPI. BIMS 12/15.   PHYSICAL EXAM:  BP (!) 139/90   Pulse 120   Temp 97.8  F (36.6  C)   Resp 20   Ht 1.575 m (5' 2\")   Wt 73.2 kg (161 lb 6.4 oz)   SpO2 96%   BMI 29.52 kg/m    Gen: sitting in recliner, alert, cooperative and in no acute distress  Resp: breathing non labored, no tachypnea   Ext: no LE edema  Neuro: CX II-XII grossly in tact; ROM in all four extremities grossly in tact  Skin: 1 cm wide x 3 cm long clear fluid filled blister along lateral aspect of the left fifth metatarsal without surrounding erythema or warmth     LABORATORY/IMAGING DATA:  [facility]    ASSESSMENT/PLAN:    Left Foot Cellulitis, Resolved  Left Foot Blister  Her foot looks quite good today, no erythema or warmth around what is clearly a blister.  -- decrease doxycycline 100 mg BID from 10 days (first day 4/12/23) to 7 days given rapid improvement  -- continue to follow clinically     Narcolepsy/Cataplexy  Unresponsive episode landed her in ER in Feb. New on methylphenidate at [facility] prior to LTC  -- methylphenidate 2.5 mg daily  -- has appt with sleep medicine on 6/22/23    DM, Type II  Hgb A1c 6.7. Diet controlled  -- annual A1c per goals of care; goal <9 given age    Cognitive Impairment  BIMS 12/15  -- ongoing 24/7 nursing and supportive cares    HFpEF (EF 55-60%), HTN, HLD  SBPs 120s-130s. Weight 151 --> 161 lbs and stable for past 3 weeks. No edema.   -- " metoprolol  mg daily and simvastatin 10 mg daily   -- follow BPs, weights, clinical volume status   -- has cardiology appt on 5/8/23     Chronic Atrial Fibrillation  HR 80s-110s  -- metoprolol  mg daily  -- warfarin, goal INR 2-3     Chronic Low Back Pain  -- APAP 1000 mg TID, diclofenac gel BID    Hypothyroidisim  TSH 16.1 in March and Ft4 normal in Jan. Levothyroxine increased from 50 mcg in Feb   -- levothyroxine 75 mcg daily   -- recheck TSH next month    CKD, Stage III  Baseline Cr around 1.   -- avoid nephrotoxic meds  -- periodic BMP per goals of care     Electronically signed by:  Savanah Lowry MD                          Sincerely,        Savanah Lowry MD

## 2023-04-25 NOTE — LETTER
4/25/2023        RE: Miesha Quarles  [address] [place] [address]        [facility] GERIATRIC SERVICES    Facility:   [facility] () [[id number]]   Code Status: DNR/DNI      CHIEF COMPLAINT/REASON FOR VISIT:  Chief Complaint   Patient presents with     RECHECK       HISTORY:      HPI: Miesha is a 89 year old female Who currently does reside in long-term care room [facility] and who I was asked to visit with today secondary to check again her left foot for which she had blisters on the weeks ago and I did place her on some doxycycline.  Her April pressures ranging 120-140 she has been febrile with a weight of 162 pounds on April 3.  There is no significant lower extremity edema.  The foot looks much improved.  The blisters have significantly decreased.  Less comfort does have a few scattered on the left lateral fifth metatarsal area but otherwise the plantar foot is unremarkable.  Her eating and drinking is good.  Regular bowel movements.  Also warfarin and Yen well.  She is very pleasant.  Feeling much better and she is appreciative of the visit.    Past Medical History:   Diagnosis Date     Atrial flutter (H)      Chronic kidney disease      Congestive heart failure (H)      Disorder of bone and cartilage, unspecified     Created by Conversion      Essential hypertension, benign     Created by Conversion      Osteoarthrosis, unspecified whether generalized or localized, lower leg     Created by Conversion      Pure hypercholesterolemia     Created by Conversion      Type II or unspecified type diabetes mellitus without mention of complication, not stated as uncontrolled     Created by Conversion      Unspecified hypothyroidism     Created by Conversion      Unspecified vitamin D deficiency     Created by Conversion             Family History   Problem Relation Age of Onset     Coronary Artery Disease Brother       Social History     Socioeconomic History     Marital status:    Tobacco  Use     Smoking status: Former     Types: Cigarettes     Quit date: 1991     Years since quittin.3     Smokeless tobacco: Never   Substance and Sexual Activity     Alcohol use: Yes     Social Determinants of Health     Financial Resource Strain: Medium Risk (2022)    Overall Financial Resource Strain (CARDIA)      Difficulty of Paying Living Expenses: Somewhat hard   Food Insecurity: No Food Insecurity (2022)    Hunger Vital Sign      Worried About Running Out of Food in the Last Year: Never true      Ran Out of Food in the Last Year: Never true   Transportation Needs: No Transportation Needs (2022)    PRAPARE - Transportation      Lack of Transportation (Medical): No      Lack of Transportation (Non-Medical): No   Housing Stability: Low Risk  (2022)    Housing Stability Vital Sign      Unable to Pay for Housing in the Last Year: No      Number of Places Lived in the Last Year: 1      Unstable Housing in the Last Year: No      No new changes to the review of systems or physical exam since our last visit on   REVIEW OF SYSTEM:  Currently she denies any new symptoms of fever cough or cold sore throat postnasal drip flulike symptoms headache shortness of breath dyspnea wheezing chest pain dizziness vertigo nausea vomiting diarrhea dysuria frequency urgency or unusual myalgias or arthralgias    PHYSICAL EXAM:   Pleasant female in no acute distress.  Head is normocephalic Lung sounds are clear throughout.  Cardiovascular S1-S2 and no lower extremity edema.  Gastrointestinal is protuberant nontender nondistended.  Musculoskeletal -independent..  Psychiatric: Pleasant affect.  Significant decrease in the blisters over the left lateral and plantar foot area.  Just a small couple few that are left.  Otherwise significantly less tender.  Able to palpate area.  The rest the foot is unremarkable.    Current Outpatient Medications:      acetaminophen (TYLENOL) 500 MG tablet, Take 1,000 mg by  "mouth 3 times daily, Disp: , Rfl:      diclofenac (VOLTAREN) 1 % topical gel, Apply 4 g topically 2 times daily To low back, Disp: , Rfl:      guaiFENesin (ROBITUSSIN) 20 mg/mL liquid, Take 10 mLs by mouth every 4 hours as needed for cough, Disp: , Rfl:      levothyroxine (SYNTHROID/LEVOTHROID) 75 MCG tablet, Take 75 mcg by mouth daily, Disp: , Rfl:      melatonin 3 MG tablet, Take 3 mg by mouth At Bedtime, Disp: , Rfl:      methylphenidate (RITALIN) 5 MG tablet, Take 0.5 tablets (2.5 mg) by mouth daily, Disp: 30 tablet, Rfl: 0     metoprolol succinate ER (TOPROL XL) 100 MG 24 hr tablet, TAKE 1 TABLET BY MOUTH EVERY DAY, Disp: 90 tablet, Rfl: 3     potassium chloride ER (KLOR-CON M) 20 MEQ CR tablet, Take 20 mEq by mouth daily, Disp: , Rfl:      simvastatin (ZOCOR) 20 MG tablet, TAKE 1/2 TABLET BY MOUTH DAILY IN THE EVENING., Disp: 45 tablet, Rfl: 3     WARFARIN SODIUM PO, 3/30/23 INR 1.86  Cont 2.5mg M-W-F and 1mg AOD.  Next INR 4/5/23.   3/23/23 INR 1.70  Take 2.5mg M-W-F and 1mg AOD.  Next INR 3/30/23. 3/13/23 INR 2.65  Cont 2.5mg Mon and Thurs and 1mg AOD.  Next INR 3/23/23.  , Disp: , Rfl:        /66   Pulse 61   Temp 98.1  F (36.7  C)   Resp 20   Ht 1.575 m (5' 2\")   Wt 72.2 kg (159 lb 3.2 oz)   SpO2 96%   BMI 29.12 kg/m      LABS:   Lab Results   Component Value Date    A1C 6.7 09/15/2022    A1C 6.7 04/21/2022    A1C 7.0 11/22/2021    A1C 6.9 02/25/2021    A1C 7.0 08/19/2020           ASSESSMENT:    Encounter Diagnoses   Name Primary?     Blister Yes     Left foot pain      Benign essential hypertension      Edema, unspecified type        PLAN:    Overall doing well.  The doxycycline seem to be helpful.  I also a chance to talk with the charge nurse on the floor today.  They are pleased so far with the results as well as the improvement of her overall condition.    For new problems or other concerns.        Electronically signed by: Festus Glez NP          Sincerely,        Festus MCKAY " Newton, NP

## 2023-04-25 NOTE — PROGRESS NOTES
[facility] GERIATRIC SERVICES    Facility:   [facility] () [[id number]]   Code Status: DNR/DNI      CHIEF COMPLAINT/REASON FOR VISIT:  Chief Complaint   Patient presents with     RECHECK       HISTORY:      HPI: Miesha is a 89 year old female Who currently does reside in long-term care room [facility] and who I was asked to visit with today secondary to check again her left foot for which she had blisters on the weeks ago and I did place her on some doxycycline.  Her April pressures ranging 120-140 she has been febrile with a weight of 162 pounds on April 3.  There is no significant lower extremity edema.  The foot looks much improved.  The blisters have significantly decreased.  Less comfort does have a few scattered on the left lateral fifth metatarsal area but otherwise the plantar foot is unremarkable.  Her eating and drinking is good.  Regular bowel movements.  Also warfarin and Yen well.  She is very pleasant.  Feeling much better and she is appreciative of the visit.    Past Medical History:   Diagnosis Date     Atrial flutter (H)      Chronic kidney disease      Congestive heart failure (H)      Disorder of bone and cartilage, unspecified     Created by Conversion      Essential hypertension, benign     Created by Conversion      Osteoarthrosis, unspecified whether generalized or localized, lower leg     Created by Conversion      Pure hypercholesterolemia     Created by Conversion      Type II or unspecified type diabetes mellitus without mention of complication, not stated as uncontrolled     Created by Conversion      Unspecified hypothyroidism     Created by Conversion      Unspecified vitamin D deficiency     Created by Conversion             Family History   Problem Relation Age of Onset     Coronary Artery Disease Brother       Social History     Socioeconomic History     Marital status:    Tobacco Use     Smoking status: Former     Types: Cigarettes     Quit date: 1/1/1991     Years  since quittin.3     Smokeless tobacco: Never   Substance and Sexual Activity     Alcohol use: Yes     Social Determinants of Health     Financial Resource Strain: Medium Risk (2022)    Overall Financial Resource Strain (CARDIA)      Difficulty of Paying Living Expenses: Somewhat hard   Food Insecurity: No Food Insecurity (2022)    Hunger Vital Sign      Worried About Running Out of Food in the Last Year: Never true      Ran Out of Food in the Last Year: Never true   Transportation Needs: No Transportation Needs (2022)    PRAPARE - Transportation      Lack of Transportation (Medical): No      Lack of Transportation (Non-Medical): No   Housing Stability: Low Risk  (2022)    Housing Stability Vital Sign      Unable to Pay for Housing in the Last Year: No      Number of Places Lived in the Last Year: 1      Unstable Housing in the Last Year: No      No new changes to the review of systems or physical exam since our last visit on   REVIEW OF SYSTEM:  Currently she denies any new symptoms of fever cough or cold sore throat postnasal drip flulike symptoms headache shortness of breath dyspnea wheezing chest pain dizziness vertigo nausea vomiting diarrhea dysuria frequency urgency or unusual myalgias or arthralgias    PHYSICAL EXAM:   Pleasant female in no acute distress.  Head is normocephalic Lung sounds are clear throughout.  Cardiovascular S1-S2 and no lower extremity edema.  Gastrointestinal is protuberant nontender nondistended.  Musculoskeletal -independent..  Psychiatric: Pleasant affect.  Significant decrease in the blisters over the left lateral and plantar foot area.  Just a small couple few that are left.  Otherwise significantly less tender.  Able to palpate area.  The rest the foot is unremarkable.    Current Outpatient Medications:      acetaminophen (TYLENOL) 500 MG tablet, Take 1,000 mg by mouth 3 times daily, Disp: , Rfl:      diclofenac (VOLTAREN) 1 % topical gel, Apply 4 g  "topically 2 times daily To low back, Disp: , Rfl:      guaiFENesin (ROBITUSSIN) 20 mg/mL liquid, Take 10 mLs by mouth every 4 hours as needed for cough, Disp: , Rfl:      levothyroxine (SYNTHROID/LEVOTHROID) 75 MCG tablet, Take 75 mcg by mouth daily, Disp: , Rfl:      melatonin 3 MG tablet, Take 3 mg by mouth At Bedtime, Disp: , Rfl:      methylphenidate (RITALIN) 5 MG tablet, Take 0.5 tablets (2.5 mg) by mouth daily, Disp: 30 tablet, Rfl: 0     metoprolol succinate ER (TOPROL XL) 100 MG 24 hr tablet, TAKE 1 TABLET BY MOUTH EVERY DAY, Disp: 90 tablet, Rfl: 3     potassium chloride ER (KLOR-CON M) 20 MEQ CR tablet, Take 20 mEq by mouth daily, Disp: , Rfl:      simvastatin (ZOCOR) 20 MG tablet, TAKE 1/2 TABLET BY MOUTH DAILY IN THE EVENING., Disp: 45 tablet, Rfl: 3     WARFARIN SODIUM PO, 3/30/23 INR 1.86  Cont 2.5mg M-W-F and 1mg AOD.  Next INR 4/5/23.   3/23/23 INR 1.70  Take 2.5mg M-W-F and 1mg AOD.  Next INR 3/30/23. 3/13/23 INR 2.65  Cont 2.5mg Mon and Thurs and 1mg AOD.  Next INR 3/23/23.  , Disp: , Rfl:        /66   Pulse 61   Temp 98.1  F (36.7  C)   Resp 20   Ht 1.575 m (5' 2\")   Wt 72.2 kg (159 lb 3.2 oz)   SpO2 96%   BMI 29.12 kg/m      LABS:   Lab Results   Component Value Date    A1C 6.7 09/15/2022    A1C 6.7 04/21/2022    A1C 7.0 11/22/2021    A1C 6.9 02/25/2021    A1C 7.0 08/19/2020           ASSESSMENT:    Encounter Diagnoses   Name Primary?     Blister Yes     Left foot pain      Benign essential hypertension      Edema, unspecified type        PLAN:    Overall doing well.  The doxycycline seem to be helpful.  I also a chance to talk with the charge nurse on the floor today.  They are pleased so far with the results as well as the improvement of her overall condition.    For new problems or other concerns.        Electronically signed by: Festus Glez NP    "

## 2023-04-27 NOTE — PROGRESS NOTES
ANTICOAGULATION CLINIC REFERRAL RENEWAL REQUEST       An annual renewal order is required for all patients referred to [facility].?  Please review and sign the pended referral order for Miesha Quarles.       ANTICOAGULATION SUMMARY      Warfarin indication(s)   Atrial Fibrillation    Mechanical heart valve present?  NO       Current goal range   INR: 2.0-3.0     Goal appropriate for indication? Goal INR 2-3, standard for indication(s) above     Time in Therapeutic Range (TTR)  (Goal > 60%) 48.7%       Office visit with referring provider's group within last year yes on 1/31/23  Currently at [facility]        Michaelle Wilson RN  [facility]

## 2023-04-27 NOTE — TELEPHONE ENCOUNTER
[facility] Geriatrics Triage Nurse INR     Provider: LARRY Melvin  Facility: [facility]   Facility Type:  [facility]    Caller: Monty  Call Back Number: [phone]  Reason for call: INR  Diagnosis/Goal: A. Fib    Todays INR: 2.45  Last INR 4/17 INR: 2.74 - 2 mg M/W/F and 1 mg AOD    Heparin/Lovenox:  No  Currently on ABX?: No  Other interacting medication:  None  Missed or refused doses: No    Verbal Order/Direction given by Provider: Coumadin 2 mg PO M/W/F and 1 mg PO AOD. Recheck INR on 5/25/23.    Provider Giving Order:  LARRY Melvin    Verbal Order given to: Monty Mae RN

## 2023-04-27 NOTE — PROGRESS NOTES
ANTICOAGULATION  MANAGEMENT    Miesha Quarles is being discharged from the [facility] Anticoagulation Management Program ([facility]).    Reason for discharge: care has been transferred to Geriatric care at [facility]. Festus Glez NP    Anticoagulation episode resolved and Standing order discontinued    If patient needs warfarin management in the future, please send a new referral    Michaelle Wilson RN

## 2023-05-02 NOTE — TELEPHONE ENCOUNTER
[facility] Triage Nurse Telephone Encounter    Provider: LARRY Melvin  Facility: [facility]  Facility Type:  [facility]    Caller: Marleni  Call Back Number: [phone]    Allergies:    Allergies   Allergen Reactions     Amoxicillin Hives     Cephalexin Other (See Comments)     Reaction with intervenous administration.     Penicillins Hives     Latex Rash     Other Environmental Allergy Other (See Comments)     Pollen causes seasonal allergies.        Reason for call: Nurse is calling to update about BP's running elevated.  Notable meds:  Metoprolol ER 100mg daily, Ritalin 2.5mg daily.  Nurse also reports that patient has had episodes of SOB about every 1-2 weeks.  During these episodes, patient's O2 sats remain stable and they don't last long.  See BP readings below:        Pulses:            Verbal Order/Direction given by Provider: Check manual BP BID x 1 week.  Update with BP's on 5/4/23.  Discontinue Ritalin.      Provider giving Order:  LARRY Melvin    Verbal Order given to: Marleni Wynne RN

## 2023-05-04 NOTE — PROGRESS NOTES
[facility] GERIATRIC SERVICES    Facility:   [facility] () [[id number]]   Code Status: DNR/DNI      CHIEF COMPLAINT/REASON FOR VISIT:  Chief Complaint   Patient presents with     RECHECK       HISTORY:      HPI: Miesha is a 89 year old female Who I was asked to visit with today secondary to hypertension.  My triage staff did get a phone call the other day and stated that her blood pressures were up in the 140-160 range looking through the vital sign chart on May 2 158-161 on May 1 153 systolically now asking them to do a manual cuff the blood pressure on May 3 range 117-147 she otherwise has been asymptomatic and diastolic in the mid 70s she has been afebrile and also on room air with a stable weight of 160 pounds.  She is on metoprolol  mg daily.  She does not have any lower extremity edema.  See results below over most recent laboratory studies.  Very delightful lady.  She states that she is doing fine is asymptomatic-to the charge nurse I think at this point manual blood pressures are not the answer and they will keep me updated if it does seem to increase we could always add an ARB to her medication list if we need to the right now we will keep her medications the same.  Otherwise she states her appetite to be good.  Denies any discomfort.  Does seem to be in good spirits this morning.    Past Medical History:   Diagnosis Date     Atrial flutter (H)      Chronic kidney disease      Congestive heart failure (H)      Disorder of bone and cartilage, unspecified     Created by Conversion      Essential hypertension, benign     Created by Conversion      Osteoarthrosis, unspecified whether generalized or localized, lower leg     Created by Conversion      Pure hypercholesterolemia     Created by Conversion      Type II or unspecified type diabetes mellitus without mention of complication, not stated as uncontrolled     Created by Conversion      Unspecified hypothyroidism     Created by Conversion       Unspecified vitamin D deficiency     Created by Conversion             Family History   Problem Relation Age of Onset     Coronary Artery Disease Brother       Social History     Socioeconomic History     Marital status:    Tobacco Use     Smoking status: Former     Types: Cigarettes     Quit date: 1991     Years since quittin.3     Smokeless tobacco: Never   Substance and Sexual Activity     Alcohol use: Yes     Social Determinants of Health     Financial Resource Strain: Medium Risk (2022)    Overall Financial Resource Strain (CARDIA)      Difficulty of Paying Living Expenses: Somewhat hard   Food Insecurity: No Food Insecurity (2022)    Hunger Vital Sign      Worried About Running Out of Food in the Last Year: Never true      Ran Out of Food in the Last Year: Never true   Transportation Needs: No Transportation Needs (2022)    PRAPARE - Transportation      Lack of Transportation (Medical): No      Lack of Transportation (Non-Medical): No   Housing Stability: Low Risk  (2022)    Housing Stability Vital Sign      Unable to Pay for Housing in the Last Year: No      Number of Places Lived in the Last Year: 1      Unstable Housing in the Last Year: No      No new changes to the review of systems or physical exam since our last visit on  however has had some hypertension  REVIEW OF SYSTEM:  Currently she denies any new symptoms of fever cough or cold sore throat postnasal drip flulike symptoms headache shortness of breath dyspnea wheezing chest pain dizziness vertigo nausea vomiting diarrhea dysuria frequency urgency or unusual myalgias or arthralgias       PHYSICAL EXAM:   Pleasant female in no acute distress.  Head is normocephalic Lung sounds are clear throughout.  Cardiovascular S1-S2 and no lower extremity edema.  Gastrointestinal is protuberant nontender nondistended.  Musculoskeletal -independent..  Psychiatric: Pleasant affect.      Current Outpatient  Medications:      acetaminophen (TYLENOL) 500 MG tablet, Take 1,000 mg by mouth 3 times daily, Disp: , Rfl:      diclofenac (VOLTAREN) 1 % topical gel, Apply 4 g topically 2 times daily To low back, Disp: , Rfl:      guaiFENesin (ROBITUSSIN) 20 mg/mL liquid, Take 10 mLs by mouth every 4 hours as needed for cough, Disp: , Rfl:      levothyroxine (SYNTHROID/LEVOTHROID) 75 MCG tablet, Take 75 mcg by mouth daily, Disp: , Rfl:      melatonin 3 MG tablet, Take 3 mg by mouth At Bedtime, Disp: , Rfl:      metoprolol succinate ER (TOPROL XL) 100 MG 24 hr tablet, TAKE 1 TABLET BY MOUTH EVERY DAY, Disp: 90 tablet, Rfl: 3     potassium chloride ER (KLOR-CON M) 20 MEQ CR tablet, Take 20 mEq by mouth daily, Disp: , Rfl:      simvastatin (ZOCOR) 20 MG tablet, TAKE 1/2 TABLET BY MOUTH DAILY IN THE EVENING., Disp: 45 tablet, Rfl: 3     WARFARIN SODIUM PO, 3/30/23 INR 1.86  Cont 2.5mg M-W-F and 1mg AOD.  Next INR 4/5/23.   3/23/23 INR 1.70  Take 2.5mg M-W-F and 1mg AOD.  Next INR 3/30/23. 3/13/23 INR 2.65  Cont 2.5mg Mon and Thurs and 1mg AOD.  Next INR 3/23/23.  , Disp: , Rfl:        There were no vitals taken for this visit.    LABS:   Vital signs on May 3 blood pressure 117/76 temperature 97.6 respirations 20 her weight on May 1 160 pounds    Current Outpatient Medications:      acetaminophen (TYLENOL) 500 MG tablet, Take 1,000 mg by mouth 3 times daily, Disp: , Rfl:      diclofenac (VOLTAREN) 1 % topical gel, Apply 4 g topically 2 times daily To low back, Disp: , Rfl:      guaiFENesin (ROBITUSSIN) 20 mg/mL liquid, Take 10 mLs by mouth every 4 hours as needed for cough, Disp: , Rfl:      levothyroxine (SYNTHROID/LEVOTHROID) 75 MCG tablet, Take 75 mcg by mouth daily, Disp: , Rfl:      melatonin 3 MG tablet, Take 3 mg by mouth At Bedtime, Disp: , Rfl:      metoprolol succinate ER (TOPROL XL) 100 MG 24 hr tablet, TAKE 1 TABLET BY MOUTH EVERY DAY, Disp: 90 tablet, Rfl: 3     potassium chloride ER (KLOR-CON M) 20 MEQ CR tablet, Take 20  mEq by mouth daily, Disp: , Rfl:      simvastatin (ZOCOR) 20 MG tablet, TAKE 1/2 TABLET BY MOUTH DAILY IN THE EVENING., Disp: 45 tablet, Rfl: 3     WARFARIN SODIUM PO, 3/30/23 INR 1.86  Cont 2.5mg M-W-F and 1mg AOD.  Next INR 4/5/23.   3/23/23 INR 1.70  Take 2.5mg M-W-F and 1mg AOD.  Next INR 3/30/23. 3/13/23 INR 2.65  Cont 2.5mg Mon and Thurs and 1mg AOD.  Next INR 3/23/23.  , Disp: , Rfl:         ASSESSMENT:    Encounter Diagnoses   Name Primary?     Benign essential hypertension Yes     Chronic heart failure with preserved ejection fraction (H)      Persistent atrial fibrillation (H)      History of falling        PLAN:    Keep a close eye on the blood pressures.  If it does still remain elevated we can always add an an ARB like losartan 25 mg daily.  She seems to be doing fine did not have any other questions or concerns.        Electronically signed by: Festus Glez NP

## 2023-05-08 NOTE — LETTER
5/8/2023    Festus Glez, NP  [address] [facility] [address]    RE: Miesha Quarles       Dear Colleague,     I had the pleasure of seeing Miesha Quarles in the [facility].    HEART CARE NOTE          Assessment/Recommendations     1. HFpEF c/b ADHF  Assessment / Plan  Hypervolemic on physical exam; endorses orthopnea and LE edema; weight up apporximatley 8 lbs since discharge; currently not on loop diuretic - unclear when held; will start furosemide 20 mg daily    [facility] clinic will call facility Thurday to follow-up      2. Atrial fibrillation  Assessment / Plan  Rate controlled; continue metoprolol   Warfarin per pharmacy management      3. MAXIM on CKD  Assessment / Plan  Renal function stable on recent lab work     4. HLP  Assessment / Plan  Continue statin     5. HTN  Assessment / Plan  Adequately controlled on current regimen - no changes at this time    History of Present Illness/Subjective      Ms. Miesha Quarles is a 89 year old female with a PMHx significant for (per Dr. Acuna's note) diastolic CHF, atrial flutter on coumadin, hypertension, hyperlipidemia and hypothyroidism who presents to [facility] for follow-up evaluation.     Today, Mrs. Quarles is hypervolemic on physical exam and endorses weight gain and orthopnea; Management plan as detailed above     ECG: Personally reviewed. atrial fibrillation, rate controlled.     ECHO (personnaly Reviewed):  1.Left ventricular size, wall motion and function are normal. The ejection  fraction is 55-60%.  2.The right ventricle is moderately dilated. Mildly decreased right  ventricular systolic function.  3.Significant biatrial chamber enlargement.  4.There is moderately severe (3+) tricuspid regurgitation. ERO is 0.28 cmÂ   with a volume of 32 cc..  5.IVC diameter >2.1 cm collapsing <50% with sniff suggests a high RA pressure  estimated at 15 mmHg or greater.  6.Right ventricular systolic pressure is elevated, consistent  with mild to  moderate pulmonary hypertension.  Compared to the prior study dated 5/18/2022, the RV is larger and the TR is  more.          Physical Examination Review of Systems   /62 (BP Location: Right arm, Patient Position: Sitting, Cuff Size: Adult Regular)   Pulse 96   Resp 18   There is no height or weight on file to calculate BMI.  Wt Readings from Last 3 Encounters:   04/25/23 72.2 kg (159 lb 3.2 oz)   04/18/23 73.2 kg (161 lb 6.4 oz)   04/13/23 73.6 kg (162 lb 3.2 oz)     General Appearance:   no distress, normal body habitus   ENT/Mouth: membranes moist, no oral lesions or bleeding gums.      EYES:  no scleral icterus, normal conjunctivae   Neck: no carotid bruits or thyromegaly   Chest/Lungs:   lungs are clear to auscultation, no rales or wheezing, equal chest wall expansion    Cardiovascular:   Regular. Normal first and second heart sounds with no murmurs, rubs, or gallops; the carotid, radial and posterior tibial pulses are intact, + JVD and LE edema bilaterally    Abdomen:  no organomegaly, masses, bruits, or tenderness; bowel sounds are present   Extremities: no cyanosis or clubbing   Skin: no xanthelasma, warm.    Neurologic: alert and calm     Psychiatric: alert and calm     A complete 10 systems ROS was reviewed  And is negative except what is listed in the HPI.          Medical History  Surgical History Family History Social History   Past Medical History:   Diagnosis Date    Atrial flutter (H)     Chronic kidney disease     Congestive heart failure (H)     Disorder of bone and cartilage, unspecified     Created by Conversion     Essential hypertension, benign     Created by Conversion     Osteoarthrosis, unspecified whether generalized or localized, lower leg     Created by Conversion     Pure hypercholesterolemia     Created by Conversion     Type II or unspecified type diabetes mellitus without mention of complication, not stated as uncontrolled     Created by Conversion      Unspecified hypothyroidism     Created by Conversion     Unspecified vitamin D deficiency     Created by Conversion     Past Surgical History:   Procedure Laterality Date    HYSTERECTOMY TOTAL ABDOMINAL, BILATERAL SALPINGO-OOPHORECTOMY, COMBINED      no family history of premature coronary artery disease Social History     Socioeconomic History    Marital status:      Spouse name: Not on file    Number of children: Not on file    Years of education: Not on file    Highest education level: Not on file   Occupational History    Not on file   Tobacco Use    Smoking status: Former     Types: Cigarettes     Quit date: 1991     Years since quittin.3    Smokeless tobacco: Never   Vaping Use    Vaping status: Not on file   Substance and Sexual Activity    Alcohol use: Yes    Drug use: Not on file    Sexual activity: Not on file   Other Topics Concern    Not on file   Social History Narrative    Not on file     Social Determinants of Health     Financial Resource Strain: Medium Risk (2022)    Overall Financial Resource Strain (CARDIA)     Difficulty of Paying Living Expenses: Somewhat hard   Food Insecurity: No Food Insecurity (2022)    Hunger Vital Sign     Worried About Running Out of Food in the Last Year: Never true     Ran Out of Food in the Last Year: Never true   Transportation Needs: No Transportation Needs (2022)    PRAPARE - Transportation     Lack of Transportation (Medical): No     Lack of Transportation (Non-Medical): No   Physical Activity: Not on file   Stress: Not on file   Social Connections: Not on file   Intimate Partner Violence: Not on file   Housing Stability: Low Risk  (2022)    Housing Stability Vital Sign     Unable to Pay for Housing in the Last Year: No     Number of Places Lived in the Last Year: 1     Unstable Housing in the Last Year: No           Lab Results    Chemistry/lipid CBC Cardiac Enzymes/BNP/TSH/INR   Lab Results   Component Value Date    CHOL 156  03/15/2023    HDL 53 03/15/2023    TRIG 104 03/15/2023    BUN 24.1 (H) 02/23/2023     (L) 02/23/2023    CO2 25 02/23/2023    Lab Results   Component Value Date    WBC 6.4 02/17/2023    HGB 14.1 02/17/2023    HCT 44.7 02/17/2023    MCV 99 02/17/2023     02/17/2023    Lab Results   Component Value Date    TSH 4.22 (H) 04/26/2023    INR 2.45 (H) 04/27/2023     No results found for: CKTOTAL, CKMB, TROPONINI       Weight:    Wt Readings from Last 3 Encounters:   04/25/23 72.2 kg (159 lb 3.2 oz)   04/18/23 73.2 kg (161 lb 6.4 oz)   04/13/23 73.6 kg (162 lb 3.2 oz)       Allergies  Allergies   Allergen Reactions    Amoxicillin Hives    Cephalexin Other (See Comments)     Reaction with intervenous administration.    Penicillins Hives    Latex Rash    Other Environmental Allergy Other (See Comments)     Pollen causes seasonal allergies.         Surgical History  Past Surgical History:   Procedure Laterality Date    HYSTERECTOMY TOTAL ABDOMINAL, BILATERAL SALPINGO-OOPHORECTOMY, COMBINED         Social History  Tobacco:   History   Smoking Status    Former    Types: Cigarettes    Quit date: 1/1/1991   Smokeless Tobacco    Never    Alcohol:   Social History    Substance and Sexual Activity      Alcohol use: Yes   Illicit Drugs:   History   Drug Use Not on file       Family History  Family History   Problem Relation Age of Onset    Coronary Artery Disease Brother           Parth Hunter MD on 5/8/2023      cc: Festus Glez        Thank you for allowing me to participate in the care of your patient.      Sincerely,     Parth Hunter MD     [facility]  cc:   Ramandeep Ramos, APRN CNP  [address] [facility], [address]  [place] [address]

## 2023-05-08 NOTE — PROGRESS NOTES
HEART CARE NOTE          Assessment/Recommendations     1. HFpEF c/b ADHF  Assessment / Plan    Hypervolemic on physical exam; endorses orthopnea and LE edema; weight up apporximatley 8 lbs since discharge; currently not on loop diuretic - unclear when held; will start furosemide 20 mg daily      [facility] clinic will call facility Thurday to follow-up      2. Atrial fibrillation  Assessment / Plan    Rate controlled; continue metoprolol     Warfarin per pharmacy management      3. MAXIM on CKD  Assessment / Plan    Renal function stable on recent lab work     4. HLP  Assessment / Plan    Continue statin     5. HTN  Assessment / Plan    Adequately controlled on current regimen - no changes at this time    History of Present Illness/Subjective      Ms. Miesha Quarles is a 89 year old female with a PMHx significant for (per Dr. Acuna's note) diastolic CHF, atrial flutter on coumadin, hypertension, hyperlipidemia and hypothyroidism who presents to [facility] for follow-up evaluation.     Today, Mrs. Quarles is hypervolemic on physical exam and endorses weight gain and orthopnea; Management plan as detailed above     ECG: Personally reviewed. atrial fibrillation, rate controlled.     ECHO (personnaly Reviewed):  1.Left ventricular size, wall motion and function are normal. The ejection  fraction is 55-60%.  2.The right ventricle is moderately dilated. Mildly decreased right  ventricular systolic function.  3.Significant biatrial chamber enlargement.  4.There is moderately severe (3+) tricuspid regurgitation. ERO is 0.28 cmÂ   with a volume of 32 cc..  5.IVC diameter >2.1 cm collapsing <50% with sniff suggests a high RA pressure  estimated at 15 mmHg or greater.  6.Right ventricular systolic pressure is elevated, consistent with mild to  moderate pulmonary hypertension.  Compared to the prior study dated 5/18/2022, the RV is larger and the TR is  more.          Physical Examination Review of Systems   /62 (BP  Location: Right arm, Patient Position: Sitting, Cuff Size: Adult Regular)   Pulse 96   Resp 18   There is no height or weight on file to calculate BMI.  Wt Readings from Last 3 Encounters:   04/25/23 72.2 kg (159 lb 3.2 oz)   04/18/23 73.2 kg (161 lb 6.4 oz)   04/13/23 73.6 kg (162 lb 3.2 oz)     General Appearance:   no distress, normal body habitus   ENT/Mouth: membranes moist, no oral lesions or bleeding gums.      EYES:  no scleral icterus, normal conjunctivae   Neck: no carotid bruits or thyromegaly   Chest/Lungs:   lungs are clear to auscultation, no rales or wheezing, equal chest wall expansion    Cardiovascular:   Regular. Normal first and second heart sounds with no murmurs, rubs, or gallops; the carotid, radial and posterior tibial pulses are intact, + JVD and LE edema bilaterally    Abdomen:  no organomegaly, masses, bruits, or tenderness; bowel sounds are present   Extremities: no cyanosis or clubbing   Skin: no xanthelasma, warm.    Neurologic: alert and calm     Psychiatric: alert and calm     A complete 10 systems ROS was reviewed  And is negative except what is listed in the HPI.          Medical History  Surgical History Family History Social History   Past Medical History:   Diagnosis Date     Atrial flutter (H)      Chronic kidney disease      Congestive heart failure (H)      Disorder of bone and cartilage, unspecified     Created by Conversion      Essential hypertension, benign     Created by Conversion      Osteoarthrosis, unspecified whether generalized or localized, lower leg     Created by Conversion      Pure hypercholesterolemia     Created by Conversion      Type II or unspecified type diabetes mellitus without mention of complication, not stated as uncontrolled     Created by Conversion      Unspecified hypothyroidism     Created by Conversion      Unspecified vitamin D deficiency     Created by Conversion     Past Surgical History:   Procedure Laterality Date     HYSTERECTOMY TOTAL  ABDOMINAL, BILATERAL SALPINGO-OOPHORECTOMY, COMBINED      no family history of premature coronary artery disease Social History     Socioeconomic History     Marital status:      Spouse name: Not on file     Number of children: Not on file     Years of education: Not on file     Highest education level: Not on file   Occupational History     Not on file   Tobacco Use     Smoking status: Former     Types: Cigarettes     Quit date: 1991     Years since quittin.3     Smokeless tobacco: Never   Vaping Use     Vaping status: Not on file   Substance and Sexual Activity     Alcohol use: Yes     Drug use: Not on file     Sexual activity: Not on file   Other Topics Concern     Not on file   Social History Narrative     Not on file     Social Determinants of Health     Financial Resource Strain: Medium Risk (2022)    Overall Financial Resource Strain (CARDIA)      Difficulty of Paying Living Expenses: Somewhat hard   Food Insecurity: No Food Insecurity (2022)    Hunger Vital Sign      Worried About Running Out of Food in the Last Year: Never true      Ran Out of Food in the Last Year: Never true   Transportation Needs: No Transportation Needs (2022)    PRAPARE - Transportation      Lack of Transportation (Medical): No      Lack of Transportation (Non-Medical): No   Physical Activity: Not on file   Stress: Not on file   Social Connections: Not on file   Intimate Partner Violence: Not on file   Housing Stability: Low Risk  (2022)    Housing Stability Vital Sign      Unable to Pay for Housing in the Last Year: No      Number of Places Lived in the Last Year: 1      Unstable Housing in the Last Year: No           Lab Results    Chemistry/lipid CBC Cardiac Enzymes/BNP/TSH/INR   Lab Results   Component Value Date    CHOL 156 03/15/2023    HDL 53 03/15/2023    TRIG 104 03/15/2023    BUN 24.1 (H) 2023     (L) 2023    CO2 25 2023    Lab Results   Component Value Date    WBC  6.4 02/17/2023    HGB 14.1 02/17/2023    HCT 44.7 02/17/2023    MCV 99 02/17/2023     02/17/2023    Lab Results   Component Value Date    TSH 4.22 (H) 04/26/2023    INR 2.45 (H) 04/27/2023     No results found for: CKTOTAL, CKMB, TROPONINI       Weight:    Wt Readings from Last 3 Encounters:   04/25/23 72.2 kg (159 lb 3.2 oz)   04/18/23 73.2 kg (161 lb 6.4 oz)   04/13/23 73.6 kg (162 lb 3.2 oz)       Allergies  Allergies   Allergen Reactions     Amoxicillin Hives     Cephalexin Other (See Comments)     Reaction with intervenous administration.     Penicillins Hives     Latex Rash     Other Environmental Allergy Other (See Comments)     Pollen causes seasonal allergies.         Surgical History  Past Surgical History:   Procedure Laterality Date     HYSTERECTOMY TOTAL ABDOMINAL, BILATERAL SALPINGO-OOPHORECTOMY, COMBINED         Social History  Tobacco:   History   Smoking Status     Former     Types: Cigarettes     Quit date: 1/1/1991   Smokeless Tobacco     Never    Alcohol:   Social History    Substance and Sexual Activity      Alcohol use: Yes   Illicit Drugs:   History   Drug Use Not on file       Family History  Family History   Problem Relation Age of Onset     Coronary Artery Disease Brother           Parth Hunter MD on 5/8/2023      cc: Festus Glez

## 2023-05-09 NOTE — TELEPHONE ENCOUNTER
Eli Bean RN at [facility] to confirm that Furosemide 20mg daily dose was started 8AM daily. ( no end date) This was at the direction of Dr. Hunter at patient's clinic visit yesterday.   Sobeida CRUZ RN BSN, CHFN, PCCN-K    FYI to Dr. Hunter

## 2023-05-10 NOTE — TELEPHONE ENCOUNTER
5/10 lm on vm to call back and reschedule 5/18 20min appt to a 40min (can be scheduled in 'approval required' slot).

## 2023-05-10 NOTE — TELEPHONE ENCOUNTER
Mariluz is contacted to discuss plan of care for diuretic management she shared that she had discussed with the nurse at the facility to make sure the changes were in progress. They have made these changes.  Sobeida CRUZ RN BSN, CHFN, PCCN-K

## 2023-05-10 NOTE — TELEPHONE ENCOUNTER
"----- Message from Roc Mares MD sent at 5/10/2023 10:10 AM CDT -----  Patient is scheduled into my schedule next week for a 20-minute visit for \"medication follow-up\".  I have not seen her in some months because she has been in the hospital and nursing home.  She will need to be rescheduled into a 40-minute hospital follow-up visit if she is now out of the nursing home.  Thanks.  You may use an approval required 40-minute block if no hosp follow ups available.      "

## 2023-05-10 NOTE — TELEPHONE ENCOUNTER
Mariluz states she is returning a phone call, please call her back.  She will be available between 12:30 and 1:00.   Mariluz also wanted to let you know that Miesha gets her medications through [employer].

## 2023-05-12 NOTE — LETTER
2023        RE: Miesha Quarles  [address] [place] [address]        [facility]  Episodic Visit   May 12, 2023      Chief Complaint   Patient presents with     Nursing Home Acute       HPI:    Miesha Quarles is a 89 year old  (1928), who is being seen today for an episodic care visit at [facility]. Her niece has some questions and concerns about her medication list and overall care plan. I visited with Ms Quarles in her room. She'd been having some loose stools that correlated with starting sertraline - I discontinued the sertraline yesterday as she was on a low 25 mg dose.     I spoke with her niece, Mariluz, via phone.   **Why was she not on a water pill? Cardiology visit on  and she was started on a low dose of furosemide. Reviewed the bumetanide order from the TCU was for 30 days so it . Both PCP and I had seen her in interim and were monitoring clinical volume status. Her weights were up when I saw her in April, but no edema. We reviewed signs/symptoms of CHF and edema - edema can be both dependent and from CHF.    **Discussed starting compression socks   **Discussed getting a BMP with next INR given    new on diuretic  **Sertraline - concern that Mariluz was not asked about this given Miesha's dementia. I shared that I too would have expected Dr Saavedra to discussed this with her given it's a more complex decision to make. I stopped the sertraline yesterday due to concern it was causing loose stools.   **Narcolepsy - she has a referral in for sleep medicine for next month. Discussed would keep that if concern for narcolepsy and that we should stop the melatonin and do a sleep log so there is some data ahead of that visit    ALLERGIES: Amoxicillin, Cephalexin, Penicillins, Latex, and Other environmental allergy    Past Medical, Surgical, Family and Social History reviewed and updated in EPIC.    MEDICATIONS  Current Outpatient Medications  "  Medication Sig Dispense Refill     acetaminophen (TYLENOL) 500 MG tablet Take 1,000 mg by mouth 3 times daily       diclofenac (VOLTAREN) 1 % topical gel Apply 4 g topically 2 times daily To low back       furosemide (LASIX) 20 MG tablet Take 1 tablet (20 mg) by mouth daily 90 tablet 3     levothyroxine (SYNTHROID/LEVOTHROID) 75 MCG tablet Take 75 mcg by mouth daily       metoprolol succinate ER (TOPROL XL) 100 MG 24 hr tablet TAKE 1 TABLET BY MOUTH EVERY DAY 90 tablet 3     nystatin (MYCOSTATIN) 703597 UNIT/GM external powder        potassium chloride ER (KLOR-CON M) 20 MEQ CR tablet Take 20 mEq by mouth daily       WARFARIN SODIUM PO 3/30/23 INR 1.86  Cont 2.5mg M-W-F and 1mg AOD.  Next INR 4/5/23.   3/23/23 INR 1.70  Take 2.5mg M-W-F and 1mg AOD.  Next INR 3/30/23. 3/13/23 INR 2.65  Cont 2.5mg Mon and Thurs and 1mg AOD.  Next INR 3/23/23.         Zinc Oxide 25 % PSTE Externally apply topically 2 times daily       Medications reviewed:  Medications reconciled to facility chart and changes were made to reflect current medications as identified as above med list. Below are the changes that were made:   Medications stopped since last EPIC medication reconciliation:   Medications Discontinued During This Encounter   Medication Reason     melatonin 3 MG tablet Med Rec(No AVS / No eCancel)     simvastatin (ZOCOR) 20 MG tablet Med Rec(No AVS / No eCancel)     sertraline (ZOLOFT) 25 MG tablet Med Rec(No AVS / No eCancel)     Medications started since last [facility] medication reconciliation:  No orders of the defined types were placed in this encounter.      REVIEW OF SYSTEMS:  Unable to be obtained due to cognitive impairment. BIMS 12/15.     PHYSICAL EXAM:  BP (!) 158/95   Pulse 81   Temp 98  F (36.7  C)   Resp 20   Ht 1.575 m (5' 2\")   Wt 71.3 kg (157 lb 3.2 oz)   SpO2 96%   BMI 28.75 kg/m    Gen: laying in bed, alert, cooperative and in no acute distress  Resp: breathing non labored, no tachypnea  Ext: mild pedal " edema  Neuro: CX II-XII grossly in tact; ROM in all four extremities grossly in tact  Psych: memory, judgement and insight impaired    LABS & IMAGING  Recent Labs and Imaging:  Reviewed as per Epic    ASSESSMENT/PLAN:    HFpEF, HTN  Dependent LE Edema  SBPs 120s-150s, most <150. HR 90s-110s. Weights are labile - appears to be about 5 lbs up from admission weight in February. Cardiology added low dose furosemide on 5/8  -- Weights MWF - update if >3 lbs from prior  -- BMP on 5/25 with INR  -- Compression socks (tubigrips or TEDS on qam and off at bedtime)  -- continues on the furosemide 20 mg daily and metoprolol  mg daily    Narcolepsy/Cataplexy  Unresponsive episode landed her in ER in Feb. New on methylphenidate at TCU prior to LTC - no longer on this.   -- discontinue melatonin   -- Sleep log x1 week  -- keep appt with sleep medicine on 6/22/23    Dementia Without Behavioral Disturbance  BIMS 12/15. SLUMS 10/30.   -- she is able to make her needs known but I do not think she has capacity to make complex health or financial related decisions. That said she should be included as part of the decision making and would value her input.   -- order to stop sertraline given yesterday by me  -- could consider a CPT to get better cog eval  -- has follow up with [facility]/Dr Saavedra  -- ongoing 24/7 nursing and supportive cares      Electronically signed by  Savanah Lowry MD                        Sincerely,        Savanah Lowry MD

## 2023-05-12 NOTE — PROGRESS NOTES
[facility] GERIATRICS  Episodic Visit   May 12, 2023      Chief Complaint   Patient presents with     Nursing Home Acute       HPI:    Miesha Quarles is a 89 year old  (1928), who is being seen today for an episodic care visit at [facility]. Her niece has some questions and concerns about her medication list and overall care plan. I visited with Ms Quarles in her room. She'd been having some loose stools that correlated with starting sertraline - I discontinued the sertraline yesterday as she was on a low 25 mg dose.     I spoke with her niece, Mariluz, via phone.   **Why was she not on a water pill? Cardiology visit on  and she was started on a low dose of furosemide. Reviewed the bumetanide order from the TCU was for 30 days so it . Both PCP and I had seen her in interim and were monitoring clinical volume status. Her weights were up when I saw her in April, but no edema. We reviewed signs/symptoms of CHF and edema - edema can be both dependent and from CHF.    **Discussed starting compression socks   **Discussed getting a BMP with next INR given    new on diuretic  **Sertraline - concern that Mairluz was not asked about this given Miesha's dementia. I shared that I too would have expected Dr Saavedra to discussed this with her given it's a more complex decision to make. I stopped the sertraline yesterday due to concern it was causing loose stools.   **Narcolepsy - she has a referral in for sleep medicine for next month. Discussed would keep that if concern for narcolepsy and that we should stop the melatonin and do a sleep log so there is some data ahead of that visit    ALLERGIES: Amoxicillin, Cephalexin, Penicillins, Latex, and Other environmental allergy    Past Medical, Surgical, Family and Social History reviewed and updated in EPIC.    MEDICATIONS  Current Outpatient Medications   Medication Sig Dispense Refill     acetaminophen (TYLENOL) 500 MG tablet Take 1,000 mg by  "mouth 3 times daily       diclofenac (VOLTAREN) 1 % topical gel Apply 4 g topically 2 times daily To low back       furosemide (LASIX) 20 MG tablet Take 1 tablet (20 mg) by mouth daily 90 tablet 3     levothyroxine (SYNTHROID/LEVOTHROID) 75 MCG tablet Take 75 mcg by mouth daily       metoprolol succinate ER (TOPROL XL) 100 MG 24 hr tablet TAKE 1 TABLET BY MOUTH EVERY DAY 90 tablet 3     nystatin (MYCOSTATIN) 625456 UNIT/GM external powder        potassium chloride ER (KLOR-CON M) 20 MEQ CR tablet Take 20 mEq by mouth daily       WARFARIN SODIUM PO 3/30/23 INR 1.86  Cont 2.5mg M-W-F and 1mg AOD.  Next INR 4/5/23.   3/23/23 INR 1.70  Take 2.5mg M-W-F and 1mg AOD.  Next INR 3/30/23. 3/13/23 INR 2.65  Cont 2.5mg Mon and Thurs and 1mg AOD.  Next INR 3/23/23.         Zinc Oxide 25 % PSTE Externally apply topically 2 times daily       Medications reviewed:  Medications reconciled to facility chart and changes were made to reflect current medications as identified as above med list. Below are the changes that were made:   Medications stopped since last EPIC medication reconciliation:   Medications Discontinued During This Encounter   Medication Reason     melatonin 3 MG tablet Med Rec(No AVS / No eCancel)     simvastatin (ZOCOR) 20 MG tablet Med Rec(No AVS / No eCancel)     sertraline (ZOLOFT) 25 MG tablet Med Rec(No AVS / No eCancel)     Medications started since last [facility] medication reconciliation:  No orders of the defined types were placed in this encounter.      REVIEW OF SYSTEMS:  Unable to be obtained due to cognitive impairment. BIMS 12/15.     PHYSICAL EXAM:  BP (!) 158/95   Pulse 81   Temp 98  F (36.7  C)   Resp 20   Ht 1.575 m (5' 2\")   Wt 71.3 kg (157 lb 3.2 oz)   SpO2 96%   BMI 28.75 kg/m    Gen: laying in bed, alert, cooperative and in no acute distress  Resp: breathing non labored, no tachypnea  Ext: mild pedal edema  Neuro: CX II-XII grossly in tact; ROM in all four extremities grossly in tact  Psych: " memory, judgement and insight impaired    LABS & IMAGING  Recent Labs and Imaging:  Reviewed as per Epic    ASSESSMENT/PLAN:    HFpEF, HTN  Dependent LE Edema  SBPs 120s-150s, most <150. HR 90s-110s. Weights are labile - appears to be about 5 lbs up from admission weight in February. Cardiology added low dose furosemide on 5/8  -- Weights MWF - update if >3 lbs from prior  -- BMP on 5/25 with INR  -- Compression socks (tubigrips or TEDS on qam and off at bedtime)  -- continues on the furosemide 20 mg daily and metoprolol  mg daily    Narcolepsy/Cataplexy  Unresponsive episode landed her in ER in Feb. New on methylphenidate at TCU prior to LTC - no longer on this.   -- discontinue melatonin   -- Sleep log x1 week  -- keep appt with sleep medicine on 6/22/23    Dementia Without Behavioral Disturbance  BIMS 12/15. SLUMS 10/30.   -- she is able to make her needs known but I do not think she has capacity to make complex health or financial related decisions. That said she should be included as part of the decision making and would value her input.   -- order to stop sertraline given yesterday by me  -- could consider a CPT to get better cog eval  -- has follow up with ACP/Dr Saavedra  -- ongoing 24/7 nursing and supportive cares      Electronically signed by  Savanah Lowry MD

## 2023-05-15 NOTE — TELEPHONE ENCOUNTER
5/15 lm on pt's niece, Mariluz's, vm (consent to comm) asking to call back and reschedule p's appt on 5/18, since it is scheduled for only 20 mins and Dr Mares would like to see her for a 40min hosp f/u instead. Second time calling, so informed that we would be cancelling original appt and gave two available times for scheduling:  APPROVAL TIME SLOTS  5/16 @ 4:20PM (4:00 arrival) or  5/22 @ 4:00PM (3:40 arrival).

## 2023-05-25 NOTE — TELEPHONE ENCOUNTER
[facility] Geriatrics Triage Nurse INR     Provider: LARRY Melvin  Facility: [facility]   Facility Type:  [facility]    Caller: Lamont  Call Back Number: [phone]  Reason for call: INR  Diagnosis/Goal: A. Fib    Todays INR: 2.61  Last INR 2.45 on 4/27 - 2 mg M/W/F and 1 mg AOD    Heparin/Lovenox:  No  Currently on ABX?: No  Other interacting medication:  None  Missed or refused doses: No    Verbal Order/Direction given by Provider:   1) Coumadin 2 mg PO M/W/F and 1 mg PO AOD. Recheck INR on 6/14/23.  2) Hold Potassium  3) Kayexalate 15 grams PO stat  4) Give additional Kayexalate 15 grams PO 4 hours after first dose  5) Check Potassium on 5/26/23    Provider Giving Order:  LARRY Melvin    Verbal Order given to: Lamont Mae RN

## 2023-05-30 NOTE — TELEPHONE ENCOUNTER
[facility] Geriatrics Lab Note     Provider: LARRY Melvin  Facility: [facility]  Facility Type:  LTC    Allergies   Allergen Reactions     Amoxicillin Hives     Cephalexin Other (See Comments)     Reaction with intervenous administration.     Penicillins Hives     Latex Rash     Other Environmental Allergy Other (See Comments)     Pollen causes seasonal allergies.       Labs Reviewed by provider: BMP - has been having a slow increase in weight gain - 5/24 159.2#, 5/26  163.2#, 5/29  163.6#. BLE and feet edema 2+, along with increased facial swelling. C/o increased SOB the last couple days, nurse had a difficulty trying to listen to lung sounds.   BP  134/86, HR  101,  RR 22,  O2 sat 100% and Temp 98.7    Potassium Chl has been on hold since 5/25 and nurse asks if this should be restarted.    Verbal Order/Direction given by Provider:   - Give additional Lasix 20mg one time only now  - On 5/31 start Lasix 20mg PO BID at 0800 and 1400  - Decrease Potassium Chl to 10mEq PO once daily  - Recheck BMP on 6/2/23    Provider giving Order:  LARRY Melvin    Verbal Order given to: Ivania Ferrera RN

## 2023-06-01 NOTE — LETTER
6/1/2023        RE: Miesha Quarles  [address] [place] [address]        [facility] SERVICES    Facility:   [facility] () [[id number]]   Code Status: DNR/DNI      CHIEF COMPLAINT/REASON FOR VISIT:  Chief Complaint   Patient presents with     RECHECK       HISTORY:      HPI: Miesha is a 89 year old female Who currently does reside in long-term care room [facility] and who I was asked to visit with today secondary to weight gain.  Her last weight on May 29 was 163 pounds back on May 26 153 pounds not sure how she gained 10 pounds of weight but on May 24 159 pounds back on May 22 156 pounds so it does appear to be all over the board.  Looking at her today she does not have any lower extremity edema however she is still in bed.  Her lungs have remained clear.  No cough or cold symptoms.  No anasarca.  No upper extremity edema.  Her blood pressures over the past couple of weeks ranging 118-151.  Her last BMP was on May 30 to see results below with a creatinine 1.40 prior to that on May 25 1.18 she now is on torsemide 20 mg twice daily with potassium 10 and she does have a BMP scheduled for tomorrow.  She denies any discomfort she is also on warfarin for A-fib.  She denies any discomfort is on scheduled Tylenol as well as Voltaren gel.  She states is no changes in appetite.  Denies any cough or cold symptoms.  For her blood pressure she is on Lopressor 100 mg in the morning.    Past Medical History:   Diagnosis Date     Atrial flutter (H)      Chronic kidney disease      Congestive heart failure (H)      Disorder of bone and cartilage, unspecified     Created by Conversion      Essential hypertension, benign     Created by Conversion      Osteoarthrosis, unspecified whether generalized or localized, lower leg     Created by Conversion      Pure hypercholesterolemia     Created by Conversion      Type II or unspecified type diabetes mellitus without mention of complication, not stated as  uncontrolled     Created by Conversion      Unspecified hypothyroidism     Created by Conversion      Unspecified vitamin D deficiency     Created by Conversion             Family History   Problem Relation Age of Onset     Coronary Artery Disease Brother       Social History     Socioeconomic History     Marital status:    Tobacco Use     Smoking status: Former     Types: Cigarettes     Quit date: 1991     Years since quittin.4     Smokeless tobacco: Never   Substance and Sexual Activity     Alcohol use: Yes     Social Determinants of Health     Financial Resource Strain: Medium Risk (2022)    Overall Financial Resource Strain (CARDIA)      Difficulty of Paying Living Expenses: Somewhat hard   Food Insecurity: No Food Insecurity (2022)    Hunger Vital Sign      Worried About Running Out of Food in the Last Year: Never true      Ran Out of Food in the Last Year: Never true   Transportation Needs: No Transportation Needs (2022)    PRAPARE - Transportation      Lack of Transportation (Medical): No      Lack of Transportation (Non-Medical): No   Housing Stability: Low Risk  (2022)    Housing Stability Vital Sign      Unable to Pay for Housing in the Last Year: No      Number of Places Lived in the Last Year: 1      Unstable Housing in the Last Year: No      No new changes since her previous exam on May 4 except that she has had potential weight gain.  REVIEW OF SYSTEM:  Currently she denies any new symptoms of fever cough or cold sore throat postnasal drip flulike symptoms headache shortness of breath dyspnea wheezing chest pain dizziness vertigo nausea vomiting diarrhea dysuria frequency urgency or unusual myalgias or arthralgias    PHYSICAL EXAM:   Pleasant female in no acute distress.  Head is normocephalic Lung sounds are clear throughout.  Cardiovascular S1-S2 and no lower extremity edema.  Gastrointestinal is protuberant nontender nondistended.  Musculoskeletal -independent..  " Psychiatric: Pleasant affect.    Current Outpatient Medications:      acetaminophen (TYLENOL) 500 MG tablet, Take 1,000 mg by mouth 3 times daily, Disp: , Rfl:      diclofenac (VOLTAREN) 1 % topical gel, Apply 4 g topically 2 times daily To low back, Disp: , Rfl:      furosemide (LASIX) 20 MG tablet, Take 20 mg by mouth 2 times daily In the morning and at 2pm, Disp: , Rfl:      levothyroxine (SYNTHROID/LEVOTHROID) 75 MCG tablet, Take 75 mcg by mouth daily, Disp: , Rfl:      metoprolol succinate ER (TOPROL XL) 100 MG 24 hr tablet, TAKE 1 TABLET BY MOUTH EVERY DAY, Disp: 90 tablet, Rfl: 3     nystatin (MYCOSTATIN) 775434 UNIT/GM external powder, , Disp: , Rfl:      potassium chloride ER (KLOR-CON M) 10 MEQ CR tablet, Take 10 mEq by mouth daily, Disp: , Rfl:      WARFARIN SODIUM PO, 3/30/23 INR 1.86  Cont 2.5mg M-W-F and 1mg AOD.  Next INR 4/5/23.   3/23/23 INR 1.70  Take 2.5mg M-W-F and 1mg AOD.  Next INR 3/30/23. 3/13/23 INR 2.65  Cont 2.5mg Mon and Thurs and 1mg AOD.  Next INR 3/23/23.  , Disp: , Rfl:      Zinc Oxide 25 % PSTE, Externally apply topically 2 times daily, Disp: , Rfl:        /80   Pulse 91   Temp 98.3  F (36.8  C)   Resp 18   Ht 1.575 m (5' 2\")   Wt 74.2 kg (163 lb 9.6 oz)   SpO2 96%   BMI 29.92 kg/m      LABS:   Last Comprehensive Metabolic Panel:  Lab Results   Component Value Date     05/30/2023    POTASSIUM 4.7 05/30/2023    CHLORIDE 108 (H) 05/30/2023    CO2 16 (L) 05/30/2023    ANIONGAP 15 05/30/2023    GLC 88 05/30/2023    BUN 38.2 (H) 05/30/2023    CR 1.40 (H) 05/30/2023    GFRESTIMATED 35 (L) 05/30/2023    JOSE CARLOS 8.0 (L) 05/30/2023             ASSESSMENT:    Encounter Diagnoses   Name Primary?     Weight gain Yes     Benign essential hypertension      Persistent atrial fibrillation (H)      Stage 3a chronic kidney disease (H)        PLAN:    I do not know if the weights were done first thing in the morning with a count on or not.  The weights do seem a little aberrant " up-and-down however if progressive there is does seem to be some weight gain issues however there is no significant edema or any to this morning and the lungs have remained clear she otherwise is asymptomatic.  So we did put her on torsemide 20 mg twice daily the other day also potassium 10 mEq daily rechecking the BMP again tomorrow Friday, June 2 otherwise very delightful lady.  Had a chance to go over her medications as well as her current concerns and she did appreciate the visit and she did not have any other questions.        Electronically signed by: Festus Glez NP          Sincerely,        Festus Glez NP

## 2023-06-01 NOTE — PROGRESS NOTES
[facility] GERIATRIC SERVICES    Facility:   [facility] () [[id number]]   Code Status: DNR/DNI      CHIEF COMPLAINT/REASON FOR VISIT:  Chief Complaint   Patient presents with     RECHECK       HISTORY:      HPI: Miesha is a 89 year old female Who currently does reside in long-term care room [facility] and who I was asked to visit with today secondary to weight gain.  Her last weight on May 29 was 163 pounds back on May 26 153 pounds not sure how she gained 10 pounds of weight but on May 24 159 pounds back on May 22 156 pounds so it does appear to be all over the board.  Looking at her today she does not have any lower extremity edema however she is still in bed.  Her lungs have remained clear.  No cough or cold symptoms.  No anasarca.  No upper extremity edema.  Her blood pressures over the past couple of weeks ranging 118-151.  Her last BMP was on May 30 to see results below with a creatinine 1.40 prior to that on May 25 1.18 she now is on torsemide 20 mg twice daily with potassium 10 and she does have a BMP scheduled for tomorrow.  She denies any discomfort she is also on warfarin for A-fib.  She denies any discomfort is on scheduled Tylenol as well as Voltaren gel.  She states is no changes in appetite.  Denies any cough or cold symptoms.  For her blood pressure she is on Lopressor 100 mg in the morning.    Past Medical History:   Diagnosis Date     Atrial flutter (H)      Chronic kidney disease      Congestive heart failure (H)      Disorder of bone and cartilage, unspecified     Created by Conversion      Essential hypertension, benign     Created by Conversion      Osteoarthrosis, unspecified whether generalized or localized, lower leg     Created by Conversion      Pure hypercholesterolemia     Created by Conversion      Type II or unspecified type diabetes mellitus without mention of complication, not stated as uncontrolled     Created by Conversion      Unspecified hypothyroidism     Created by  Conversion      Unspecified vitamin D deficiency     Created by Conversion             Family History   Problem Relation Age of Onset     Coronary Artery Disease Brother       Social History     Socioeconomic History     Marital status:    Tobacco Use     Smoking status: Former     Types: Cigarettes     Quit date: 1991     Years since quittin.4     Smokeless tobacco: Never   Substance and Sexual Activity     Alcohol use: Yes     Social Determinants of Health     Financial Resource Strain: Medium Risk (2022)    Overall Financial Resource Strain (CARDIA)      Difficulty of Paying Living Expenses: Somewhat hard   Food Insecurity: No Food Insecurity (2022)    Hunger Vital Sign      Worried About Running Out of Food in the Last Year: Never true      Ran Out of Food in the Last Year: Never true   Transportation Needs: No Transportation Needs (2022)    PRAPARE - Transportation      Lack of Transportation (Medical): No      Lack of Transportation (Non-Medical): No   Housing Stability: Low Risk  (2022)    Housing Stability Vital Sign      Unable to Pay for Housing in the Last Year: No      Number of Places Lived in the Last Year: 1      Unstable Housing in the Last Year: No      No new changes since her previous exam on May 4 except that she has had potential weight gain.  REVIEW OF SYSTEM:  Currently she denies any new symptoms of fever cough or cold sore throat postnasal drip flulike symptoms headache shortness of breath dyspnea wheezing chest pain dizziness vertigo nausea vomiting diarrhea dysuria frequency urgency or unusual myalgias or arthralgias    PHYSICAL EXAM:   Pleasant female in no acute distress.  Head is normocephalic Lung sounds are clear throughout.  Cardiovascular S1-S2 and no lower extremity edema.  Gastrointestinal is protuberant nontender nondistended.  Musculoskeletal -independent..  Psychiatric: Pleasant affect.    Current Outpatient Medications:      acetaminophen  "(TYLENOL) 500 MG tablet, Take 1,000 mg by mouth 3 times daily, Disp: , Rfl:      diclofenac (VOLTAREN) 1 % topical gel, Apply 4 g topically 2 times daily To low back, Disp: , Rfl:      furosemide (LASIX) 20 MG tablet, Take 20 mg by mouth 2 times daily In the morning and at 2pm, Disp: , Rfl:      levothyroxine (SYNTHROID/LEVOTHROID) 75 MCG tablet, Take 75 mcg by mouth daily, Disp: , Rfl:      metoprolol succinate ER (TOPROL XL) 100 MG 24 hr tablet, TAKE 1 TABLET BY MOUTH EVERY DAY, Disp: 90 tablet, Rfl: 3     nystatin (MYCOSTATIN) 277243 UNIT/GM external powder, , Disp: , Rfl:      potassium chloride ER (KLOR-CON M) 10 MEQ CR tablet, Take 10 mEq by mouth daily, Disp: , Rfl:      WARFARIN SODIUM PO, 3/30/23 INR 1.86  Cont 2.5mg M-W-F and 1mg AOD.  Next INR 4/5/23.   3/23/23 INR 1.70  Take 2.5mg M-W-F and 1mg AOD.  Next INR 3/30/23. 3/13/23 INR 2.65  Cont 2.5mg Mon and Thurs and 1mg AOD.  Next INR 3/23/23.  , Disp: , Rfl:      Zinc Oxide 25 % PSTE, Externally apply topically 2 times daily, Disp: , Rfl:        /80   Pulse 91   Temp 98.3  F (36.8  C)   Resp 18   Ht 1.575 m (5' 2\")   Wt 74.2 kg (163 lb 9.6 oz)   SpO2 96%   BMI 29.92 kg/m      LABS:   Last Comprehensive Metabolic Panel:  Lab Results   Component Value Date     05/30/2023    POTASSIUM 4.7 05/30/2023    CHLORIDE 108 (H) 05/30/2023    CO2 16 (L) 05/30/2023    ANIONGAP 15 05/30/2023    GLC 88 05/30/2023    BUN 38.2 (H) 05/30/2023    CR 1.40 (H) 05/30/2023    GFRESTIMATED 35 (L) 05/30/2023    JOSE CARLOS 8.0 (L) 05/30/2023             ASSESSMENT:    Encounter Diagnoses   Name Primary?     Weight gain Yes     Benign essential hypertension      Persistent atrial fibrillation (H)      Stage 3a chronic kidney disease (H)        PLAN:    I do not know if the weights were done first thing in the morning with a count on or not.  The weights do seem a little aberrant up-and-down however if progressive there is does seem to be some weight gain issues however " there is no significant edema or any to this morning and the lungs have remained clear she otherwise is asymptomatic.  So we did put her on torsemide 20 mg twice daily the other day also potassium 10 mEq daily rechecking the BMP again tomorrow Friday, June 2 otherwise very delightful lady.  Had a chance to go over her medications as well as her current concerns and she did appreciate the visit and she did not have any other questions.        Electronically signed by: Festus Glez NP

## 2023-06-06 NOTE — LETTER
6/6/2023        RE: Miesha Quarles  [address] [place] [address]        [facility] SERVICES    Facility:   [facility] () [[id number]]   Code Status: DNR      CHIEF COMPLAINT/REASON FOR VISIT:  Chief Complaint   Patient presents with     RECHECK       HISTORY:      HPI: Miesha is a 89 year old female Who I was asked to revisit with secondary to discolored feet and toes as well as her other chronic medical conditions.  I also did have the charge nurse to evaluate the feet with me.  In looking at her bilateral feet there are a number of toes with some minimal scabbing there is one on her left heel though that is soft blister we will put some Skin-Prep on that that is about 2.5 or 3 cm in circumference.  Try to protect that area as well.  She does have more of a purplish and violaceous hue to her feet and compared to her lower legs.  Recently an arterial ultrasound did show normal to mild great outflow obstruction normal velocity gradient without significant focal flow-limiting stenosis.  In talking further with the charge nurse as well as her niece we did decide to send her off to vascular medicine so they can continue to evaluate her legs and do further testing.  There is no significant lower extremity edema her weight was at 165 pounds as of June 5 in comparison to May 29 263 pounds but she does not have any increase in lower extremity edema however she currently is in bed her lungs have remained clear there is no extremity edema and she overall denies any shortness of breath.  Her last BMP was on June 2 see results below.  For chronic pain is on scheduled Tylenol 1000 mg 3 times daily.    Past Medical History:   Diagnosis Date     Atrial flutter (H)      Chronic kidney disease      Congestive heart failure (H)      Disorder of bone and cartilage, unspecified     Created by Conversion      Essential hypertension, benign     Created by Conversion      Osteoarthrosis, unspecified  whether generalized or localized, lower leg     Created by Conversion      Pure hypercholesterolemia     Created by Conversion      Type II or unspecified type diabetes mellitus without mention of complication, not stated as uncontrolled     Created by Conversion      Unspecified hypothyroidism     Created by Conversion      Unspecified vitamin D deficiency     Created by Conversion             Family History   Problem Relation Age of Onset     Coronary Artery Disease Brother       Social History     Socioeconomic History     Marital status:    Tobacco Use     Smoking status: Former     Types: Cigarettes     Quit date: 1991     Years since quittin.4     Smokeless tobacco: Never   Substance and Sexual Activity     Alcohol use: Yes     Social Determinants of Health     Financial Resource Strain: Medium Risk (2022)    Overall Financial Resource Strain (CARDIA)      Difficulty of Paying Living Expenses: Somewhat hard   Food Insecurity: No Food Insecurity (2022)    Hunger Vital Sign      Worried About Running Out of Food in the Last Year: Never true      Ran Out of Food in the Last Year: Never true   Transportation Needs: No Transportation Needs (2022)    PRAPARE - Transportation      Lack of Transportation (Medical): No      Lack of Transportation (Non-Medical): No   Housing Stability: Low Risk  (2022)    Housing Stability Vital Sign      Unable to Pay for Housing in the Last Year: No      Number of Places Lived in the Last Year: 1      Unstable Housing in the Last Year: No      No new changes to review of systems or physical exam since our last visit on  except that she does have a violaceous/purple feet discoloration  REVIEW OF SYSTEM:    Currently she denies any new symptoms of fever cough or cold sore throat postnasal drip flulike symptoms headache shortness of breath dyspnea wheezing chest pain dizziness vertigo nausea vomiting diarrhea dysuria frequency urgency or unusual  "myalgias or arthralgias  PHYSICAL EXAM:   Pleasant female in no acute distress.  Head is normocephalic Lung sounds are clear throughout.  Cardiovascular S1-S2 and no lower extremity edema.  Gastrointestinal is protuberant nontender nondistended.  Musculoskeletal -independent..  Psychiatric: Pleasant affect.  Bilateral feet do have a violaceous/purple discoloration.  A few scabs on various toes as well as the left heel 2.5 or 3 cm blister wound.    Current Outpatient Medications:      acetaminophen (TYLENOL) 500 MG tablet, Take 1,000 mg by mouth 3 times daily, Disp: , Rfl:      diclofenac (VOLTAREN) 1 % topical gel, Apply 4 g topically 2 times daily To low back, Disp: , Rfl:      furosemide (LASIX) 20 MG tablet, Take 20 mg by mouth 2 times daily In the morning and at 2pm, Disp: , Rfl:      levothyroxine (SYNTHROID/LEVOTHROID) 75 MCG tablet, Take 75 mcg by mouth daily, Disp: , Rfl:      metoprolol succinate ER (TOPROL XL) 100 MG 24 hr tablet, TAKE 1 TABLET BY MOUTH EVERY DAY, Disp: 90 tablet, Rfl: 3     nystatin (MYCOSTATIN) 731918 UNIT/GM external powder, , Disp: , Rfl:      potassium chloride ER (KLOR-CON M) 10 MEQ CR tablet, Take 10 mEq by mouth daily, Disp: , Rfl:      WARFARIN SODIUM PO, 3/30/23 INR 1.86  Cont 2.5mg M-W-F and 1mg AOD.  Next INR 4/5/23.   3/23/23 INR 1.70  Take 2.5mg M-W-F and 1mg AOD.  Next INR 3/30/23. 3/13/23 INR 2.65  Cont 2.5mg Mon and Thurs and 1mg AOD.  Next INR 3/23/23.  , Disp: , Rfl:      Zinc Oxide 25 % PSTE, Externally apply topically 2 times daily, Disp: , Rfl:        BP (!) 130/92   Pulse 110   Temp 98.3  F (36.8  C)   Resp 18   Ht 1.575 m (5' 2\")   Wt 75 kg (165 lb 6.4 oz)   SpO2 96%   BMI 30.25 kg/m      LABS:   Last Comprehensive Metabolic Panel:  Lab Results   Component Value Date     06/02/2023    POTASSIUM 4.7 06/02/2023    CHLORIDE 108 (H) 06/02/2023    CO2 20 (L) 06/02/2023    ANIONGAP 12 06/02/2023    GLC 97 06/02/2023    BUN 38.9 (H) 06/02/2023    CR 1.23 (H) " 06/02/2023    GFRESTIMATED 41 (L) 06/02/2023    JOSE CARLOS 8.1 (L) 06/02/2023             ASSESSMENT:    Encounter Diagnoses   Name Primary?     PAD (peripheral artery disease) (H) Yes     Benign essential hypertension      Stage 3a chronic kidney disease (H)      Chronic heart failure with preserved ejection fraction (H)        PLAN:    Her last BMP on June 2 creatinine 1.23 prior to that on June 30 at 1.40.  She is on furosemide 20 mg twice daily with potassium of 10.  The arterial ultrasound did not show any stenosis of lower extremities and in talking with the niece as well as charge nurse we will go ahead and get her sent over to vascular medicine for further work-up and evaluation of her discolored feet.  Otherwise continue to manage her weight gain as well as other medications.        Electronically signed by: Festus Glez NP          Sincerely,        Festus Glez NP

## 2023-06-06 NOTE — PROGRESS NOTES
[facility] GERIATRIC SERVICES    Facility:   [facility] () [[id number]]   Code Status: DNR      CHIEF COMPLAINT/REASON FOR VISIT:  Chief Complaint   Patient presents with     RECHECK       HISTORY:      HPI: Miesha is a 89 year old female Who I was asked to revisit with secondary to discolored feet and toes as well as her other chronic medical conditions.  I also did have the charge nurse to evaluate the feet with me.  In looking at her bilateral feet there are a number of toes with some minimal scabbing there is one on her left heel though that is soft blister we will put some Skin-Prep on that that is about 2.5 or 3 cm in circumference.  Try to protect that area as well.  She does have more of a purplish and violaceous hue to her feet and compared to her lower legs.  Recently an arterial ultrasound did show normal to mild great outflow obstruction normal velocity gradient without significant focal flow-limiting stenosis.  In talking further with the charge nurse as well as her niece we did decide to send her off to vascular medicine so they can continue to evaluate her legs and do further testing.  There is no significant lower extremity edema her weight was at 165 pounds as of June 5 in comparison to May 29 263 pounds but she does not have any increase in lower extremity edema however she currently is in bed her lungs have remained clear there is no extremity edema and she overall denies any shortness of breath.  Her last BMP was on June 2 see results below.  For chronic pain is on scheduled Tylenol 1000 mg 3 times daily.    Past Medical History:   Diagnosis Date     Atrial flutter (H)      Chronic kidney disease      Congestive heart failure (H)      Disorder of bone and cartilage, unspecified     Created by Conversion      Essential hypertension, benign     Created by Conversion      Osteoarthrosis, unspecified whether generalized or localized, lower leg     Created by Conversion      Pure  hypercholesterolemia     Created by Conversion      Type II or unspecified type diabetes mellitus without mention of complication, not stated as uncontrolled     Created by Conversion      Unspecified hypothyroidism     Created by Conversion      Unspecified vitamin D deficiency     Created by Conversion             Family History   Problem Relation Age of Onset     Coronary Artery Disease Brother       Social History     Socioeconomic History     Marital status:    Tobacco Use     Smoking status: Former     Types: Cigarettes     Quit date: 1991     Years since quittin.4     Smokeless tobacco: Never   Substance and Sexual Activity     Alcohol use: Yes     Social Determinants of Health     Financial Resource Strain: Medium Risk (2022)    Overall Financial Resource Strain (CARDIA)      Difficulty of Paying Living Expenses: Somewhat hard   Food Insecurity: No Food Insecurity (2022)    Hunger Vital Sign      Worried About Running Out of Food in the Last Year: Never true      Ran Out of Food in the Last Year: Never true   Transportation Needs: No Transportation Needs (2022)    PRAPARE - Transportation      Lack of Transportation (Medical): No      Lack of Transportation (Non-Medical): No   Housing Stability: Low Risk  (2022)    Housing Stability Vital Sign      Unable to Pay for Housing in the Last Year: No      Number of Places Lived in the Last Year: 1      Unstable Housing in the Last Year: No      No new changes to review of systems or physical exam since our last visit on  except that she does have a violaceous/purple feet discoloration  REVIEW OF SYSTEM:    Currently she denies any new symptoms of fever cough or cold sore throat postnasal drip flulike symptoms headache shortness of breath dyspnea wheezing chest pain dizziness vertigo nausea vomiting diarrhea dysuria frequency urgency or unusual myalgias or arthralgias  PHYSICAL EXAM:   Pleasant female in no acute distress.  " Head is normocephalic Lung sounds are clear throughout.  Cardiovascular S1-S2 and no lower extremity edema.  Gastrointestinal is protuberant nontender nondistended.  Musculoskeletal -independent..  Psychiatric: Pleasant affect.  Bilateral feet do have a violaceous/purple discoloration.  A few scabs on various toes as well as the left heel 2.5 or 3 cm blister wound.    Current Outpatient Medications:      acetaminophen (TYLENOL) 500 MG tablet, Take 1,000 mg by mouth 3 times daily, Disp: , Rfl:      diclofenac (VOLTAREN) 1 % topical gel, Apply 4 g topically 2 times daily To low back, Disp: , Rfl:      furosemide (LASIX) 20 MG tablet, Take 20 mg by mouth 2 times daily In the morning and at 2pm, Disp: , Rfl:      levothyroxine (SYNTHROID/LEVOTHROID) 75 MCG tablet, Take 75 mcg by mouth daily, Disp: , Rfl:      metoprolol succinate ER (TOPROL XL) 100 MG 24 hr tablet, TAKE 1 TABLET BY MOUTH EVERY DAY, Disp: 90 tablet, Rfl: 3     nystatin (MYCOSTATIN) 367106 UNIT/GM external powder, , Disp: , Rfl:      potassium chloride ER (KLOR-CON M) 10 MEQ CR tablet, Take 10 mEq by mouth daily, Disp: , Rfl:      WARFARIN SODIUM PO, 3/30/23 INR 1.86  Cont 2.5mg M-W-F and 1mg AOD.  Next INR 4/5/23.   3/23/23 INR 1.70  Take 2.5mg M-W-F and 1mg AOD.  Next INR 3/30/23. 3/13/23 INR 2.65  Cont 2.5mg Mon and Thurs and 1mg AOD.  Next INR 3/23/23.  , Disp: , Rfl:      Zinc Oxide 25 % PSTE, Externally apply topically 2 times daily, Disp: , Rfl:        BP (!) 130/92   Pulse 110   Temp 98.3  F (36.8  C)   Resp 18   Ht 1.575 m (5' 2\")   Wt 75 kg (165 lb 6.4 oz)   SpO2 96%   BMI 30.25 kg/m      LABS:   Last Comprehensive Metabolic Panel:  Lab Results   Component Value Date     06/02/2023    POTASSIUM 4.7 06/02/2023    CHLORIDE 108 (H) 06/02/2023    CO2 20 (L) 06/02/2023    ANIONGAP 12 06/02/2023    GLC 97 06/02/2023    BUN 38.9 (H) 06/02/2023    CR 1.23 (H) 06/02/2023    GFRESTIMATED 41 (L) 06/02/2023    JOSE CARLOS 8.1 (L) 06/02/2023 "             ASSESSMENT:    Encounter Diagnoses   Name Primary?     PAD (peripheral artery disease) (H) Yes     Benign essential hypertension      Stage 3a chronic kidney disease (H)      Chronic heart failure with preserved ejection fraction (H)        PLAN:    Her last BMP on June 2 creatinine 1.23 prior to that on June 30 at 1.40.  She is on furosemide 20 mg twice daily with potassium of 10.  The arterial ultrasound did not show any stenosis of lower extremities and in talking with the niece as well as charge nurse we will go ahead and get her sent over to vascular medicine for further work-up and evaluation of her discolored feet.  Otherwise continue to manage her weight gain as well as other medications.        Electronically signed by: Festus Glez NP

## 2023-06-08 NOTE — LETTER
6/8/2023        RE: Miesha Quarles  [address]  [place] [address]        [facility] GERIATRICS  REGULATORY VISIT  June 8, 2023      [facility] Medical Record Number:  [id number]  Place of Service where encounter took place:  No question data found.    No chief complaint on file.      HPI:    Miesha Quarles is a 89 year old  (12/26/1928), who is being seen today for a federally mandated E/M visit. HPI information obtained from: {FGS HPI:727937}.    Today, ***      ALLERGIES:    Allergies   Allergen Reactions    Amoxicillin Hives    Cephalexin Other (See Comments)     Reaction with intervenous administration.    Penicillins Hives    Latex Rash    Other Environmental Allergy Other (See Comments)     Pollen causes seasonal allergies.        Past Medical, Surgical, Family and Social History: Reviewed and updated in EPIC.    MEDICATIONS:  Post Discharge Medication Reconciliation Status: {ACO Med Rec (Provider):288797}. ***    Current Outpatient Medications   Medication Sig Dispense Refill    acetaminophen (TYLENOL) 500 MG tablet Take 1,000 mg by mouth 3 times daily      diclofenac (VOLTAREN) 1 % topical gel Apply 4 g topically 2 times daily To low back      furosemide (LASIX) 20 MG tablet Take 20 mg by mouth 2 times daily In the morning and at 2pm      guaiFENesin (ROBITUSSIN) 20 mg/mL liquid Take 200 mg by mouth every 4 hours as needed for cough      levothyroxine (SYNTHROID/LEVOTHROID) 75 MCG tablet Take 75 mcg by mouth daily      metoprolol succinate ER (TOPROL XL) 100 MG 24 hr tablet TAKE 1 TABLET BY MOUTH EVERY DAY 90 tablet 3    nystatin (MYCOSTATIN) 886705 UNIT/GM external powder       potassium chloride ER (KLOR-CON M) 10 MEQ CR tablet Take 10 mEq by mouth daily      WARFARIN SODIUM PO 3/30/23 INR 1.86  Cont 2.5mg M-W-F and 1mg AOD.  Next INR 4/5/23.   3/23/23 INR 1.70  Take 2.5mg M-W-F and 1mg AOD.  Next INR 3/30/23. 3/13/23 INR 2.65  Cont 2.5mg Mon and Thurs and 1mg AOD.  Next INR  3/23/23.        Zinc Oxide 25 % PSTE Externally apply topically 2 times daily       Medications reviewed:  Medications reconciled to facility chart and changes were made to reflect current medications as identified as above med list. Below are the changes that were made:   Medications stopped since last EPIC medication reconciliation:   There are no discontinued medications.  Medications started since last [facility] medication reconciliation:  Orders Placed This Encounter   Medications    guaiFENesin (ROBITUSSIN) 20 mg/mL liquid     Sig: Take 200 mg by mouth every 4 hours as needed for cough     ***    REVIEW OF SYSTEMS:  4 point ROS neg other than the symptoms noted above in the HPI.***  Unable to be obtained due to cognitive impairment or aphasia.     PHYSICAL EXAM:  There were no vitals taken for this visit.  Gen: sitting in bed, alert, cooperative and in no acute distress  HEENT: normocephalic; *** hearing acuity; moist mucous membranes in mouth; eyes without scleral icterus or scleral injection  Card: RRR, S1, S2, no murmurs  Resp: lungs clear to auscultation bilaterally  GI: abdomen soft, not-tender, non-distended, +BS  Ext: no LE edema  Neuro: CX II-XII grossly in tact; ROM in all four extremities grossly in tact  Psych: alert and oriented x3; normal affect      LAB[facility]IMAGING: [facility] Epic [facility]    ASSESSMENT / PLAN:  {FGS DX:252430}    Orders:  ***    Electronically signed by  Savanah Lowry MD              Sincerely,        Savanah Lowry MD

## 2023-06-11 NOTE — PROGRESS NOTES
[facility] GERIATRICS  REGULATORY VISIT  June 12, 2023    [facility] Medical Record Number:  [id number]  Place of Service where encounter took place:  [facility] () [[id number]]      Chief Complaint   Patient presents with     [facility]       HPI:    Miesha Quarles is a 89 year old  (12/26/1928), who is being seen today for a federally mandated E/M visit at [facility] where she has resided since February after a hospitalization in January followed by a TCU stay at a different facility. HPI information obtained from: facility chart records, facility staff, patient report and [facility] chart review.    Today, Ms. Quarles is seen in her room sitting in a wheelchair after breakfast. She is a limited historian ([employer] 12/15) but is able to make her needs known. She tells me she has no chest pain or dyspnea. We talked about her weight gain - she eats well, good appetite. She does not feel she has any water weight on her. She notes her hands and feet feel cold. Discussed she has an appointment to see vascular next week, along with upcoming sleep medicine and cardiology appointments. She is OK with me checking labs tomorrow given higher dose of diuretic. No concerns today per discussion with nursing. They too note she eats well.     ALLERGIES:    Allergies   Allergen Reactions     Amoxicillin Hives     Cephalexin Other (See Comments)     Reaction with intervenous administration.     Penicillins Hives     Latex Rash     Other Environmental Allergy Other (See Comments)     Pollen causes seasonal allergies.        Past Medical, Surgical, Family and Social History: Reviewed and updated in EPIC.    MEDICATIONS:  Current Outpatient Medications   Medication Sig Dispense Refill     acetaminophen (TYLENOL) 500 MG tablet Take 1,000 mg by mouth 3 times daily       diclofenac (VOLTAREN) 1 % topical gel Apply 4 g topically 2 times daily To low back       furosemide (LASIX) 20 MG  "tablet Take 20 mg by mouth 2 times daily In the morning and at 2pm       guaiFENesin (ROBITUSSIN) 20 mg/mL liquid Take 200 mg by mouth every 4 hours as needed for cough       levothyroxine (SYNTHROID/LEVOTHROID) 75 MCG tablet Take 75 mcg by mouth daily       metoprolol succinate ER (TOPROL XL) 100 MG 24 hr tablet TAKE 1 TABLET BY MOUTH EVERY DAY 90 tablet 3     nystatin (MYCOSTATIN) 948493 UNIT/GM external powder        potassium chloride ER (KLOR-CON M) 10 MEQ CR tablet Take 10 mEq by mouth daily       WARFARIN SODIUM PO 3/30/23 INR 1.86  Cont 2.5mg M-W-F and 1mg AOD.  Next INR 4/5/23.   3/23/23 INR 1.70  Take 2.5mg M-W-F and 1mg AOD.  Next INR 3/30/23. 3/13/23 INR 2.65  Cont 2.5mg Mon and Thurs and 1mg AOD.  Next INR 3/23/23.         Zinc Oxide 25 % PSTE Externally apply topically 2 times daily       Medications reviewed:  Medications reconciled to facility chart and changes were made to reflect current medications as identified as above med list. Below are the changes that were made:   Medications stopped since last EPIC medication reconciliation:   There are no discontinued medications.  Medications started since last [facility] medication reconciliation:  No orders of the defined types were placed in this encounter.      REVIEW OF SYSTEMS:  4 point ROS neg other than the symptoms noted above in the HPI. [facility] 12[facility]15    PHYSICAL EXAM:  /76   Pulse 104   Temp 98.3  F (36.8  C)   Resp 18   Ht 1.575 m (5' 2\")   Wt 75 kg (165 lb 6.4 oz)   SpO2 96%   BMI 30.25 kg/m    Gen: sitting wheechair, alert, cooperative and in no acute distress  Resp: lungs clear to auscultation bilaterally, moving good air, no crackles or wheezes  Ext: no LE edema; extremities (legs/feed, hands) are cool to the touch and have a bluish tone  Neuro: CX II-XII grossly in tact; ROM in all four extremities grossly in tact  Psych: memory, judgement and insight impaired    LABS/IMAGING: Reviewed as per [facility] and/or [facility].     Dispatch " [facility] portable Duplex U/S (6/2/23):  Findings:  Velocities and waveforms: Duplex scan was performed using B-mode/gray scale imaging and  Doppler spectral analysis and color flow of bilateral lower extremities. The right leg shows low normal  right common femoral artery velocity at 49 cm/s. There is a normal velocity gradient within the right  lower extremity going from 49 cm/s down to 39 cm/s in the popliteal artery. Biphasic waveforms are  seen of the distal runoff. The left leg shows appropriate common femoral artery velocity at 63 cm/s.  There is a normal left leg velocity gradient ranging from 63 cm/s down to 62 cm/s in the popliteal  artery. Biphasic waveform is seen of the distal runoff. Right popliteal fossa cyst is seen.    Impression:  1. Bilateral lower legs show biphasic waveforms of the spectral runoff consistent with normal to mild  grade outflow obstruction.  2. Normal velocity gradient is seen of the lower extremities without significant focal flow limiting  Stenosis.    Electronically Signed by: Jovani Temple MD at 06/04/2023 1:53 AM CST    ASSESSMENT / PLAN:    HFpEF (EF 55-60%), HTN  Tricuspid Regurgitation   SBPs 130s-150s, most <150. HR labile 70s-110s. Weights have steadily increased since March 150 --> 160 --> 165 lbs. Furosemide restarted by cards on 5/8 at 20 mg daily and increased to 20 mg BID on 5/31. She has no edema on exam, lungs are clear. She is eating well and ?if the weight gain is from her diet and not water weight.   -- weights MWF before breakfast, update if >165 lbs  -- [facility] 6/13/23  -- I will call her niece tomorrow once labs are back   -- continues on the furosemide 20 mg BID pending labs tomorrow  -- metoprolol  mg daily  -- has follow up with cardiology on 7/14/23    Narcolepsy/Cataplexy  Unresponsive episode landed her in ER in Feb. New on methylphenidate at TCU prior to LTC - no longer on this.   -- has appt with sleep medicine on 6/22/23    Concern for PAD  Her limbs  are cool, have a bluish hue. She'd had some foot sores. See above under imaging re: portable Dopplar U/S done on 6/2/23 - Images are not in their portal to push into Epic. PCP had referred to vascular in and she has an appointment next week  -- vascular medicine 6/19/23 - appreciate their evaluation and recommendations    Electronically signed by  Savanah Lowry MD

## 2023-06-12 NOTE — LETTER
6/12/2023        RE: Miesha Quarles  [address]  [place] [address]        [facility] GERIATRICS  REGULATORY VISIT  June 12, 2023    [facility] Medical Record Number:  [id number]  Place of Service where encounter took place:  [facility] () [[id number]]      Chief Complaint   Patient presents with     residential Regulatory       HPI:    Miesha Quarles is a 89 year old  (12/26/1928), who is being seen today for a federally mandated E/M visit at [facility] where she has resided since February after a hospitalization in January followed by a TCU stay at a different facility. HPI information obtained from: facility chart records, facility staff, patient report and [facility] chart review.    Today, Ms. Quarles is seen in her room sitting in a wheelchair after breakfast. She is a limited historian (BIMS 12/15) but is able to make her needs known. She tells me she has no chest pain or dyspnea. We talked about her weight gain - she eats well, good appetite. She does not feel she has any water weight on her. She notes her hands and feet feel cold. Discussed she has an appointment to see vascular next week, along with upcoming sleep medicine and cardiology appointments. She is OK with me checking labs tomorrow given higher dose of diuretic. No concerns today per discussion with nursing. They too note she eats well.     ALLERGIES:    Allergies   Allergen Reactions     Amoxicillin Hives     Cephalexin Other (See Comments)     Reaction with intervenous administration.     Penicillins Hives     Latex Rash     Other Environmental Allergy Other (See Comments)     Pollen causes seasonal allergies.        Past Medical, Surgical, Family and Social History: Reviewed and updated in EPIC.    MEDICATIONS:  Current Outpatient Medications   Medication Sig Dispense Refill     acetaminophen (TYLENOL) 500 MG tablet Take 1,000 mg by mouth 3 times daily       diclofenac (VOLTAREN) 1 %  "topical gel Apply 4 g topically 2 times daily To low back       furosemide (LASIX) 20 MG tablet Take 20 mg by mouth 2 times daily In the morning and at 2pm       guaiFENesin (ROBITUSSIN) 20 mg/mL liquid Take 200 mg by mouth every 4 hours as needed for cough       levothyroxine (SYNTHROID/LEVOTHROID) 75 MCG tablet Take 75 mcg by mouth daily       metoprolol succinate ER (TOPROL XL) 100 MG 24 hr tablet TAKE 1 TABLET BY MOUTH EVERY DAY 90 tablet 3     nystatin (MYCOSTATIN) 278157 UNIT/GM external powder        potassium chloride ER (KLOR-CON M) 10 MEQ CR tablet Take 10 mEq by mouth daily       WARFARIN SODIUM PO 3/30/23 INR 1.86  Cont 2.5mg M-W-F and 1mg AOD.  Next INR 4/5/23.   3/23/23 INR 1.70  Take 2.5mg M-W-F and 1mg AOD.  Next INR 3/30/23. 3/13/23 INR 2.65  Cont 2.5mg Mon and Thurs and 1mg AOD.  Next INR 3/23/23.         Zinc Oxide 25 % PSTE Externally apply topically 2 times daily       Medications reviewed:  Medications reconciled to facility chart and changes were made to reflect current medications as identified as above med list. Below are the changes that were made:   Medications stopped since last EPIC medication reconciliation:   There are no discontinued medications.  Medications started since last [facility] medication reconciliation:  No orders of the defined types were placed in this encounter.      REVIEW OF SYSTEMS:  4 point ROS neg other than the symptoms noted above in the HPI. [facility] 12/15    PHYSICAL EXAM:  /76   Pulse 104   Temp 98.3  F (36.8  C)   Resp 18   Ht 1.575 m (5' 2\")   Wt 75 kg (165 lb 6.4 oz)   SpO2 96%   BMI 30.25 kg/m    Gen: sitting wheechair, alert, cooperative and in no acute distress  Resp: lungs clear to auscultation bilaterally, moving good air, no crackles or wheezes  Ext: no LE edema; extremities (legs/feed, hands) are cool to the touch and have a bluish tone  Neuro: CX II-XII grossly in tact; ROM in all four extremities grossly in tact  Psych: memory, judgement and " insight impaired    LABS/IMAGING: Reviewed as per [facility] and/or [facility].     [facility]  Findings:  Velocities and waveforms: Duplex scan was performed using B-mode/gray scale imaging and  Doppler spectral analysis and color flow of bilateral lower extremities. The right leg shows low normal  right common femoral artery velocity at 49 cm/s. There is a normal velocity gradient within the right  lower extremity going from 49 cm/s down to 39 cm/s in the popliteal artery. Biphasic waveforms are  seen of the distal runoff. The left leg shows appropriate common femoral artery velocity at 63 cm/s.  There is a normal left leg velocity gradient ranging from 63 cm/s down to 62 cm/s in the popliteal  artery. Biphasic waveform is seen of the distal runoff. Right popliteal fossa cyst is seen.    Impression:  1. Bilateral lower legs show biphasic waveforms of the spectral runoff consistent with normal to mild  grade outflow obstruction.  2. Normal velocity gradient is seen of the lower extremities without significant focal flow limiting  Stenosis.    Electronically Signed by: Jovani Temple MD at 06/04/2023 1:53 AM CST    ASSESSMENT / PLAN:    HFpEF (EF 55-60%), HTN  Tricuspid Regurgitation   SBPs 130s-150s, most <150. HR labile 70s-110s. Weights have steadily increased since March 150 --> 160 --> 165 lbs. Furosemide restarted by cards on 5/8 at 20 mg daily and increased to 20 mg BID on 5/31. She has no edema on exam, lungs are clear. She is eating well and ?if the weight gain is from her diet and not water weight.   -- weights MWF before breakfast, update if >165 lbs  -- [facility] 6/13/23  -- I will call her niece tomorrow once labs are back   -- continues on the furosemide 20 mg BID pending labs tomorrow  -- metoprolol  mg daily  -- has follow up with cardiology on 7/14/23    Narcolepsy/Cataplexy  Unresponsive episode landed her in ER in Feb. New on methylphenidate at TCU prior to LTC - no  longer on this.   -- has appt with sleep medicine on 6/22/23    Concern for PAD  Her limbs are cool, have a bluish hue. She'd had some foot sores. See above under imaging re: portable Dopplar U/S done on 6/2/23 - Images are not in their portal to push into Epic. PCP had referred to vascular in and she has an appointment next week  -- vascular medicine 6/19/23 - appreciate their evaluation and recommendations    Electronically signed by  Savanah Lowry MD              Sincerely,        Savanah Lowry MD

## 2023-06-13 NOTE — TELEPHONE ENCOUNTER
[facility] Geriatrics Triage Nurse Telephone Encounter    Provider: Savanah Mitchell MD  Facility: [facility]  Facility Type:  [facility]    Caller: Marleni  Call Back Number: [phone]    Allergies:    Allergies   Allergen Reactions     Amoxicillin Hives     Cephalexin Other (See Comments)     Reaction with intervenous administration.     Penicillins Hives     Latex Rash     Other Environmental Allergy Other (See Comments)     Pollen causes seasonal allergies.        Reason for call: Nurse is calling to report that patient slid out of bed over the night.  She opened up a known blister to the bottom of her left heel and also on the underside of her right 2nd toe.  Nurse applied Mepliex to the heel and a gauze to the 2nd toe.  Of note, patient will be seeing vascular on 6/19/23.  Nurse is also reporting that patient has a pustule on the top of the right hand at the base of the second finger.  The nurse states that the pustule is quite painful.      Verbal Order/Direction given by Provider: Continue with mepilex to the left heel and gauze to the left 2nd toe.  Warm moist compress to the left hand pustule TID.  MD to assess further on 6/14/23.      Provider giving Order:  Savanah Mitchell MD    Verbal Order given to: Lamont Wynne RN

## 2023-06-14 NOTE — TELEPHONE ENCOUNTER
[facility] Geriatrics Triage Nurse INR     Provider: Savanah Mitchell MD  Facility: [facility]   Facility Type:  [facility]    Caller: Marleni  Call Back Number: [phone]  Reason for call: INR  Diagnosis/Goal: A. Fib    Todays INR: 4.23  Last INR   5/25 2.61 - 2mg MWF and 1mg AOD    Heparin/Lovenox:  No  Currently on ABX?: No  Other interacting medication:  None  Missed or refused doses: No     BMP also done today - BUN elevated  Notable meds: Lasix 20mg BID; KCl 10mEq daily  Weight 6/14: 171.2#    Verbal Order/Direction given by Provider:   - Hold Coumadin x 2 days and recheck INR on 6/16/23  - Decrease Lasix to 20mg PO once daily    Provider Giving Order:  Savanah Mitchell MD    Verbal Order given to: Marleni Ferrera RN

## 2023-06-15 NOTE — PROGRESS NOTES
[facility] GERIATRICS  Episodic Visit   Juanita 15, 2023    [facility] Medical Record Number:  [id number]  Place of Service where encounter took place:  [facility] () [[id number]]      Chief Complaint   Patient presents with     Nursing Home Acute       HPI:    Miesha Quarles is a 89 year old  (12/26/1928), who is being seen today for an acute visit at [facility] to follow up on a fall, labs and hand/skin concern.     HPI information obtained from: facility chart records, facility staff, patient report and [facility] chart review.    Today, Ms. Quarles is seen in her room laying in bed. She is a limited historian, but seemed more frail than even two days ago.  I don't see the ?pustule on her hand. I do see a couple areas on knuckes that look to have a indurated area that would be consistent with an ulcer due to poor circulation.    I called her niece, Mariluz, to update her on labs and my recommendation that we enroll her in hospice as her body is showing us it is starting to wind down organ by organ. Discussed her CHF including the severe TR - explained that I think this leaky valve is causing the decompensated CHF and that despite increase in diuretics she is continuing to gain weight and that we cannot increase diuretics due to her BPs and renal function. Also discussed that her cool limbs are related to her poor circulation. Mariluz was very receptive to this conversation. She said one of Miesha's friends recently visited her and commented that Miesha was more confused, didn't recognize her. I let her know that I'd recommend we connect her with the [facility] hospice team at [facility]. She will not be able to physically come to [facility] before 6/19 - if Miesha begins to decline more clinically we would not hospitalize, rather we'd start the comfort meds ahead of hospice enrollment. Mariluz is OK cancelling her upcoming appointments.     ADDENDUM 6/16/23 - she will be signing on  to [facility] on 6/17/23    ALLERGIES:    Allergies   Allergen Reactions     Amoxicillin Hives     Cephalexin Other (See Comments)     Reaction with intervenous administration.     Penicillins Hives     Latex Rash     Other Environmental Allergy Other (See Comments)     Pollen causes seasonal allergies.        Past Medical, Surgical, Family and Social History: Reviewed and updated in EPIC.    MEDICATIONS:  Current Outpatient Medications   Medication Sig Dispense Refill     acetaminophen (TYLENOL) 500 MG tablet Take 1,000 mg by mouth 3 times daily       diclofenac (VOLTAREN) 1 % topical gel Apply 4 g topically 2 times daily To low back       furosemide (LASIX) 20 MG tablet Take 20 mg by mouth daily       guaiFENesin (ROBITUSSIN) 20 mg/mL liquid Take 200 mg by mouth every 4 hours as needed for cough       levothyroxine (SYNTHROID/LEVOTHROID) 75 MCG tablet Take 75 mcg by mouth daily       metoprolol succinate ER (TOPROL XL) 100 MG 24 hr tablet TAKE 1 TABLET BY MOUTH EVERY DAY 90 tablet 3     nystatin (MYCOSTATIN) 197967 UNIT/GM external powder        potassium chloride ER (KLOR-CON M) 10 MEQ CR tablet Take 10 mEq by mouth daily       simvastatin (ZOCOR) 10 MG tablet Take 10 mg by mouth At Bedtime       WARFARIN SODIUM PO 3/30/23 INR 1.86  Cont 2.5mg M-W-F and 1mg AOD.  Next INR 4/5/23.   3/23/23 INR 1.70  Take 2.5mg M-W-F and 1mg AOD.  Next INR 3/30/23. 3/13/23 INR 2.65  Cont 2.5mg Mon and Thurs and 1mg AOD.  Next INR 3/23/23.         Zinc Oxide 25 % PSTE Externally apply topically 2 times daily       Medications reviewed:  Medications reconciled to facility chart and changes were made to reflect current medications as identified as above med list. Below are the changes that were made:   Medications stopped since last EPIC medication reconciliation:   There are no discontinued medications.  Medications started since last [facility] medication reconciliation:  Orders Placed This Encounter   Medications     simvastatin  "(ZOCOR) 10 MG tablet     Sig: Take 10 mg by mouth At Bedtime       REVIEW OF SYSTEMS:  4 point ROS neg other than the symptoms noted above in the HPI. BIMS 12/15    PHYSICAL EXAM:  BP 96/68   Pulse 107   Temp 97  F (36.1  C)   Resp 20   Ht 1.575 m (5' 2\")   Wt 77.7 kg (171 lb 3.2 oz)   SpO2 98%   BMI 31.31 kg/m    Gen: laying in bed, alert, cooperative and in no acute distress  Resp: she has an upper respiratory expiratory wheeze; however, lungs are clear, no wheezing or crackles  Ext: she was laying on her R side and she had some edema in her trunk, buttocks and R thigh - her ankles/feet remain without edema  Neuro: CX II-XII grossly in tact; ROM in all four extremities grossly in tact  Psych: memory, judgement and insight impaired  Skin: see HPI - no open or infected areas on hands, some   areas on her knuckles that look as though they could be related to poor circulation    LABS/IMAGING: Reviewed as per [facility] and/or [facility].     ASSESSMENT / PLAN:    Weight Gain  Seen today in her bed laying on her R side and she has some truncal edema and edema in her R thigh. O2 sats 98% on room air. Lungs are without wheezing or crackles though she has an expiratory wheeze. She was 151 lbs in March 2023. Looking at the trend, she had a period in April that she was 160-162 lbs and then back to 153 lbs on 5/15 with the addition of furosemide 20 mg daily on 5/8. She was back up to 159 lbs on 5/24 and then 153 lbs on 5/26 with no intervention between. On 5/29 weight was 163 lbs (so 10 lbs variability in 4 days?) Furosemide was increased from 20 mg daily to BID on 5/31. Despite this her cyndy is now 171 lbs. She is intravascularly dry  - BPs trending down, HR up and labs with MAXIM below  -- see HPI re: I think this is all decompensated CHF in setting of severe TR, unable to diurese due to renal function and relative hypotension and that her cool limbs are also related to this  -- referral placed to [facility] hospice - very " much appreciate their care    MAXIM on CKD III  Cr 1.91, up from 1.23 on 6/2 on increased dose of furosemide. Also with BPs trending down and some tachycardia - she is dry  -- furosemide decreased from 20 BID to 20 daily (order given yesterday)  -- see above re: hospice referral     HFpEF (EF 55-60%), HTN  Severe Tricuspid Regurgitation   SBPs 130s-150s, most <150 but with increased dose of furosemide now SBPs 110s and was 96/68 on 6/14. HR 110s. Concern her weight gain and clinical picture is related to her severe TR   -- furosemide 20 mg daily, metoprolol  mg daily  -- cancel follow up with cardiology on 7/14/23  -- see above re: hospice referral     Supratherapuetic INR  Warfarin on hold last night and tonight. Had been on 2 mg M/W/F and 1 mg AOD  -- INR ordered for tomorrow 6/16    Concern for PAD  Left Foot Pain  Her limbs are cool, have a bluish hue. She'd had some foot sores. Portable Dopplar U/S done on 6/2/23 withoutsignificant flow obstruction   -- I think related to her CHF/circulation  -- add morphine solutab 2.5 mg q4h PRN  -- cancel vascular medicine appt on 6/19/23    Electronically signed by  Savanah Lowry MD

## 2023-06-16 NOTE — TELEPHONE ENCOUNTER
[facility] Geriatrics Triage Nurse INR     Provider: Savanah Mitchell MD  Facility: [facility]   Facility Type:  LTC    Caller: Ivania    Reason for call: INR  Diagnosis/Goal: A. Fib    Todays INR: 4.65  Last INR 4.23 on 6/14 - coumadin held    Heparin/Lovenox:  No  Currently on ABX?: No  Other interacting medication:  None  Missed or refused doses: No    Verbal Order/Direction given by Provider: Continue to hold Coumadin. Hospice pending; if Coumadin is not discontinued, check INR on 6/19/23.    Provider Giving Order:  Savanah Mitchell MD    Verbal Order given to: Ivania Mae

## 2023-06-21 ENCOUNTER — DOCUMENTATION ONLY (OUTPATIENT)
Dept: GERIATRICS | Facility: CLINIC | Age: 88
End: 2023-06-21
Payer: MEDICARE

## 2025-03-26 NOTE — TELEPHONE ENCOUNTER
ANTICOAGULATION  MANAGEMENT    Assessment     Today's INR result of 2.70 is Therapeutic (goal INR of 2.0-3.0)        Warfarin taken as previously instructed    No new diet changes affecting INR    No new medication/supplements affecting INR    Continues to tolerate warfarin with no reported s/s of bleeding or thromboembolism     Previous INR was Subtherapeutic at 1.90 on 11/18/19.    Plan:     Spoke with Miesha regarding INR result and instructed:     Warfarin Dosing Instructions:   (has 5mg tabs)   - Continue current warfarin dose 5 mg daily on Mondays; and 2.5 mg daily rest of week.    Instructed patient to follow up no later than:  2-4 wks.   - hard to drive out in the winter, car not so reliable.  Will do the best she can to come in for INR.    Education provided: importance of consistent vitamin K intake    Miesha verbalizes understanding and agrees to warfarin dosing plan.    Instructed to call the [facility] for any changes, questions or concerns. (#[phone])   ?   Reina Cruz RN    Subjective/Objective:      Miesha Quarles, a 89 y.o. female is on warfarin.     Miesha reports:     Home warfarin dose: as updated on anticoagulation calendar per template     Missed doses: No     Medication changes:  No     S/S of bleeding or thromboembolism:  No     New Injury or illness:  No     Changes in diet or alcohol consumption:  No     Upcoming surgery, procedure or cardioversion:  No    Anticoagulation Episode Summary     Current INR goal:   2.0-3.0   TTR:   73.9 % (1 y)   Next INR check:   2/25/2020   INR from last check:   2.70 (1/28/2020)   Weekly max warfarin dose:      Target end date:      INR check location:      Preferred lab:      Send INR reminders to:   [facility]    Indications    Atrial flutter (H) [I48.92]           Comments:            Anticoagulation Care Providers     Provider Role Specialty Phone number    Roc Mares MD Referring Internal Medicine [phone]     53 Hour(s) 6 Minute(s)